# Patient Record
Sex: FEMALE | Race: WHITE | NOT HISPANIC OR LATINO | Employment: UNEMPLOYED | URBAN - METROPOLITAN AREA
[De-identification: names, ages, dates, MRNs, and addresses within clinical notes are randomized per-mention and may not be internally consistent; named-entity substitution may affect disease eponyms.]

---

## 2017-02-19 ENCOUNTER — HOSPITAL ENCOUNTER (EMERGENCY)
Facility: HOSPITAL | Age: 57
Discharge: HOME | End: 2017-02-19
Attending: FAMILY MEDICINE
Payer: MEDICAID

## 2017-02-19 VITALS
HEART RATE: 84 BPM | DIASTOLIC BLOOD PRESSURE: 80 MMHG | OXYGEN SATURATION: 99 % | BODY MASS INDEX: 30.82 KG/M2 | WEIGHT: 185 LBS | HEIGHT: 65 IN | RESPIRATION RATE: 20 BRPM | SYSTOLIC BLOOD PRESSURE: 160 MMHG | TEMPERATURE: 98 F

## 2017-02-19 DIAGNOSIS — K29.00 ACUTE SUPERFICIAL GASTRITIS WITHOUT HEMORRHAGE: Primary | ICD-10-CM

## 2017-02-19 LAB
ALBUMIN SERPL BCP-MCNC: 4 G/DL
ALP SERPL-CCNC: 36 IU/L
ALT SERPL W/O P-5'-P-CCNC: 15 IU/L
AMYLASE SERPL-CCNC: 64 U/L
ANION GAP SERPL CALC-SCNC: 10 MMOL/L
AST SERPL-CCNC: 21 IU/L
BACTERIA #/AREA URNS AUTO: NORMAL /HPF
BASOPHILS # BLD AUTO: 0.03 K/UL
BASOPHILS NFR BLD: 0.5 %
BILIRUB SERPL-MCNC: 0.5 MG/DL
BILIRUB UR QL STRIP: NEGATIVE
BUN SERPL-MCNC: 19 MG/DL
CALCIUM SERPL-MCNC: 9.4 MG/DL
CHLORIDE SERPL-SCNC: 97 MMOL/L
CLARITY UR REFRACT.AUTO: CLEAR
CO2 SERPL-SCNC: 32 MMOL/L
COLOR UR AUTO: YELLOW
CREAT SERPL-MCNC: 0.55 MG/DL
DIFFERENTIAL METHOD: ABNORMAL
EOSINOPHIL # BLD AUTO: 0 K/UL
EOSINOPHIL NFR BLD: 0.6 %
ERYTHROCYTE [DISTWIDTH] IN BLOOD BY AUTOMATED COUNT: 12.9 %
EST. GFR  (AFRICAN AMERICAN): >60 ML/MIN/1.73 M^2
EST. GFR  (NON AFRICAN AMERICAN): >60 ML/MIN/1.73 M^2
GLUCOSE SERPL-MCNC: 116 MG/DL
GLUCOSE UR QL STRIP: NEGATIVE
HCT VFR BLD AUTO: 39.4 %
HGB BLD-MCNC: 13.2 G/DL
HGB UR QL STRIP: NEGATIVE
KETONES UR QL STRIP: NEGATIVE
LEUKOCYTE ESTERASE UR QL STRIP: ABNORMAL
LIPASE SERPL-CCNC: 182 U/L
LYMPHOCYTES # BLD AUTO: 3 K/UL
LYMPHOCYTES NFR BLD: 47.9 %
MCH RBC QN AUTO: 29.9 PG
MCHC RBC AUTO-ENTMCNC: 33.5 %
MCV RBC AUTO: 89 FL
MICROSCOPIC COMMENT: NORMAL
MONOCYTES # BLD AUTO: 0.4 K/UL
MONOCYTES NFR BLD: 6.6 %
NEUTROPHILS # BLD AUTO: 2.7 K/UL
NEUTROPHILS NFR BLD: 44.2 %
NITRITE UR QL STRIP: NEGATIVE
PH UR STRIP: 8 [PH] (ref 5–8)
PLATELET # BLD AUTO: 157 K/UL
PMV BLD AUTO: 13.2 FL
POTASSIUM SERPL-SCNC: 4.4 MMOL/L
PROT SERPL-MCNC: 7.2 G/DL
PROT UR QL STRIP: NEGATIVE
RBC # BLD AUTO: 4.41 M/UL
RBC #/AREA URNS AUTO: 0 /HPF (ref 0–4)
SODIUM SERPL-SCNC: 139 MMOL/L
SP GR UR STRIP: 1.01 (ref 1–1.03)
SQUAMOUS #/AREA URNS AUTO: NORMAL /HPF
TROPONIN I SERPL DL<=0.01 NG/ML-MCNC: <0.012 NG/ML
URN SPEC COLLECT METH UR: ABNORMAL
UROBILINOGEN UR STRIP-ACNC: NEGATIVE EU/DL
WBC # BLD AUTO: 6.18 K/UL
WBC #/AREA URNS AUTO: 2 /HPF (ref 0–5)
WBC CLUMPS UR QL AUTO: NORMAL

## 2017-02-19 PROCEDURE — 96361 HYDRATE IV INFUSION ADD-ON: CPT

## 2017-02-19 PROCEDURE — 83690 ASSAY OF LIPASE: CPT

## 2017-02-19 PROCEDURE — 96375 TX/PRO/DX INJ NEW DRUG ADDON: CPT

## 2017-02-19 PROCEDURE — 96374 THER/PROPH/DIAG INJ IV PUSH: CPT

## 2017-02-19 PROCEDURE — 85025 COMPLETE CBC W/AUTO DIFF WBC: CPT

## 2017-02-19 PROCEDURE — 84484 ASSAY OF TROPONIN QUANT: CPT

## 2017-02-19 PROCEDURE — 63600175 PHARM REV CODE 636 W HCPCS: Performed by: FAMILY MEDICINE

## 2017-02-19 PROCEDURE — 81000 URINALYSIS NONAUTO W/SCOPE: CPT

## 2017-02-19 PROCEDURE — C9113 INJ PANTOPRAZOLE SODIUM, VIA: HCPCS | Performed by: FAMILY MEDICINE

## 2017-02-19 PROCEDURE — 80053 COMPREHEN METABOLIC PANEL: CPT

## 2017-02-19 PROCEDURE — 25000003 PHARM REV CODE 250: Performed by: FAMILY MEDICINE

## 2017-02-19 PROCEDURE — 93005 ELECTROCARDIOGRAM TRACING: CPT

## 2017-02-19 PROCEDURE — 82150 ASSAY OF AMYLASE: CPT

## 2017-02-19 PROCEDURE — 99284 EMERGENCY DEPT VISIT MOD MDM: CPT | Mod: 25

## 2017-02-19 RX ORDER — TRAMADOL HYDROCHLORIDE 50 MG/1
50 TABLET ORAL EVERY 8 HOURS PRN
Qty: 30 TABLET | Refills: 0 | Status: SHIPPED | OUTPATIENT
Start: 2017-02-19 | End: 2017-03-01

## 2017-02-19 RX ORDER — TRAMADOL HYDROCHLORIDE 50 MG/1
50 TABLET ORAL
Status: COMPLETED | OUTPATIENT
Start: 2017-02-19 | End: 2017-02-19

## 2017-02-19 RX ORDER — DICYCLOMINE HYDROCHLORIDE 20 MG/1
20 TABLET ORAL 3 TIMES DAILY PRN
Qty: 30 TABLET | Refills: 0 | Status: SHIPPED | OUTPATIENT
Start: 2017-02-19 | End: 2017-03-21

## 2017-02-19 RX ORDER — PANTOPRAZOLE SODIUM 20 MG/1
40 TABLET, DELAYED RELEASE ORAL DAILY
Qty: 30 TABLET | Refills: 0 | Status: SHIPPED | OUTPATIENT
Start: 2017-02-19 | End: 2017-05-12 | Stop reason: CLARIF

## 2017-02-19 RX ORDER — PANTOPRAZOLE SODIUM 40 MG/10ML
40 INJECTION, POWDER, LYOPHILIZED, FOR SOLUTION INTRAVENOUS
Status: COMPLETED | OUTPATIENT
Start: 2017-02-19 | End: 2017-02-19

## 2017-02-19 RX ORDER — ONDANSETRON 2 MG/ML
4 INJECTION INTRAMUSCULAR; INTRAVENOUS
Status: COMPLETED | OUTPATIENT
Start: 2017-02-19 | End: 2017-02-19

## 2017-02-19 RX ADMIN — PANTOPRAZOLE SODIUM 40 MG: 40 INJECTION, POWDER, FOR SOLUTION INTRAVENOUS at 10:02

## 2017-02-19 RX ADMIN — LIDOCAINE HYDROCHLORIDE: 20 SOLUTION ORAL; TOPICAL at 12:02

## 2017-02-19 RX ADMIN — TRAMADOL HYDROCHLORIDE 50 MG: 50 TABLET, COATED ORAL at 12:02

## 2017-02-19 RX ADMIN — SODIUM CHLORIDE 1000 ML: 0.9 INJECTION, SOLUTION INTRAVENOUS at 10:02

## 2017-02-19 RX ADMIN — ONDANSETRON 4 MG: 2 INJECTION INTRAMUSCULAR; INTRAVENOUS at 10:02

## 2017-02-19 NOTE — ED PROVIDER NOTES
Encounter Date: 2017       History     Chief Complaint   Patient presents with    Nausea     nausea accompinied by sharp pains under right ribs x 4 days      Review of patient's allergies indicates:   Allergen Reactions    Codeine Hives    Sulfa (sulfonamide antibiotics) Hives     HPI Comments: Patient complains of right upper quadrant pain since last 4 days.  Mild nausea vomiting so far.  No blood.  No diarrhea.  No fever.  Patient says she had a kidney tumor removed about 4 years ago.    The history is provided by the patient.     Past Medical History   Diagnosis Date    Anxiety     Depression     High cholesterol     Hypertension      No past medical history pertinent negatives.  Past Surgical History   Procedure Laterality Date    Hysterectomy      S-section       section      Breast surgery       History reviewed. No pertinent family history.  Social History   Substance Use Topics    Smoking status: Never Smoker    Smokeless tobacco: None    Alcohol use No     Review of Systems   Constitutional: Negative for activity change, appetite change, chills, fatigue, fever and unexpected weight change.   HENT: Negative for ear discharge, ear pain and sore throat.    Eyes: Negative for pain, discharge, redness and itching.   Respiratory: Negative for cough, choking, chest tightness, shortness of breath and wheezing.    Gastrointestinal: Positive for abdominal pain, nausea and vomiting. Negative for abdominal distention, anal bleeding, blood in stool, constipation and diarrhea.   Genitourinary: Negative for dysuria.   Musculoskeletal: Negative for back pain, neck pain and neck stiffness.   Skin: Negative for rash.   Neurological: Negative for dizziness, light-headedness and numbness.   Psychiatric/Behavioral: The patient is not nervous/anxious.    All other systems reviewed and are negative.      Physical Exam   Initial Vitals   BP Pulse Resp Temp SpO2   17 0949 17 0949 17 0949  02/19/17 0949 02/19/17 0949   160/66 73 20 98.1 °F (36.7 °C) 96 %     Physical Exam    Nursing note and vitals reviewed.  Constitutional: She appears well-developed and well-nourished.   HENT:   Head: Normocephalic and atraumatic.   Right Ear: External ear normal.   Left Ear: External ear normal.   Nose: Nose normal.   Mouth/Throat: Oropharynx is clear and moist.   Eyes: Conjunctivae and EOM are normal. Pupils are equal, round, and reactive to light.   Neck: Normal range of motion. Neck supple.   Cardiovascular: Normal rate, regular rhythm, normal heart sounds and intact distal pulses.   Pulmonary/Chest: Breath sounds normal. No respiratory distress. She has no wheezes. She has no rhonchi. She has no rales. She exhibits no tenderness.   Abdominal: Soft. Bowel sounds are normal. She exhibits no distension and no mass. There is no hepatosplenomegaly. There is tenderness in the right upper quadrant. There is guarding. There is no rebound, no tenderness at McBurney's point and negative Hickey's sign.       Musculoskeletal: Normal range of motion.   Neurological: She is alert and oriented to person, place, and time. She has normal strength and normal reflexes. She displays normal reflexes. No cranial nerve deficit or sensory deficit.         ED Course   Procedures  Labs Reviewed   CBC W/ AUTO DIFFERENTIAL - Abnormal; Notable for the following:        Result Value    MPV 13.2 (*)     All other components within normal limits   COMPREHENSIVE METABOLIC PANEL - Abnormal; Notable for the following:     CO2 32 (*)     Glucose 116 (*)     BUN, Bld 19 (*)     Alkaline Phosphatase 36 (*)     All other components within normal limits   URINALYSIS - Abnormal; Notable for the following:     Leukocytes, UA 1+ (*)     All other components within normal limits   TROPONIN I   LIPASE   AMYLASE   URINALYSIS MICROSCOPIC     EKG Readings: (Independently Interpreted)   Rhythm: Normal Sinus Rhythm. Ectopy: No Ectopy. Conduction: Normal. ST  Segments: Normal ST Segments. T Waves: Normal. Clinical Impression: Normal Sinus Rhythm                            ED Course   pain better and pt feels safe to go home.  Clinical Impression:   The encounter diagnosis was Acute superficial gastritis without hemorrhage.    Disposition:   Disposition: Discharged  Condition: Stable  Advised to follow up with primary care physician if pain persist after taking medication for further evaluation.       Maximo Jay MD  02/19/17 4113

## 2017-02-19 NOTE — ED AVS SNAPSHOT
[facility]  [address] [place] [address]               Ashli Kumar   2017  9:48 AM   ED    Description:  Female : 1960   Department:  [facility]           Your Care was Coordinated By:     Provider Role From To    Maximo Jay MD Attending Provider 17 0954 --      Reason for Visit     Nausea           Diagnoses this Visit        Comments    Acute superficial gastritis without hemorrhage    -  Primary       ED Disposition     None           To Do List           Follow-up Information     Follow up with Kobi Harrington MD In 1 week.    Specialty:  Family Medicine    Contact information:    [address] [place] [id number] [phone]         These Medications        Disp Refills Start End    pantoprazole (PROTONIX) 20 MG tablet 30 tablet 0 2017    Take 2 tablets (40 mg total) by mouth once daily. - Oral    dicyclomine (BENTYL) 20 mg tablet 30 tablet 0 2017 3/21/2017    Take 1 tablet (20 mg total) by mouth 3 (three) times daily as needed (pain). - Oral    tramadol (ULTRAM) 50 mg tablet 30 tablet 0 2017 3/1/2017    Take 1 tablet (50 mg total) by mouth every 8 (eight) hours as needed for Pain. - Oral      [facility]     [facility] On Call Nurse Care Line -  Assistance  Registered nurses in the [facility] provide clinical advisement, health education, appointment booking, and other advisory services.  Call for this free service at [phone].             Medications           Message regarding Medications     Verify the changes and/or additions to your medication regime listed below are the same as discussed with your clinician today.  If any of these changes or additions are incorrect, please notify your healthcare provider.        START taking these NEW medications        Refills    pantoprazole (PROTONIX) 20 MG tablet 0    Sig: Take 2 tablets (40 mg total) by  mouth once daily.    Class: Print    Route: Oral    dicyclomine (BENTYL) 20 mg tablet 0    Sig: Take 1 tablet (20 mg total) by mouth 3 (three) times daily as needed (pain).    Class: Print    Route: Oral    tramadol (ULTRAM) 50 mg tablet 0    Sig: Take 1 tablet (50 mg total) by mouth every 8 (eight) hours as needed for Pain.    Class: Print    Route: Oral      These medications were administered today        Dose Freq    ondansetron injection 4 mg 4 mg ED 1 Time    Sig: Inject 4 mg into the vein ED 1 Time.    Class: Normal    Route: Intravenous    sodium chloride 0.9% bolus 1,000 mL 1,000 mL ED 1 Time    Sig: Inject 1,000 mLs into the vein ED 1 Time.    Class: Normal    Route: Intravenous    pantoprazole injection 40 mg 40 mg ED 1 Time    Sig: Inject 40 mg into the vein ED 1 Time.    Class: Normal    Route: Intravenous    (pyxis) gi cocktail (mylanta 30 mL, lidocaine 2 % viscous 10 mL, dicyclomine 10 mL) 50 mL  ED 1 Time    Sig: Take by mouth ED 1 Time.    Class: Normal    Route: Oral    tramadol tablet 50 mg 50 mg ED 1 Time    Sig: Take 1 tablet (50 mg total) by mouth ED 1 Time.    Class: Normal    Route: Oral      STOP taking these medications     hydrocodone-acetaminophen 7.5-325mg (NORCO) 7.5-325 mg per tablet Take 1 tablet by mouth every 6 (six) hours as needed for Pain.           Verify that the below list of medications is an accurate representation of the medications you are currently taking.  If none reported, the list may be blank. If incorrect, please contact your healthcare provider. Carry this list with you in case of emergency.           Current Medications     (pyxis) gi cocktail (mylanta 30 mL, lidocaine 2 % viscous 10 mL, dicyclomine 10 mL) 50 mL Take by mouth ED 1 Time.    dicyclomine (BENTYL) 20 mg tablet Take 1 tablet (20 mg total) by mouth 3 (three) times daily as needed (pain).    fluoxetine (PROZAC) 10 MG Tab Take 4 tablets (40 mg total) by mouth every 12 (twelve) hours.    pantoprazole  "(PROTONIX) 20 MG tablet Take 2 tablets (40 mg total) by mouth once daily.    pantoprazole injection 40 mg Inject 40 mg into the vein ED 1 Time.    tramadol (ULTRAM) 50 mg tablet Take 1 tablet (50 mg total) by mouth every 8 (eight) hours as needed for Pain.    tramadol tablet 50 mg Take 1 tablet (50 mg total) by mouth ED 1 Time.           Clinical Reference Information           Your Vitals Were     BP Pulse Temp Resp Height Weight    155/90 75 98 °F (36.7 °C) 20 5' 5" (1.651 m) 83.9 kg (185 lb)    SpO2 BMI             97% 30.79 kg/m2         Allergies as of 2/19/2017        Reactions    Codeine Hives    Sulfa (Sulfonamide Antibiotics) Hives      Immunizations Administered on Date of Encounter - 2/19/2017     None      ED Micro, Lab, POCT     Start Ordered       Status Ordering Provider    02/19/17 1007 02/19/17 1007  Troponin I  STAT      Final result     02/19/17 1007 02/19/17 1007  CBC auto differential  STAT      Final result     02/19/17 1007 02/19/17 1007  Comprehensive metabolic panel  STAT      Final result     02/19/17 1007 02/19/17 1007  Lipase  STAT      Final result     02/19/17 1007 02/19/17 1007  Urinalysis  STAT      Edited Result - FINAL     02/19/17 1007 02/19/17 1007  Amylase  STAT      Final result     02/19/17 1007 02/19/17 1007  Urinalysis Microscopic  Once      Final result       ED Imaging Orders     Start Ordered       Status Ordering Provider    02/19/17 1107 02/19/17 1107  CT Abdomen Pelvis  Without Contrast  1 time imaging      Final result         Discharge Instructions         Treating Gastritis     Take your medicines as directed, even if your stomach pain goes away.    A medical evaluation will be done to find out the cause of your symptoms. The evaluation may include your health history, a physical exam, and some tests. Once your evaluation is done, treatment can begin. It may include taking certain medicines and making some lifestyle changes. Follow your healthcare providers " advice.  Taking medicines  Your healthcare providers may prescribe some medicines to neutralize or reduce excess stomach acids. If tests show that H. pylori are in your stomach lining, antibiotics may be prescribed. H.pylori are a type of bacteria that can cause gastritis.  Avoiding certain things  Be sure to avoid:  · Aspirin. Avoid taking aspirin and other anti-inflammatory medicines, such as ibuprofen. They can irritate your stomach lining. Also, check with your healthcare provider before taking or stopping any medicines.  · Spicy foods and caffeine. Stay away from foods prepared with spices, especially black pepper. Caffeine can also make your symptoms worse. So, avoid coffee, tea, cola drinks, and chocolate. Be sure to tell your healthcare provider about any other foods or liquids that bother your stomach.  · Tobacco and alcohol. Dont use tobacco or drink alcohol. Tobacco and alcohol can increase stomach acids and worsen your gastritis symptoms.  Reducing your stress  Stress may make your gastritis symptoms worse. Whenever you can, reduce the stress in your life. One way to do this is to start an exercise program--talk to your healthcare provider first. Also, try to get enough sleep, at least 8 hours a night.  Date Last Reviewed: 7/1/2016  © 0027-6574 [employer]. [address] [place] [address]. All rights reserved. This information is not intended as a substitute for professional medical care. Always follow your healthcare professional's instructions.          [facility] Sign-Up     Activating your [facility] account is as easy as 1-2-3!     1) Visit [email or web address], select Sign Up Now, enter this activation code and your date of birth, then select Next.  [email or web address]  Expires: 4/5/2017 12:10 PM      2) Create a username and password to use when you visit [facility] in the future and select a security question in case you lose your password and select Next.    3) Enter your e-mail  address and click Sign Up!    Additional Information  If you have questions, please e-mail [email or web address] or call [phone] to talk to our [facility] staff. Remember, [facility] is NOT to be used for urgent needs. For medical emergencies, dial 911.          [facility] complies with applicable Federal civil rights laws and does not discriminate on the basis of race, color, national origin, age, disability, or sex.        Language Assistance Services     ATTENTION: Language assistance services are available, free of charge. Please call [phone].      ATENCIÓN: Si habla español, tiene a zavala disposición servicios gratuitos de asistencia lingüística. Llame al [phone].     CHÚ Ý: N?u b?n nói Ti?ng Vi?t, có các d?ch v? h? tr? ngôn ng? mi?n phí dành cho b?n. G?i s? [phone].

## 2017-02-19 NOTE — DISCHARGE INSTRUCTIONS
Treating Gastritis     Take your medicines as directed, even if your stomach pain goes away.    A medical evaluation will be done to find out the cause of your symptoms. The evaluation may include your health history, a physical exam, and some tests. Once your evaluation is done, treatment can begin. It may include taking certain medicines and making some lifestyle changes. Follow your healthcare providers advice.  Taking medicines  Your healthcare providers may prescribe some medicines to neutralize or reduce excess stomach acids. If tests show that H. pylori are in your stomach lining, antibiotics may be prescribed. H.pylori are a type of bacteria that can cause gastritis.  Avoiding certain things  Be sure to avoid:  · Aspirin. Avoid taking aspirin and other anti-inflammatory medicines, such as ibuprofen. They can irritate your stomach lining. Also, check with your healthcare provider before taking or stopping any medicines.  · Spicy foods and caffeine. Stay away from foods prepared with spices, especially black pepper. Caffeine can also make your symptoms worse. So, avoid coffee, tea, cola drinks, and chocolate. Be sure to tell your healthcare provider about any other foods or liquids that bother your stomach.  · Tobacco and alcohol. Dont use tobacco or drink alcohol. Tobacco and alcohol can increase stomach acids and worsen your gastritis symptoms.  Reducing your stress  Stress may make your gastritis symptoms worse. Whenever you can, reduce the stress in your life. One way to do this is to start an exercise program--talk to your healthcare provider first. Also, try to get enough sleep, at least 8 hours a night.  Date Last Reviewed: 7/1/2016  © 6555-3742 [employer]. [address] [place] [address], [place] [address]. All rights reserved. This information is not intended as a substitute for professional medical care. Always follow your healthcare professional's instructions.

## 2017-05-04 ENCOUNTER — OFFICE VISIT (OUTPATIENT)
Dept: GASTROENTEROLOGY | Facility: CLINIC | Age: 57
End: 2017-05-04
Payer: MEDICAID

## 2017-05-04 VITALS — HEIGHT: 65 IN | WEIGHT: 184.06 LBS | BODY MASS INDEX: 30.67 KG/M2

## 2017-05-04 DIAGNOSIS — Z12.11 COLON CANCER SCREENING: ICD-10-CM

## 2017-05-04 DIAGNOSIS — R10.12 ABDOMINAL PAIN, LUQ (LEFT UPPER QUADRANT): ICD-10-CM

## 2017-05-04 DIAGNOSIS — R10.13 DYSPEPSIA: ICD-10-CM

## 2017-05-04 DIAGNOSIS — R19.5 LOOSE STOOLS: ICD-10-CM

## 2017-05-04 DIAGNOSIS — R10.13 ABDOMINAL PAIN, EPIGASTRIC: Primary | ICD-10-CM

## 2017-05-04 DIAGNOSIS — K62.5 RECTAL BLEEDING: ICD-10-CM

## 2017-05-04 DIAGNOSIS — R15.2 FECAL URGENCY: ICD-10-CM

## 2017-05-04 DIAGNOSIS — R10.11 ABDOMINAL PAIN, RUQ (RIGHT UPPER QUADRANT): ICD-10-CM

## 2017-05-04 PROCEDURE — 99213 OFFICE O/P EST LOW 20 MIN: CPT | Mod: PBBFAC,PN | Performed by: NURSE PRACTITIONER

## 2017-05-04 PROCEDURE — 99204 OFFICE O/P NEW MOD 45 MIN: CPT | Mod: S$PBB,,, | Performed by: NURSE PRACTITIONER

## 2017-05-04 PROCEDURE — 99999 PR PBB SHADOW E&M-EST. PATIENT-LVL III: CPT | Mod: PBBFAC,,, | Performed by: NURSE PRACTITIONER

## 2017-05-04 RX ORDER — CLONAZEPAM 0.5 MG/1
0.5 TABLET ORAL 2 TIMES DAILY PRN
COMMUNITY
End: 2019-02-09

## 2017-05-04 RX ORDER — OXYBUTYNIN CHLORIDE 5 MG/1
5 TABLET ORAL 3 TIMES DAILY
COMMUNITY
End: 2022-05-27

## 2017-05-04 RX ORDER — DICYCLOMINE HYDROCHLORIDE 10 MG/1
10 CAPSULE ORAL 4 TIMES DAILY PRN
Qty: 120 CAPSULE | Refills: 3 | Status: SHIPPED | OUTPATIENT
Start: 2017-05-04 | End: 2017-06-03

## 2017-05-04 RX ORDER — LISINOPRIL AND HYDROCHLOROTHIAZIDE 10; 12.5 MG/1; MG/1
1 TABLET ORAL DAILY
Status: ON HOLD | COMMUNITY
End: 2019-05-13 | Stop reason: HOSPADM

## 2017-05-04 RX ORDER — PRAVASTATIN SODIUM 40 MG/1
40 TABLET ORAL DAILY
COMMUNITY
End: 2022-05-27

## 2017-05-04 RX ORDER — DIVALPROEX SODIUM 500 MG/1
500 TABLET, FILM COATED, EXTENDED RELEASE ORAL 2 TIMES DAILY
COMMUNITY
End: 2022-06-27 | Stop reason: SDUPTHER

## 2017-05-04 RX ORDER — ASPIRIN 81 MG/1
81 TABLET ORAL DAILY
Status: ON HOLD | COMMUNITY
End: 2019-05-13 | Stop reason: HOSPADM

## 2017-05-04 RX ORDER — FLUOXETINE HYDROCHLORIDE 40 MG/1
40 CAPSULE ORAL DAILY
COMMUNITY
End: 2022-06-27 | Stop reason: SDUPTHER

## 2017-05-04 RX ORDER — PANTOPRAZOLE SODIUM 40 MG/1
40 TABLET, DELAYED RELEASE ORAL DAILY
Qty: 30 TABLET | Refills: 3 | Status: SHIPPED | OUTPATIENT
Start: 2017-05-04 | End: 2017-05-12 | Stop reason: CLARIF

## 2017-05-04 RX ORDER — METOPROLOL SUCCINATE 50 MG/1
50 TABLET, EXTENDED RELEASE ORAL DAILY
COMMUNITY
End: 2022-05-27

## 2017-05-04 RX ORDER — LORATADINE 10 MG/1
10 TABLET ORAL DAILY
COMMUNITY
End: 2017-12-22

## 2017-05-04 NOTE — PROGRESS NOTES
Subjective:       Patient ID: Ashli Kumar is a 56 y.o. female.    Chief Complaint: Gas    HPI  Reports presenting to ED in February with upper abdominal pain. (6/10).  She was started on pantoprazole and bentyl which did provide some benefit.  She is not longer taking these and has continued to have upper abdominal pain, increased gas, belching, reflux, indigestion.  She also reports fecal urgency with loose stools after eating.  Certain foods may be worse like greasy foods and cheese.  Has noted blood episodically with bowel movements.  No black stools.  No nausea or vomiting.   No dysphagia.   Labs and CT at ED were unremarkable.  She has never had EGD or colonoscopy.    Review of Systems   Constitutional: Negative.  Negative for activity change, appetite change, fatigue, fever and unexpected weight change.   HENT: Negative.  Negative for sore throat and trouble swallowing.    Respiratory: Negative.  Negative for cough, choking and shortness of breath.    Cardiovascular: Negative.  Negative for chest pain.   Gastrointestinal: Positive for abdominal pain and diarrhea. Negative for abdominal distention, blood in stool, constipation, nausea and vomiting.   Genitourinary: Negative.  Negative for difficulty urinating, dysuria and hematuria.   Musculoskeletal: Negative.  Negative for neck pain and neck stiffness.   Skin: Negative.    Neurological: Negative.  Negative for dizziness, syncope, weakness and light-headedness.   Psychiatric/Behavioral: Negative.        Objective:      Physical Exam   Constitutional: She is oriented to person, place, and time. She appears well-developed and well-nourished. No distress.   HENT:   Head: Normocephalic.   Eyes: No scleral icterus.   Neck: Neck supple.   Cardiovascular: Normal rate.    Pulmonary/Chest: Effort normal. No respiratory distress.   Abdominal: Soft. Normal appearance and bowel sounds are normal. She exhibits no distension and no mass. There is tenderness in  the right upper quadrant, epigastric area and left upper quadrant.   Musculoskeletal: Normal range of motion. She exhibits no edema.   Neurological: She is alert and oriented to person, place, and time.   Skin: Skin is warm and dry. She is not diaphoretic. No pallor.   Psychiatric: She has a normal mood and affect. Her behavior is normal. Judgment and thought content normal.   Vitals reviewed.      Assessment:       1. Abdominal pain, epigastric    2. Abdominal pain, LUQ (left upper quadrant)    3. Abdominal pain, RUQ (right upper quadrant)    4. Dyspepsia    5. Rectal bleeding    6. Fecal urgency    7. Loose stools    8. Colon cancer screening        Plan:         Ashli Antonio was seen today for gas.    Diagnoses and all orders for this visit:    Abdominal pain, epigastric  -     US Abdomen Complete; Future    Abdominal pain, LUQ (left upper quadrant)  -     US Abdomen Complete; Future    Abdominal pain, RUQ (right upper quadrant)  -     US Abdomen Complete; Future    Dyspepsia    Rectal bleeding    Fecal urgency    Loose stools    Colon cancer screening    Other orders  -     pantoprazole (PROTONIX) 40 MG tablet; Take 1 tablet (40 mg total) by mouth once daily.  -     dicyclomine (BENTYL) 10 MG capsule; Take 1 capsule (10 mg total) by mouth 4 (four) times daily as needed.  -     Case request GI: ESOPHAGOGASTRODUODENOSCOPY (EGD), COLONOSCOPY    I have explained the planned procedures to the patient.The risks, benefits and alternatives of the procedure were also explained in detail. Patient verbalized understanding, all questions were answered. The patient agrees to proceed as planned.    Lactose free trial.  Gas-x.  Resume bentyl and pantoprazole.    Follow up after the above.

## 2017-05-11 ENCOUNTER — ANESTHESIA EVENT (OUTPATIENT)
Dept: ENDOSCOPY | Facility: HOSPITAL | Age: 57
End: 2017-05-11
Payer: MEDICAID

## 2017-05-11 ENCOUNTER — TELEPHONE (OUTPATIENT)
Dept: GASTROENTEROLOGY | Facility: CLINIC | Age: 57
End: 2017-05-11

## 2017-05-11 NOTE — TELEPHONE ENCOUNTER
----- Message from Tracy Huang sent at 5/11/2017  7:09 AM CDT -----  No. [phone]   Patient is having a procedure on Friday, 5/12/17.   What type of diet is she on today.   Please call.

## 2017-05-11 NOTE — ANESTHESIA PREPROCEDURE EVALUATION
2017  Ashli Kumar is a 56 y.o., female w abd pain, dyspepsia, rectal bleeding & loose stools for EGD & colonoscopy.    PRIOR ANES (in Epic)    none 2017    ANES-RELATED MED/SURG  HTN  HLD    Past Surgical History:   Procedure Laterality Date    BREAST SURGERY       SECTION      HYSTERECTOMY      s-section       ALLERGIES  Review of patient's allergies indicates:   Allergen Reactions    Codeine Hives    Sulfa (sulfonamide antibiotics) Hives     ANES-RELATED HOME Rx  ?      Anesthesia Evaluation         Review of Systems  Anesthesia Hx:  History of prior surgery of interest to airway management or planning: Denies Personal Hx of Anesthesia complications.   Social:  Non-Smoker, No Alcohol Use    Hematology/Oncology:  Hematology Normal   Oncology Normal     EENT/Dental:  EENT/Dental Normal 2 missing teeth  No nosebleeds   Cardiovascular:   Exercise tolerance: good Hypertension    Pulmonary:   Snores  Hi STOPBANG   Renal/:   Chronic Renal Disease Kidney tumor removed   Neurological:  Neurology Normal    Endocrine:  Endocrine Normal      Wt Readings from Last 1 Encounters:   17 83.5 kg (184 lb 1.4 oz)     Temp Readings from Last 1 Encounters:   17 36.7 °C (98 °F)     BP Readings from Last 1 Encounters:   17 (!) 160/80     Pulse Readings from Last 1 Encounters:   17 84     SpO2 Readings from Last 1 Encounters:   17 99%       Physical Exam   Airway/Jaw/Neck:  Airway Findings: Mouth Opening: < 3 cm     Dental:  DENTAL FINDINGS: Normal            Lab Results   Component Value Date    WBC 6.18 2017    HGB 13.2 2017    HCT 39.4 2017    MCV 89 2017     2017       Chemistry        Component Value Date/Time     2017 1032    K 4.4 2017 1032    CL 97 2017 1032    CO2 32 (H) 2017 1032    BUN  19 (H) 02/19/2017 1032    CREATININE 0.55 02/19/2017 1032     (H) 02/19/2017 1032        Component Value Date/Time    CALCIUM 9.4 02/19/2017 1032    ALKPHOS 36 (L) 02/19/2017 1032    AST 21 02/19/2017 1032    ALT 15 02/19/2017 1032    BILITOT 0.5 02/19/2017 1032          Lab Results   Component Value Date    ALBUMIN 4.0 02/19/2017     No results found for: TSH, A8BYODZ, J3KRFDR, THYROIDAB    CXR 2016-11-04  Heart size is normal. The lung fields are clear. No acute pulmonary infiltrate.  Chronic spurring of the thoracic spine is present.    EKG 2017-02-19  Normal sinus rhythm  Normal ECG  When compared with ECG of 13-OCT-2016 09:49,  No significant change was found  Confirmed by Bhargav    ECHO      Anesthesia Plan  Type of Anesthesia, risks & benefits discussed:  Anesthesia Type:  MAC  Patient's Preference:   Intra-op Monitoring Plan:   Intra-op Monitoring Plan Comments:   Post Op Pain Control Plan:   Post Op Pain Control Plan Comments:   Induction:    Beta Blocker:         Informed Consent: Patient understands risks and agrees with Anesthesia plan.  Questions answered. Anesthesia consent signed with patient.  ASA Score: 2     Day of Surgery Review of History & Physical:            Ready For Surgery From Anesthesia Perspective.

## 2017-05-12 ENCOUNTER — HOSPITAL ENCOUNTER (OUTPATIENT)
Facility: HOSPITAL | Age: 57
Discharge: HOME | End: 2017-05-12
Attending: INTERNAL MEDICINE | Admitting: INTERNAL MEDICINE
Payer: MEDICAID

## 2017-05-12 ENCOUNTER — ANESTHESIA (OUTPATIENT)
Dept: ENDOSCOPY | Facility: HOSPITAL | Age: 57
End: 2017-05-12
Payer: MEDICAID

## 2017-05-12 DIAGNOSIS — R10.11 RIGHT UPPER QUADRANT PAIN: ICD-10-CM

## 2017-05-12 DIAGNOSIS — R19.7 DIARRHEA, UNSPECIFIED TYPE: ICD-10-CM

## 2017-05-12 DIAGNOSIS — R10.12 LEFT UPPER QUADRANT PAIN: ICD-10-CM

## 2017-05-12 DIAGNOSIS — R10.13 EPIGASTRIC PAIN: Primary | ICD-10-CM

## 2017-05-12 DIAGNOSIS — Z12.12 SCREENING FOR COLORECTAL CANCER: ICD-10-CM

## 2017-05-12 DIAGNOSIS — Z12.11 SCREENING FOR COLORECTAL CANCER: ICD-10-CM

## 2017-05-12 DIAGNOSIS — Z12.11 COLON CANCER SCREENING: Primary | ICD-10-CM

## 2017-05-12 PROCEDURE — 25000003 PHARM REV CODE 250: Performed by: NURSE ANESTHETIST, CERTIFIED REGISTERED

## 2017-05-12 PROCEDURE — 37000009 HC ANESTHESIA EA ADD 15 MINS: Performed by: INTERNAL MEDICINE

## 2017-05-12 PROCEDURE — 37000008 HC ANESTHESIA 1ST 15 MINUTES: Performed by: INTERNAL MEDICINE

## 2017-05-12 PROCEDURE — 25000003 PHARM REV CODE 250: Performed by: INTERNAL MEDICINE

## 2017-05-12 PROCEDURE — 63600175 PHARM REV CODE 636 W HCPCS: Performed by: NURSE ANESTHETIST, CERTIFIED REGISTERED

## 2017-05-12 PROCEDURE — 43239 EGD BIOPSY SINGLE/MULTIPLE: CPT | Performed by: INTERNAL MEDICINE

## 2017-05-12 PROCEDURE — 88342 IMHCHEM/IMCYTCHM 1ST ANTB: CPT | Mod: 26,,, | Performed by: PATHOLOGY

## 2017-05-12 PROCEDURE — 45378 DIAGNOSTIC COLONOSCOPY: CPT | Performed by: INTERNAL MEDICINE

## 2017-05-12 PROCEDURE — 27201012 HC FORCEPS, HOT/COLD, DISP: Performed by: INTERNAL MEDICINE

## 2017-05-12 PROCEDURE — 88305 TISSUE EXAM BY PATHOLOGIST: CPT | Mod: 26,,, | Performed by: PATHOLOGY

## 2017-05-12 PROCEDURE — 88305 TISSUE EXAM BY PATHOLOGIST: CPT | Performed by: PATHOLOGY

## 2017-05-12 PROCEDURE — G0121 COLON CA SCRN NOT HI RSK IND: HCPCS | Mod: 53 | Performed by: INTERNAL MEDICINE

## 2017-05-12 PROCEDURE — 45378 DIAGNOSTIC COLONOSCOPY: CPT | Mod: 52,,, | Performed by: INTERNAL MEDICINE

## 2017-05-12 PROCEDURE — 43239 EGD BIOPSY SINGLE/MULTIPLE: CPT | Mod: 51,,, | Performed by: INTERNAL MEDICINE

## 2017-05-12 RX ORDER — SODIUM CHLORIDE 9 MG/ML
INJECTION, SOLUTION INTRAVENOUS CONTINUOUS
Status: DISCONTINUED | OUTPATIENT
Start: 2017-05-12 | End: 2017-05-12 | Stop reason: HOSPADM

## 2017-05-12 RX ORDER — LIDOCAINE HCL/PF 100 MG/5ML
SYRINGE (ML) INTRAVENOUS
Status: DISCONTINUED | OUTPATIENT
Start: 2017-05-12 | End: 2017-05-12

## 2017-05-12 RX ORDER — PROPOFOL 10 MG/ML
VIAL (ML) INTRAVENOUS CONTINUOUS PRN
Status: DISCONTINUED | OUTPATIENT
Start: 2017-05-12 | End: 2017-05-12

## 2017-05-12 RX ORDER — OMEPRAZOLE 40 MG/1
40 CAPSULE, DELAYED RELEASE ORAL DAILY
Qty: 30 CAPSULE | Refills: 2 | Status: SHIPPED | OUTPATIENT
Start: 2017-05-12 | End: 2019-07-27 | Stop reason: SDUPTHER

## 2017-05-12 RX ORDER — PROPOFOL 10 MG/ML
VIAL (ML) INTRAVENOUS
Status: DISCONTINUED | OUTPATIENT
Start: 2017-05-12 | End: 2017-05-12

## 2017-05-12 RX ADMIN — PROPOFOL 50 MG: 10 INJECTION, EMULSION INTRAVENOUS at 08:05

## 2017-05-12 RX ADMIN — SODIUM CHLORIDE: 0.9 INJECTION, SOLUTION INTRAVENOUS at 09:05

## 2017-05-12 RX ADMIN — LIDOCAINE HYDROCHLORIDE 80 MG: 20 INJECTION, SOLUTION INTRAVENOUS at 08:05

## 2017-05-12 RX ADMIN — SODIUM CHLORIDE: 0.9 INJECTION, SOLUTION INTRAVENOUS at 08:05

## 2017-05-12 RX ADMIN — TOPICAL ANESTHETIC 1 EACH: 200 SPRAY DENTAL; PERIODONTAL at 08:05

## 2017-05-12 RX ADMIN — PROPOFOL 125 MCG/KG/MIN: 10 INJECTION, EMULSION INTRAVENOUS at 08:05

## 2017-05-12 NOTE — PLAN OF CARE
Past Medical History:   Diagnosis Date    Anxiety     Depression     High cholesterol     Hypertension      Accompanied by spouse, ok to speak to him re results of exam.

## 2017-05-12 NOTE — INTERVAL H&P NOTE
The patient has been examined and the H&P has been reviewed:    I concur with the findings and no changes have occurred since H&P was written.    Anesthesia/Surgery risks, benefits and alternative options discussed and understood by patient/family.          Active Hospital Problems    Diagnosis  POA    Screening for colorectal cancer [Z12.11, Z12.12]  Not Applicable      Resolved Hospital Problems    Diagnosis Date Resolved POA   No resolved problems to display.

## 2017-05-12 NOTE — ANESTHESIA POSTPROCEDURE EVALUATION
"Anesthesia Post Evaluation    Patient: Ashli Kumar    Procedure(s) Performed: Procedure(s) (LRB):  ESOPHAGOGASTRODUODENOSCOPY (EGD) (N/A)  COLONOSCOPY Miralax (N/A)    Final Anesthesia Type: MAC  Patient location during evaluation: GI PACU  Patient participation: Yes- Able to Participate  Level of consciousness: awake and alert and oriented  Post-procedure vital signs: reviewed and stable  Pain management: adequate  Airway patency: patent  PONV status at discharge: No PONV  Anesthetic complications: no      Cardiovascular status: blood pressure returned to baseline, hemodynamically stable and stable  Respiratory status: room air, unassisted and spontaneous ventilation  Hydration status: euvolemic  Follow-up not needed.        Visit Vitals    /84 (BP Location: Right arm, Patient Position: Lying, BP Method: Automatic)    Pulse (!) 56    Temp 36.4 °C (97.5 °F) (Oral)    Resp 20    Ht 5' 5" (1.651 m)    Wt 83 kg (183 lb)    SpO2 (!) 93%    Breastfeeding No    BMI 30.45 kg/m2       Pain/Geraldo Score: Pain Assessment Performed: Yes (5/12/2017  7:55 AM)  Presence of Pain: denies (5/12/2017  7:55 AM)      "

## 2017-05-12 NOTE — IP AVS SNAPSHOT
[facility]  [address]  [place] [address]  Phone: [phone]           Patient Discharge Instructions   Our goal is to set you up for success. This packet includes information on your condition, medications, and your home care.  It will help you care for yourself to prevent having to return to the hospital.     Please ask your nurse if you have any questions.      There are many details to remember when preparing to leave the hospital. Here is what you will need to do:    1. Take your medicine. If you are prescribed medications, review your Medication List on the following pages. You may have new medications to  at the pharmacy and others that you'll need to stop taking. Review the instructions for how and when to take your medications. Talk with your doctor or nurses if you are unsure of what to do.     2. Go to your follow-up appointments. Specific follow-up information is listed in the following pages. Your may be contacted by a nurse or clinical provider about future appointments. Be sure we have all of the phone numbers to reach you. Please contact your provider's office if you are unable to make an appointment.     3. Watch for warning signs. Your doctor or nurse will give you detailed warning signs to watch for and when to call for assistance. These instructions may also include educational information about your condition. If you experience any of warning signs to your health, call your doctor.               ** Verify the list of medication(s) below is accurate and up to date. Carry this with you in case of emergency. If your medications have changed, please notify your healthcare provider.             Medication List      CONTINUE taking these medications        Additional Info                      aspirin 81 MG EC tablet   Commonly known as:  ECOTRIN   Refills:  0   Dose:  81 mg    Instructions:  Take 81 mg by mouth once daily.     Begin Date    AM    Noon    PM    Bedtime        clonazePAM 0.5 MG tablet   Commonly known as:  KLONOPIN   Refills:  0   Dose:  0.5 mg    Instructions:  Take 0.5 mg by mouth 2 (two) times daily as needed for Anxiety.     Begin Date    AM    Noon    PM    Bedtime       dicyclomine 10 MG capsule   Commonly known as:  BENTYL   Quantity:  120 capsule   Refills:  3   Dose:  10 mg    Instructions:  Take 1 capsule (10 mg total) by mouth 4 (four) times daily as needed.     Begin Date    AM    Noon    PM    Bedtime       divalproex  MG Tb24   Commonly known as:  DEPAKOTE   Refills:  0   Dose:  500 mg    Instructions:  Take 500 mg by mouth once daily.     Begin Date    AM    Noon    PM    Bedtime       fluoxetine 40 MG capsule   Commonly known as:  PROZAC   Refills:  0    Instructions:  Take by mouth once daily.     Begin Date    AM    Noon    PM    Bedtime       lisinopril-hydrochlorothiazide 10-12.5 mg per tablet   Commonly known as:  PRINZIDE,ZESTORETIC   Refills:  0   Dose:  1 tablet    Instructions:  Take 1 tablet by mouth once daily.     Begin Date    AM    Noon    PM    Bedtime       loratadine 10 mg tablet   Commonly known as:  CLARITIN   Refills:  0   Dose:  10 mg    Instructions:  Take 10 mg by mouth once daily.     Begin Date    AM    Noon    PM    Bedtime       metoprolol succinate 50 MG 24 hr tablet   Commonly known as:  TOPROL-XL   Refills:  0   Dose:  50 mg    Instructions:  Take 50 mg by mouth once daily.     Begin Date    AM    Noon    PM    Bedtime       oxybutynin 5 MG Tab   Commonly known as:  DITROPAN   Refills:  0   Dose:  5 mg    Instructions:  Take 5 mg by mouth 3 (three) times daily.     Begin Date    AM    Noon    PM    Bedtime       * pantoprazole 20 MG tablet   Commonly known as:  PROTONIX   Quantity:  30 tablet   Refills:  0   Dose:  40 mg    Instructions:  Take 2 tablets (40 mg total) by mouth once daily.     Begin Date    AM    Noon    PM    Bedtime       * pantoprazole 40 MG tablet   Commonly known as:  PROTONIX   Quantity:  30  tablet   Refills:  3   Dose:  40 mg    Instructions:  Take 1 tablet (40 mg total) by mouth once daily.     Begin Date    AM    Noon    PM    Bedtime       pravastatin 40 MG tablet   Commonly known as:  PRAVACHOL   Refills:  0   Dose:  40 mg    Instructions:  Take 40 mg by mouth once daily.     Begin Date    AM    Noon    PM    Bedtime       * Notice:  This list has 2 medication(s) that are the same as other medications prescribed for you. Read the directions carefully, and ask your doctor or other care provider to review them with you.               Please bring to all follow up appointments:    1. A copy of your discharge instructions.  2. All medicines you are currently taking in their original bottles.  3. Identification and insurance card.    Please arrive 15 minutes ahead of scheduled appointment time.    Please call 24 hours in advance if you must reschedule your appointment and/or time.          Discharge Instructions     Future Orders    Diet general     Questions:    Total calories:      Fat restriction, if any:      Protein restriction, if any:      Na restriction, if any:      Fluid restriction:      Additional restrictions:          Discharge Instructions       Discharge Summary/Instructions for EGD and Colonoscopy  Ashli Kumar  5/12/2017  Buddy Butcher Jr., *    Restrictions on Activity:    - Do not drive car or operate machinery until the day after the procedure.  - The following day: return to full activity including work.  - For 3 days: No heavy lifting, straining or running.  - Diet: Eat and drink normally unless instructed otherwise.    Treatment for Common Side Effects:  - Mild abdominal pain and bloating or excessive gas: rest, eat lightly and use a heating pad.   -Sore Throat - treat with throat lozenges, gargle with warm salt water.    Symptoms to watch for and report to your physician:  1. Severe abdominal pain.  2. Fever within 24 hours after a procedure.  3. A large amount  "of rectal bleeding. (A small amount of blood from the rectum is not serious, especially if hemorrhoids are present.)  4. Because air was put into your colon during the procedure, expelling large amount of air from your rectum is normal.  5. You may not have a bowel movement for 1-3 days because of the colonoscopy prep. This is normal.  6. Go directly to the emergency room if you notice any of the following:     Chills and/orfever over 101   Persistent vomiting   Severe abdominal pain, other than gas cramps   Severe chest pain   Black, tarry stools   Any bleeding - exceeding one tablespoon    If you have any questions or problems, please call your Physician:    Buddy Butcher Jr., *    Lab Results: Contact Physician's Office    If a complication or emergency situation arises and you are unable to reach your Physician - GO TO THE EMERGENCY ROOM.          Admission Information     Date & Time Provider Department CSN    5/12/2017  7:11 AM Buddy Butcher Jr., MD [facility] [id number]      Care Providers     Provider Role Specialty Primary office phone    Buddy Butcher Jr., MD Attending Provider Gastroenterology [phone]    Buddy Butcher Jr., MD Surgeon  Gastroenterology [phone]      Your Vitals Were     BP Pulse Temp Resp Height Weight    123/84 (BP Location: Right arm, Patient Position: Lying, BP Method: Automatic) 56 97.5 °F (36.4 °C) (Oral) 20 5' 5" (1.651 m) 83 kg (183 lb)    SpO2 BMI             93% 30.45 kg/m2         Recent Lab Values     No lab values to display.      Pending Labs     Order Current Status    Specimen to Pathology - Surgery Collected (05/12/17 0912)      Allergies as of 5/12/2017        Reactions    Codeine Hives    Sulfa (Sulfonamide Antibiotics) Hives      [facility]     [facility] Nurse Care Line - 24/7 Assistance  Unless otherwise directed by your provider, please contact [facility], our nurse care line that is available for 24/7 " assistance.     Registered nurses in the [facility] Center provide clinical advisement, health education, appointment booking, and other advisory services.  Call for this free service at [phone].        Advance Directives     An advance directive is a document which, in the event you are no longer able to make decisions for yourself, tells your healthcare team what kind of treatment you do or do not want to receive, or who you would like to make those decisions for you.  If you do not currently have an advance directive, [facility] encourages you to create one.  For more information call:  [phone]), [phone] ([phone]),  or log on to [email or web address].        Language Assistance Services     ATTENTION: Language assistance services are available, free of charge. Please call [phone].      ATENCIÓN: Si habla español, tiene a zavala disposición servicios gratuitos de asistencia lingüística. Llame al [phone].     [facility] Ý: N?u b?n nói Ti?ng Vi?t, có các d?ch v? h? tr? ngôn ng? mi?n phí dành cho b?n. G?i s? [phone].        [facility] Sign-Up     Activating your [facility] account is as easy as 1-2-3!     1) Visit [email or web address], select Sign Up Now, enter this activation code and your date of birth, then select Next.  [email or web address]  Expires: 6/26/2017  9:27 AM      2) Create a username and password to use when you visit [facility] in the future and select a security question in case you lose your password and select Next.    3) Enter your e-mail address and click Sign Up!    Additional Information  If you have questions, please e-mail [email or web address] or call [phone] to talk to our [facility] staff. Remember, [facility] is NOT to be used for urgent needs. For medical emergencies, dial 911.          [facility] complies with applicable Federal civil rights laws and does not discriminate on the basis of race, color, national origin, age,  disability, or sex.

## 2017-05-12 NOTE — TRANSFER OF CARE
"Anesthesia Transfer of Care Note    Patient: Ashli Kumar    Procedure(s) Performed: Procedure(s) (LRB):  ESOPHAGOGASTRODUODENOSCOPY (EGD) (N/A)  COLONOSCOPY Miralax (N/A)    Patient location: GI    Anesthesia Type: MAC    Transport from OR: Transported from OR on room air with adequate spontaneous ventilation    Post pain: adequate analgesia    Post assessment: no apparent anesthetic complications    Post vital signs: stable    Level of consciousness: awake, alert and oriented    Nausea/Vomiting: no nausea/vomiting    Complications: none          Last vitals:   Visit Vitals    /84 (BP Location: Right arm, Patient Position: Lying, BP Method: Automatic)    Pulse (!) 56    Temp 36.4 °C (97.5 °F) (Oral)    Resp 20    Ht 5' 5" (1.651 m)    Wt 83 kg (183 lb)    SpO2 (!) 93%    Breastfeeding No    BMI 30.45 kg/m2     "

## 2017-05-12 NOTE — DISCHARGE INSTRUCTIONS
Discharge Summary/Instructions for EGD and Colonoscopy  Ashli Kumar  5/12/2017  Buddy Butcher Jr., *    Restrictions on Activity:    - Do not drive car or operate machinery until the day after the procedure.  - The following day: return to full activity including work.  - For 3 days: No heavy lifting, straining or running.  - Diet: Eat and drink normally unless instructed otherwise.    Treatment for Common Side Effects:  - Mild abdominal pain and bloating or excessive gas: rest, eat lightly and use a heating pad.   -Sore Throat - treat with throat lozenges, gargle with warm salt water.    Symptoms to watch for and report to your physician:  1. Severe abdominal pain.  2. Fever within 24 hours after a procedure.  3. A large amount of rectal bleeding. (A small amount of blood from the rectum is not serious, especially if hemorrhoids are present.)  4. Because air was put into your colon during the procedure, expelling large amount of air from your rectum is normal.  5. You may not have a bowel movement for 1-3 days because of the colonoscopy prep. This is normal.  6. Go directly to the emergency room if you notice any of the following:     Chills and/orfever over 101   Persistent vomiting   Severe abdominal pain, other than gas cramps   Severe chest pain   Black, tarry stools   Any bleeding - exceeding one tablespoon    If you have any questions or problems, please call your Physician:    Buddy Butcher Jr., *    Lab Results: Contact Physician's Office    If a complication or emergency situation arises and you are unable to reach your Physician - GO TO THE EMERGENCY ROOM.

## 2017-05-15 VITALS
TEMPERATURE: 98 F | HEIGHT: 65 IN | SYSTOLIC BLOOD PRESSURE: 130 MMHG | WEIGHT: 183 LBS | RESPIRATION RATE: 21 BRPM | HEART RATE: 55 BPM | OXYGEN SATURATION: 96 % | BODY MASS INDEX: 30.49 KG/M2 | DIASTOLIC BLOOD PRESSURE: 57 MMHG

## 2017-06-08 ENCOUNTER — OFFICE VISIT (OUTPATIENT)
Dept: GASTROENTEROLOGY | Facility: CLINIC | Age: 57
End: 2017-06-08
Payer: MEDICAID

## 2017-06-08 VITALS
SYSTOLIC BLOOD PRESSURE: 125 MMHG | HEIGHT: 65 IN | WEIGHT: 180.56 LBS | DIASTOLIC BLOOD PRESSURE: 71 MMHG | BODY MASS INDEX: 30.08 KG/M2

## 2017-06-08 DIAGNOSIS — R10.13 ABDOMINAL PAIN, EPIGASTRIC: ICD-10-CM

## 2017-06-08 DIAGNOSIS — Z12.11 COLON CANCER SCREENING: ICD-10-CM

## 2017-06-08 DIAGNOSIS — K25.9 GASTRIC ULCER WITHOUT HEMORRHAGE OR PERFORATION, UNSPECIFIED CHRONICITY: Primary | ICD-10-CM

## 2017-06-08 PROCEDURE — 99213 OFFICE O/P EST LOW 20 MIN: CPT | Mod: S$PBB,,, | Performed by: NURSE PRACTITIONER

## 2017-06-08 PROCEDURE — 99212 OFFICE O/P EST SF 10 MIN: CPT | Mod: PBBFAC,PN | Performed by: NURSE PRACTITIONER

## 2017-06-08 PROCEDURE — 99999 PR PBB SHADOW E&M-EST. PATIENT-LVL II: CPT | Mod: PBBFAC,,, | Performed by: NURSE PRACTITIONER

## 2017-06-08 NOTE — PROGRESS NOTES
Subjective:       Patient ID: Ashli Kumar is a 56 y.o. female.    Chief Complaint: Follow-up    HPI Presents to follow up after EGD and colonoscopy for abdominal pain, dyspepsia, loose stools, fecal urgency and rectal bleeding.    EGD:  - Normal duodenal bulb and second portion of the                         duodenum.                        - Non-bleeding gastric ulcers with pigmented                         material. Biopsied.                        - The examination was otherwise normal.                        - Z-line regular, 40 cm from the incisors.                        - Normal esophagus.    Colonoscopy:    The perianal and digital rectal examinations were normal.       Multiple medium-mouthed diverticula were found in the sigmoid colon.       Due to tortuosity and angulation in an area of numerouds        diverticula, the scope could not be advanced above the 45 cm level       The rectum appeared normal.    Path:  Stomach, antrum ulcer, biopsy:  Chronic gastritis.  No definitive area of ulceration is identified within the entirely submitted biopsy specimen.  Immunohistochemical stain for Helicobacter species will be performed and results will follow in a supplemental  Report.  Negative for h.pylori      Reports that she continues to have episodic upper abdominal discomfort and heartburn which is worsened by certain foods.  She does not think she has been taking omeprazole as prescribed.  Denies nausea or vomiting.  She has had resolution of loose stools, urgency and rectal bleeding.  She reports a regular bowel habit.  Denies blood or black stools.     Review of Systems   Constitutional: Negative.  Negative for activity change, appetite change, fatigue, fever and unexpected weight change.   HENT: Negative.  Negative for sore throat and trouble swallowing.    Respiratory: Negative.  Negative for cough, choking and shortness of breath.    Cardiovascular: Negative.  Negative for chest pain.    Gastrointestinal: Positive for abdominal pain. Negative for blood in stool, constipation, diarrhea, nausea and vomiting.   Genitourinary: Positive for hematuria.   Musculoskeletal: Negative.  Negative for neck pain and neck stiffness.   Skin: Negative.    Neurological: Negative.  Negative for dizziness, syncope, weakness and light-headedness.   Psychiatric/Behavioral: Negative.        Objective:      Physical Exam   Constitutional: She is oriented to person, place, and time. She appears well-developed and well-nourished. No distress.   HENT:   Head: Normocephalic.   Eyes: No scleral icterus.   Cardiovascular: Normal rate.    Pulmonary/Chest: Effort normal.   Neurological: She is alert and oriented to person, place, and time.   Skin: Skin is warm and dry. She is not diaphoretic.   Psychiatric: She has a normal mood and affect. Her behavior is normal. Judgment and thought content normal.   Vitals reviewed.      Assessment:       1. Gastric ulcer without hemorrhage or perforation, unspecified chronicity    2. Abdominal pain, epigastric    3. Colon cancer screening        Plan:     Ashli Antonio was seen today for follow-up.    Diagnoses and all orders for this visit:    Gastric ulcer without hemorrhage or perforation, unspecified chronicity    Abdominal pain, epigastric    Colon cancer screening          Resume omeprazole.   Barium Enema as previously ordered.    Follow up in 6 months or sooner if needed.

## 2017-06-13 ENCOUNTER — HOSPITAL ENCOUNTER (EMERGENCY)
Facility: HOSPITAL | Age: 57
Discharge: HOME | End: 2017-06-13
Attending: FAMILY MEDICINE
Payer: MEDICAID

## 2017-06-13 VITALS
HEART RATE: 67 BPM | WEIGHT: 179 LBS | RESPIRATION RATE: 18 BRPM | BODY MASS INDEX: 29.82 KG/M2 | SYSTOLIC BLOOD PRESSURE: 130 MMHG | OXYGEN SATURATION: 100 % | HEIGHT: 65 IN | DIASTOLIC BLOOD PRESSURE: 68 MMHG | TEMPERATURE: 98 F

## 2017-06-13 DIAGNOSIS — H60.501 ACUTE OTITIS EXTERNA OF RIGHT EAR, UNSPECIFIED TYPE: Primary | ICD-10-CM

## 2017-06-13 PROCEDURE — 99283 EMERGENCY DEPT VISIT LOW MDM: CPT

## 2017-06-13 RX ORDER — NEOMYCIN SULFATE, POLYMYXIN B SULFATE AND HYDROCORTISONE 10; 3.5; 1 MG/ML; MG/ML; [USP'U]/ML
3 SUSPENSION/ DROPS AURICULAR (OTIC) 3 TIMES DAILY
Qty: 10 ML | Refills: 0 | Status: SHIPPED | OUTPATIENT
Start: 2017-06-13 | End: 2018-08-23

## 2017-06-13 NOTE — ED PROVIDER NOTES
"Encounter Date: 2017       History     Chief Complaint   Patient presents with    Otalgia     PT reports right ear pain and "popping noises" x 1 week. Pt reports she noticed brown drainage today     Review of patient's allergies indicates:   Allergen Reactions    Codeine Hives    Sulfa (sulfonamide antibiotics) Hives     Patient complains of mild fullness and drainage in her right ear.  Popping sensation.  No fever.  Mild congestion.  No sore throat.  No chest pain no shortness of breath no cough .      The history is provided by the patient.     Past Medical History:   Diagnosis Date    Anxiety     Depression     High cholesterol     Hypertension      Past Surgical History:   Procedure Laterality Date    BREAST SURGERY       SECTION      COLONOSCOPY N/A 2017    Procedure: COLONOSCOPY Miralax;  Surgeon: Buddy Butcher Jr., MD;  Location: [facility];  Service: Endoscopy;  Laterality: N/A;    HYSTERECTOMY      KIDNEY SURGERY Right     done at [facility], reported as mass by patient    s-section       History reviewed. No pertinent family history.  Social History   Substance Use Topics    Smoking status: Never Smoker    Smokeless tobacco: Not on file    Alcohol use No     Review of Systems   Constitutional: Negative for activity change, appetite change, chills and fatigue.   HENT: Positive for ear discharge and ear pain. Negative for sore throat.    Eyes: Negative for pain, discharge and itching.   Respiratory: Negative for cough, chest tightness, shortness of breath and wheezing.    Gastrointestinal: Negative for abdominal distention, abdominal pain, nausea and vomiting.   Genitourinary: Negative for dysuria.   Musculoskeletal: Negative for back pain and neck pain.   Skin: Negative for rash.   Neurological: Negative for dizziness, tremors, seizures, speech difficulty, weakness, light-headedness, numbness and headaches.   Psychiatric/Behavioral: Negative for behavioral problems and " hallucinations. The patient is not nervous/anxious.    All other systems reviewed and are negative.      Physical Exam     Initial Vitals [06/13/17 1005]   BP Pulse Resp Temp SpO2   (!) 156/93 66 18 98.9 °F (37.2 °C) 99 %     Physical Exam    Nursing note and vitals reviewed.  Constitutional: She appears well-developed and well-nourished.   HENT:   Head: Normocephalic and atraumatic.   Right Ear: External ear normal. There is drainage and tenderness.   Left Ear: External ear normal. No drainage or tenderness.   Nose: Nose normal.   Mouth/Throat: Oropharynx is clear and moist.   Eyes: Pupils are equal, round, and reactive to light.   Cardiovascular: Normal rate, regular rhythm, normal heart sounds and intact distal pulses. Exam reveals no gallop and no friction rub.    No murmur heard.  Pulmonary/Chest: Breath sounds normal. No respiratory distress. She has no wheezes. She has no rhonchi. She has no rales.   Neurological: She is alert and oriented to person, place, and time. She has normal strength.         ED Course   Procedures  Labs Reviewed - No data to display                            ED Course     Clinical Impression:   The encounter diagnosis was Acute otitis externa of right ear, unspecified type.    Disposition:   Disposition: Discharged  Condition: Edil Jay MD  06/13/17 1030

## 2017-06-13 NOTE — ED TRIAGE NOTES
"PT reports right ear pain and "popping noises" x 1 week. Pt reports she noticed brown drainage today  "

## 2017-08-15 ENCOUNTER — TELEPHONE (OUTPATIENT)
Dept: GASTROENTEROLOGY | Facility: CLINIC | Age: 57
End: 2017-08-15

## 2017-08-15 NOTE — TELEPHONE ENCOUNTER
MAILBOX NOT SET UP      ----- Message from Sammie Jeffopshire sent at 8/15/2017  1:30 PM CDT -----  Contact: [phone]/self   Patient would like to reschedule a colonoscopy. She would also like to discuss pain relief with you.  Please advise.

## 2017-09-18 ENCOUNTER — TELEPHONE (OUTPATIENT)
Dept: GASTROENTEROLOGY | Facility: CLINIC | Age: 57
End: 2017-09-18

## 2017-09-18 NOTE — TELEPHONE ENCOUNTER
----- Message from Tonya Mascorro sent at 9/18/2017 11:32 AM CDT -----  Contact: [phone]/self  Patient requesting to speak with you regarding scheduling Colonoscopy. Please advise.

## 2017-09-25 ENCOUNTER — HOSPITAL ENCOUNTER (OUTPATIENT)
Dept: RADIOLOGY | Facility: HOSPITAL | Age: 57
Discharge: HOME | End: 2017-09-25
Attending: INTERNAL MEDICINE
Payer: MEDICAID

## 2017-09-25 DIAGNOSIS — Z12.11 COLON CANCER SCREENING: ICD-10-CM

## 2017-09-25 PROCEDURE — 74270 X-RAY XM COLON 1CNTRST STD: CPT | Mod: 26,,, | Performed by: INTERNAL MEDICINE

## 2017-09-25 PROCEDURE — 74270 X-RAY XM COLON 1CNTRST STD: CPT | Mod: TC

## 2017-10-04 ENCOUNTER — TELEPHONE (OUTPATIENT)
Dept: GASTROENTEROLOGY | Facility: CLINIC | Age: 57
End: 2017-10-04

## 2017-10-04 ENCOUNTER — HOSPITAL ENCOUNTER (EMERGENCY)
Facility: HOSPITAL | Age: 57
Discharge: HOME | End: 2017-10-04
Attending: FAMILY MEDICINE
Payer: MEDICAID

## 2017-10-04 VITALS
DIASTOLIC BLOOD PRESSURE: 67 MMHG | BODY MASS INDEX: 28.32 KG/M2 | RESPIRATION RATE: 16 BRPM | SYSTOLIC BLOOD PRESSURE: 147 MMHG | OXYGEN SATURATION: 100 % | HEIGHT: 65 IN | WEIGHT: 170 LBS | HEART RATE: 61 BPM | TEMPERATURE: 98 F

## 2017-10-04 DIAGNOSIS — N39.0 URINARY TRACT INFECTION WITH HEMATURIA, SITE UNSPECIFIED: Primary | ICD-10-CM

## 2017-10-04 DIAGNOSIS — R31.9 URINARY TRACT INFECTION WITH HEMATURIA, SITE UNSPECIFIED: Primary | ICD-10-CM

## 2017-10-04 LAB
BACTERIA #/AREA URNS AUTO: ABNORMAL /HPF
BILIRUB UR QL STRIP: NEGATIVE
CLARITY UR REFRACT.AUTO: ABNORMAL
COLOR UR AUTO: ABNORMAL
GLUCOSE UR QL STRIP: NEGATIVE
HGB UR QL STRIP: ABNORMAL
HYALINE CASTS UR QL AUTO: 0 /LPF
KETONES UR QL STRIP: ABNORMAL
LEUKOCYTE ESTERASE UR QL STRIP: ABNORMAL
MICROSCOPIC COMMENT: ABNORMAL
NITRITE UR QL STRIP: NEGATIVE
PH UR STRIP: 7 [PH] (ref 5–8)
PROT UR QL STRIP: ABNORMAL
RBC #/AREA URNS AUTO: >100 /HPF (ref 0–4)
SP GR UR STRIP: 1.01 (ref 1–1.03)
URN SPEC COLLECT METH UR: ABNORMAL
UROBILINOGEN UR STRIP-ACNC: ABNORMAL EU/DL
WBC #/AREA URNS AUTO: 8 /HPF (ref 0–5)

## 2017-10-04 PROCEDURE — 81000 URINALYSIS NONAUTO W/SCOPE: CPT

## 2017-10-04 PROCEDURE — 99283 EMERGENCY DEPT VISIT LOW MDM: CPT

## 2017-10-04 RX ORDER — NITROFURANTOIN 25; 75 MG/1; MG/1
100 CAPSULE ORAL 2 TIMES DAILY
Qty: 10 CAPSULE | Refills: 0 | Status: SHIPPED | OUTPATIENT
Start: 2017-10-04 | End: 2017-10-09

## 2017-10-04 RX ORDER — PHENAZOPYRIDINE HYDROCHLORIDE 200 MG/1
200 TABLET, FILM COATED ORAL 3 TIMES DAILY
Qty: 6 TABLET | Refills: 0 | Status: SHIPPED | OUTPATIENT
Start: 2017-10-04 | End: 2017-10-14

## 2017-10-04 NOTE — TELEPHONE ENCOUNTER
----- Message from Starla Trujillo sent at 10/4/2017 10:05 AM CDT -----  Contact: pt  Pt called in said she need to talk tot the dr said she has pressure when she urinates and a lil burning

## 2017-10-04 NOTE — TELEPHONE ENCOUNTER
Informed pt of her barium enema results. Pt stated that she has some pinkish blood when she urinates. I informed pt that she should go the the ED. Pt stated that she will call back to schedule a f/u appt.

## 2017-10-04 NOTE — TELEPHONE ENCOUNTER
Spoke with pt and she is having c/o pressure/burning after Urinating. I informed pt that she should speak with her Gynecologist about this issue. Pt has been offered an appt to see the Gynecologist here, and declines (she has her own that she would like to see).  Pt stated that she would like to know the results from her barium enema.

## 2017-10-04 NOTE — TELEPHONE ENCOUNTER
Let her know that the barium enema did not show any polyps but the results are limited as she had difficulty tolerating the barium for as long as needed.  She should FU in the clinic to discuss anything further that we may need to do.  Please schedule FU appt.

## 2017-10-04 NOTE — ED PROVIDER NOTES
Encounter Date: 10/4/2017       History     Chief Complaint   Patient presents with    Urinary Tract Infection     pressure with urination, small amount of blood in urine and frequency x1 day        Dysuria    This is a new problem. The current episode started two days ago. The problem occurs every urination. The problem has been gradually worsening. The quality of the pain is described as burning. The pain is at a severity of 3/10. Associated symptoms include hematuria. Pertinent negatives include no chills, no sweats and no nausea. She has tried nothing for the symptoms.     Review of patient's allergies indicates:   Allergen Reactions    Codeine Hives    Sulfa (sulfonamide antibiotics) Hives     Past Medical History:   Diagnosis Date    Anxiety     Depression     High cholesterol     Hypertension      Past Surgical History:   Procedure Laterality Date    BREAST SURGERY       SECTION      COLONOSCOPY N/A 2017    Procedure: COLONOSCOPY Miralax;  Surgeon: Buddy Butcher Jr., MD;  Location: [facility];  Service: Endoscopy;  Laterality: N/A;    HYSTERECTOMY      KIDNEY SURGERY Right     done at [facility], reported as mass by patient    s-section       History reviewed. No pertinent family history.  Social History   Substance Use Topics    Smoking status: Never Smoker    Smokeless tobacco: Never Used    Alcohol use No     Review of Systems   Constitutional: Negative for chills and fever.   HENT: Negative for sore throat.    Respiratory: Negative for shortness of breath.    Cardiovascular: Negative for chest pain.   Gastrointestinal: Negative for nausea.   Genitourinary: Positive for dysuria and hematuria.   Musculoskeletal: Negative for back pain.   Skin: Negative for rash.   Neurological: Negative for weakness.   Hematological: Does not bruise/bleed easily.   All other systems reviewed and are negative.      Physical Exam     Initial Vitals   BP Pulse Resp Temp SpO2   10/04/17 1316  10/04/17 1316 10/04/17 1316 10/04/17 1316 10/04/17 1317   (!) 147/66 61 16 98.3 °F (36.8 °C) 100 %      MAP       10/04/17 1316       93         Physical Exam    Nursing note and vitals reviewed.  Constitutional: She appears well-developed.   HENT:   Head: Normocephalic and atraumatic.   Right Ear: External ear normal.   Left Ear: External ear normal.   Nose: Nose normal.   Mouth/Throat: Oropharynx is clear and moist.   Eyes: Conjunctivae and EOM are normal. Pupils are equal, round, and reactive to light. Right eye exhibits no discharge. Left eye exhibits no discharge.   Neck: Normal range of motion. Neck supple. No tracheal deviation present.   Cardiovascular: Normal rate, regular rhythm and normal heart sounds.   No murmur heard.  Pulmonary/Chest: Breath sounds normal. No respiratory distress. She has no wheezes.   Abdominal: Soft. Bowel sounds are normal.   Neurological: She is alert and oriented to person, place, and time.   Skin: Skin is warm and dry.         ED Course   Procedures  Labs Reviewed   URINALYSIS - Abnormal; Notable for the following:        Result Value    Color, UA Orange (*)     Appearance, UA Cloudy (*)     Protein, UA 2+ (*)     Ketones, UA 1+ (*)     Occult Blood UA 3+ (*)     Urobilinogen, UA 2.0-3.0 (*)     Leukocytes, UA 3+ (*)     All other components within normal limits   URINALYSIS MICROSCOPIC - Abnormal; Notable for the following:     RBC, UA >100 (*)     WBC, UA 8 (*)     Bacteria, UA Few (*)     All other components within normal limits             Medical Decision Making:   Initial Assessment:   Patient sitting in no distress and pleasant. Patient has no other complaints other than documented.   Differential Diagnosis:   UTI  URINARY CALCULI  Abdominal pain                   ED Course      Clinical Impression:   The encounter diagnosis was Urinary tract infection with hematuria, site unspecified.                           Yong Van MD  10/04/17 9960

## 2017-12-22 ENCOUNTER — HOSPITAL ENCOUNTER (EMERGENCY)
Facility: HOSPITAL | Age: 57
Discharge: HOME | End: 2017-12-22
Payer: MEDICAID

## 2017-12-22 VITALS
HEIGHT: 65 IN | OXYGEN SATURATION: 100 % | HEART RATE: 57 BPM | RESPIRATION RATE: 18 BRPM | SYSTOLIC BLOOD PRESSURE: 182 MMHG | BODY MASS INDEX: 28.32 KG/M2 | DIASTOLIC BLOOD PRESSURE: 72 MMHG | TEMPERATURE: 98 F | WEIGHT: 170 LBS

## 2017-12-22 DIAGNOSIS — N30.00 ACUTE CYSTITIS WITHOUT HEMATURIA: Primary | ICD-10-CM

## 2017-12-22 LAB
BACTERIA #/AREA URNS AUTO: ABNORMAL /HPF
BILIRUB UR QL STRIP: NEGATIVE
CLARITY UR REFRACT.AUTO: CLEAR
COLOR UR AUTO: YELLOW
GLUCOSE UR QL STRIP: NEGATIVE
HGB UR QL STRIP: NEGATIVE
KETONES UR QL STRIP: NEGATIVE
LEUKOCYTE ESTERASE UR QL STRIP: ABNORMAL
MICROSCOPIC COMMENT: ABNORMAL
NITRITE UR QL STRIP: NEGATIVE
PH UR STRIP: 7 [PH] (ref 5–8)
PROT UR QL STRIP: NEGATIVE
SP GR UR STRIP: 1.01 (ref 1–1.03)
SQUAMOUS #/AREA URNS AUTO: 2 /HPF
URN SPEC COLLECT METH UR: ABNORMAL
UROBILINOGEN UR STRIP-ACNC: NEGATIVE EU/DL
WBC #/AREA URNS AUTO: 5 /HPF (ref 0–5)

## 2017-12-22 PROCEDURE — 81000 URINALYSIS NONAUTO W/SCOPE: CPT

## 2017-12-22 PROCEDURE — 99283 EMERGENCY DEPT VISIT LOW MDM: CPT

## 2017-12-22 RX ORDER — NITROFURANTOIN 25; 75 MG/1; MG/1
100 CAPSULE ORAL 2 TIMES DAILY
Qty: 10 CAPSULE | Refills: 0 | Status: SHIPPED | OUTPATIENT
Start: 2017-12-22 | End: 2017-12-27

## 2017-12-22 NOTE — ED PROVIDER NOTES
Encounter Date: 2017       History     Chief Complaint   Patient presents with    Abdominal Pain     RLQ abdominal pain with urinary frequency since yesterday and sharp pains in right flank; states RLQ is constant 7/10.     57-year-old female presents to clinic with complaints of 2 day history of lower abdominal cramping worse on the right side, urinary frequency, urinary urgency and right flank pain.  Symptoms are constant and unchanged.  Patient denies dysuria, hematuria, nausea, vomiting, fever or chills.  Patient denies past medical history of recurrent UTIs, pyelonephritis or nephrolithiasis.  Patient did have a mass removed from right kidney without any incident post surgery.  Patient has tried no treatment.          Review of patient's allergies indicates:   Allergen Reactions    Codeine Hives    Sulfa (sulfonamide antibiotics) Hives     Past Medical History:   Diagnosis Date    Anxiety     Depression     GERD (gastroesophageal reflux disease)     High cholesterol     Hypertension      Past Surgical History:   Procedure Laterality Date    BREAST SURGERY       SECTION      COLONOSCOPY N/A 2017    Procedure: COLONOSCOPY Miralax;  Surgeon: Buddy Butcher Jr., MD;  Location: [facility];  Service: Endoscopy;  Laterality: N/A;    HYSTERECTOMY      KIDNEY SURGERY Right     done at [facility], reported as mass by patient    s-section       History reviewed. No pertinent family history.  Social History   Substance Use Topics    Smoking status: Never Smoker    Smokeless tobacco: Never Used    Alcohol use No     Review of Systems   Constitutional: Negative for chills, diaphoresis, fatigue and fever.   Respiratory: Negative for shortness of breath.    Cardiovascular: Negative for chest pain.   Gastrointestinal: Positive for abdominal pain. Negative for constipation, nausea and vomiting.   Genitourinary: Positive for flank pain, frequency and urgency. Negative for decreased urine volume,  difficulty urinating, dysuria, hematuria and vaginal discharge.   Musculoskeletal: Negative for back pain.   Skin: Negative for rash.   Neurological: Negative for dizziness, weakness and headaches.   All other systems reviewed and are negative.      Physical Exam     Initial Vitals [12/22/17 1228]   BP Pulse Resp Temp SpO2   (!) 182/72 (!) 57 18 97.5 °F (36.4 °C) 100 %      MAP       108.67         Physical Exam    Nursing note and vitals reviewed.  Constitutional: Vital signs are normal. She appears well-developed and well-nourished. She is active. No distress.   HENT:   Head: Normocephalic and atraumatic.   Eyes: Conjunctivae, EOM and lids are normal.   Neck: Normal range of motion. Neck supple. No thyroid mass and no thyromegaly present.   Cardiovascular: Regular rhythm, normal heart sounds and normal pulses. Bradycardia present.    No murmur heard.  Hr: 58 on exam   Pulmonary/Chest: Effort normal and breath sounds normal. No respiratory distress. She has no decreased breath sounds. She has no wheezes. She has no rales.   Abdominal: Soft. Normal appearance and bowel sounds are normal. She exhibits no mass. There is no hepatosplenomegaly. There is tenderness in the right lower quadrant, suprapubic area and left lower quadrant. There is no rebound, no guarding, no CVA tenderness, no tenderness at McBurney's point and negative Hickey's sign. No hernia.   Musculoskeletal: Normal range of motion.   Neurological: She is alert.   Skin: Skin is warm, dry and intact. Capillary refill takes less than 2 seconds.         ED Course   Procedures  Labs Reviewed   URINALYSIS - Abnormal; Notable for the following:        Result Value    Leukocytes, UA 3+ (*)     All other components within normal limits   URINALYSIS MICROSCOPIC - Abnormal; Notable for the following:     Bacteria, UA Few (*)     All other components within normal limits             Medical Decision Making:   Initial Assessment:   57-year-old female presents to  clinic with complaints of 2 day history of lower abdominal cramping worse on the right side, urinary frequency, urinary urgency and right flank pain.  Symptoms are constant and unchanged.  Patient denies dysuria, hematuria, nausea, vomiting, fever or chills.  Patient denies past medical history of recurrent UTIs, pyelonephritis or nephrolithiasis.  Patient did have a mass removed from right kidney without any incident post surgery.  Patient has tried no treatment.  Physical exam reveal slight tenderness to lower abdominal regionno CVA tenderness.    Differential Diagnosis:   Urinary tract infection, pyelonephritis, nephrolithiasis.  Clinical Tests:   Lab Tests: Ordered and Reviewed       <> Summary of Lab: Urinalysis revealed large leukocyte.  ED Management:  Patient diagnosed with urinary tract infection and treated with antibiotics and to follow up with PCP 2 days if symptoms worsen.                   ED Course      Clinical Impression:   The encounter diagnosis was Acute cystitis without hematuria.                           WENDY Bartlett  12/22/17 1532       Matthew Shi MD  12/31/17 4909

## 2018-07-17 ENCOUNTER — TELEPHONE (OUTPATIENT)
Dept: GASTROENTEROLOGY | Facility: CLINIC | Age: 58
End: 2018-07-17

## 2018-07-17 NOTE — TELEPHONE ENCOUNTER
Spoke with pt and was able to schedule her for a f/u with Micaela on 8/23/18 at 11:00am in [place]. Verbal Understanding.

## 2018-07-17 NOTE — TELEPHONE ENCOUNTER
----- Message from Terrie Cartagena sent at 7/17/2018  1:32 PM CDT -----  Contact: self, [phone]  Patient states she is going through a lot and just realized she will be out of dicyclomine and omeprazole medications prescribed. Patient would like to know if she needs to come back in first. Please advise.

## 2018-08-11 ENCOUNTER — HOSPITAL ENCOUNTER (EMERGENCY)
Facility: HOSPITAL | Age: 58
Discharge: HOME | End: 2018-08-11
Attending: SURGERY
Payer: MEDICAID

## 2018-08-11 VITALS
RESPIRATION RATE: 18 BRPM | HEIGHT: 65 IN | TEMPERATURE: 98 F | BODY MASS INDEX: 31.49 KG/M2 | OXYGEN SATURATION: 100 % | DIASTOLIC BLOOD PRESSURE: 73 MMHG | HEART RATE: 57 BPM | WEIGHT: 189 LBS | SYSTOLIC BLOOD PRESSURE: 169 MMHG

## 2018-08-11 DIAGNOSIS — N30.01 ACUTE CYSTITIS WITH HEMATURIA: Primary | ICD-10-CM

## 2018-08-11 LAB
BILIRUB UR QL STRIP: NEGATIVE
CLARITY UR REFRACT.AUTO: ABNORMAL
COLOR UR AUTO: ABNORMAL
GLUCOSE UR QL STRIP: NEGATIVE
HGB UR QL STRIP: ABNORMAL
KETONES UR QL STRIP: NEGATIVE
LEUKOCYTE ESTERASE UR QL STRIP: ABNORMAL
MICROSCOPIC COMMENT: NORMAL
NITRITE UR QL STRIP: NEGATIVE
PH UR STRIP: 7 [PH] (ref 5–8)
PROT UR QL STRIP: ABNORMAL
RBC #/AREA URNS AUTO: 1 /HPF (ref 0–4)
SP GR UR STRIP: 1.01 (ref 1–1.03)
URN SPEC COLLECT METH UR: ABNORMAL
UROBILINOGEN UR STRIP-ACNC: 1 EU/DL
WBC #/AREA URNS AUTO: 3 /HPF (ref 0–5)

## 2018-08-11 PROCEDURE — 25000003 PHARM REV CODE 250: Performed by: SURGERY

## 2018-08-11 PROCEDURE — 87086 URINE CULTURE/COLONY COUNT: CPT

## 2018-08-11 PROCEDURE — 99283 EMERGENCY DEPT VISIT LOW MDM: CPT

## 2018-08-11 PROCEDURE — 81000 URINALYSIS NONAUTO W/SCOPE: CPT

## 2018-08-11 RX ORDER — PHENAZOPYRIDINE HYDROCHLORIDE 200 MG/1
200 TABLET, FILM COATED ORAL
Qty: 9 TABLET | Refills: 0 | Status: SHIPPED | OUTPATIENT
Start: 2018-08-11 | End: 2018-08-14

## 2018-08-11 RX ORDER — NITROFURANTOIN 25; 75 MG/1; MG/1
100 CAPSULE ORAL 2 TIMES DAILY
Qty: 14 CAPSULE | Refills: 0 | Status: SHIPPED | OUTPATIENT
Start: 2018-08-11 | End: 2018-08-18

## 2018-08-11 RX ORDER — NITROFURANTOIN 25; 75 MG/1; MG/1
100 CAPSULE ORAL
Status: COMPLETED | OUTPATIENT
Start: 2018-08-11 | End: 2018-08-11

## 2018-08-11 RX ORDER — TRAMADOL HYDROCHLORIDE 50 MG/1
100 TABLET ORAL
Status: COMPLETED | OUTPATIENT
Start: 2018-08-11 | End: 2018-08-11

## 2018-08-11 RX ADMIN — TRAMADOL HYDROCHLORIDE 100 MG: 50 TABLET, COATED ORAL at 09:08

## 2018-08-11 RX ADMIN — NITROFURANTOIN (MONOHYDRATE/MACROCRYSTALS) 100 MG: 75; 25 CAPSULE ORAL at 09:08

## 2018-08-11 NOTE — ED PROVIDER NOTES
Encounter Date: 2018       History     Chief Complaint   Patient presents with    Flank Pain     Pt reports left flank pain that started this morning. Pt reports frequent urination.     Patient has chronic UTI secondary to bladder dysfunction and she has recurrence of urinary frequency and left flank and CVA pain since yesterday.  No abdominal pain or GI symptoms      The history is provided by the patient.   Flank Pain   This is a recurrent problem. The current episode started yesterday. The problem occurs daily. The problem has been gradually worsening. Associated symptoms include headaches. Pertinent negatives include no chest pain and no abdominal pain. Nothing aggravates the symptoms. Nothing relieves the symptoms. She has tried nothing for the symptoms.     Review of patient's allergies indicates:   Allergen Reactions    Codeine Hives    Sulfa (sulfonamide antibiotics) Hives     Past Medical History:   Diagnosis Date    Anxiety     Depression     GERD (gastroesophageal reflux disease)     High cholesterol     Hypertension      Past Surgical History:   Procedure Laterality Date    BREAST SURGERY       SECTION      COLONOSCOPY N/A 2017    Procedure: COLONOSCOPY Miralax;  Surgeon: Buddy Butcher Jr., MD;  Location: [facility];  Service: Endoscopy;  Laterality: N/A;    HYSTERECTOMY      KIDNEY SURGERY Right     done at [facility], reported as mass by patient    s-section       History reviewed. No pertinent family history.  Social History   Substance Use Topics    Smoking status: Never Smoker    Smokeless tobacco: Never Used    Alcohol use No     Review of Systems   Constitutional: Negative.    HENT: Negative.    Eyes: Negative.    Respiratory: Negative.    Cardiovascular: Negative for chest pain.   Gastrointestinal: Negative for abdominal pain.   Endocrine: Negative.    Genitourinary: Positive for flank pain and urgency.   Skin: Negative.    Allergic/Immunologic: Negative.     Neurological: Positive for headaches.   Hematological: Negative.    Psychiatric/Behavioral: Negative.        Physical Exam     Initial Vitals [08/11/18 0833]   BP Pulse Resp Temp SpO2   (!) 199/91 75 20 97.6 °F (36.4 °C) 100 %      MAP       --         Physical Exam    Nursing note and vitals reviewed.  Constitutional: She appears well-developed and well-nourished.   HENT:   Head: Normocephalic and atraumatic.   Eyes: Conjunctivae are normal.   Neck: Normal range of motion.   Cardiovascular: Normal rate, normal heart sounds and intact distal pulses.   Pulmonary/Chest: Breath sounds normal.   Abdominal: Soft.   Musculoskeletal: Normal range of motion.   Neurological: She is alert and oriented to person, place, and time. She has normal strength.   Skin: Skin is warm and dry. Capillary refill takes less than 2 seconds.   Psychiatric: She has a normal mood and affect.         ED Course   Procedures  Labs Reviewed   URINALYSIS - Abnormal; Notable for the following:        Result Value    Appearance, UA Hazy (*)     Protein, UA Trace (*)     Occult Blood UA Trace (*)     Leukocytes, UA 3+ (*)     All other components within normal limits   CULTURE, URINE   URINALYSIS MICROSCOPIC          Imaging Results    None          Medical Decision Making:   Initial Assessment:   Dysuria with frequent UTIs  ED Management:  Patient has a UTI and will treat with antibiotics.  She needs follow-up with her urologist to discuss prevention of future UTIs                      Clinical Impression:   The encounter diagnosis was Acute cystitis with hematuria.      Disposition:   Disposition: Discharged  Condition: Stable                        TULIO Thomas III, MD  08/11/18 0931

## 2018-08-12 LAB
BACTERIA UR CULT: NORMAL
BACTERIA UR CULT: NORMAL

## 2018-08-17 ENCOUNTER — HOSPITAL ENCOUNTER (OUTPATIENT)
Dept: RADIOLOGY | Facility: HOSPITAL | Age: 58
Discharge: HOME | End: 2018-08-17
Attending: UROLOGY
Payer: MEDICAID

## 2018-08-17 DIAGNOSIS — R31.21 ASYMPTOMATIC MICROSCOPIC HEMATURIA: ICD-10-CM

## 2018-08-17 DIAGNOSIS — R39.14 FEELING OF INCOMPLETE BLADDER EMPTYING: ICD-10-CM

## 2018-08-17 DIAGNOSIS — N30.20 BLADDER INFECTION, CHRONIC: ICD-10-CM

## 2018-08-17 DIAGNOSIS — N30.20 BLADDER INFECTION, CHRONIC: Primary | ICD-10-CM

## 2018-08-20 ENCOUNTER — HOSPITAL ENCOUNTER (OUTPATIENT)
Dept: RADIOLOGY | Facility: HOSPITAL | Age: 58
Discharge: HOME | End: 2018-08-20
Attending: UROLOGY
Payer: MEDICAID

## 2018-08-20 DIAGNOSIS — N30.20 BLADDER INFECTION, CHRONIC: ICD-10-CM

## 2018-08-20 DIAGNOSIS — R31.21 ASYMPTOMATIC MICROSCOPIC HEMATURIA: ICD-10-CM

## 2018-08-20 DIAGNOSIS — R39.14 FEELING OF INCOMPLETE BLADDER EMPTYING: ICD-10-CM

## 2018-08-20 PROCEDURE — 76770 US EXAM ABDO BACK WALL COMP: CPT | Mod: TC,PO

## 2018-08-23 ENCOUNTER — OFFICE VISIT (OUTPATIENT)
Dept: GASTROENTEROLOGY | Facility: CLINIC | Age: 58
End: 2018-08-23
Payer: MEDICAID

## 2018-08-23 VITALS
DIASTOLIC BLOOD PRESSURE: 55 MMHG | BODY MASS INDEX: 28.1 KG/M2 | HEIGHT: 65 IN | SYSTOLIC BLOOD PRESSURE: 84 MMHG | WEIGHT: 168.63 LBS

## 2018-08-23 DIAGNOSIS — R12 HEARTBURN: Primary | ICD-10-CM

## 2018-08-23 DIAGNOSIS — K22.4 ESOPHAGEAL SPASM: ICD-10-CM

## 2018-08-23 PROCEDURE — 99213 OFFICE O/P EST LOW 20 MIN: CPT | Mod: PBBFAC,PN | Performed by: NURSE PRACTITIONER

## 2018-08-23 PROCEDURE — 99213 OFFICE O/P EST LOW 20 MIN: CPT | Mod: S$PBB,,, | Performed by: NURSE PRACTITIONER

## 2018-08-23 PROCEDURE — 99999 PR PBB SHADOW E&M-EST. PATIENT-LVL III: CPT | Mod: PBBFAC,,, | Performed by: NURSE PRACTITIONER

## 2018-10-05 ENCOUNTER — TELEPHONE (OUTPATIENT)
Dept: GASTROENTEROLOGY | Facility: CLINIC | Age: 58
End: 2018-10-05

## 2018-10-05 NOTE — TELEPHONE ENCOUNTER
Attempted to contact patient to reschedule her appointment with Micaela Galicia. Unable to leave patient a message and patient does not have a patient portal.

## 2018-10-08 ENCOUNTER — TELEPHONE (OUTPATIENT)
Dept: GASTROENTEROLOGY | Facility: CLINIC | Age: 58
End: 2018-10-08

## 2018-10-08 NOTE — TELEPHONE ENCOUNTER
Informed pt that Micaela will not be in clinic on  10-11 with she is scheduled for a 1 month f/u. [facility]. I informed pt that I will contact her when the schedule opens. Verbal Understanding.

## 2019-02-09 ENCOUNTER — HOSPITAL ENCOUNTER (EMERGENCY)
Facility: HOSPITAL | Age: 59
Discharge: HOME | End: 2019-02-09
Attending: EMERGENCY MEDICINE
Payer: MEDICAID

## 2019-02-09 VITALS
DIASTOLIC BLOOD PRESSURE: 70 MMHG | OXYGEN SATURATION: 99 % | SYSTOLIC BLOOD PRESSURE: 113 MMHG | RESPIRATION RATE: 15 BRPM | TEMPERATURE: 98 F | HEART RATE: 78 BPM

## 2019-02-09 DIAGNOSIS — R05.9 COUGH: ICD-10-CM

## 2019-02-09 DIAGNOSIS — J20.9 ACUTE BRONCHITIS, UNSPECIFIED ORGANISM: Primary | ICD-10-CM

## 2019-02-09 LAB
FLUAV AG SPEC QL IA: NEGATIVE
FLUBV AG SPEC QL IA: NEGATIVE
SPECIMEN SOURCE: NORMAL

## 2019-02-09 PROCEDURE — 99284 EMERGENCY DEPT VISIT MOD MDM: CPT | Mod: 25,ER

## 2019-02-09 PROCEDURE — 87400 INFLUENZA A/B EACH AG IA: CPT | Mod: ER

## 2019-02-09 RX ORDER — PREDNISONE 20 MG/1
20 TABLET ORAL 2 TIMES DAILY
Qty: 8 TABLET | Refills: 0 | Status: SHIPPED | OUTPATIENT
Start: 2019-02-09 | End: 2019-02-13

## 2019-02-09 RX ORDER — BENZONATATE 200 MG/1
200 CAPSULE ORAL 3 TIMES DAILY PRN
Qty: 20 CAPSULE | Refills: 0 | Status: SHIPPED | OUTPATIENT
Start: 2019-02-09 | End: 2019-02-19

## 2019-02-09 NOTE — ED PROVIDER NOTES
Encounter Date: 2019       History     Chief Complaint   Patient presents with    Fatigue     I have been sick since Tuesday. Coughing and short of breath. I am nauseated. I am also very weak,      59 yo female here with complaints of nonproductive cough x4 days.  Patient reports that she was seen by her primary care provider at the onset of symptoms and prescribed cough medicine-which she reports works somewhat.  She denies any fever or chills.  She denies any nasal congestion or sore throat. She denies being a daily cigarette smoker or having any lung issues.          Review of patient's allergies indicates:   Allergen Reactions    Codeine Hives    Sulfa (sulfonamide antibiotics) Hives     Past Medical History:   Diagnosis Date    Anxiety     Depression     GERD (gastroesophageal reflux disease)     High cholesterol     Hypertension      Past Surgical History:   Procedure Laterality Date    BREAST SURGERY       SECTION      COLONOSCOPY Miralax N/A 2017    Performed by [facility]dy [facility]cher Jr[facility] at [facility]    ESOPHAGOGASTRODUODENOSCOPY (EGD) N/A 2017    Performed by Bud[facility]cher Jr., MD at [facility]    HYSTERECTOMY      KIDNEY SURGERY Right     done at [facility], reported as mass by patient    s-section       History reviewed. No pertinent family history.  Social History     Tobacco Use    Smoking status: Never Smoker    Smokeless tobacco: Never Used   Substance Use Topics    Alcohol use: No    Drug use: No     Review of Systems   Constitutional: Positive for activity change, appetite change and fatigue.   HENT: Positive for congestion. Negative for sinus pressure, sinus pain and sore throat.    Respiratory: Positive for cough. Negative for shortness of breath and wheezing.    Cardiovascular: Negative for chest pain and leg swelling.   Gastrointestinal: Positive for nausea ( not currently having ). Negative for abdominal distention, diarrhea and vomiting.    Musculoskeletal: Negative for myalgias.   Neurological: Negative for dizziness, syncope and headaches.   All other systems reviewed and are negative.      Physical Exam     Initial Vitals [02/09/19 1030]   BP Pulse Resp Temp SpO2   117/67 77 15 97.8 °F (36.6 °C) 98 %      MAP       --         Physical Exam    Nursing note and vitals reviewed.  Constitutional: She appears well-developed and well-nourished. She is not diaphoretic. No distress.   HENT:   Head: Normocephalic and atraumatic.   Right Ear: External ear normal.   Left Ear: External ear normal.   Nose: Nose normal.   Mouth/Throat: Oropharynx is clear and moist.   Bilateral TMs gray and pearly.  Bilateral canals clear.  No pharyngeal erythema or tonsillar swelling.   Eyes: Conjunctivae and EOM are normal.   Neck: Normal range of motion. Neck supple.   Cardiovascular: Normal rate, regular rhythm and normal heart sounds. Exam reveals no gallop and no friction rub.    No murmur heard.  Pulmonary/Chest: Breath sounds normal. No respiratory distress. She has no wheezes. She has no rhonchi. She has no rales.   Abdominal: Soft. She exhibits no distension. There is no tenderness.   Musculoskeletal:   No gross abnormality, normal gait.   Neurological: She is alert and oriented to person, place, and time.   Psychiatric: She has a normal mood and affect. Her behavior is normal. Thought content normal.         ED Course   Procedures  Labs Reviewed   INFLUENZA A AND B ANTIGEN          Imaging Results          X-Ray Chest PA And Lateral (Final result)  Result time 02/09/19 10:49:02    Final result by NICOLE Paulino Sr., MD (02/09/19 10:49:02)                 Impression:      1. The lungs are clear.  2. There are mild degenerative changes in the thoracic spine.  .      Electronically signed by: Kobi Paulino MD  Date:    02/09/2019  Time:    10:49             Narrative:    EXAMINATION:  XR CHEST PA AND LATERAL    CLINICAL  HISTORY:  Cough    COMPARISON:  11/04/2016    FINDINGS:  The size and contour of the heart are normal. The lungs are clear. There is no pneumothorax or pleural effusion.  There are mild degenerative changes in the thoracic spine.                                 Medical Decision Making:   Differential Diagnosis:   Influenza, bronchitis, URI, pneumonia  Clinical Tests:   Lab Tests: Reviewed and Ordered  Radiological Study: Ordered and Reviewed  ED Management:  Influenza swab negative and chest x-ray without acute abnormalities.  Will treat for acute bronchitis with course of steroids.  Findings, treatment, need for follow-up, and return precautions to ER reviewed with patient prior to discharge. Patient verbalized agreement and understanding of treatment plan.              Attending Attestation:     Physician Attestation Statement for NP/PA:   I reviewed the chart but I did not personally examine the patient. The face to face encounter was performed by the NP/PA.                     Clinical Impression:   1. Acute bronchitis.     Disposition:   Disposition: Discharged                        Bev Her NP  02/09/19 6975       Kobi Whitman MD  02/09/19 6965

## 2019-02-09 NOTE — ED NOTES
Did not obtain vitals in triage. Pt stated she was to weak to sit up and she needed to lay down. Pt then stated she needed a bed pain. Pt informed I can use the wheel chair to bring her bathroom. Pt ambulated into ED.Pt's grand daughter is with her and is also sick with a persistent cough and she reports her little sister is sick also.

## 2019-03-25 ENCOUNTER — HOSPITAL ENCOUNTER (EMERGENCY)
Facility: HOSPITAL | Age: 59
Discharge: HOME | End: 2019-03-25
Attending: FAMILY MEDICINE
Payer: MEDICAID

## 2019-03-25 VITALS
OXYGEN SATURATION: 99 % | HEART RATE: 54 BPM | BODY MASS INDEX: 29.16 KG/M2 | RESPIRATION RATE: 16 BRPM | DIASTOLIC BLOOD PRESSURE: 63 MMHG | WEIGHT: 175 LBS | TEMPERATURE: 98 F | HEIGHT: 65 IN | SYSTOLIC BLOOD PRESSURE: 132 MMHG

## 2019-03-25 DIAGNOSIS — R09.1 PLEURISY: Primary | ICD-10-CM

## 2019-03-25 DIAGNOSIS — R07.9 CHEST PAIN: ICD-10-CM

## 2019-03-25 LAB
ALBUMIN SERPL BCP-MCNC: 4.2 G/DL (ref 3.5–5.2)
ALP SERPL-CCNC: 44 U/L (ref 38–126)
ALT SERPL W/O P-5'-P-CCNC: 12 U/L (ref 10–44)
ANION GAP SERPL CALC-SCNC: 7 MMOL/L (ref 8–16)
APTT BLDCRRT: 24.2 SEC (ref 21–32)
AST SERPL-CCNC: 17 U/L (ref 15–46)
BASOPHILS # BLD AUTO: 0.02 K/UL (ref 0–0.2)
BASOPHILS NFR BLD: 0.3 % (ref 0–1.9)
BILIRUB SERPL-MCNC: 0.3 MG/DL (ref 0.1–1)
BUN SERPL-MCNC: 25 MG/DL (ref 7–17)
CALCIUM SERPL-MCNC: 9 MG/DL (ref 8.7–10.5)
CHLORIDE SERPL-SCNC: 98 MMOL/L (ref 95–110)
CO2 SERPL-SCNC: 30 MMOL/L (ref 23–29)
CREAT SERPL-MCNC: 0.89 MG/DL (ref 0.5–1.4)
D DIMER PPP FEU-MCNC: 238 NG/ML
DIFFERENTIAL METHOD: ABNORMAL
EOSINOPHIL # BLD AUTO: 0.1 K/UL (ref 0–0.5)
EOSINOPHIL NFR BLD: 0.7 % (ref 0–8)
ERYTHROCYTE [DISTWIDTH] IN BLOOD BY AUTOMATED COUNT: 13 % (ref 11.5–14.5)
EST. GFR  (AFRICAN AMERICAN): >60 ML/MIN/1.73 M^2
EST. GFR  (NON AFRICAN AMERICAN): >60 ML/MIN/1.73 M^2
GLUCOSE SERPL-MCNC: 100 MG/DL (ref 70–110)
HCT VFR BLD AUTO: 36.8 % (ref 37–48.5)
HGB BLD-MCNC: 11.9 G/DL (ref 12–16)
INR PPP: 1 (ref 0.8–1.2)
LYMPHOCYTES # BLD AUTO: 3 K/UL (ref 1–4.8)
LYMPHOCYTES NFR BLD: 44 % (ref 18–48)
MCH RBC QN AUTO: 28.7 PG (ref 27–31)
MCHC RBC AUTO-ENTMCNC: 32.3 G/DL (ref 32–36)
MCV RBC AUTO: 89 FL (ref 82–98)
MONOCYTES # BLD AUTO: 0.6 K/UL (ref 0.3–1)
MONOCYTES NFR BLD: 8.9 % (ref 4–15)
NEUTROPHILS # BLD AUTO: 3.1 K/UL (ref 1.8–7.7)
NEUTROPHILS NFR BLD: 45.8 % (ref 38–73)
PLATELET # BLD AUTO: 129 K/UL (ref 150–350)
PMV BLD AUTO: 13.2 FL (ref 9.2–12.9)
POTASSIUM SERPL-SCNC: 4.2 MMOL/L (ref 3.5–5.1)
PROT SERPL-MCNC: 7.2 G/DL (ref 6–8.4)
PROTHROMBIN TIME: 10.8 SEC (ref 9–12.5)
RBC # BLD AUTO: 4.15 M/UL (ref 4–5.4)
SODIUM SERPL-SCNC: 135 MMOL/L (ref 136–145)
TROPONIN I SERPL DL<=0.01 NG/ML-MCNC: <0.012 NG/ML (ref 0.01–0.03)
WBC # BLD AUTO: 6.82 K/UL (ref 3.9–12.7)

## 2019-03-25 PROCEDURE — 93010 ELECTROCARDIOGRAM REPORT: CPT | Mod: ,,, | Performed by: INTERNAL MEDICINE

## 2019-03-25 PROCEDURE — 63600175 PHARM REV CODE 636 W HCPCS: Mod: ER | Performed by: FAMILY MEDICINE

## 2019-03-25 PROCEDURE — 85730 THROMBOPLASTIN TIME PARTIAL: CPT | Mod: ER

## 2019-03-25 PROCEDURE — 96374 THER/PROPH/DIAG INJ IV PUSH: CPT | Mod: ER

## 2019-03-25 PROCEDURE — 25000003 PHARM REV CODE 250: Mod: ER | Performed by: FAMILY MEDICINE

## 2019-03-25 PROCEDURE — 85025 COMPLETE CBC W/AUTO DIFF WBC: CPT | Mod: ER

## 2019-03-25 PROCEDURE — 84484 ASSAY OF TROPONIN QUANT: CPT | Mod: ER

## 2019-03-25 PROCEDURE — 85379 FIBRIN DEGRADATION QUANT: CPT | Mod: ER

## 2019-03-25 PROCEDURE — C9113 INJ PANTOPRAZOLE SODIUM, VIA: HCPCS | Mod: ER | Performed by: FAMILY MEDICINE

## 2019-03-25 PROCEDURE — 99285 EMERGENCY DEPT VISIT HI MDM: CPT | Mod: 25,ER

## 2019-03-25 PROCEDURE — 80053 COMPREHEN METABOLIC PANEL: CPT | Mod: ER

## 2019-03-25 PROCEDURE — 96375 TX/PRO/DX INJ NEW DRUG ADDON: CPT | Mod: ER

## 2019-03-25 PROCEDURE — 93010 EKG 12-LEAD: ICD-10-PCS | Mod: ,,, | Performed by: INTERNAL MEDICINE

## 2019-03-25 PROCEDURE — 85610 PROTHROMBIN TIME: CPT | Mod: ER

## 2019-03-25 PROCEDURE — 93005 ELECTROCARDIOGRAM TRACING: CPT | Mod: ER

## 2019-03-25 RX ORDER — KETOROLAC TROMETHAMINE 10 MG/1
10 TABLET, FILM COATED ORAL EVERY 8 HOURS
Qty: 15 TABLET | Refills: 0 | Status: ON HOLD | OUTPATIENT
Start: 2019-03-25 | End: 2019-05-13 | Stop reason: ALTCHOICE

## 2019-03-25 RX ORDER — ONDANSETRON 2 MG/ML
4 INJECTION INTRAMUSCULAR; INTRAVENOUS
Status: COMPLETED | OUTPATIENT
Start: 2019-03-25 | End: 2019-03-25

## 2019-03-25 RX ORDER — PANTOPRAZOLE SODIUM 40 MG/10ML
40 INJECTION, POWDER, LYOPHILIZED, FOR SOLUTION INTRAVENOUS
Status: COMPLETED | OUTPATIENT
Start: 2019-03-25 | End: 2019-03-25

## 2019-03-25 RX ORDER — MORPHINE SULFATE 4 MG/ML
2 INJECTION, SOLUTION INTRAMUSCULAR; INTRAVENOUS
Status: COMPLETED | OUTPATIENT
Start: 2019-03-25 | End: 2019-03-25

## 2019-03-25 RX ADMIN — LIDOCAINE HYDROCHLORIDE: 20 SOLUTION ORAL; TOPICAL at 05:03

## 2019-03-25 RX ADMIN — MORPHINE SULFATE 2 MG: 4 INJECTION INTRAVENOUS at 06:03

## 2019-03-25 RX ADMIN — PANTOPRAZOLE SODIUM 40 MG: 40 INJECTION, POWDER, FOR SOLUTION INTRAVENOUS at 05:03

## 2019-03-25 RX ADMIN — ONDANSETRON 4 MG: 2 INJECTION INTRAMUSCULAR; INTRAVENOUS at 06:03

## 2019-03-25 NOTE — ED PROVIDER NOTES
"Encounter Date: 3/25/2019       History     Chief Complaint   Patient presents with    Pleurisy     pt. stated "it started with the pain this morning right in the middle of my chestthe pain has not gotten any better, Lianne been drinking plenty of fluids thinking It might of been of from being outside form [facility] yesterday"      58-year-old female complains of short retrosternal chest pain since this morning.  Tried to drink some water but did not get better.  Has not taken any medications.  No radiation of pain. Pain is noticed with inspiration.  Denies cough or fever.    The history is provided by the patient.     Review of patient's allergies indicates:   Allergen Reactions    Codeine Hives    Sulfa (sulfonamide antibiotics) Hives     Past Medical History:   Diagnosis Date    Anxiety     Depression     GERD (gastroesophageal reflux disease)     High cholesterol     Hypertension      Past Surgical History:   Procedure Laterality Date    BREAST SURGERY       SECTION      COLONOSCOPY Miralax N/A 2017    Performed [facility] Buddy [facility]cher [facility] at [facility] ENDO    ESOPHAGOGASTRODUODENOSCOPY (EGD) N/A 2017    Performed by [facility]dy [facility]cher Jr[facility] at [facility] ENDO    HYSTERECTOMY      KIDNEY SURGERY Right     done at [facility], reported as mass by patient    s-section       No family history on file.  Social History     Tobacco Use    Smoking status: Never Smoker    Smokeless tobacco: Never Used   Substance Use Topics    Alcohol use: No    Drug use: No     Review of Systems   Constitutional: Negative for activity change, appetite change, chills and fever.   HENT: Negative for congestion, ear discharge, rhinorrhea, sinus pressure, sinus pain, sore throat and trouble swallowing.    Eyes: Negative for photophobia, pain, discharge, redness, itching and visual disturbance.   Respiratory: Negative for cough, chest tightness, shortness of breath and wheezing.    Cardiovascular: Positive for chest " pain. Negative for palpitations and leg swelling.   Gastrointestinal: Negative for abdominal distention, abdominal pain, constipation, diarrhea, nausea and vomiting.   Genitourinary: Negative for dysuria, flank pain, frequency and hematuria.   Musculoskeletal: Negative for back pain, gait problem, neck pain and neck stiffness.   Skin: Negative for rash and wound.   Neurological: Negative for dizziness, tremors, seizures, syncope, speech difficulty, weakness, light-headedness, numbness and headaches.   Psychiatric/Behavioral: Negative for behavioral problems, confusion, hallucinations and sleep disturbance. The patient is not nervous/anxious.    All other systems reviewed and are negative.      Physical Exam     Initial Vitals   BP Pulse Resp Temp SpO2   -- -- -- -- --      MAP       --         Physical Exam    Nursing note and vitals reviewed.  Constitutional: Vital signs are normal. She appears well-developed and well-nourished. She is active.   HENT:   Head: Normocephalic and atraumatic.   Nose: Nose normal.   Mouth/Throat: Oropharynx is clear and moist.   Eyes: Conjunctivae, EOM and lids are normal. Pupils are equal, round, and reactive to light.   Neck: Trachea normal, normal range of motion and full passive range of motion without pain. Neck supple. Normal range of motion present. No neck rigidity.   Cardiovascular: Normal rate, regular rhythm, S1 normal, S2 normal, normal heart sounds, intact distal pulses and normal pulses.   Pulmonary/Chest: Breath sounds normal. No respiratory distress. She has no wheezes. She has no rhonchi. She has no rales.   Abdominal: Soft. Normal appearance and bowel sounds are normal. She exhibits no distension. There is no tenderness.   Musculoskeletal: Normal range of motion.   Lymphadenopathy:     She has no cervical adenopathy.   Neurological: She is alert and oriented to person, place, and time. She has normal reflexes. No cranial nerve deficit or sensory deficit. GCS score is  15. GCS eye subscore is 4. GCS verbal subscore is 5. GCS motor subscore is 6.   Skin: Skin is warm and intact. Capillary refill takes less than 2 seconds. No abrasion, no bruising and no rash noted.   Psychiatric: She has a normal mood and affect. Her speech is normal and behavior is normal. Judgment and thought content normal. She is not actively hallucinating. Cognition and memory are normal. She is attentive.         ED Course   Procedures  Labs Reviewed   CBC W/ AUTO DIFFERENTIAL   COMPREHENSIVE METABOLIC PANEL   APTT   PROTIME-INR   TROPONIN I     EKG Readings: (Independently Interpreted)   Rhythm: Normal Sinus Rhythm. Heart Rate: 72. Ectopy: No Ectopy. Conduction: Normal. ST Segments: Normal ST Segments. T Waves: Normal. Clinical Impression: Normal Sinus Rhythm       Imaging Results    None       X-Rays:   Independently Interpreted Readings:   Chest X-Ray: Normal heart size.  No infiltrates.  No acute abnormalities.     Medical Decision Making:   Clinical Tests:   Lab Tests: Ordered and Reviewed  Radiological Study: Ordered and Reviewed  Medical Tests: Ordered and Reviewed                      Clinical Impression:       ICD-10-CM ICD-9-CM   1. Pleurisy R09.1 511.0   2. Chest pain R07.9 786.50         Disposition:   Disposition: Discharged  Condition: Stable                        Reyna Moya MD  03/25/19 3331

## 2019-03-25 NOTE — ED NOTES
"Patient stated "the chest pain comes and goes, when I take a deep breathe in", MD is aware and orders placed  "

## 2019-04-25 ENCOUNTER — HOSPITAL ENCOUNTER (EMERGENCY)
Facility: HOSPITAL | Age: 59
Discharge: HOME | End: 2019-04-25
Attending: EMERGENCY MEDICINE
Payer: MEDICAID

## 2019-04-25 VITALS
DIASTOLIC BLOOD PRESSURE: 69 MMHG | RESPIRATION RATE: 17 BRPM | SYSTOLIC BLOOD PRESSURE: 155 MMHG | HEART RATE: 56 BPM | TEMPERATURE: 98 F | WEIGHT: 181.5 LBS | BODY MASS INDEX: 30.24 KG/M2 | HEIGHT: 65 IN | OXYGEN SATURATION: 100 %

## 2019-04-25 DIAGNOSIS — R07.9 CHEST PAIN: ICD-10-CM

## 2019-04-25 DIAGNOSIS — R07.89 CHEST TIGHTNESS: Primary | ICD-10-CM

## 2019-04-25 LAB
ALBUMIN SERPL BCP-MCNC: 4.4 G/DL (ref 3.5–5.2)
ALP SERPL-CCNC: 39 U/L (ref 38–126)
ALT SERPL W/O P-5'-P-CCNC: 13 U/L (ref 10–44)
ANION GAP SERPL CALC-SCNC: 7 MMOL/L (ref 8–16)
AST SERPL-CCNC: 16 U/L (ref 15–46)
BASOPHILS # BLD AUTO: 0.02 K/UL (ref 0–0.2)
BASOPHILS NFR BLD: 0.3 % (ref 0–1.9)
BILIRUB SERPL-MCNC: 0.4 MG/DL (ref 0.1–1)
BUN SERPL-MCNC: 22 MG/DL (ref 7–17)
CALCIUM SERPL-MCNC: 9.5 MG/DL (ref 8.7–10.5)
CHLORIDE SERPL-SCNC: 99 MMOL/L (ref 95–110)
CO2 SERPL-SCNC: 31 MMOL/L (ref 23–29)
CREAT SERPL-MCNC: 0.81 MG/DL (ref 0.5–1.4)
DIFFERENTIAL METHOD: ABNORMAL
EOSINOPHIL # BLD AUTO: 0.1 K/UL (ref 0–0.5)
EOSINOPHIL NFR BLD: 0.8 % (ref 0–8)
ERYTHROCYTE [DISTWIDTH] IN BLOOD BY AUTOMATED COUNT: 13.2 % (ref 11.5–14.5)
EST. GFR  (AFRICAN AMERICAN): >60 ML/MIN/1.73 M^2
EST. GFR  (NON AFRICAN AMERICAN): >60 ML/MIN/1.73 M^2
GLUCOSE SERPL-MCNC: 89 MG/DL (ref 70–110)
HCT VFR BLD AUTO: 36.9 % (ref 37–48.5)
HGB BLD-MCNC: 12.2 G/DL (ref 12–16)
LYMPHOCYTES # BLD AUTO: 2.6 K/UL (ref 1–4.8)
LYMPHOCYTES NFR BLD: 44.1 % (ref 18–48)
MCH RBC QN AUTO: 29.1 PG (ref 27–31)
MCHC RBC AUTO-ENTMCNC: 33.1 G/DL (ref 32–36)
MCV RBC AUTO: 88 FL (ref 82–98)
MONOCYTES # BLD AUTO: 0.7 K/UL (ref 0.3–1)
MONOCYTES NFR BLD: 11.9 % (ref 4–15)
NEUTROPHILS # BLD AUTO: 2.5 K/UL (ref 1.8–7.7)
NEUTROPHILS NFR BLD: 42.6 % (ref 38–73)
NT-PROBNP: 203 PG/ML (ref 5–900)
PLATELET # BLD AUTO: 146 K/UL (ref 150–350)
PMV BLD AUTO: 13.1 FL (ref 9.2–12.9)
POTASSIUM SERPL-SCNC: 4 MMOL/L (ref 3.5–5.1)
PROT SERPL-MCNC: 7.5 G/DL (ref 6–8.4)
RBC # BLD AUTO: 4.19 M/UL (ref 4–5.4)
SODIUM SERPL-SCNC: 137 MMOL/L (ref 136–145)
TROPONIN I SERPL DL<=0.01 NG/ML-MCNC: <0.012 NG/ML (ref 0.01–0.03)
WBC # BLD AUTO: 5.89 K/UL (ref 3.9–12.7)

## 2019-04-25 PROCEDURE — 93005 ELECTROCARDIOGRAM TRACING: CPT | Mod: ER

## 2019-04-25 PROCEDURE — 93010 ELECTROCARDIOGRAM REPORT: CPT | Mod: ,,, | Performed by: INTERNAL MEDICINE

## 2019-04-25 PROCEDURE — 80053 COMPREHEN METABOLIC PANEL: CPT | Mod: ER

## 2019-04-25 PROCEDURE — 84484 ASSAY OF TROPONIN QUANT: CPT | Mod: ER

## 2019-04-25 PROCEDURE — 99284 EMERGENCY DEPT VISIT MOD MDM: CPT | Mod: 25,ER

## 2019-04-25 PROCEDURE — 25000003 PHARM REV CODE 250: Mod: ER | Performed by: EMERGENCY MEDICINE

## 2019-04-25 PROCEDURE — 85025 COMPLETE CBC W/AUTO DIFF WBC: CPT | Mod: ER

## 2019-04-25 PROCEDURE — 94760 N-INVAS EAR/PLS OXIMETRY 1: CPT | Mod: ER

## 2019-04-25 PROCEDURE — 83880 ASSAY OF NATRIURETIC PEPTIDE: CPT | Mod: ER

## 2019-04-25 PROCEDURE — 93010 EKG 12-LEAD: ICD-10-PCS | Mod: ,,, | Performed by: INTERNAL MEDICINE

## 2019-04-25 RX ORDER — ASPIRIN 325 MG
325 TABLET ORAL
Status: COMPLETED | OUTPATIENT
Start: 2019-04-25 | End: 2019-04-25

## 2019-04-25 RX ADMIN — ASPIRIN 325 MG ORAL TABLET 325 MG: 325 PILL ORAL at 09:04

## 2019-04-25 NOTE — DISCHARGE INSTRUCTIONS
Return emergency room with any worsening chest pain, shortness of breath, nausea, vomiting, or any other concerning symptoms.  Follow with the cardiologist at the scheduled appointment this coming month.

## 2019-04-25 NOTE — ED PROVIDER NOTES
Encounter Date: 2019       History     Chief Complaint   Patient presents with    Chest Pain     reports chest tightness constant since this am. she is hypertensive, states she just took her meds prior to arrival. pt anxious     58-year-old female comes emergency with complaints of having a tightness in the left side of her chest that she woke up with this morning.  Patient had a stress test done yesterday although she could on do the leg kicking to produce the rapid heart rate.  Patient has not received the results of the stress test.  Patient concerned that there may be something wrong.  Denies any shortness of breath. No fever the chills nausea vomiting or diarrhea.  No diaphoresis.  Has had similar episodes in the past mostly over the last week.  Patient's cardiologist is well aware of this.  Patient several hypertension high cholesterol.  Appears to be in no distress at this time        Review of patient's allergies indicates:   Allergen Reactions    Codeine Hives    Sulfa (sulfonamide antibiotics) Hives     Past Medical History:   Diagnosis Date    Anxiety     Depression     GERD (gastroesophageal reflux disease)     High cholesterol     Hypertension      Past Surgical History:   Procedure Laterality Date    BREAST SURGERY       SECTION      COLONOSCOPY Miralax N/A 2017    Performed by Buddy [facility]cher Jr., MD at [facility] ENDO    ESOPHAGOGASTRODUODENOSCOPY (EGD) N/A 2017    Performed by Buddy Butcher Jr., MD at [facility] ENDO    HYSTERECTOMY      KIDNEY SURGERY Right     done at [facility], reported as mass by patient    s-section       No family history on file.  Social History     Tobacco Use    Smoking status: Never Smoker    Smokeless tobacco: Never Used   Substance Use Topics    Alcohol use: No    Drug use: No     Review of Systems   Respiratory: Positive for chest tightness (Chest tightness left chest anterior inferior not reproduced with palpation.).     Cardiovascular: Negative for chest pain, palpitations and leg swelling.   Genitourinary: Negative.    All other systems reviewed and are negative.      Physical Exam     Initial Vitals [04/25/19 0930]   BP Pulse Resp Temp SpO2   (!) 216/95 74 18 98.2 °F (36.8 °C) 99 %      MAP       --         Physical Exam    Nursing note and vitals reviewed.  Constitutional: She appears well-developed and well-nourished.   HENT:   Head: Normocephalic and atraumatic.   Eyes: Pupils are equal, round, and reactive to light.   Cardiovascular: Normal rate, regular rhythm and normal heart sounds.   No anterior chest wall tenderness to palpation.   Abdominal: Soft. Bowel sounds are normal.   Musculoskeletal: Normal range of motion.   Neurological: She is alert and oriented to person, place, and time. She has normal strength.   Skin: Skin is warm.   Psychiatric: She has a normal mood and affect. Thought content normal.         ED Course   Procedures  Labs Reviewed   CBC W/ AUTO DIFFERENTIAL - Abnormal; Notable for the following components:       Result Value    Hematocrit 36.9 (*)     Platelets 146 (*)     MPV 13.1 (*)     All other components within normal limits   COMPREHENSIVE METABOLIC PANEL - Abnormal; Notable for the following components:    CO2 31 (*)     BUN, Bld 22 (*)     Anion Gap 7 (*)     All other components within normal limits   TROPONIN I   NT-PRO NATRIURETIC PEPTIDE   TROPONIN I          Imaging Results    None          Medical Decision Making:   Differential Diagnosis:   MI, STEMI, anxiety reaction, costochondritis, PE, pneumothorax, angina, cancer, fracture    ED Management:  The patient is stable at this time.  All laboratory values are within normal limits. Considering that the patient had a stress test yesterday was not told of any abnormal findings I will allow the patient to follow with the primary care physician.  I will call her cardiologist prior to discharge just to keep him informed of what is going on.   There was some concern that she may need a stent but she is uncertain of this.  Patient's cardiologist is Dr. Mark Umanzor.  He is in the heart catheterization lab today.  I have spoken with his nurse practitioner, Elizabet Reynoso.  She reports to me that her stress test done yesterday was negative.  She has a follow-up appointment set for later this month.  I will discharge patient home at this time.  Follow up with her cardiologist as necessary.  The patient does however clearly understand to return to the emergency room with any shortness of breath, chest pain, or any other concerns.                      Clinical Impression:       ICD-10-CM ICD-9-CM   1. Chest tightness R07.89 786.59   2. Chest pain R07.9 786.50         Disposition:   Disposition: Discharged  Condition: Stable                        Kenneth Layton MD  04/25/19 2064

## 2019-05-01 PROBLEM — I20.9 ANGINA, CLASS III: Status: ACTIVE | Noted: 2019-05-01

## 2019-05-13 PROBLEM — I10 ESSENTIAL HYPERTENSION: Status: ACTIVE | Noted: 2019-05-13

## 2019-05-13 PROBLEM — I50.31 ACUTE DIASTOLIC HEART FAILURE: Status: ACTIVE | Noted: 2019-05-13

## 2019-05-13 PROBLEM — R06.02 SOB (SHORTNESS OF BREATH): Status: ACTIVE | Noted: 2019-05-13

## 2019-05-13 PROBLEM — E66.09 OBESITY DUE TO EXCESS CALORIES: Status: ACTIVE | Noted: 2019-05-13

## 2019-07-27 ENCOUNTER — HOSPITAL ENCOUNTER (EMERGENCY)
Facility: HOSPITAL | Age: 59
Discharge: HOME | End: 2019-07-27
Attending: EMERGENCY MEDICINE
Payer: MEDICAID

## 2019-07-27 VITALS
BODY MASS INDEX: 28.66 KG/M2 | HEIGHT: 65 IN | HEART RATE: 80 BPM | TEMPERATURE: 98 F | SYSTOLIC BLOOD PRESSURE: 107 MMHG | OXYGEN SATURATION: 97 % | WEIGHT: 172 LBS | RESPIRATION RATE: 18 BRPM | DIASTOLIC BLOOD PRESSURE: 77 MMHG

## 2019-07-27 DIAGNOSIS — M76.61 TENDONITIS, ACHILLES, RIGHT: Primary | ICD-10-CM

## 2019-07-27 PROCEDURE — 99283 EMERGENCY DEPT VISIT LOW MDM: CPT | Mod: ER

## 2019-07-27 PROCEDURE — 25000003 PHARM REV CODE 250: Mod: ER | Performed by: PHYSICIAN ASSISTANT

## 2019-07-27 RX ORDER — OMEPRAZOLE 40 MG/1
40 CAPSULE, DELAYED RELEASE ORAL DAILY
Qty: 30 CAPSULE | Refills: 2 | Status: SHIPPED | OUTPATIENT
Start: 2019-07-27 | End: 2022-05-27

## 2019-07-27 RX ORDER — FAMOTIDINE 20 MG/1
20 TABLET, FILM COATED ORAL
Status: COMPLETED | OUTPATIENT
Start: 2019-07-27 | End: 2019-07-27

## 2019-07-27 RX ORDER — KETOROLAC TROMETHAMINE 10 MG/1
10 TABLET, FILM COATED ORAL
Status: COMPLETED | OUTPATIENT
Start: 2019-07-27 | End: 2019-07-27

## 2019-07-27 RX ORDER — IBUPROFEN 600 MG/1
600 TABLET ORAL EVERY 8 HOURS PRN
Qty: 15 TABLET | Refills: 0 | Status: SHIPPED | OUTPATIENT
Start: 2019-07-27 | End: 2019-08-01

## 2019-07-27 RX ADMIN — FAMOTIDINE 20 MG: 20 TABLET, FILM COATED ORAL at 06:07

## 2019-07-27 RX ADMIN — KETOROLAC TROMETHAMINE 10 MG: 10 TABLET, FILM COATED ORAL at 06:07

## 2019-07-27 NOTE — ED NOTES
"Pt stated her right wrist feels like pain striking and it hard to do anything thing with it like that. Also verbalized pain to the right ankle " I dont know why it hurt and I dont remember hurting it."  "

## 2019-07-28 NOTE — ED PROVIDER NOTES
Encounter Date: 2019       History     Chief Complaint   Patient presents with    Joint Pain     Pt c/o right  wrist and right ankle pain x 3-4 days.  Pt denies any known injury.      Patient is 59-year-old female presenting with complaint of 1 week history intermittent moderate aching pain in the right posterior ankle.  No known injury.  The pain is worse with walking.  It improved some with plantar flexion of the foot.  No numbness or focal weakness.  No treatment prior to arrival.        Review of patient's allergies indicates:   Allergen Reactions    Codeine Hives    Sulfa (sulfonamide antibiotics) Hives     Past Medical History:   Diagnosis Date    Anxiety     Depression     GERD (gastroesophageal reflux disease)     High cholesterol     Hypertension      Past Surgical History:   Procedure Laterality Date    BREAST SURGERY       SECTION      COLONOSCOPY Miralax N/A 2017    Performed [facility] at [facility] ENDO    ESOPHAGOGASTRODUODENOSCOPY (EGD) N/A 2017    Performed by Buddy [facility]cher Jr., MD at [facility] ENDO    HYSTERECTOMY      KIDNEY SURGERY Right     done at [facility], reported as mass by patient    Left heart cath Left 2019    Performed by [facility]hard [facility]nan MD at [facility] CATH LAB    s-section       History reviewed. No pertinent family history.  Social History     Tobacco Use    Smoking status: Never Smoker    Smokeless tobacco: Never Used   Substance Use Topics    Alcohol use: No    Drug use: No     Review of Systems   Constitutional: Negative for activity change, appetite change, chills and fever.   Musculoskeletal: Negative for back pain, neck pain and neck stiffness.        +right ankle pain. No swelling.   Skin: Negative for color change, rash and wound.   Neurological: Negative for weakness and numbness.   All other systems reviewed and are negative.      Physical Exam     Initial Vitals [19 1745]   BP Pulse Resp Temp SpO2   107/77 80  18 97.8 °F (36.6 °C) 97 %      MAP       --         Physical Exam    Nursing note and vitals reviewed.  Constitutional: She appears well-developed and well-nourished. She appears distressed (Mild.  Smiling, resting comfortably).   HENT:   Head: Normocephalic and atraumatic.   Cardiovascular: Normal rate, regular rhythm, normal heart sounds and intact distal pulses.   Pulmonary/Chest: Breath sounds normal. No respiratory distress.   Musculoskeletal: She exhibits no edema.   No swelling or deformity to the right ankle.  No bony tenderness.  Tenderness to palpation along the Achilles tendon.  Achilles tendon and feels intact.  Pain with dorsiflexion.  No tenderness along the plantar fascia.  Normal range of motion of all toes without pain.  No posterior calf tenderness or swelling.  Normal range of motion of right knee without pain.  Good distal pulses.   Neurological: She is alert and oriented to person, place, and time. She has normal strength. No sensory deficit.   Skin: Skin is warm and dry. No rash noted. No erythema.   Psychiatric: She has a normal mood and affect. Her behavior is normal. Judgment and thought content normal.         ED Course   Procedures  Labs Reviewed - No data to display       Imaging Results    None          Medical Decision Making:   Exam is consistent with Achilles tendinitis.  No bony tenderness or blunt trauma so no emergent imaging indicated at this time.  Patient was advised on stretches and supportive care and the need for follow-up. Rx for NSAID. Return to the ED if worse in any way.                       Clinical Impression:       ICD-10-CM ICD-9-CM   1. Tendonitis, Achilles, right M76.61 726.71         Disposition:   Disposition: Discharged                        WENDY Ansari  07/28/19 6520

## 2020-01-29 ENCOUNTER — HOSPITAL ENCOUNTER (OUTPATIENT)
Dept: RADIOLOGY | Facility: HOSPITAL | Age: 60
Discharge: HOME | End: 2020-01-29
Attending: OBSTETRICS & GYNECOLOGY
Payer: MEDICAID

## 2020-01-29 DIAGNOSIS — Z12.31 SCREENING MAMMOGRAM, ENCOUNTER FOR: ICD-10-CM

## 2020-01-29 PROCEDURE — 77067 SCR MAMMO BI INCL CAD: CPT | Mod: TC,PO

## 2020-02-03 ENCOUNTER — HOSPITAL ENCOUNTER (EMERGENCY)
Facility: HOSPITAL | Age: 60
Discharge: HOME | End: 2020-02-03
Attending: EMERGENCY MEDICINE
Payer: MEDICAID

## 2020-02-03 VITALS
SYSTOLIC BLOOD PRESSURE: 121 MMHG | WEIGHT: 175 LBS | TEMPERATURE: 98 F | DIASTOLIC BLOOD PRESSURE: 60 MMHG | BODY MASS INDEX: 29.16 KG/M2 | OXYGEN SATURATION: 97 % | HEART RATE: 66 BPM | RESPIRATION RATE: 18 BRPM | HEIGHT: 65 IN

## 2020-02-03 DIAGNOSIS — M79.601 RIGHT ARM PAIN: Primary | ICD-10-CM

## 2020-02-03 DIAGNOSIS — S46.911A MUSCLE STRAIN OF RIGHT UPPER ARM, INITIAL ENCOUNTER: ICD-10-CM

## 2020-02-03 PROCEDURE — 93005 ELECTROCARDIOGRAM TRACING: CPT | Mod: ER

## 2020-02-03 PROCEDURE — 93010 EKG 12-LEAD: ICD-10-PCS | Mod: ,,, | Performed by: INTERNAL MEDICINE

## 2020-02-03 PROCEDURE — 99284 EMERGENCY DEPT VISIT MOD MDM: CPT | Mod: 25,ER

## 2020-02-03 PROCEDURE — 63600175 PHARM REV CODE 636 W HCPCS: Mod: ER | Performed by: EMERGENCY MEDICINE

## 2020-02-03 PROCEDURE — 93010 ELECTROCARDIOGRAM REPORT: CPT | Mod: ,,, | Performed by: INTERNAL MEDICINE

## 2020-02-03 PROCEDURE — 96372 THER/PROPH/DIAG INJ SC/IM: CPT | Mod: ER

## 2020-02-03 RX ORDER — TRAMADOL HYDROCHLORIDE AND ACETAMINOPHEN 37.5; 325 MG/1; MG/1
1 TABLET, FILM COATED ORAL EVERY 6 HOURS PRN
Qty: 9 TABLET | Refills: 0 | Status: SHIPPED | OUTPATIENT
Start: 2020-02-03 | End: 2022-05-27

## 2020-02-03 RX ORDER — KETOROLAC TROMETHAMINE 30 MG/ML
60 INJECTION, SOLUTION INTRAMUSCULAR; INTRAVENOUS
Status: COMPLETED | OUTPATIENT
Start: 2020-02-03 | End: 2020-02-03

## 2020-02-03 RX ADMIN — KETOROLAC TROMETHAMINE 60 MG: 30 INJECTION, SOLUTION INTRAMUSCULAR at 09:02

## 2020-02-04 NOTE — ED PROVIDER NOTES
Chief Complaint  Chief Complaint   Patient presents with    Arm Pain     pain under the right arm started tonight. Pain is 8/10       HPI  Ashli Kumar is a 59 y.o. female who presents with right arm pain. Patient reports right arm pain that started tonight.  Pain feels crampy and is exacerbated by lifting her arm up only.  She denies any chest pain or shortness of breath or palpitations.  She denies any fever vomiting or diarrhea or diaphoresis.  She denies any syncope or near syncope or abdominal pain.  She denies any trauma. She reports that she has had pains to this arm previously and this is similar but she still wanted to get checked out.  The pain is moderate in intensity    Past medical history  Past Medical History:   Diagnosis Date    Anxiety     Depression     GERD (gastroesophageal reflux disease)     High cholesterol     Hypertension        Current Medications  No current facility-administered medications for this encounter.     Current Outpatient Medications:     divalproex ER (DEPAKOTE) 500 MG Tb24, Take 500 mg by mouth once daily., Disp: , Rfl:     fluoxetine (PROZAC) 40 MG capsule, Take by mouth once daily., Disp: , Rfl:     lisinopril 10 MG tablet, Take 1 tablet (10 mg total) by mouth once daily., Disp: 90 tablet, Rfl: 3    metoprolol succinate (TOPROL-XL) 50 MG 24 hr tablet, Take 50 mg by mouth once daily., Disp: , Rfl:     omeprazole (PRILOSEC) 40 MG capsule, Take 1 capsule (40 mg total) by mouth once daily., Disp: 30 capsule, Rfl: 2    oxybutynin (DITROPAN) 5 MG Tab, Take 5 mg by mouth 3 (three) times daily., Disp: , Rfl:     pravastatin (PRAVACHOL) 40 MG tablet, Take 40 mg by mouth once daily., Disp: , Rfl:     tramadol-acetaminophen 37.5-325 mg (ULTRACET) 37.5-325 mg Tab, Take 1 tablet by mouth every 6 (six) hours as needed for Pain., Disp: 9 tablet, Rfl: 0    Allergies  Review of patient's allergies indicates:   Allergen Reactions    Codeine Hives    Sulfa  (sulfonamide antibiotics) Hives       Surgical history  Past Surgical History:   Procedure Laterality Date    BRE[facility]OPSY [facility]     [facility]    BREAST SURGERY       SECTION      COLONOSCOPY N/A 2017    Procedure: COLONOSCOPY Miralax;  Surgeon: Buddy Butcher Jr., MD;  Location[facility] ENDO;  Service: Endoscopy;  Laterality: N/A;    HYSTERECTOMY      [place]GERY Right     [place], reported as mass by patient    LEFT HEART CATHETERIZATION Left 2019    Procedure: Left heart cath;  Surgeon: Gerhard Krishnan MD;  Location[facility] CATH LAB;  Service: Cardiology;  Laterality: Left;    OOPHORECTOMY      s-section         Social history  Social History     Socioeconomic History    Marital status:      Spouse name: Not on file    Number of children: Not on file    Years of education: Not on file    Highest education level: Not on file   Occupational History    Not on file   Social Needs    Financial resource strain: Not on file    Food insecurity:     Worry: Not on file     Inability: Not on file    Transportation needs:     Medical: Not on file     Non-medical: Not on file   Tobacco Use    Smoking status: Never Smoker    Smokeless tobacco: Never Used   Substance and Sexual Activity    Alcohol use: No    Drug use: No    Sexual activity: Not on file   Lifestyle    Physical activity:     Days per week: Not on file     Minutes per session: Not on file    Stress: Not on file   Relationships    Social connections:     Talks on phone: Not on file     Gets together: Not on file     Attends Gnosticism service: Not on file     Active member of club or organization: Not on file     Attends meetings of clubs or organizations: Not on file     Relationship status: Not on file   Other Topics Concern    Not on file   Social History Narrative    Not on file       Family History  History reviewed. No pertinent family history.    Review of systems  Constitutional: No fever or  "weakness.  Eyes: No redness, pain, or discharge.  HENT: No ear pain, no sudden onset headache, no throat pain.  Respiratory: No wheezing, cough, or shortness of breath.  Cardiovascular: No chest pain or palpitations.  Skin: No rash, abscess, or laceration.  Neurologic: No new focal weakness or sensory changes.  All systems otherwise negative except as noted in ROS and HPI    Physical Exam  Vital signs: BP (!) 186/84 (BP Location: Right arm, Patient Position: Sitting)   Pulse 79   Temp 97.8 °F (36.6 °C) (Oral)   Resp 18   Ht 5' 5" (1.651 m)   Wt 79.4 kg (175 lb)   SpO2 98%   Breastfeeding? No   BMI 29.12 kg/m²   Constitutional: No acute distress.  Well developed, alert, oriented and appropriate.  HENT: Normocephalic, atraumatic. Normal ear, nose, and throat.  Eyes: PERRL, EOMI, normal conjunctiva.  Neck: Normal range of motion, no tenderness; supple.  Respiratory: Nonlabored breathing with normal breath sounds.  Cardiovascular: RRR with no pulse deficit.  GI: Soft, nontender, no rebound or guarding.  Musculoskeletal:  Patient has pain with active abduction of right arm.  Local point tender to muscles of left arm.  No bony tenderness.  Skin: Warm, dry skin without infection or injury.  Neurologic: Normal motor, sensation with no new focal deficit.  Psychiatric: Affect normal, judgement normal, mood normal.  No SI, HI, and not gravely disabled.    Labs  Pertinent labs reviewed (see chart for details)  Labs Reviewed - No data to display    ECG  Results for orders placed or performed during the hospital encounter of 04/25/19   EKG 12-lead    Collection Time: 04/25/19  9:30 AM    Narrative    Test Reason : R07.9,    Vent. Rate : 072 BPM     Atrial Rate : 072 BPM     P-R Int : 182 ms          QRS Dur : 094 ms      QT Int : 402 ms       P-R-T Axes : 072 036 067 degrees     QTc Int : 440 ms    Normal sinus rhythm  Normal ECG  When compared with ECG of 25-MAR-2019 16:10,  No significant change was found  Confirmed by " BARRON ENCISO, JOE ([id number]) on 4/25/2019 2:16:39 PM    Referred By: [id number]   SELF           Confirmed By:JOE MART MD     ECG interpreted by ED MD    Radiology  X-Ray Chest AP Portable   Final Result      No acute findings.         Electronically signed by: Silvestre Rodriguez MD   Date:    02/03/2020   Time:    22:09          Procedures  Procedures    Medications   ketorolac injection 60 mg (60 mg Intramuscular Given 2/3/20 2150)       ED course and medical decision making    ED Course as of Feb 03 2230 Mon Feb 03, 2020 2145 EKG shows normal sinus rhythm rate of 67 beats per minute with no significant ST changes.  Normal QTC    [MB]      ED Course User Index  [MB] Dominick Sadler MD       Out of an abundance of caution, we will perform chest x-ray and EKG.  Symptoms though are very consistent with local right arm strain.  Patient feels improved after medication.  She feels comfortable plan to go home and understands reasons to return emergency department    Disposition    Patient discharged in stable condition      Final impression  1. Right arm pain    2. Muscle strain of right upper arm, initial encounter        Critical care time spent with this patient was 0 minutes excluding the procedure time.          Dominick Sadler MD  02/03/20 1054

## 2020-04-16 ENCOUNTER — HOSPITAL ENCOUNTER (EMERGENCY)
Facility: HOSPITAL | Age: 60
Discharge: HOME | End: 2020-04-16
Attending: EMERGENCY MEDICINE
Payer: MEDICAID

## 2020-04-16 VITALS
BODY MASS INDEX: 28.99 KG/M2 | TEMPERATURE: 98 F | SYSTOLIC BLOOD PRESSURE: 128 MMHG | WEIGHT: 174 LBS | OXYGEN SATURATION: 99 % | HEIGHT: 65 IN | RESPIRATION RATE: 20 BRPM | HEART RATE: 59 BPM | DIASTOLIC BLOOD PRESSURE: 76 MMHG

## 2020-04-16 DIAGNOSIS — R05.9 COUGH: Primary | ICD-10-CM

## 2020-04-16 LAB — SARS-COV-2 RDRP RESP QL NAA+PROBE: NEGATIVE

## 2020-04-16 PROCEDURE — U0002 COVID-19 LAB TEST NON-CDC: HCPCS | Mod: ER

## 2020-04-16 PROCEDURE — 99283 EMERGENCY DEPT VISIT LOW MDM: CPT | Mod: 25,ER

## 2020-04-16 RX ORDER — BENZONATATE 100 MG/1
100 CAPSULE ORAL 3 TIMES DAILY PRN
Qty: 20 CAPSULE | Refills: 0 | Status: SHIPPED | OUTPATIENT
Start: 2020-04-16 | End: 2020-04-26

## 2020-04-16 NOTE — DISCHARGE INSTRUCTIONS
Please follow-up with your primary care as needed return to the emergency room as needed for any shortness of breath.   You have been prescribed cough medicine may take it as directed.

## 2020-04-30 ENCOUNTER — HOSPITAL ENCOUNTER (EMERGENCY)
Facility: HOSPITAL | Age: 60
Discharge: HOME | End: 2020-04-30
Attending: FAMILY MEDICINE
Payer: MEDICAID

## 2020-04-30 VITALS
BODY MASS INDEX: 29.32 KG/M2 | HEART RATE: 67 BPM | WEIGHT: 176 LBS | SYSTOLIC BLOOD PRESSURE: 150 MMHG | RESPIRATION RATE: 18 BRPM | OXYGEN SATURATION: 99 % | HEIGHT: 65 IN | TEMPERATURE: 99 F | DIASTOLIC BLOOD PRESSURE: 80 MMHG

## 2020-04-30 DIAGNOSIS — G89.29 CHRONIC NECK PAIN: ICD-10-CM

## 2020-04-30 DIAGNOSIS — M54.2 CHRONIC NECK PAIN: ICD-10-CM

## 2020-04-30 DIAGNOSIS — J30.2 SEASONAL ALLERGIES: Primary | ICD-10-CM

## 2020-04-30 DIAGNOSIS — R52 PAIN: ICD-10-CM

## 2020-04-30 LAB — SARS-COV-2 RDRP RESP QL NAA+PROBE: NEGATIVE

## 2020-04-30 PROCEDURE — U0002 COVID-19 LAB TEST NON-CDC: HCPCS | Mod: ER

## 2020-04-30 PROCEDURE — 99283 EMERGENCY DEPT VISIT LOW MDM: CPT | Mod: 25,ER

## 2020-04-30 RX ORDER — METHOCARBAMOL 500 MG/1
500 TABLET, FILM COATED ORAL 2 TIMES DAILY PRN
Qty: 12 TABLET | Refills: 0 | Status: SHIPPED | OUTPATIENT
Start: 2020-04-30 | End: 2020-05-10

## 2020-04-30 NOTE — DISCHARGE INSTRUCTIONS
Please follow-up with your primary care doctor for further evaluation.  Please return to the emergency room as needed.
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
## 2020-09-01 ENCOUNTER — HOSPITAL ENCOUNTER (EMERGENCY)
Facility: HOSPITAL | Age: 60
Discharge: HOME | End: 2020-09-01
Attending: EMERGENCY MEDICINE
Payer: MEDICAID

## 2020-09-01 VITALS
RESPIRATION RATE: 25 BRPM | OXYGEN SATURATION: 96 % | DIASTOLIC BLOOD PRESSURE: 58 MMHG | SYSTOLIC BLOOD PRESSURE: 119 MMHG | WEIGHT: 165 LBS | HEART RATE: 59 BPM | BODY MASS INDEX: 27.49 KG/M2 | HEIGHT: 65 IN | TEMPERATURE: 98 F

## 2020-09-01 DIAGNOSIS — R07.9 CHEST PAIN: ICD-10-CM

## 2020-09-01 DIAGNOSIS — B34.9 ACUTE VIRAL SYNDROME: Primary | ICD-10-CM

## 2020-09-01 LAB
ALBUMIN SERPL BCP-MCNC: 4.2 G/DL (ref 3.5–5.2)
ALP SERPL-CCNC: 36 U/L (ref 38–126)
ALT SERPL W/O P-5'-P-CCNC: 22 U/L (ref 10–44)
ANION GAP SERPL CALC-SCNC: 11 MMOL/L (ref 8–16)
AST SERPL-CCNC: 37 U/L (ref 15–46)
BASOPHILS # BLD AUTO: 0.02 K/UL (ref 0–0.2)
BASOPHILS NFR BLD: 0.3 % (ref 0–1.9)
BILIRUB SERPL-MCNC: 0.6 MG/DL (ref 0.1–1)
BUN SERPL-MCNC: 15 MG/DL (ref 7–17)
CALCIUM SERPL-MCNC: 9.3 MG/DL (ref 8.7–10.5)
CHLORIDE SERPL-SCNC: 96 MMOL/L (ref 95–110)
CO2 SERPL-SCNC: 30 MMOL/L (ref 23–29)
CREAT SERPL-MCNC: 0.78 MG/DL (ref 0.5–1.4)
DIFFERENTIAL METHOD: ABNORMAL
EOSINOPHIL # BLD AUTO: 0 K/UL (ref 0–0.5)
EOSINOPHIL NFR BLD: 0.3 % (ref 0–8)
ERYTHROCYTE [DISTWIDTH] IN BLOOD BY AUTOMATED COUNT: 13.4 % (ref 11.5–14.5)
EST. GFR  (AFRICAN AMERICAN): >60 ML/MIN/1.73 M^2
EST. GFR  (NON AFRICAN AMERICAN): >60 ML/MIN/1.73 M^2
GLUCOSE SERPL-MCNC: 94 MG/DL (ref 70–110)
HCT VFR BLD AUTO: 38.6 % (ref 37–48.5)
HGB BLD-MCNC: 12.7 G/DL (ref 12–16)
IMM GRANULOCYTES # BLD AUTO: 0.04 K/UL (ref 0–0.04)
IMM GRANULOCYTES NFR BLD AUTO: 0.5 % (ref 0–0.5)
LYMPHOCYTES # BLD AUTO: 2.3 K/UL (ref 1–4.8)
LYMPHOCYTES NFR BLD: 28.2 % (ref 18–48)
MCH RBC QN AUTO: 30.1 PG (ref 27–31)
MCHC RBC AUTO-ENTMCNC: 32.9 G/DL (ref 32–36)
MCV RBC AUTO: 92 FL (ref 82–98)
MONOCYTES # BLD AUTO: 0.7 K/UL (ref 0.3–1)
MONOCYTES NFR BLD: 8.3 % (ref 4–15)
NEUTROPHILS # BLD AUTO: 5 K/UL (ref 1.8–7.7)
NEUTROPHILS NFR BLD: 62.4 % (ref 38–73)
NRBC BLD-RTO: 0 /100 WBC
PLATELET # BLD AUTO: 142 K/UL (ref 150–350)
PMV BLD AUTO: 13.2 FL (ref 9.2–12.9)
POTASSIUM SERPL-SCNC: 4.1 MMOL/L (ref 3.5–5.1)
PROT SERPL-MCNC: 7 G/DL (ref 6–8.4)
RBC # BLD AUTO: 4.22 M/UL (ref 4–5.4)
SODIUM SERPL-SCNC: 137 MMOL/L (ref 136–145)
TROPONIN I SERPL DL<=0.01 NG/ML-MCNC: <0.012 NG/ML (ref 0.01–0.03)
WBC # BLD AUTO: 7.97 K/UL (ref 3.9–12.7)

## 2020-09-01 PROCEDURE — 96361 HYDRATE IV INFUSION ADD-ON: CPT | Mod: ER

## 2020-09-01 PROCEDURE — 85025 COMPLETE CBC W/AUTO DIFF WBC: CPT | Mod: ER

## 2020-09-01 PROCEDURE — 99284 EMERGENCY DEPT VISIT MOD MDM: CPT | Mod: 25,ER

## 2020-09-01 PROCEDURE — 84484 ASSAY OF TROPONIN QUANT: CPT | Mod: ER

## 2020-09-01 PROCEDURE — 96374 THER/PROPH/DIAG INJ IV PUSH: CPT | Mod: ER

## 2020-09-01 PROCEDURE — 80053 COMPREHEN METABOLIC PANEL: CPT | Mod: ER

## 2020-09-01 PROCEDURE — 25000003 PHARM REV CODE 250: Mod: ER | Performed by: PHYSICIAN ASSISTANT

## 2020-09-01 PROCEDURE — 93010 ELECTROCARDIOGRAM REPORT: CPT | Mod: ,,, | Performed by: INTERNAL MEDICINE

## 2020-09-01 PROCEDURE — 93010 EKG 12-LEAD: ICD-10-PCS | Mod: ,,, | Performed by: INTERNAL MEDICINE

## 2020-09-01 PROCEDURE — 93005 ELECTROCARDIOGRAM TRACING: CPT | Mod: ER

## 2020-09-01 PROCEDURE — 63600175 PHARM REV CODE 636 W HCPCS: Mod: ER | Performed by: PHYSICIAN ASSISTANT

## 2020-09-01 RX ORDER — LEVOCETIRIZINE DIHYDROCHLORIDE 5 MG/1
5 TABLET, FILM COATED ORAL NIGHTLY
COMMUNITY
End: 2022-05-27

## 2020-09-01 RX ORDER — SPIRONOLACTONE 25 MG/1
25 TABLET ORAL DAILY
COMMUNITY
End: 2022-05-27

## 2020-09-01 RX ORDER — ATORVASTATIN CALCIUM 10 MG/1
10 TABLET, FILM COATED ORAL DAILY
COMMUNITY
End: 2022-05-27

## 2020-09-01 RX ORDER — ONDANSETRON 2 MG/ML
4 INJECTION INTRAMUSCULAR; INTRAVENOUS
Status: COMPLETED | OUTPATIENT
Start: 2020-09-01 | End: 2020-09-01

## 2020-09-01 RX ORDER — ONDANSETRON 4 MG/1
8 TABLET, ORALLY DISINTEGRATING ORAL
COMMUNITY
End: 2022-05-27

## 2020-09-01 RX ORDER — DIPHENOXYLATE HYDROCHLORIDE AND ATROPINE SULFATE 2.5; .025 MG/1; MG/1
1 TABLET ORAL 4 TIMES DAILY PRN
COMMUNITY
End: 2022-05-27

## 2020-09-01 RX ORDER — ONDANSETRON 4 MG/1
4 TABLET, FILM COATED ORAL EVERY 8 HOURS PRN
Qty: 10 TABLET | Refills: 0 | Status: SHIPPED | OUTPATIENT
Start: 2020-09-01 | End: 2022-05-27

## 2020-09-01 RX ADMIN — ONDANSETRON 4 MG: 2 INJECTION INTRAMUSCULAR; INTRAVENOUS at 03:09

## 2020-09-01 RX ADMIN — SODIUM CHLORIDE 1000 ML: 0.9 INJECTION, SOLUTION INTRAVENOUS at 03:09

## 2020-09-01 NOTE — DISCHARGE INSTRUCTIONS
Home quarantine into the received the results from your COVID test from urgent care.  Return to the emergency department for new or worsening chest pain, shortness of breath or if worse in any way.

## 2020-09-01 NOTE — ED PROVIDER NOTES
Encounter Date: 2020       History     Chief Complaint   Patient presents with    multiple medical complaints.     c/o fatigue, nausea, light headed, lack of appetite. denies any fever. frequent cold sweats.      Patient is a 60-year-old female with history of anxiety/depression, GERD, hypertension and hyperlipidemia.  She is presenting with complaint of 3-4 month history of fatigue and just not feeling well.  She has had some intermittent nasal congestion and postnasal drip.  Occasional dry cough.  When she coughs it causes nausea and causes her to feel lightheaded.  She has had some intermittent chest pain that she feels is related to GERD.  She is not having chest pain now.  No shortness of breath.  No known exposure to illness.  She was seen at urgent care today and tested for COVID but they told her her blood pressure was low and she needed to come get IV fluids. She has not been vomiting and had one episode of diarrhea yesterday.         Review of patient's allergies indicates:   Allergen Reactions    Codeine Hives    Sulfa (sulfonamide antibiotics) Hives    Benzoate analogues Itching     Past Medical History:   Diagnosis Date    Anxiety     Depression     GERD (gastroesophageal reflux disease)     High cholesterol     Hypertension      Past Surgical History:   Procedure Laterality Date    [facility]SY [facility]     [facility]    BREAST SURGERY       SECTION      COLONOSCOPY N/A 2017    Procedure: COLONOSCOPY Miralax;  Surgeon: Buddy Butcher Jr., MD;  Location: [facility];  Service: Endoscopy;  Laterality: N/A;    HYSTERECTOMY      [facility]ID[facility]GERY Right     [facility], reported as mass by patient    LEFT HEART CATHETERIZATION Left 2019    Procedure: Left heart cath;  Surgeon: Gerhard Krishnan MD;  Location: [facility] CATH LAB;  Service: Cardiology;  Laterality: Left;    OOPHORECTOMY      s-section       History reviewed. No pertinent family history.  Social History      Tobacco Use    Smoking status: Never Smoker    Smokeless tobacco: Never Used   Substance Use Topics    Alcohol use: No    Drug use: No     Review of Systems   Constitutional: Positive for appetite change and fatigue. Negative for activity change, chills and fever.   HENT: Positive for congestion. Negative for ear pain, rhinorrhea, sore throat and voice change.    Respiratory: Positive for cough. Negative for shortness of breath and wheezing.    Cardiovascular: Positive for chest pain. Negative for palpitations and leg swelling.   Gastrointestinal: Positive for diarrhea (resolved) and nausea. Negative for abdominal pain and vomiting.   Genitourinary: Negative for dysuria, flank pain, frequency and hematuria.   Musculoskeletal: Negative for back pain, myalgias, neck pain and neck stiffness.   Skin: Negative for rash.   Neurological: Negative for dizziness, weakness, numbness and headaches.   All other systems reviewed and are negative.      Physical Exam     Initial Vitals [09/01/20 1504]   BP Pulse Resp Temp SpO2   (!) 181/81 80 19 98 °F (36.7 °C) 99 %      MAP       --         Physical Exam    Nursing note and vitals reviewed.  Constitutional: She appears well-developed and well-nourished. She appears distressed (malaise. Non-toxic appearing).   HENT:   Head: Normocephalic.   Nose: Nose normal.   Mouth/Throat: Oropharynx is clear and moist.   Eyes: Conjunctivae and EOM are normal. Pupils are equal, round, and reactive to light.   Neck: Normal range of motion. Neck supple.   Cardiovascular: Normal rate, regular rhythm, normal heart sounds and intact distal pulses.   Pulmonary/Chest: Breath sounds normal. No respiratory distress.   Abdominal: Soft. Bowel sounds are normal. There is no abdominal tenderness.   Musculoskeletal: No edema.   Lymphadenopathy:     She has no cervical adenopathy.   Neurological: She is alert and oriented to person, place, and time.   Skin: Skin is warm and dry. No rash noted.    Psychiatric: She has a normal mood and affect. Her behavior is normal. Judgment and thought content normal.         ED Course   Procedures  Labs Reviewed   CBC W/ AUTO DIFFERENTIAL - Abnormal; Notable for the following components:       Result Value    Platelets 142 (*)     MPV 13.2 (*)     All other components within normal limits   COMPREHENSIVE METABOLIC PANEL - Abnormal; Notable for the following components:    CO2 30 (*)     Alkaline Phosphatase 36 (*)     All other components within normal limits   TROPONIN I        ECG Results          EKG 12-lead (Final result)  Result time 09/01/20 15:35:27    Final result by Interface, Lab In [facility] (09/01/20 15:35:27)                 Narrative:    Test Reason : R07.9,    Vent. Rate : 059 BPM     Atrial Rate : 059 BPM     P-R Int : 164 ms          QRS Dur : 092 ms      QT Int : 444 ms       P-R-T Axes : 072 049 062 degrees     QTc Int : 439 ms    Sinus bradycardia  Otherwise normal ECG  When compared with ECG of 03-FEB-2020 21:41,  No significant change was found  Confirmed by Aamir Umanzor MD ([id number]) on 9/1/2020 3:35:22 PM    Referred By: System System           Confirmed By:Aamir Umanzor MD                            Imaging Results    None          Medical Decision Making:   Clinical Tests:   Lab Tests: Ordered and Reviewed  The following lab test(s) were unremarkable: CBC, CMP and Troponin  Patient was feeling better prior to discharge.  Advised her to follow-up with her PCP for recheck without fail.  Return to the emergency department if worse in any way.                   ED Course as of Sep 01 1647   Tue Sep 01, 2020   1528 EKG shows sinus bradycardia rate of 59 beats per minute with no ST elevation MI.  Normal QTC    [MB]      ED Course User Index  [MB] Dominick Sadler MD                Clinical Impression:       ICD-10-CM ICD-9-CM   1. Acute viral syndrome  B34.9 079.99   2. Chest pain  R07.9 786.50         Disposition:   Disposition: Discharged     ED  Disposition Condition    Discharge Stable        ED Prescriptions     Medication Sig Dispense Start Date End Date Auth. Provider    ondansetron (ZOFRAN) 4 MG tablet Take 1 tablet (4 mg total) by mouth every 8 (eight) hours as needed. 10 tablet 9/1/2020  WENDY Ansari        Follow-up Information     Follow up With Specialties Details Why Contact Info    Kobi Harrington MD Family Medicine In 2 days  [address] [facility] [id number] [phone]                                       WENDY Ansari  09/01/20 9048

## 2020-09-01 NOTE — ED NOTES
Gave patient warm blanket for comfort. Patient denies pain/discomfort. Side rails up x2. Bed is in low locked position. Call bell is in reach. tv on. No distress noted.

## 2020-09-15 ENCOUNTER — HOSPITAL ENCOUNTER (EMERGENCY)
Facility: HOSPITAL | Age: 60
Discharge: HOME | End: 2020-09-15
Attending: EMERGENCY MEDICINE
Payer: MEDICAID

## 2020-09-15 VITALS
HEIGHT: 65 IN | HEART RATE: 84 BPM | OXYGEN SATURATION: 98 % | WEIGHT: 165 LBS | RESPIRATION RATE: 18 BRPM | BODY MASS INDEX: 27.49 KG/M2 | TEMPERATURE: 98 F | DIASTOLIC BLOOD PRESSURE: 86 MMHG | SYSTOLIC BLOOD PRESSURE: 151 MMHG

## 2020-09-15 DIAGNOSIS — S63.502A LEFT WRIST SPRAIN, INITIAL ENCOUNTER: Primary | ICD-10-CM

## 2020-09-15 DIAGNOSIS — S09.90XA ACUTE HEAD INJURY WITHOUT LOSS OF CONSCIOUSNESS, INITIAL ENCOUNTER: ICD-10-CM
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
## 2020-09-15 PROCEDURE — 99283 EMERGENCY DEPT VISIT LOW MDM: CPT | Mod: 25,ER

## 2020-09-15 PROCEDURE — 25000003 PHARM REV CODE 250: Mod: ER | Performed by: PHYSICIAN ASSISTANT

## 2020-09-15 RX ORDER — KETOROLAC TROMETHAMINE 10 MG/1
10 TABLET, FILM COATED ORAL
Status: COMPLETED | OUTPATIENT
Start: 2020-09-15 | End: 2020-09-15

## 2020-09-15 RX ORDER — MELOXICAM 15 MG/1
15 TABLET ORAL DAILY
Qty: 7 TABLET | Refills: 0 | Status: SHIPPED | OUTPATIENT
Start: 2020-09-15 | End: 2020-09-22

## 2020-09-15 RX ADMIN — KETOROLAC TROMETHAMINE 10 MG: 10 TABLET, FILM COATED ORAL at 11:09

## 2020-09-15 NOTE — ED PROVIDER NOTES
"Encounter Date: 9/15/2020       History     Chief Complaint   Patient presents with    Fall     "I tripped and fell last night about 7pm and today my forehead hurts and my laft hand by my thumb" denies LOC. No obv deformity     Patient is a 60-year-old female presenting with constant moderate aching pain to the left wrist secondary to a trip and fall at home last night.  She also struck the front of her head.  No LOC.  No diffuse headache only pain on the forehead with palpation.  No changes in vision speech or hearing.  No numbness or focal weakness.  She is not on anticoagulants.  No treatment prior to arrival.        Review of patient's allergies indicates:   Allergen Reactions    Codeine Hives    Sulfa (sulfonamide antibiotics) Hives    Benzoate analogues Itching     Past Medical History:   Diagnosis Date    Anxiety     Depression     GERD (gastroesophageal reflux disease)     High cholesterol     Hypertension      Past Surgical History:   Procedure Laterality Date    [facility]     [facility]    BREAST SURGERY       SECTION      COLONOSCOPY N/A 2017    Procedure: COLONOSCOPY Miralax;  Surgeon: Buddy Butcher Jr., MD;  Location: [facility];  Service: Endoscopy;  Laterality: N/A;    HYSTERECTOMY      KIDNEY SURGERY Right     done at [facility], reported as mass by patient    LEFT HEART CATHETERIZATION Left 2019    Procedure: Left heart cath;  Surgeon: Gerhard Krishnan MD;  Location: [facility] CATH LAB;  Service: Cardiology;  Laterality: Left;    OOPHORECTOMY      s-section       No family history on file.  Social History     Tobacco Use    Smoking status: Never Smoker    Smokeless tobacco: Never Used   Substance Use Topics    Alcohol use: No    Drug use: No     Review of Systems   Constitutional: Negative for activity change, appetite change, chills, fatigue and fever.   HENT: Negative for congestion, ear pain, rhinorrhea, sore throat and voice change.    Respiratory: " Negative for cough, shortness of breath and wheezing.    Cardiovascular: Negative for chest pain.   Gastrointestinal: Negative for abdominal pain, diarrhea, nausea and vomiting.   Musculoskeletal: Negative for back pain, neck pain and neck stiffness.        + left wrist pain   Skin: Negative for rash.   Neurological: Negative for dizziness, weakness and numbness.        + head injury  No LOC.    All other systems reviewed and are negative.      Physical Exam     Initial Vitals [09/15/20 1050]   BP Pulse Resp Temp SpO2   (!) 158/84 72 19 98.6 °F (37 °C) 98 %      MAP       --         Physical Exam    Nursing note and vitals reviewed.  Constitutional: She appears well-developed and well-nourished. She appears distressed.   HENT:   Head: Normocephalic.   Nose: Nose normal.   Mouth/Throat: Oropharynx is clear and moist.   No palpable skull deformity.  No hemotympanum.   Eyes: Conjunctivae and EOM are normal. Pupils are equal, round, and reactive to light.   Neck: Normal range of motion. Neck supple.   No midline or spinous tenderness.  Normal range of motion   Cardiovascular: Normal rate, regular rhythm, normal heart sounds and intact distal pulses.   Pulmonary/Chest: Breath sounds normal. No respiratory distress.   Abdominal: Soft. Bowel sounds are normal. There is no abdominal tenderness.   Musculoskeletal: No edema.      Comments: No swelling or deformity to the left wrist.  Tenderness palpation of the distal radius.  Pain with flexion and extension.  No pain with pronation and supination.  No tenderness over the anatomical snuffbox or bones in the hand.  Normal range of motion of all fingers.  Normal range of motion of elbow without pain.  Good distal pulses.   Lymphadenopathy:     She has no cervical adenopathy.   Neurological: She is alert and oriented to person, place, and time. She has normal strength. No cranial nerve deficit or sensory deficit.   Skin: Skin is warm and dry. No rash noted.   Psychiatric: She  has a normal mood and affect. Her behavior is normal. Judgment and thought content normal.         ED Course   Procedures  Labs Reviewed - No data to display       Imaging Results          X-Ray Wrist Complete Left (Final result)  Result time 09/15/20 11:21:35    Final result by Silvestre Rodriguez MD (09/15/20 11:21:35)                 Impression:      No acute findings.      Electronically signed by: Silvestre Rodriguez MD  Date:    09/15/2020  Time:    11:21             Narrative:    EXAMINATION:  XR WRIST COMPLETE 3 VIEWS LEFT    CLINICAL HISTORY:  Wrist injury with left wrist pain.  Unspecified fall, initial encounter,    TECHNIQUE:  Standard radiography performed.    COMPARISON:  None    FINDINGS:  No definite fracture seen.  There are mild degenerative changes of the radiocarpal, intercarpal and 1st carpometacarpal joints.                                 Medical Decision Making:   Clinical Tests:   Radiological Study: Ordered and Reviewed  No acute findings on x-ray of the left wrist per my independent interpretation.  Ace bandage applied by the nurse.  Advised the patient on supportive care and follow-up.  Return to the emergency department if worse in any way.                             Clinical Impression:       ICD-10-CM ICD-9-CM   1. Left wrist sprain, initial encounter  S63.502A 842.00   2. Fall at home, initial encounter  W19.XXXA E888.9    Y92.009 E849.0   3. Acute head injury without loss of consciousness, initial encounter  S09.90XA 959.01         Disposition:   Disposition: Discharged     ED Disposition Condition    Discharge Stable        ED Prescriptions     Medication Sig Dispense Start Date End Date Auth. Provider    meloxicam (MOBIC) 15 MG tablet Take 1 tablet (15 mg total) by mouth once daily. for 7 days 7 tablet 9/15/2020 9/22/2020 WENDY Ansari        Follow-up Information     Follow up With Specialties Details Why Contact Info    Kobi Harrington MD Family Medicine In 1 week   [address] [facility] [id number]  [phone]                                         WENDY Ansari  09/15/20 6297

## 2020-10-31 ENCOUNTER — HOSPITAL ENCOUNTER (EMERGENCY)
Facility: HOSPITAL | Age: 60
Discharge: HOME | End: 2020-10-31
Attending: FAMILY MEDICINE
Payer: MEDICAID

## 2020-10-31 VITALS
TEMPERATURE: 99 F | SYSTOLIC BLOOD PRESSURE: 154 MMHG | WEIGHT: 153.5 LBS | OXYGEN SATURATION: 96 % | HEART RATE: 77 BPM | BODY MASS INDEX: 25.58 KG/M2 | RESPIRATION RATE: 25 BRPM | HEIGHT: 65 IN | DIASTOLIC BLOOD PRESSURE: 71 MMHG

## 2020-10-31 DIAGNOSIS — K21.9 GASTROESOPHAGEAL REFLUX DISEASE WITHOUT ESOPHAGITIS: Primary | ICD-10-CM

## 2020-10-31 DIAGNOSIS — R07.9 CHEST PAIN: ICD-10-CM

## 2020-10-31 LAB
ALBUMIN SERPL BCP-MCNC: 3.9 G/DL (ref 3.5–5.2)
ALP SERPL-CCNC: 40 U/L (ref 38–126)
ALT SERPL W/O P-5'-P-CCNC: 12 U/L (ref 10–44)
AMYLASE SERPL-CCNC: 51 U/L (ref 30–110)
ANION GAP SERPL CALC-SCNC: 11 MMOL/L (ref 8–16)
AST SERPL-CCNC: 20 U/L (ref 15–46)
BASOPHILS # BLD AUTO: 0.02 K/UL (ref 0–0.2)
BASOPHILS NFR BLD: 0.3 % (ref 0–1.9)
BILIRUB SERPL-MCNC: 0.8 MG/DL (ref 0.1–1)
BUN SERPL-MCNC: 13 MG/DL (ref 7–17)
CALCIUM SERPL-MCNC: 9.3 MG/DL (ref 8.7–10.5)
CHLORIDE SERPL-SCNC: 99 MMOL/L (ref 95–110)
CO2 SERPL-SCNC: 27 MMOL/L (ref 23–29)
CREAT SERPL-MCNC: 0.64 MG/DL (ref 0.5–1.4)
DIFFERENTIAL METHOD: ABNORMAL
EOSINOPHIL # BLD AUTO: 0 K/UL (ref 0–0.5)
EOSINOPHIL NFR BLD: 0.5 % (ref 0–8)
ERYTHROCYTE [DISTWIDTH] IN BLOOD BY AUTOMATED COUNT: 12.1 % (ref 11.5–14.5)
EST. GFR  (AFRICAN AMERICAN): >60 ML/MIN/1.73 M^2
EST. GFR  (NON AFRICAN AMERICAN): >60 ML/MIN/1.73 M^2
GLUCOSE SERPL-MCNC: 104 MG/DL (ref 70–110)
HCT VFR BLD AUTO: 37.7 % (ref 37–48.5)
HGB BLD-MCNC: 12.7 G/DL (ref 12–16)
IMM GRANULOCYTES # BLD AUTO: 0.03 K/UL (ref 0–0.04)
IMM GRANULOCYTES NFR BLD AUTO: 0.5 % (ref 0–0.5)
LIPASE SERPL-CCNC: 125 U/L (ref 23–300)
LYMPHOCYTES # BLD AUTO: 3.2 K/UL (ref 1–4.8)
LYMPHOCYTES NFR BLD: 48.7 % (ref 18–48)
MCH RBC QN AUTO: 31.3 PG (ref 27–31)
MCHC RBC AUTO-ENTMCNC: 33.7 G/DL (ref 32–36)
MCV RBC AUTO: 93 FL (ref 82–98)
MONOCYTES # BLD AUTO: 0.5 K/UL (ref 0.3–1)
MONOCYTES NFR BLD: 6.9 % (ref 4–15)
NEUTROPHILS # BLD AUTO: 2.8 K/UL (ref 1.8–7.7)
NEUTROPHILS NFR BLD: 43.1 % (ref 38–73)
NRBC BLD-RTO: 0 /100 WBC
PLATELET # BLD AUTO: 171 K/UL (ref 150–350)
PMV BLD AUTO: 12.5 FL (ref 9.2–12.9)
POTASSIUM SERPL-SCNC: 3.5 MMOL/L (ref 3.5–5.1)
PROT SERPL-MCNC: 6.8 G/DL (ref 6–8.4)
RBC # BLD AUTO: 4.06 M/UL (ref 4–5.4)
SODIUM SERPL-SCNC: 137 MMOL/L (ref 136–145)
TROPONIN I SERPL DL<=0.01 NG/ML-MCNC: <0.012 NG/ML (ref 0.01–0.03)
TROPONIN I SERPL DL<=0.01 NG/ML-MCNC: <0.012 NG/ML (ref 0.01–0.03)
WBC # BLD AUTO: 6.51 K/UL (ref 3.9–12.7)

## 2020-10-31 PROCEDURE — 93005 ELECTROCARDIOGRAM TRACING: CPT | Mod: ER

## 2020-10-31 PROCEDURE — 96374 THER/PROPH/DIAG INJ IV PUSH: CPT | Mod: ER

## 2020-10-31 PROCEDURE — 82150 ASSAY OF AMYLASE: CPT | Mod: ER

## 2020-10-31 PROCEDURE — 83690 ASSAY OF LIPASE: CPT | Mod: ER

## 2020-10-31 PROCEDURE — 80053 COMPREHEN METABOLIC PANEL: CPT | Mod: ER

## 2020-10-31 PROCEDURE — 84484 ASSAY OF TROPONIN QUANT: CPT | Mod: 91,ER

## 2020-10-31 PROCEDURE — 25000003 PHARM REV CODE 250: Mod: ER | Performed by: FAMILY MEDICINE

## 2020-10-31 PROCEDURE — 63600175 PHARM REV CODE 636 W HCPCS: Mod: ER | Performed by: FAMILY MEDICINE

## 2020-10-31 PROCEDURE — 94760 N-INVAS EAR/PLS OXIMETRY 1: CPT | Mod: ER

## 2020-10-31 PROCEDURE — 99284 EMERGENCY DEPT VISIT MOD MDM: CPT | Mod: 25,ER

## 2020-10-31 PROCEDURE — C9113 INJ PANTOPRAZOLE SODIUM, VIA: HCPCS | Mod: ER | Performed by: FAMILY MEDICINE

## 2020-10-31 PROCEDURE — 96375 TX/PRO/DX INJ NEW DRUG ADDON: CPT | Mod: ER

## 2020-10-31 PROCEDURE — 93010 EKG 12-LEAD: ICD-10-PCS | Mod: ,,, | Performed by: INTERNAL MEDICINE

## 2020-10-31 PROCEDURE — 93010 ELECTROCARDIOGRAM REPORT: CPT | Mod: ,,, | Performed by: INTERNAL MEDICINE

## 2020-10-31 PROCEDURE — 85025 COMPLETE CBC W/AUTO DIFF WBC: CPT | Mod: ER

## 2020-10-31 RX ORDER — ONDANSETRON 2 MG/ML
4 INJECTION INTRAMUSCULAR; INTRAVENOUS
Status: COMPLETED | OUTPATIENT
Start: 2020-10-31 | End: 2020-10-31

## 2020-10-31 RX ORDER — PANTOPRAZOLE SODIUM 20 MG/1
20 TABLET, DELAYED RELEASE ORAL DAILY
Qty: 30 TABLET | Refills: 0 | Status: SHIPPED | OUTPATIENT
Start: 2020-10-31 | End: 2022-05-27

## 2020-10-31 RX ORDER — PANTOPRAZOLE SODIUM 40 MG/10ML
40 INJECTION, POWDER, LYOPHILIZED, FOR SOLUTION INTRAVENOUS
Status: COMPLETED | OUTPATIENT
Start: 2020-10-31 | End: 2020-10-31

## 2020-10-31 RX ORDER — ONDANSETRON 4 MG/1
4 TABLET, ORALLY DISINTEGRATING ORAL EVERY 8 HOURS PRN
Qty: 30 TABLET | Refills: 0 | Status: SHIPPED | OUTPATIENT
Start: 2020-10-31 | End: 2022-05-27

## 2020-10-31 RX ADMIN — PANTOPRAZOLE SODIUM 40 MG: 40 INJECTION, POWDER, FOR SOLUTION INTRAVENOUS at 10:10

## 2020-10-31 RX ADMIN — LIDOCAINE HYDROCHLORIDE: 20 SOLUTION ORAL; TOPICAL at 11:10

## 2020-10-31 RX ADMIN — ONDANSETRON 4 MG: 2 INJECTION INTRAMUSCULAR; INTRAVENOUS at 10:10

## 2020-10-31 NOTE — ED PROVIDER NOTES
"Encounter Date: 10/31/2020       History     Chief Complaint   Patient presents with    Vomiting     Pt states "I threw up twice this week. I can't eat anything. I feel tightness in my chest and feel light headed." Pt states she threw up last night. Pt c/o cough. Denies diarrhea, abd pain, SOB, diaphoresis. No meds.      60-year-old female presents to ER with vomiting since last 2 days.  She is unable to tolerate solids.  She is able to keep fluids down.  Mild epigastric and anterior chest wall pain.  Patient denies fever, cough.  Generalized weakness and tiredness.  No focal deficits.  No shortness of breath.    The history is provided by the patient.     Review of patient's allergies indicates:   Allergen Reactions    Codeine Hives    Sulfa (sulfonamide antibiotics) Hives    Benzoate analogues Itching     Past Medical History:   Diagnosis Date    Anxiety     Depression     GERD (gastroesophageal reflux disease)     High cholesterol     Hypertension      Past Surgical History:   Procedure Laterality Date    BRE[facility]SY [facility]     [facility]    BREAST SURGERY       SECTION      COLONOSCOPY N/A 2017    Procedure: COLONOSCOPY Miralax;  Surgeon: Buddy Butcher Jr., MD;  Location: [facility] ENDO;  Service: Endoscopy;  Laterality: N/A;    HYSTERECTOMY      KIDNEY SURGERY Right     done at [facility], reported as mass by patient    LEFT HEART CATHETERIZATION Left 2019    Procedure: Left heart cath;  Surgeon: Gerhard Krishnan MD;  Location: [facility] CATH LAB;  Service: Cardiology;  Laterality: Left;    OOPHORECTOMY      s-section       History reviewed. No pertinent family history.  Social History     Tobacco Use    Smoking status: Never Smoker    Smokeless tobacco: Never Used   Substance Use Topics    Alcohol use: No    Drug use: No     Review of Systems   Constitutional: Positive for appetite change. Negative for activity change, chills and fever.   HENT: Negative for congestion, ear " discharge, rhinorrhea, sinus pressure, sinus pain, sore throat and trouble swallowing.    Eyes: Negative for photophobia, pain, discharge, redness, itching and visual disturbance.   Respiratory: Negative for cough, chest tightness, shortness of breath and wheezing.    Cardiovascular: Positive for chest pain. Negative for palpitations and leg swelling.   Gastrointestinal: Positive for abdominal pain, nausea and vomiting. Negative for abdominal distention, constipation and diarrhea.   Genitourinary: Negative for dysuria, flank pain, frequency and hematuria.   Musculoskeletal: Negative for back pain, gait problem, neck pain and neck stiffness.   Skin: Negative for rash and wound.   Neurological: Negative for dizziness, tremors, seizures, syncope, speech difficulty, weakness, light-headedness, numbness and headaches.   Psychiatric/Behavioral: Negative for behavioral problems, confusion, hallucinations and sleep disturbance. The patient is not nervous/anxious.    All other systems reviewed and are negative.      Physical Exam     Initial Vitals [10/31/20 0950]   BP Pulse Resp Temp SpO2   (!) 164/81 (!) 125 20 98.6 °F (37 °C) 97 %      MAP       --         Physical Exam    Nursing note and vitals reviewed.  Constitutional: Vital signs are normal. She appears well-developed and well-nourished. She is not diaphoretic. She is active. No distress.   HENT:   Head: Normocephalic and atraumatic.   Right Ear: Tympanic membrane normal.   Left Ear: Tympanic membrane normal.   Nose: Nose normal.   Mouth/Throat: Oropharynx is clear and moist.   Eyes: Conjunctivae, EOM and lids are normal. Pupils are equal, round, and reactive to light.   Neck: Trachea normal, normal range of motion and full passive range of motion without pain. Neck supple. Normal range of motion present. No neck rigidity.   Cardiovascular: Normal rate, regular rhythm, S1 normal, S2 normal, normal heart sounds, intact distal pulses and normal pulses.    Pulmonary/Chest: Breath sounds normal. No respiratory distress. She has no wheezes. She has no rhonchi. She has no rales. She exhibits tenderness.     Tender to touch and palpation over Xphisternum and epigastric area.   Abdominal: Soft. Normal appearance and bowel sounds are normal. She exhibits no distension. There is abdominal tenderness in the epigastric area.   Musculoskeletal: Normal range of motion. No tenderness.   Lymphadenopathy:     She has no cervical adenopathy.   Neurological: She is alert and oriented to person, place, and time. She has normal strength and normal reflexes. No cranial nerve deficit or sensory deficit. GCS score is 15. GCS eye subscore is 4. GCS verbal subscore is 5. GCS motor subscore is 6.   Skin: Skin is warm and intact. Capillary refill takes less than 2 seconds. No abrasion, no bruising and no rash noted.   Psychiatric: She has a normal mood and affect. Her speech is normal and behavior is normal. Judgment and thought content normal. She is not actively hallucinating. Cognition and memory are normal. She is attentive.         ED Course   Procedures  Labs Reviewed   CBC W/ AUTO DIFFERENTIAL - Abnormal; Notable for the following components:       Result Value    MCH 31.3 (*)     Lymph % 48.7 (*)     All other components within normal limits   COMPREHENSIVE METABOLIC PANEL   TROPONIN I   TROPONIN I   AMYLASE   LIPASE   LIPASE   AMYLASE        ECG Results          EKG 12-lead (In process)  Result time 10/31/20 10:16:33    In process by Interface, Lab In [facility] (10/31/20 10:16:33)                 Narrative:    Test Reason : R07.9,    Vent. Rate : 103 BPM     Atrial Rate : 103 BPM     P-R Int : 148 ms          QRS Dur : 088 ms      QT Int : 358 ms       P-R-T Axes : 066 034 034 degrees     QTc Int : 468 ms    Sinus tachycardia  Nonspecific ST and T wave abnormality  Abnormal ECG  When compared with ECG of 01-SEP-2020 15:25,  Vent. rate has increased BY  44 BPM  Nonspecific T wave  abnormality now evident in Inferior leads  Nonspecific T wave abnormality now evident in Lateral leads    Referred By: AAAREFERR   SELF           Confirmed By:                             Imaging Results    None          Medical Decision Making:   Initial Assessment:   Epigastric pain/sternal tenderness.  Vomiting for last 2 days.  No shortness of breath.  Differential Diagnosis:   Gastritis, pancreatitis, costochondritis, angina, mi,  Clinical Tests:   Lab Tests: Ordered and Reviewed  Radiological Study: Ordered and Reviewed  ED Management:  Normal EKG without ST elevation or depressions.  Normal cardiac enzymes.  Amylase and lipase are normal.  Patient has been treated with Protonix and Zofran which improved her symptoms.  Repeat cardiac enzyme is also negative.  Patient is pain free.  She has been prescribed with Protonix and Zofran.  Advised to follow-up ED with recurrence of similar symptoms.  Follow up PCP for further evaluation and management.                             Clinical Impression:       ICD-10-CM ICD-9-CM   1. Gastroesophageal reflux disease without esophagitis  K21.9 530.81   2. Chest pain  R07.9 786.50                      Disposition:   Disposition: Discharged  Condition: Stable     ED Disposition Condition    Discharge Stable        ED Prescriptions     Medication Sig Dispense Start Date End Date Auth. Provider    pantoprazole (PROTONIX) 20 MG tablet Take 1 tablet (20 mg total) by mouth once daily. 30 tablet 10/31/2020  Maximo Jay MD    ondansetron (ZOFRAN-ODT) 4 MG TbDL Take 1 tablet (4 mg total) by mouth every 8 (eight) hours as needed. 30 tablet 10/31/2020  Maximo Jay MD        Follow-up Information     Follow up With Specialties Details Why Contact Info    Kobi Harrington MD Family Medicine   [address] [place] [id number] [phone]                                         Maximo Jay MD  10/31/20 1145

## 2020-11-01 ENCOUNTER — HOSPITAL ENCOUNTER (EMERGENCY)
Facility: HOSPITAL | Age: 60
Discharge: HOME | End: 2020-11-01
Attending: FAMILY MEDICINE
Payer: MEDICAID

## 2020-11-01 VITALS
HEIGHT: 65 IN | BODY MASS INDEX: 25.33 KG/M2 | HEART RATE: 69 BPM | RESPIRATION RATE: 17 BRPM | WEIGHT: 152 LBS | TEMPERATURE: 98 F | SYSTOLIC BLOOD PRESSURE: 119 MMHG | DIASTOLIC BLOOD PRESSURE: 68 MMHG | OXYGEN SATURATION: 100 %

## 2020-11-01 DIAGNOSIS — R55 NEAR SYNCOPE: Primary | ICD-10-CM

## 2020-11-01 DIAGNOSIS — R07.9 CHEST PAIN: ICD-10-CM

## 2020-11-01 DIAGNOSIS — R53.1 GENERALIZED WEAKNESS: ICD-10-CM

## 2020-11-01 DIAGNOSIS — E86.0 DEHYDRATION: ICD-10-CM

## 2020-11-01 LAB
ALBUMIN SERPL BCP-MCNC: 3.9 G/DL (ref 3.5–5.2)
ALP SERPL-CCNC: 28 U/L (ref 38–126)
ALT SERPL W/O P-5'-P-CCNC: 14 U/L (ref 10–44)
ANION GAP SERPL CALC-SCNC: 9 MMOL/L (ref 8–16)
APTT BLDCRRT: 23.7 SEC (ref 21–32)
AST SERPL-CCNC: 25 U/L (ref 15–46)
BASOPHILS # BLD AUTO: 0.01 K/UL (ref 0–0.2)
BASOPHILS NFR BLD: 0.2 % (ref 0–1.9)
BILIRUB SERPL-MCNC: 0.6 MG/DL (ref 0.1–1)
BUN SERPL-MCNC: 12 MG/DL (ref 7–17)
CALCIUM SERPL-MCNC: 9.1 MG/DL (ref 8.7–10.5)
CHLORIDE SERPL-SCNC: 98 MMOL/L (ref 95–110)
CO2 SERPL-SCNC: 27 MMOL/L (ref 23–29)
CREAT SERPL-MCNC: 0.71 MG/DL (ref 0.5–1.4)
D DIMER PPP IA.FEU-MCNC: 0.73 MG/L FEU
DIFFERENTIAL METHOD: ABNORMAL
EOSINOPHIL # BLD AUTO: 0 K/UL (ref 0–0.5)
EOSINOPHIL NFR BLD: 0.3 % (ref 0–8)
ERYTHROCYTE [DISTWIDTH] IN BLOOD BY AUTOMATED COUNT: 12.2 % (ref 11.5–14.5)
EST. GFR  (AFRICAN AMERICAN): >60 ML/MIN/1.73 M^2
EST. GFR  (NON AFRICAN AMERICAN): >60 ML/MIN/1.73 M^2
GLUCOSE SERPL-MCNC: 220 MG/DL (ref 70–110)
HCT VFR BLD AUTO: 36.8 % (ref 37–48.5)
HGB BLD-MCNC: 12 G/DL (ref 12–16)
IMM GRANULOCYTES # BLD AUTO: 0.06 K/UL (ref 0–0.04)
IMM GRANULOCYTES NFR BLD AUTO: 0.9 % (ref 0–0.5)
INR PPP: 1 (ref 0.8–1.2)
LYMPHOCYTES # BLD AUTO: 2.5 K/UL (ref 1–4.8)
LYMPHOCYTES NFR BLD: 38.4 % (ref 18–48)
MCH RBC QN AUTO: 31.2 PG (ref 27–31)
MCHC RBC AUTO-ENTMCNC: 32.6 G/DL (ref 32–36)
MCV RBC AUTO: 96 FL (ref 82–98)
MONOCYTES # BLD AUTO: 0.5 K/UL (ref 0.3–1)
MONOCYTES NFR BLD: 7.8 % (ref 4–15)
NEUTROPHILS # BLD AUTO: 3.4 K/UL (ref 1.8–7.7)
NEUTROPHILS NFR BLD: 52.4 % (ref 38–73)
NRBC BLD-RTO: 0 /100 WBC
NT-PROBNP: 160 PG/ML (ref 5–900)
PLATELET # BLD AUTO: 162 K/UL (ref 150–350)
PMV BLD AUTO: 13 FL (ref 9.2–12.9)
POTASSIUM SERPL-SCNC: 4 MMOL/L (ref 3.5–5.1)
PROT SERPL-MCNC: 6.8 G/DL (ref 6–8.4)
PROTHROMBIN TIME: 10.8 SEC (ref 9–12.5)
RBC # BLD AUTO: 3.85 M/UL (ref 4–5.4)
SODIUM SERPL-SCNC: 134 MMOL/L (ref 136–145)
TROPONIN I SERPL DL<=0.01 NG/ML-MCNC: <0.012 NG/ML (ref 0.01–0.03)
WBC # BLD AUTO: 6.54 K/UL (ref 3.9–12.7)

## 2020-11-01 PROCEDURE — 25000003 PHARM REV CODE 250: Mod: ER | Performed by: FAMILY MEDICINE

## 2020-11-01 PROCEDURE — 84484 ASSAY OF TROPONIN QUANT: CPT | Mod: ER

## 2020-11-01 PROCEDURE — 85610 PROTHROMBIN TIME: CPT | Mod: ER

## 2020-11-01 PROCEDURE — 80053 COMPREHEN METABOLIC PANEL: CPT | Mod: ER

## 2020-11-01 PROCEDURE — 93010 ELECTROCARDIOGRAM REPORT: CPT | Mod: ,,, | Performed by: INTERNAL MEDICINE

## 2020-11-01 PROCEDURE — 96361 HYDRATE IV INFUSION ADD-ON: CPT | Mod: ER

## 2020-11-01 PROCEDURE — 94760 N-INVAS EAR/PLS OXIMETRY 1: CPT | Mod: ER

## 2020-11-01 PROCEDURE — 96360 HYDRATION IV INFUSION INIT: CPT | Mod: ER,59

## 2020-11-01 PROCEDURE — 85025 COMPLETE CBC W/AUTO DIFF WBC: CPT | Mod: ER

## 2020-11-01 PROCEDURE — 25500020 PHARM REV CODE 255: Mod: ER | Performed by: FAMILY MEDICINE

## 2020-11-01 PROCEDURE — 93005 ELECTROCARDIOGRAM TRACING: CPT | Mod: ER

## 2020-11-01 PROCEDURE — 99285 EMERGENCY DEPT VISIT HI MDM: CPT | Mod: 25,ER

## 2020-11-01 PROCEDURE — 83880 ASSAY OF NATRIURETIC PEPTIDE: CPT | Mod: ER

## 2020-11-01 PROCEDURE — 85730 THROMBOPLASTIN TIME PARTIAL: CPT | Mod: ER

## 2020-11-01 PROCEDURE — 93010 EKG 12-LEAD: ICD-10-PCS | Mod: ,,, | Performed by: INTERNAL MEDICINE

## 2020-11-01 PROCEDURE — 85379 FIBRIN DEGRADATION QUANT: CPT | Mod: ER

## 2020-11-01 RX ADMIN — SODIUM CHLORIDE 1000 ML: 0.9 INJECTION, SOLUTION INTRAVENOUS at 05:11

## 2020-11-01 RX ADMIN — IOHEXOL 100 ML: 350 INJECTION, SOLUTION INTRAVENOUS at 06:11

## 2020-11-01 NOTE — ED NOTES
Pt began c/o chest pain when placed in room. Pt placed on cardiac monitor, BP cuff and pulse ox. MD notified. IV started and blood sent to lab. EKG completed. Pt in room on cell phone. Will continue to monitor.

## 2020-11-01 NOTE — ED PROVIDER NOTES
Encounter Date: 2020       History     Chief Complaint   Patient presents with    Weakness     Pt's neighbor states she took pt to run errands and pt became weak and needed assistance sitting down in the store. Pt seen in ER yesterday. Pt c/o abd cramping and runny nose. Denies hitting head or LOC.      60-year-old female went for shopping today and suddenly felt weak and was helped by another person.  She had no focal weakness but generalized.  She did not have chest pain or shortness of breath.  No cough or respiratory distress.  No fever.  No abdominal pain.  Just before coming to ER she had mild tenderness in her right chest wall which disappeared.  Patient was seen in the ER for epigastric, lower chest wall pain yesterday and had complete cardiac workup which was negative with 2nd set of troponin.  She was treated as GERD and she did not have any more pain since then.  Today she does not have pain which was there yesterday.  No slurring of speech.  No focal facial weakness.  No blurring of vision.    The history is provided by the patient.     Review of patient's allergies indicates:   Allergen Reactions    Codeine Hives    Sulfa (sulfonamide antibiotics) Hives    Benzoate analogues Itching     Past Medical History:   Diagnosis Date    Anxiety     Depression     GERD (gastroesophageal reflux disease)     High cholesterol     Hypertension      Past Surgical History:   Procedure Laterality Date    [facility]    BREAST SURGERY       SECTION      COLONOSCOPY N/A 2017    Procedure: COLONOSCOPY Miralax;  Surgeon: Buddy Butcher Jr., MD;  Location: [facility] ENDO;  Service: Endoscopy;  Laterality: N/A;    HYSTERECTOMY      KIDNEY SURGERY Right     done at [facility], reported as mass by patient    LEFT HEART CATHETERIZATION Left 2019    Procedure: Left heart cath;  Surgeon: Gerhard Krishnan MD;  [facility];  Service: Cardiology;  Laterality: Left;     OOPHORECTOMY      s-section       History reviewed. No pertinent family history.  Social History     Tobacco Use    Smoking status: Never Smoker    Smokeless tobacco: Never Used   Substance Use Topics    Alcohol use: No    Drug use: No       Nurse's notes reviewed  Vital signs reviewed        Review of Systems  Constitutional-positive for appetite change  Cardiovascular-positive for chest pain  Gastrointestinal-positive for abdominal pain nausea and vomiting  Skin negative for rash in wound  All other systems reviewed and are negative      Physical Exam     Initial Vitals [11/01/20 1540]   BP Pulse Resp Temp SpO2   (!) 142/60 (!) 112 20 98.2 °F (36.8 °C) 99 %      MAP       --         Physical Exam    Constitutional-well developed well nourished.  No acute distress.  H ENT-normal cephalic and atraumatic  Eyes-no acute changes, extraocular muscles intact  Neck is supple  Cardiovascular shows no pulse deficit  Abdomen-mild midepigastric tenderness  Musculoskeletal-no acute changes, normal range of motion  Neuro-moving all extremities normally with no lateralizing deficits          ED Course   Procedures  Labs Reviewed   CBC W/ AUTO DIFFERENTIAL - Abnormal; Notable for the following components:       Result Value    RBC 3.85 (*)     Hematocrit 36.8 (*)     MCH 31.2 (*)     MPV 13.0 (*)     Immature Granulocytes 0.9 (*)     Immature Grans (Abs) 0.06 (*)     All other components within normal limits   COMPREHENSIVE METABOLIC PANEL - Abnormal; Notable for the following components:    Sodium 134 (*)     Glucose 220 (*)     Alkaline Phosphatase 28 (*)     All other components within normal limits   D DIMER, QUANTITATIVE - Abnormal; Notable for the following components:    D-Dimer 0.73 (*)     All other components within normal limits   TROPONIN I   APTT   PROTIME-INR   NT-PRO NATRIURETIC PEPTIDE   PROTIME-INR   NT-PRO NATRIURETIC PEPTIDE   APTT        ECG Results          EKG 12-lead (Final result)  Result time  11/03/20 12:25:37    Final result by Interface, Lab In [facility] (11/03/20 12:25:37)                 Narrative:    Test Reason : R07.9,    Vent. Rate : 099 BPM     Atrial Rate : 099 BPM     P-R Int : 148 ms          QRS Dur : 088 ms      QT Int : 360 ms       P-R-T Axes : 072 060 076 degrees     QTc Int : 462 ms    Normal sinus rhythm  Normal ECG  When compared with ECG of 31-OCT-2020 09:56,  Nonspecific T wave abnormality, improved in Lateral leads  Confirmed by Kiko ZUNIGA MD, Jeremy BARTON ([id number]) on 11/2/2020 8:28:58 AM    Referred By: AAAREFERR   SELF           Confirmed By:Jeremy Hoover III, MD                            Imaging Results          CTA Chest Non-Coronary - PE Study (Final result)  Result time 11/01/20 18:49:48    Final result by Praful Dick MD (11/01/20 18:49:48)                 Impression:      No CT evidence of pulmonary thromboembolus.    No pulmonary consolidation or infiltrate.    Significant scarring involving the upper pole the right kidney.    All CT scans at this facility use dose modulation, iterative reconstruction, and/or weight based dosing when appropriate to reduce radiation dose to as low as reasonable achievable.      Electronically signed by: Praful Dick  Date:    11/01/2020  Time:    18:49             Narrative:    EXAMINATION:  CTA CHEST NON CORONARY    CLINICAL HISTORY:  PE suspected, intermediate prob, neg D-dimer;    TECHNIQUE:  Low dose axial images, sagittal and coronal reformations were obtained from the thoracic inlet to the lung bases.  One hundred cc Omnipaque 350 intravenous contrast administered according to PE protocol..    COMPARISON:  Chest radiograph 04/16/2020    FINDINGS:  Base of Neck: No significant abnormality.    Aorta: Left-sided aortic arch.  Three branch vessels.  No aneurysm.  Minor atherosclerotic disease.  No evidence of dissection.    Heart: Normal size. No pericardial effusion.    Pulmonary vasculature: The pulmonary arteries distribute  normally.  There is adequate opacification of the pulmonary arteries.  No filling defects identified.    Shonda/Mediastinum: No pathologic jesu enlargement.    Trachea and Proximal airways: Patent.    Lungs/Pleura: Lungs appear symmetrically expanded.  No focal consolidation.  There are faint bilateral dependent ground-glass opacities, most compatible with atelectasis.  No pleural effusion.  No pneumothorax.  No pulmonary mass.    Esophagus: Normal course and caliber.    Upper Abdomen: Significant scarring involving the upper pole of the right kidney.    Bones: Mild degenerative changes.  No acute or concerning osseous abnormalities.    Thoracic soft tissues: Within normal limits.                                 Medical Decision Making:   Initial Assessment:   Near syncopal episode.  Cardiac evaluation negative yesterday.  Differential Diagnosis:   Near syncopal episode.  Generalized weakness,  Anxiety,  PE,  Orthostatic hypertension.  Dehydration.  Clinical Tests:   Lab Tests: Ordered and Reviewed  Radiological Study: Ordered and Reviewed  Medical Tests: Ordered and Reviewed  ED Management:  Normal EKG.  Normal cardiac enzymes.  Slightly elevated glucose today.  Slightly elevated D-dimer.  CT of chest has been ordered.  Patient has 1 kidney with normal preserved kidney functions.  Will hydrate before CT.  Pending CT patient turned over to Dr. Sadler                      Patient feels improved and feels comfortable plan to go home at this time.  She understands reasons to return emergency department and will follow up with primary doctor immediately.  She will concentrate on hydration.   Dominick Sadler MD       Clinical Impression:       ICD-10-CM ICD-9-CM   1. Near syncope  R55 780.2   2. Chest pain  R07.9 786.50   3. Dehydration  E86.0 276.51   4. Generalized weakness  R53.1 780.79                          ED Disposition Condition    Discharge Stable        ED Prescriptions     None        Follow-up  Information     Follow up With Specialties Details Why Contact Info    Kobi Harrington MD Family Medicine Call today  [address] [facility] [id number] [phone]                                         Dominick Sadler MD  11/01/20 2321       Dominick Sadler MD  11/28/20 0706       Dominick Sadler MD  12/07/20 0120

## 2020-11-02 NOTE — DISCHARGE INSTRUCTIONS
Please eat no more than 1800 calories per day if wanting to lose weight.  Please avoid white foods like white bread, white pasta, and white rice.  Brown versions of those foods are better.  Fresh vegetables and lean proteins should be included with most meals. Try to eat fresh natural foods with different colors as they contain different vitamins.  Please avoid sodas and other sugary foods and drinks.  Please contact your primary doctor for continued evaluation.  Please return to the ED immediately if symptoms return, change or worsen in any way.  Please call your primary doctor today for reevaluation appointment.

## 2021-03-30 ENCOUNTER — HOSPITAL ENCOUNTER (EMERGENCY)
Facility: HOSPITAL | Age: 61
Discharge: HOME | End: 2021-03-30
Attending: EMERGENCY MEDICINE
Payer: MEDICAID

## 2021-03-30 VITALS
HEART RATE: 64 BPM | TEMPERATURE: 98 F | RESPIRATION RATE: 20 BRPM | OXYGEN SATURATION: 97 % | BODY MASS INDEX: 24.32 KG/M2 | HEIGHT: 65 IN | DIASTOLIC BLOOD PRESSURE: 63 MMHG | SYSTOLIC BLOOD PRESSURE: 133 MMHG | WEIGHT: 146 LBS

## 2021-03-30 DIAGNOSIS — R07.9 CHEST PAIN: Primary | ICD-10-CM

## 2021-03-30 DIAGNOSIS — R55 NEAR SYNCOPE: ICD-10-CM

## 2021-03-30 LAB
ALBUMIN SERPL BCP-MCNC: 3.6 G/DL (ref 3.5–5.2)
ALP SERPL-CCNC: 42 U/L (ref 38–126)
ALT SERPL W/O P-5'-P-CCNC: 17 U/L (ref 10–44)
ANION GAP SERPL CALC-SCNC: 3 MMOL/L (ref 8–16)
AST SERPL-CCNC: 27 U/L (ref 15–46)
BASOPHILS # BLD AUTO: 0.03 K/UL (ref 0–0.2)
BASOPHILS NFR BLD: 0.5 % (ref 0–1.9)
BILIRUB SERPL-MCNC: 0.6 MG/DL (ref 0.1–1)
CALCIUM SERPL-MCNC: 8.9 MG/DL (ref 8.7–10.5)
CHLORIDE SERPL-SCNC: 101 MMOL/L (ref 95–110)
CO2 SERPL-SCNC: 35 MMOL/L (ref 23–29)
CREAT SERPL-MCNC: 0.58 MG/DL (ref 0.5–1.4)
DIFFERENTIAL METHOD: ABNORMAL
EOSINOPHIL # BLD AUTO: 0 K/UL (ref 0–0.5)
EOSINOPHIL NFR BLD: 0.5 % (ref 0–8)
ERYTHROCYTE [DISTWIDTH] IN BLOOD BY AUTOMATED COUNT: 13 % (ref 11.5–14.5)
EST. GFR  (AFRICAN AMERICAN): >60 ML/MIN/1.73 M^2
EST. GFR  (NON AFRICAN AMERICAN): >60 ML/MIN/1.73 M^2
GLUCOSE SERPL-MCNC: 101 MG/DL (ref 70–110)
HCT VFR BLD AUTO: 32.1 % (ref 37–48.5)
HGB BLD-MCNC: 10.6 G/DL (ref 12–16)
IMM GRANULOCYTES # BLD AUTO: 0.02 K/UL (ref 0–0.04)
IMM GRANULOCYTES NFR BLD AUTO: 0.3 % (ref 0–0.5)
LYMPHOCYTES # BLD AUTO: 2.5 K/UL (ref 1–4.8)
LYMPHOCYTES NFR BLD: 38.8 % (ref 18–48)
MCH RBC QN AUTO: 30.6 PG (ref 27–31)
MCHC RBC AUTO-ENTMCNC: 33 G/DL (ref 32–36)
MCV RBC AUTO: 93 FL (ref 82–98)
MONOCYTES # BLD AUTO: 0.6 K/UL (ref 0.3–1)
MONOCYTES NFR BLD: 9.1 % (ref 4–15)
NEUTROPHILS # BLD AUTO: 3.2 K/UL (ref 1.8–7.7)
NEUTROPHILS NFR BLD: 50.8 % (ref 38–73)
NRBC BLD-RTO: 0 /100 WBC
PLATELET # BLD AUTO: 202 K/UL (ref 150–450)
PMV BLD AUTO: 12.7 FL (ref 9.2–12.9)
POTASSIUM SERPL-SCNC: 3.5 MMOL/L (ref 3.5–5.1)
PROT SERPL-MCNC: 6.2 G/DL (ref 6–8.4)
RBC # BLD AUTO: 3.46 M/UL (ref 4–5.4)
SODIUM SERPL-SCNC: 139 MMOL/L (ref 136–145)
TROPONIN I SERPL-MCNC: <0.012 NG/ML (ref 0.01–0.03)
UUN UR-MCNC: 13 MG/DL (ref 7–17)
WBC # BLD AUTO: 6.36 K/UL (ref 3.9–12.7)

## 2021-03-30 PROCEDURE — 85025 COMPLETE CBC W/AUTO DIFF WBC: CPT | Mod: ER | Performed by: EMERGENCY MEDICINE

## 2021-03-30 PROCEDURE — 93010 EKG 12-LEAD: ICD-10-PCS | Mod: ,,, | Performed by: INTERNAL MEDICINE

## 2021-03-30 PROCEDURE — 96360 HYDRATION IV INFUSION INIT: CPT | Mod: ER

## 2021-03-30 PROCEDURE — 93005 ELECTROCARDIOGRAM TRACING: CPT | Mod: ER

## 2021-03-30 PROCEDURE — 84484 ASSAY OF TROPONIN QUANT: CPT | Mod: ER | Performed by: EMERGENCY MEDICINE

## 2021-03-30 PROCEDURE — 99285 EMERGENCY DEPT VISIT HI MDM: CPT | Mod: 25,ER

## 2021-03-30 PROCEDURE — 80053 COMPREHEN METABOLIC PANEL: CPT | Mod: ER | Performed by: EMERGENCY MEDICINE

## 2021-03-30 PROCEDURE — 25000003 PHARM REV CODE 250: Mod: ER | Performed by: EMERGENCY MEDICINE

## 2021-03-30 PROCEDURE — 93010 ELECTROCARDIOGRAM REPORT: CPT | Mod: ,,, | Performed by: INTERNAL MEDICINE

## 2021-03-30 RX ORDER — SODIUM CHLORIDE 9 MG/ML
INJECTION, SOLUTION INTRAVENOUS
Status: COMPLETED | OUTPATIENT
Start: 2021-03-30 | End: 2021-03-30

## 2021-03-30 RX ADMIN — SODIUM CHLORIDE: 0.9 INJECTION, SOLUTION INTRAVENOUS at 01:03

## 2021-05-15 ENCOUNTER — HOSPITAL ENCOUNTER (EMERGENCY)
Facility: HOSPITAL | Age: 61
Discharge: HOME | End: 2021-05-15
Attending: FAMILY MEDICINE
Payer: MEDICAID

## 2021-05-15 VITALS
TEMPERATURE: 99 F | OXYGEN SATURATION: 100 % | BODY MASS INDEX: 24.32 KG/M2 | RESPIRATION RATE: 19 BRPM | HEIGHT: 65 IN | SYSTOLIC BLOOD PRESSURE: 128 MMHG | WEIGHT: 146 LBS | HEART RATE: 54 BPM | DIASTOLIC BLOOD PRESSURE: 74 MMHG

## 2021-05-15 DIAGNOSIS — R42 DIZZINESS: Primary | ICD-10-CM

## 2021-05-15 DIAGNOSIS — I95.1 ORTHOSTATIC HYPOTENSION: ICD-10-CM

## 2021-05-15 DIAGNOSIS — R07.9 CHEST PAIN: ICD-10-CM

## 2021-05-15 LAB
ALBUMIN SERPL BCP-MCNC: 4.5 G/DL (ref 3.5–5.2)
ALP SERPL-CCNC: 47 U/L (ref 38–126)
ALT SERPL W/O P-5'-P-CCNC: 29 U/L (ref 10–44)
ANION GAP SERPL CALC-SCNC: 7 MMOL/L (ref 8–16)
AST SERPL-CCNC: 35 U/L (ref 15–46)
BASOPHILS # BLD AUTO: 0.03 K/UL (ref 0–0.2)
BASOPHILS NFR BLD: 0.4 % (ref 0–1.9)
BILIRUB SERPL-MCNC: 0.6 MG/DL (ref 0.1–1)
CALCIUM SERPL-MCNC: 9.4 MG/DL (ref 8.7–10.5)
CHLORIDE SERPL-SCNC: 96 MMOL/L (ref 95–110)
CO2 SERPL-SCNC: 30 MMOL/L (ref 23–29)
CREAT SERPL-MCNC: 0.59 MG/DL (ref 0.5–1.4)
DIFFERENTIAL METHOD: ABNORMAL
EOSINOPHIL # BLD AUTO: 0 K/UL (ref 0–0.5)
EOSINOPHIL NFR BLD: 0.4 % (ref 0–8)
ERYTHROCYTE [DISTWIDTH] IN BLOOD BY AUTOMATED COUNT: 12.3 % (ref 11.5–14.5)
EST. GFR  (AFRICAN AMERICAN): >60 ML/MIN/1.73 M^2
EST. GFR  (NON AFRICAN AMERICAN): >60 ML/MIN/1.73 M^2
GLUCOSE SERPL-MCNC: 93 MG/DL (ref 70–110)
HCT VFR BLD AUTO: 36.3 % (ref 37–48.5)
HGB BLD-MCNC: 12 G/DL (ref 12–16)
IMM GRANULOCYTES # BLD AUTO: 0.04 K/UL (ref 0–0.04)
IMM GRANULOCYTES NFR BLD AUTO: 0.5 % (ref 0–0.5)
LYMPHOCYTES # BLD AUTO: 2.6 K/UL (ref 1–4.8)
LYMPHOCYTES NFR BLD: 33.7 % (ref 18–48)
MCH RBC QN AUTO: 30.9 PG (ref 27–31)
MCHC RBC AUTO-ENTMCNC: 33.1 G/DL (ref 32–36)
MCV RBC AUTO: 94 FL (ref 82–98)
MONOCYTES # BLD AUTO: 0.5 K/UL (ref 0.3–1)
MONOCYTES NFR BLD: 6.8 % (ref 4–15)
NEUTROPHILS # BLD AUTO: 4.5 K/UL (ref 1.8–7.7)
NEUTROPHILS NFR BLD: 58.2 % (ref 38–73)
NRBC BLD-RTO: 0 /100 WBC
NT-PROBNP SERPL-MCNC: 326 PG/ML (ref 5–900)
PLATELET # BLD AUTO: 187 K/UL (ref 150–450)
PMV BLD AUTO: 12.7 FL (ref 9.2–12.9)
POTASSIUM SERPL-SCNC: 4.8 MMOL/L (ref 3.5–5.1)
PROT SERPL-MCNC: 7.8 G/DL (ref 6–8.4)
RBC # BLD AUTO: 3.88 M/UL (ref 4–5.4)
SODIUM SERPL-SCNC: 133 MMOL/L (ref 136–145)
TROPONIN I SERPL-MCNC: <0.012 NG/ML (ref 0.01–0.03)
UUN UR-MCNC: 24 MG/DL (ref 7–17)
WBC # BLD AUTO: 7.74 K/UL (ref 3.9–12.7)

## 2021-05-15 PROCEDURE — 83880 ASSAY OF NATRIURETIC PEPTIDE: CPT | Mod: ER | Performed by: FAMILY MEDICINE

## 2021-05-15 PROCEDURE — 25000003 PHARM REV CODE 250: Mod: ER | Performed by: FAMILY MEDICINE

## 2021-05-15 PROCEDURE — 96361 HYDRATE IV INFUSION ADD-ON: CPT | Mod: ER

## 2021-05-15 PROCEDURE — 85025 COMPLETE CBC W/AUTO DIFF WBC: CPT | Mod: ER | Performed by: FAMILY MEDICINE

## 2021-05-15 PROCEDURE — 93005 ELECTROCARDIOGRAM TRACING: CPT | Mod: ER

## 2021-05-15 PROCEDURE — 96374 THER/PROPH/DIAG INJ IV PUSH: CPT | Mod: ER

## 2021-05-15 PROCEDURE — 84484 ASSAY OF TROPONIN QUANT: CPT | Mod: ER | Performed by: FAMILY MEDICINE

## 2021-05-15 PROCEDURE — 25000003 PHARM REV CODE 250: Mod: ER | Performed by: EMERGENCY MEDICINE

## 2021-05-15 PROCEDURE — 93010 ELECTROCARDIOGRAM REPORT: CPT | Mod: ,,, | Performed by: INTERNAL MEDICINE

## 2021-05-15 PROCEDURE — 94760 N-INVAS EAR/PLS OXIMETRY 1: CPT | Mod: ER

## 2021-05-15 PROCEDURE — 93010 EKG 12-LEAD: ICD-10-PCS | Mod: ,,, | Performed by: INTERNAL MEDICINE

## 2021-05-15 PROCEDURE — 80053 COMPREHEN METABOLIC PANEL: CPT | Mod: ER | Performed by: FAMILY MEDICINE

## 2021-05-15 PROCEDURE — 99284 EMERGENCY DEPT VISIT MOD MDM: CPT | Mod: 25,ER

## 2021-05-15 RX ORDER — FAMOTIDINE 10 MG/ML
20 INJECTION INTRAVENOUS
Status: COMPLETED | OUTPATIENT
Start: 2021-05-15 | End: 2021-05-15

## 2021-05-15 RX ADMIN — FAMOTIDINE 20 MG: 10 INJECTION, SOLUTION INTRAVENOUS at 06:05

## 2021-05-15 RX ADMIN — LIDOCAINE HYDROCHLORIDE: 20 SOLUTION ORAL; TOPICAL at 06:05

## 2021-05-15 RX ADMIN — SODIUM CHLORIDE 1000 ML: 0.9 INJECTION, SOLUTION INTRAVENOUS at 08:05

## 2022-03-21 ENCOUNTER — HOSPITAL ENCOUNTER (EMERGENCY)
Facility: HOSPITAL | Age: 62
Discharge: HOME | End: 2022-03-21
Attending: EMERGENCY MEDICINE
Payer: MEDICAID

## 2022-03-21 VITALS
TEMPERATURE: 99 F | DIASTOLIC BLOOD PRESSURE: 84 MMHG | BODY MASS INDEX: 30.6 KG/M2 | WEIGHT: 183.88 LBS | HEART RATE: 70 BPM | RESPIRATION RATE: 17 BRPM | OXYGEN SATURATION: 99 % | SYSTOLIC BLOOD PRESSURE: 126 MMHG

## 2022-03-21 DIAGNOSIS — N30.90 CYSTITIS: Primary | ICD-10-CM

## 2022-03-21 LAB
ALBUMIN SERPL BCP-MCNC: 4.1 G/DL (ref 3.5–5.2)
ALP SERPL-CCNC: 45 U/L (ref 38–126)
ALT SERPL W/O P-5'-P-CCNC: 12 U/L (ref 10–44)
ANION GAP SERPL CALC-SCNC: 9 MMOL/L (ref 8–16)
AST SERPL-CCNC: 18 U/L (ref 15–46)
BASOPHILS # BLD AUTO: 0.01 K/UL (ref 0–0.2)
BASOPHILS NFR BLD: 0.2 % (ref 0–1.9)
BILIRUB SERPL-MCNC: 0.5 MG/DL (ref 0.1–1)
BILIRUB UR QL STRIP: NEGATIVE
CALCIUM SERPL-MCNC: 9.1 MG/DL (ref 8.7–10.5)
CHLORIDE SERPL-SCNC: 99 MMOL/L (ref 95–110)
CLARITY UR REFRACT.AUTO: CLEAR
CO2 SERPL-SCNC: 30 MMOL/L (ref 23–29)
COLOR UR AUTO: YELLOW
CREAT SERPL-MCNC: 0.7 MG/DL (ref 0.5–1.4)
DIFFERENTIAL METHOD: ABNORMAL
EOSINOPHIL # BLD AUTO: 0.1 K/UL (ref 0–0.5)
EOSINOPHIL NFR BLD: 1 % (ref 0–8)
ERYTHROCYTE [DISTWIDTH] IN BLOOD BY AUTOMATED COUNT: 12.6 % (ref 11.5–14.5)
EST. GFR  (AFRICAN AMERICAN): >60 ML/MIN/1.73 M^2
EST. GFR  (NON AFRICAN AMERICAN): >60 ML/MIN/1.73 M^2
GLUCOSE SERPL-MCNC: 97 MG/DL (ref 70–110)
GLUCOSE UR QL STRIP: NEGATIVE
HCT VFR BLD AUTO: 35.1 % (ref 37–48.5)
HGB BLD-MCNC: 11.5 G/DL (ref 12–16)
HGB UR QL STRIP: ABNORMAL
IMM GRANULOCYTES # BLD AUTO: 0.01 K/UL (ref 0–0.04)
IMM GRANULOCYTES NFR BLD AUTO: 0.2 % (ref 0–0.5)
KETONES UR QL STRIP: NEGATIVE
LEUKOCYTE ESTERASE UR QL STRIP: ABNORMAL
LYMPHOCYTES # BLD AUTO: 2.3 K/UL (ref 1–4.8)
LYMPHOCYTES NFR BLD: 44.4 % (ref 18–48)
MCH RBC QN AUTO: 29.7 PG (ref 27–31)
MCHC RBC AUTO-ENTMCNC: 32.8 G/DL (ref 32–36)
MCV RBC AUTO: 91 FL (ref 82–98)
MICROSCOPIC COMMENT: ABNORMAL
MONOCYTES # BLD AUTO: 0.4 K/UL (ref 0.3–1)
MONOCYTES NFR BLD: 7.3 % (ref 4–15)
NEUTROPHILS # BLD AUTO: 2.5 K/UL (ref 1.8–7.7)
NEUTROPHILS NFR BLD: 46.9 % (ref 38–73)
NITRITE UR QL STRIP: NEGATIVE
NRBC BLD-RTO: 0 /100 WBC
PH UR STRIP: 5 [PH] (ref 5–8)
PLATELET # BLD AUTO: 160 K/UL (ref 150–450)
PMV BLD AUTO: 12.5 FL (ref 9.2–12.9)
POTASSIUM SERPL-SCNC: 4.3 MMOL/L (ref 3.5–5.1)
PROT SERPL-MCNC: 7.1 G/DL (ref 6–8.4)
PROT UR QL STRIP: NEGATIVE
RBC # BLD AUTO: 3.87 M/UL (ref 4–5.4)
RBC #/AREA URNS AUTO: 6 /HPF (ref 0–4)
SODIUM SERPL-SCNC: 138 MMOL/L (ref 136–145)
SP GR UR STRIP: 1.01 (ref 1–1.03)
URN SPEC COLLECT METH UR: ABNORMAL
UROBILINOGEN UR STRIP-ACNC: NEGATIVE EU/DL
UUN UR-MCNC: 14 MG/DL (ref 7–17)
WBC # BLD AUTO: 5.22 K/UL (ref 3.9–12.7)
WBC #/AREA URNS AUTO: 10 /HPF (ref 0–5)

## 2022-03-21 PROCEDURE — 96374 THER/PROPH/DIAG INJ IV PUSH: CPT | Mod: ER

## 2022-03-21 PROCEDURE — 80053 COMPREHEN METABOLIC PANEL: CPT | Mod: ER | Performed by: PHYSICIAN ASSISTANT

## 2022-03-21 PROCEDURE — 25000003 PHARM REV CODE 250: Mod: ER | Performed by: PHYSICIAN ASSISTANT

## 2022-03-21 PROCEDURE — 85025 COMPLETE CBC W/AUTO DIFF WBC: CPT | Mod: ER | Performed by: PHYSICIAN ASSISTANT

## 2022-03-21 PROCEDURE — 81000 URINALYSIS NONAUTO W/SCOPE: CPT | Mod: ER | Performed by: PHYSICIAN ASSISTANT

## 2022-03-21 PROCEDURE — 99284 EMERGENCY DEPT VISIT MOD MDM: CPT | Mod: 25,ER

## 2022-03-21 PROCEDURE — 63600175 PHARM REV CODE 636 W HCPCS: Mod: ER | Performed by: PHYSICIAN ASSISTANT

## 2022-03-21 PROCEDURE — 96361 HYDRATE IV INFUSION ADD-ON: CPT | Mod: ER

## 2022-03-21 RX ORDER — CEPHALEXIN 500 MG/1
500 CAPSULE ORAL EVERY 12 HOURS
Qty: 14 CAPSULE | Refills: 0 | Status: SHIPPED | OUTPATIENT
Start: 2022-03-21 | End: 2022-03-28

## 2022-03-21 RX ORDER — KETOROLAC TROMETHAMINE 30 MG/ML
30 INJECTION, SOLUTION INTRAMUSCULAR; INTRAVENOUS
Status: COMPLETED | OUTPATIENT
Start: 2022-03-21 | End: 2022-03-21

## 2022-03-21 RX ORDER — NAPROXEN 500 MG/1
500 TABLET ORAL 2 TIMES DAILY WITH MEALS
Qty: 14 TABLET | Refills: 0 | Status: SHIPPED | OUTPATIENT
Start: 2022-03-21 | End: 2022-03-21 | Stop reason: SDUPTHER

## 2022-03-21 RX ORDER — NAPROXEN 500 MG/1
500 TABLET ORAL 2 TIMES DAILY WITH MEALS
Qty: 14 TABLET | Refills: 0 | Status: SHIPPED | OUTPATIENT
Start: 2022-03-21 | End: 2022-05-27

## 2022-03-21 RX ADMIN — KETOROLAC TROMETHAMINE 30 MG: 30 INJECTION, SOLUTION INTRAMUSCULAR; INTRAVENOUS at 10:03

## 2022-03-21 RX ADMIN — SODIUM CHLORIDE 1000 ML: 0.9 INJECTION, SOLUTION INTRAVENOUS at 10:03

## 2022-03-21 NOTE — ED PROVIDER NOTES
Encounter Date: 3/21/2022       History     Chief Complaint   Patient presents with    Back Pain    Urinary Frequency     I am having lower back pain and going to the bathroom more often like every 2 hours for almost a week now      HPI: Ashli Kumar, a 61 y.o. female  has a past medical history of Anxiety, Depression, GERD (gastroesophageal reflux disease), High cholesterol, and Hypertension.     She presents to the ED for evaluation of lower back pain with increased frequency of urination x 2 days.  Denies fevers.  Has concern for UTI.  States that over 10 years ago, had a mass removed from one of her kidneys.  Had no residual effects from this surgery.        The history is provided by the patient.     Review of patient's allergies indicates:   Allergen Reactions    Codeine Hives    Sulfa (sulfonamide antibiotics) Hives    Benzoate analogues Itching     Past Medical History:   Diagnosis Date    Anxiety     Depression     GERD (gastroesophageal reflux disease)     High cholesterol     Hypertension      Past Surgical History:   Procedure Laterality Date    [facility]SY [facility]     [facility]    BREAST SURGERY       SECTION      COLONOSCOPY N/A 2017    Procedure: COLONOSCOPY Miralax;  Surgeon: Buddy Butcher Jr., MD;  Location: [facility] ENDO;  Service: Endoscopy;  Laterality: N/A;    HYSTERECTOMY      KIDNEY SURGERY Right     done at [facility], reported as mass by patient    LEFT HEART CATHETERIZATION Left 2019    Procedure: Left heart cath;  Surgeon: Gerhard Krishnan MD;  Location: [facility] CATH LAB;  Service: Cardiology;  Laterality: Left;    OOPHORECTOMY      s-section       History reviewed. No pertinent family history.  Social History     Tobacco Use    Smoking status: Never Smoker    Smokeless tobacco: Never Used   Substance Use Topics    Alcohol use: No    Drug use: No     Review of Systems   Constitutional: Negative for fever.   Genitourinary: Positive for frequency.  Negative for dysuria.   Musculoskeletal: Positive for back pain.   Skin: Negative for color change and rash.   Neurological: Negative for weakness and numbness.   Hematological: Negative for adenopathy.   Psychiatric/Behavioral: Negative for agitation.   All other systems reviewed and are negative.      Physical Exam     Initial Vitals [03/21/22 1005]   BP Pulse Resp Temp SpO2   126/84 76 15 98.8 °F (37.1 °C) 98 %      MAP       --         Physical Exam    Nursing note and vitals reviewed.  Constitutional: She appears well-developed and well-nourished. She is not diaphoretic. No distress.   HENT:   Head: Normocephalic and atraumatic.   Right Ear: External ear normal.   Left Ear: External ear normal.   Nose: Nose normal.   Eyes: Conjunctivae and EOM are normal.   Neck:   Normal range of motion.  Cardiovascular: Normal rate and regular rhythm.   Pulmonary/Chest: No respiratory distress.   Abdominal: Abdomen is soft. Bowel sounds are normal. She exhibits no distension. There is abdominal tenderness in the suprapubic area. There is no rebound and no guarding.   Musculoskeletal:         General: Normal range of motion.      Cervical back: Normal and normal range of motion.      Thoracic back: Normal.      Lumbar back: Normal.     Neurological: She is alert and oriented to person, place, and time.   Skin: Capillary refill takes less than 2 seconds. No rash noted.   Psychiatric: She has a normal mood and affect. Thought content normal.         ED Course   Procedures  Labs Reviewed   URINALYSIS, REFLEX TO URINE CULTURE - Abnormal; Notable for the following components:       Result Value    Occult Blood UA 1+ (*)     Leukocytes, UA 2+ (*)     All other components within normal limits    Narrative:     Preferred Collection Type->Urine, Clean Catch  Specimen Source->Urine  Collection Type->Urine, Clean Catch   CBC W/ AUTO DIFFERENTIAL - Abnormal; Notable for the following components:    RBC 3.87 (*)     Hemoglobin 11.5 (*)      Hematocrit 35.1 (*)     All other components within normal limits   COMPREHENSIVE METABOLIC PANEL - Abnormal; Notable for the following components:    CO2 30 (*)     All other components within normal limits   URINALYSIS MICROSCOPIC - Abnormal; Notable for the following components:    RBC, UA 6 (*)     WBC, UA 10 (*)     All other components within normal limits    Narrative:     Preferred Collection Type->Urine, Clean Catch  Specimen Source->Urine  Collection Type->Urine, Clean Catch          Imaging Results    None          Medications   ketorolac injection 30 mg (30 mg Intravenous Given 3/21/22 1029)   sodium chloride 0.9% bolus 1,000 mL (0 mLs Intravenous Stopped 3/21/22 1128)     Medical Decision Making:   Initial Assessment:   Increased frequency of urination   Differential Diagnosis:   UTI, muscular strain, pyleo   ED Management:  Pt presents to ED for evaluation of lower back pain with increased frequency of urination.  No concerning abnormalities on blood work.  UA concerning for UTI.  Pt will be prescribed a course of ABX and instructed to f/u with PCP for further evaluation.  Pt verbalized understanding and agreement with plan.                        Clinical Impression:   Final diagnoses:  [N30.90] Cystitis (Primary)          ED Disposition Condition    Discharge Stable        ED Prescriptions     Medication Sig Dispense Start Date End Date Auth. Provider    cephALEXin (KEFLEX) 500 MG capsule Take 1 capsule (500 mg total) by mouth every 12 (twelve) hours. for 7 days 14 capsule 3/21/2022 3/28/2022 Dahlia Schmitz PA-C    naproxen (NAPROSYN) 500 MG tablet  (Status: Discontinued) Take 1 tablet (500 mg total) by mouth 2 (two) times daily with meals. 14 tablet 3/21/2022 3/21/2022 Dahlia Schmitz PA-C    naproxen (NAPROSYN) 500 MG tablet Take 1 tablet (500 mg total) by mouth 2 (two) times daily with meals. 14 tablet 3/21/2022  Dahlia Schmitz PA-C        Follow-up Information    None          Dahlia LAINEZ  KALEB Schmitz  03/21/22 1522     Composite Graft Text: The defect edges were debeveled with a #15 scalpel blade.  Given the location of the defect, shape of the defect, the proximity to free margins and the fact the defect was full thickness a composite graft was deemed most appropriate.  The defect was outline and then transferred to the donor site.  A full thickness graft was then excised from the donor site. The graft was then placed in the primary defect, oriented appropriately and then sutured into place.  The secondary defect was then repaired using a primary closure.

## 2022-03-23 ENCOUNTER — TELEPHONE (OUTPATIENT)
Dept: ADMINISTRATIVE | Facility: OTHER | Age: 62
End: 2022-03-23
Payer: MEDICAID

## 2022-04-25 ENCOUNTER — HOSPITAL ENCOUNTER (EMERGENCY)
Facility: HOSPITAL | Age: 62
Discharge: HOME | End: 2022-04-25
Attending: EMERGENCY MEDICINE
Payer: MEDICAID

## 2022-04-25 VITALS
RESPIRATION RATE: 18 BRPM | TEMPERATURE: 98 F | HEART RATE: 74 BPM | WEIGHT: 179.69 LBS | OXYGEN SATURATION: 98 % | SYSTOLIC BLOOD PRESSURE: 130 MMHG | DIASTOLIC BLOOD PRESSURE: 80 MMHG | BODY MASS INDEX: 29.9 KG/M2

## 2022-04-25 DIAGNOSIS — A59.9 TRICHOMONAS INFECTION: Primary | ICD-10-CM

## 2022-04-25 DIAGNOSIS — J06.9 UPPER RESPIRATORY TRACT INFECTION, UNSPECIFIED TYPE: ICD-10-CM

## 2022-04-25 DIAGNOSIS — N30.90 CYSTITIS: ICD-10-CM

## 2022-04-25 LAB
BACTERIA #/AREA URNS AUTO: ABNORMAL /HPF
BILIRUB UR QL STRIP: NEGATIVE
CLARITY UR REFRACT.AUTO: ABNORMAL
COLOR UR AUTO: ABNORMAL
GLUCOSE UR QL STRIP: NEGATIVE
GROUP A STREP, MOLECULAR: NEGATIVE
HGB UR QL STRIP: ABNORMAL
HYALINE CASTS UR QL AUTO: 0 /LPF
INFLUENZA A, MOLECULAR: NEGATIVE
INFLUENZA B, MOLECULAR: NEGATIVE
KETONES UR QL STRIP: ABNORMAL
LEUKOCYTE ESTERASE UR QL STRIP: ABNORMAL
MICROSCOPIC COMMENT: ABNORMAL
NITRITE UR QL STRIP: NEGATIVE
PH UR STRIP: 6 [PH] (ref 5–8)
PROT UR QL STRIP: ABNORMAL
RBC #/AREA URNS AUTO: 15 /HPF (ref 0–4)
SARS-COV-2 RDRP RESP QL NAA+PROBE: NEGATIVE
SP GR UR STRIP: 1.02 (ref 1–1.03)
SPECIMEN SOURCE: NORMAL
TRICHOMONAS UR QL COMP ASSIST: ABNORMAL
URN SPEC COLLECT METH UR: ABNORMAL
UROBILINOGEN UR STRIP-ACNC: >=8 EU/DL
WBC #/AREA URNS AUTO: >100 /HPF (ref 0–5)

## 2022-04-25 PROCEDURE — 87591 N.GONORRHOEAE DNA AMP PROB: CPT | Mod: ER | Performed by: EMERGENCY MEDICINE

## 2022-04-25 PROCEDURE — 87651 STREP A DNA AMP PROBE: CPT | Mod: ER | Performed by: NURSE PRACTITIONER

## 2022-04-25 PROCEDURE — 87086 URINE CULTURE/COLONY COUNT: CPT | Mod: ER | Performed by: EMERGENCY MEDICINE

## 2022-04-25 PROCEDURE — 87502 INFLUENZA DNA AMP PROBE: CPT | Mod: ER | Performed by: NURSE PRACTITIONER

## 2022-04-25 PROCEDURE — 99284 EMERGENCY DEPT VISIT MOD MDM: CPT | Mod: 25,ER

## 2022-04-25 PROCEDURE — U0002 COVID-19 LAB TEST NON-CDC: HCPCS | Mod: ER | Performed by: EMERGENCY MEDICINE

## 2022-04-25 PROCEDURE — 25000003 PHARM REV CODE 250: Mod: ER | Performed by: NURSE PRACTITIONER

## 2022-04-25 PROCEDURE — 87491 CHLMYD TRACH DNA AMP PROBE: CPT | Mod: ER | Performed by: EMERGENCY MEDICINE

## 2022-04-25 PROCEDURE — 81000 URINALYSIS NONAUTO W/SCOPE: CPT | Mod: ER | Performed by: EMERGENCY MEDICINE

## 2022-04-25 RX ORDER — IBUPROFEN 600 MG/1
600 TABLET ORAL
Status: COMPLETED | OUTPATIENT
Start: 2022-04-25 | End: 2022-04-25

## 2022-04-25 RX ORDER — AMOXICILLIN AND CLAVULANATE POTASSIUM 875; 125 MG/1; MG/1
1 TABLET, FILM COATED ORAL 2 TIMES DAILY
Qty: 14 TABLET | Refills: 0 | Status: SHIPPED | OUTPATIENT
Start: 2022-04-25 | End: 2022-05-27

## 2022-04-25 RX ORDER — METRONIDAZOLE 500 MG/1
500 TABLET ORAL EVERY 12 HOURS
Qty: 14 TABLET | Refills: 0 | Status: SHIPPED | OUTPATIENT
Start: 2022-04-25 | End: 2022-05-02

## 2022-04-25 RX ADMIN — IBUPROFEN 600 MG: 600 TABLET ORAL at 01:04

## 2022-04-25 NOTE — ED NOTES
Discharge and follow up instructions given. Pt verbalized understanding.     Respirations even and non-labored. AAO x 3. NADN.

## 2022-04-25 NOTE — DISCHARGE INSTRUCTIONS
Please follow-up with your primary care physician to have further STD testing performed and return with any new or worsening symptoms.

## 2022-04-25 NOTE — ED PROVIDER NOTES
Encounter Date: 2022       History     Chief Complaint   Patient presents with    Sore Throat     Sore throat congestion and tightness since last night      HPI: Ashli Kumar, a 61 y.o. female  has a past medical history of Anxiety, Depression, GERD (gastroesophageal reflux disease), High cholesterol, and Hypertension.     She presents to the ED for evaluation of URI symptoms including sore throat with intermittent headache wound to throat started last night.  Attest to nasal congestion and hoarseness.  No fever.  No known sick contacts.  Secondary complaint of increased frequency of urination times 2-3 days..  Has tried over-the-counter medication with little improvement of her symptoms.  Attest to vaginal discharge.  Denies concern for STD.  No abdominal/pelvic pain.        The history is provided by the patient.     Review of patient's allergies indicates:   Allergen Reactions    Codeine Hives    Sulfa (sulfonamide antibiotics) Hives    Benzoate analogues Itching     Past Medical History:   Diagnosis Date    Anxiety     Depression     GERD (gastroesophageal reflux disease)     High cholesterol     Hypertension      Past Surgical History:   Procedure Laterality Date    [facility]    BREAST SURGERY       SECTION      COLONOSCOPY N/A 2017    Procedure: COLONOSCOPY Miralax;  Surgeon: Buddy Butcher Jr., MD;  Location: [facility] ENDO;  Service: Endoscopy;  Laterality: N/A;    HYSTERECTOMY      KIDNEY SURGERY Right     [facility] at [facility], reported as mass by patient    LEFT HEART CATHETERIZATION Left 2019    Procedure: Left heart cath;  Surgeon: Gerhard Krishnan MD;  [facility];  Service: Cardiology;  Laterality: Left;    OOPHORECTOMY      s-section       History reviewed. No pertinent family history.  Social History     Tobacco Use    Smoking status: Never Smoker    Smokeless tobacco: Never Used   Substance Use Topics    Alcohol use:  No    Drug use: No     Review of Systems   Constitutional: Negative for fever.   HENT: Positive for congestion and voice change.    Gastrointestinal: Negative for nausea and vomiting.   Genitourinary: Positive for frequency and vaginal discharge. Negative for vaginal bleeding and vaginal pain.   Allergic/Immunologic: Negative for immunocompromised state.   All other systems reviewed and are negative.      Physical Exam     Initial Vitals [04/25/22 1305]   BP Pulse Resp Temp SpO2   132/81 77 15 98.1 °F (36.7 °C) 98 %      MAP       --         Physical Exam    Nursing note and vitals reviewed.  Constitutional: She appears well-developed and well-nourished. She is not diaphoretic. No distress.   HENT:   Head: Normocephalic and atraumatic.   Right Ear: External ear normal.   Left Ear: External ear normal.   Nose: Nose normal.   Mouth/Throat: Uvula is midline, oropharynx is clear and moist and mucous membranes are normal.   Eyes: Conjunctivae and EOM are normal.   Neck:   Normal range of motion.  Cardiovascular: Normal rate and regular rhythm.   Pulmonary/Chest: Breath sounds normal. No respiratory distress. She has no wheezes. She has no rhonchi. She has no rales.   Abdominal: Abdomen is soft. Bowel sounds are normal. She exhibits no distension. There is no abdominal tenderness. There is no rebound and no guarding.   Musculoskeletal:         General: Normal range of motion.      Cervical back: Normal range of motion.     Neurological: She is alert and oriented to person, place, and time.   Skin: Skin is warm and dry. Capillary refill takes less than 2 seconds. No rash noted. No erythema.   Psychiatric: She has a normal mood and affect. Thought content normal.         ED Course   Procedures  Labs Reviewed   URINALYSIS, REFLEX TO URINE CULTURE - Abnormal; Notable for the following components:       Result Value    Appearance, UA Cloudy (*)     Protein, UA 1+ (*)     Ketones, UA 2+ (*)     Occult Blood UA 1+ (*)      Urobilinogen, UA >=8.0 (*)     Leukocytes, UA 3+ (*)     All other components within normal limits    Narrative:     Preferred Collection Type->Urine, Clean Catch  Specimen Source->Urine  Collection Type->Urine, Clean Catch   URINALYSIS MICROSCOPIC - Abnormal; Notable for the following components:    RBC, UA 15 (*)     WBC, UA >100 (*)     Bacteria Moderate (*)     Trichomonas, UA Occasional (*)     All other components within normal limits    Narrative:     Preferred Collection Type->Urine, Clean Catch  Specimen Source->Urine  Collection Type->Urine, Clean Catch   INFLUENZA A & B BY MOLECULAR   GROUP A STREP, MOLECULAR   C. TRACHOMATIS/N. GONORRHOEAE BY AMP DNA   C. TRACHOMATIS/N. GONORRHOEAE BY AMP DNA    Narrative:     Preferred Collection Type->Urine, Clean Catch  Specimen Source->Urine  Collection Type->Urine, Clean Catch   CULTURE, URINE   SARS-COV-2 RNA AMPLIFICATION, QUAL    Narrative:     Is the patient symptomatic?->Yes          Imaging Results    None          Medications   ibuprofen tablet 600 mg (600 mg Oral Given 4/25/22 1345)     Medical Decision Making:   Initial Assessment:   Increased frequency of urination, URI symptoms   Differential Diagnosis:   Covid, strep, influenza, viral URI  UTI, STI, BV  ED Management:  Pt presents to ED for evaluation of urinary and URI symptoms.  Strep, covid, negative; likely viral.  UA concerning for UTI and trichomonas.  Pt denies concern for STD, given finding on urinalysis, will send off for gonorrhea and chlamydia.  Patient declining treatment that time.  She was given information on symptomatic control of her URI symptoms and reasons for return.  Patient verbalized understanding and agreement plan.                      Clinical Impression:   Final diagnoses:  [A59.9] Trichomonas infection (Primary)  [J06.9] Upper respiratory tract infection, unspecified type  [N30.90] Cystitis          ED Disposition Condition    Discharge Stable        ED Prescriptions      Medication Sig Dispense Start Date End Date Auth. Provider    amoxicillin-clavulanate 875-125mg (AUGMENTIN) 875-125 mg per tablet Take 1 tablet by mouth 2 (two) times daily. 14 tablet 4/25/2022  Dahlia Schmitz PA-C    metroNIDAZOLE (FLAGYL) 500 MG tablet Take 1 tablet (500 mg total) by mouth every 12 (twelve) hours. for 7 days 14 tablet 4/25/2022 5/2/2022 Dahlia Schmitz PA-C        Follow-up Information    None          Dahlia Schmitz PA-C  04/26/22 1492

## 2022-04-25 NOTE — FIRST PROVIDER EVALUATION
Emergency Department TeleTriage Encounter Note      CHIEF COMPLAINT    Chief Complaint   Patient presents with    Sore Throat     Sore throat congestion and tightness since last night        VITAL SIGNS   Initial Vitals [04/25/22 1305]   BP Pulse Resp Temp SpO2   132/81 77 15 98.1 °F (36.7 °C) 98 %      MAP       --            ALLERGIES    Review of patient's allergies indicates:   Allergen Reactions    Codeine Hives    Sulfa (sulfonamide antibiotics) Hives    Benzoate analogues Itching       PROVIDER TRIAGE NOTE  This is a teletriage evaluation of a 61 y.o. female presenting to the ED complaining of sore throat, nasal congestion, and hoarse voice since last night.     Initial orders will be placed and care will be transferred to an alternate provider when patient is roomed for a full evaluation. Any additional orders and the final disposition will be determined by that provider.           ORDERS  Labs Reviewed   INFLUENZA A & B BY MOLECULAR   GROUP A STREP, MOLECULAR   SARS-COV-2 RDRP GENE       ED Orders (720h ago, onward)    Start Ordered     Status Ordering Provider    04/25/22 1330 04/25/22 1319  ibuprofen tablet 600 mg  ED 1 Time         Ordered ESTHELA VEGA.    04/25/22 1320 04/25/22 1319  POCT COVID-19 Rapid Screening  Once         Ordered ESTHELA VEGA    04/25/22 1320 04/25/22 1319  Influenza A & B by Molecular  STAT         Ordered ESTHELA VEGA.    04/25/22 1320 04/25/22 1319  Group A Strep, Molecular  STAT         Ordered ESTHELA VEGA            Virtual Visit Note: The provider triage portion of this emergency department evaluation and documentation was performed via [employer], a HIPAA-compliant telemedicine application, in concert with a tele-presenter in the room. A face to face patient evaluation with one of my colleagues will occur once the patient is placed in an emergency department room.      DISCLAIMER: This note was prepared with [employer]  voice recognition transcription software. Garbled syntax, mangled pronouns, and other bizarre constructions may be attributed to that software system.

## 2022-04-26 LAB
BACTERIA UR CULT: NO GROWTH
C TRACH DNA SPEC QL NAA+PROBE: NOT DETECTED
N GONORRHOEA DNA SPEC QL NAA+PROBE: NOT DETECTED

## 2022-05-27 ENCOUNTER — HOSPITAL ENCOUNTER (OUTPATIENT)
Facility: HOSPITAL | Age: 62
Discharge: HOME | DRG: 391 | End: 2022-05-30
Attending: EMERGENCY MEDICINE | Admitting: FAMILY MEDICINE
Payer: MEDICAID

## 2022-05-27 DIAGNOSIS — K92.2 GI BLEED: ICD-10-CM

## 2022-05-27 DIAGNOSIS — R55 SYNCOPE: Primary | ICD-10-CM

## 2022-05-27 DIAGNOSIS — R07.9 CHEST PAIN: ICD-10-CM

## 2022-05-27 DIAGNOSIS — K92.2 LOWER GI BLEED: ICD-10-CM

## 2022-05-27 PROBLEM — F31.32 BIPOLAR AFFECTIVE DISORDER, CURRENTLY DEPRESSED, MODERATE: Status: ACTIVE | Noted: 2022-05-27

## 2022-05-27 PROBLEM — I50.32 CHRONIC HEART FAILURE WITH PRESERVED EJECTION FRACTION: Status: ACTIVE | Noted: 2019-05-13

## 2022-05-27 LAB
ABO + RH BLD: NORMAL
ALBUMIN SERPL BCP-MCNC: 3.8 G/DL (ref 3.5–5.2)
ALP SERPL-CCNC: 45 U/L (ref 55–135)
ALT SERPL W/O P-5'-P-CCNC: 18 U/L (ref 10–44)
ANION GAP SERPL CALC-SCNC: 15 MMOL/L (ref 8–16)
AST SERPL-CCNC: 19 U/L (ref 10–40)
BASOPHILS # BLD AUTO: 0.02 K/UL (ref 0–0.2)
BASOPHILS NFR BLD: 0.2 % (ref 0–1.9)
BILIRUB SERPL-MCNC: 0.3 MG/DL (ref 0.1–1)
BILIRUB UR QL STRIP: NEGATIVE
BLD GP AB SCN CELLS X3 SERPL QL: NORMAL
BUN SERPL-MCNC: 29 MG/DL (ref 8–23)
CALCIUM SERPL-MCNC: 9.6 MG/DL (ref 8.7–10.5)
CHLORIDE SERPL-SCNC: 100 MMOL/L (ref 95–110)
CLARITY UR: CLEAR
CO2 SERPL-SCNC: 25 MMOL/L (ref 23–29)
COLOR UR: YELLOW
CREAT SERPL-MCNC: 1 MG/DL (ref 0.5–1.4)
CTP QC/QA: YES
DIFFERENTIAL METHOD: ABNORMAL
EOSINOPHIL # BLD AUTO: 0 K/UL (ref 0–0.5)
EOSINOPHIL NFR BLD: 0.3 % (ref 0–8)
ERYTHROCYTE [DISTWIDTH] IN BLOOD BY AUTOMATED COUNT: 12.5 % (ref 11.5–14.5)
EST. GFR  (AFRICAN AMERICAN): >60 ML/MIN/1.73 M^2
EST. GFR  (NON AFRICAN AMERICAN): >60 ML/MIN/1.73 M^2
GLUCOSE SERPL-MCNC: 139 MG/DL (ref 70–110)
GLUCOSE UR QL STRIP: NEGATIVE
HCT VFR BLD AUTO: 38.5 % (ref 37–48.5)
HGB BLD-MCNC: 12.5 G/DL (ref 12–16)
HGB UR QL STRIP: ABNORMAL
IMM GRANULOCYTES # BLD AUTO: 0.04 K/UL (ref 0–0.04)
IMM GRANULOCYTES NFR BLD AUTO: 0.4 % (ref 0–0.5)
INR PPP: 1.1 (ref 0.8–1.2)
KETONES UR QL STRIP: NEGATIVE
LEUKOCYTE ESTERASE UR QL STRIP: NEGATIVE
LIPASE SERPL-CCNC: 59 U/L (ref 4–60)
LYMPHOCYTES # BLD AUTO: 2 K/UL (ref 1–4.8)
LYMPHOCYTES NFR BLD: 17.9 % (ref 18–48)
MAGNESIUM SERPL-MCNC: 1.9 MG/DL (ref 1.6–2.6)
MCH RBC QN AUTO: 29.8 PG (ref 27–31)
MCHC RBC AUTO-ENTMCNC: 32.5 G/DL (ref 32–36)
MCV RBC AUTO: 92 FL (ref 82–98)
MONOCYTES # BLD AUTO: 0.7 K/UL (ref 0.3–1)
MONOCYTES NFR BLD: 6.4 % (ref 4–15)
NEUTROPHILS # BLD AUTO: 8.2 K/UL (ref 1.8–7.7)
NEUTROPHILS NFR BLD: 74.8 % (ref 38–73)
NITRITE UR QL STRIP: NEGATIVE
NRBC BLD-RTO: 0 /100 WBC
OB PNL STL: POSITIVE
PH UR STRIP: 6 [PH] (ref 5–8)
PLATELET # BLD AUTO: 162 K/UL (ref 150–450)
PMV BLD AUTO: 12.5 FL (ref 9.2–12.9)
POCT GLUCOSE: 132 MG/DL (ref 70–110)
POTASSIUM SERPL-SCNC: 4.3 MMOL/L (ref 3.5–5.1)
PROT SERPL-MCNC: 7.4 G/DL (ref 6–8.4)
PROT UR QL STRIP: ABNORMAL
PROTHROMBIN TIME: 10.9 SEC (ref 9–12.5)
RBC # BLD AUTO: 4.19 M/UL (ref 4–5.4)
SARS-COV-2 RDRP RESP QL NAA+PROBE: NEGATIVE
SODIUM SERPL-SCNC: 140 MMOL/L (ref 136–145)
SP GR UR STRIP: 1.02 (ref 1–1.03)
TROPONIN I SERPL DL<=0.01 NG/ML-MCNC: <0.006 NG/ML (ref 0–0.03)
URN SPEC COLLECT METH UR: ABNORMAL
UROBILINOGEN UR STRIP-ACNC: NEGATIVE EU/DL
WBC # BLD AUTO: 10.94 K/UL (ref 3.9–12.7)

## 2022-05-27 PROCEDURE — 86850 RBC ANTIBODY SCREEN: CPT | Performed by: EMERGENCY MEDICINE

## 2022-05-27 PROCEDURE — G0378 HOSPITAL OBSERVATION PER HR: HCPCS

## 2022-05-27 PROCEDURE — 85610 PROTHROMBIN TIME: CPT | Performed by: EMERGENCY MEDICINE

## 2022-05-27 PROCEDURE — 93010 EKG 12-LEAD: ICD-10-PCS | Mod: ,,, | Performed by: INTERNAL MEDICINE

## 2022-05-27 PROCEDURE — 25000003 PHARM REV CODE 250: Performed by: STUDENT IN AN ORGANIZED HEALTH CARE EDUCATION/TRAINING PROGRAM

## 2022-05-27 PROCEDURE — 80053 COMPREHEN METABOLIC PANEL: CPT | Performed by: EMERGENCY MEDICINE

## 2022-05-27 PROCEDURE — C9113 INJ PANTOPRAZOLE SODIUM, VIA: HCPCS | Performed by: STUDENT IN AN ORGANIZED HEALTH CARE EDUCATION/TRAINING PROGRAM

## 2022-05-27 PROCEDURE — 81003 URINALYSIS AUTO W/O SCOPE: CPT | Performed by: EMERGENCY MEDICINE

## 2022-05-27 PROCEDURE — 63600175 PHARM REV CODE 636 W HCPCS: Performed by: STUDENT IN AN ORGANIZED HEALTH CARE EDUCATION/TRAINING PROGRAM

## 2022-05-27 PROCEDURE — U0002 COVID-19 LAB TEST NON-CDC: HCPCS | Performed by: STUDENT IN AN ORGANIZED HEALTH CARE EDUCATION/TRAINING PROGRAM

## 2022-05-27 PROCEDURE — 82962 GLUCOSE BLOOD TEST: CPT

## 2022-05-27 PROCEDURE — 93010 ELECTROCARDIOGRAM REPORT: CPT | Mod: ,,, | Performed by: INTERNAL MEDICINE

## 2022-05-27 PROCEDURE — 82272 OCCULT BLD FECES 1-3 TESTS: CPT | Performed by: EMERGENCY MEDICINE

## 2022-05-27 PROCEDURE — 11000001 HC ACUTE MED/SURG PRIVATE ROOM

## 2022-05-27 PROCEDURE — 93005 ELECTROCARDIOGRAM TRACING: CPT

## 2022-05-27 PROCEDURE — 85025 COMPLETE CBC W/AUTO DIFF WBC: CPT | Performed by: EMERGENCY MEDICINE

## 2022-05-27 PROCEDURE — 83735 ASSAY OF MAGNESIUM: CPT | Performed by: EMERGENCY MEDICINE

## 2022-05-27 PROCEDURE — 99285 EMERGENCY DEPT VISIT HI MDM: CPT | Mod: 25

## 2022-05-27 PROCEDURE — 96375 TX/PRO/DX INJ NEW DRUG ADDON: CPT

## 2022-05-27 PROCEDURE — 84484 ASSAY OF TROPONIN QUANT: CPT | Performed by: EMERGENCY MEDICINE

## 2022-05-27 PROCEDURE — 83690 ASSAY OF LIPASE: CPT | Performed by: EMERGENCY MEDICINE

## 2022-05-27 RX ORDER — IBUPROFEN 200 MG
24 TABLET ORAL
Status: DISCONTINUED | OUTPATIENT
Start: 2022-05-27 | End: 2022-05-30 | Stop reason: HOSPADM

## 2022-05-27 RX ORDER — TALC
6 POWDER (GRAM) TOPICAL NIGHTLY PRN
Status: DISCONTINUED | OUTPATIENT
Start: 2022-05-27 | End: 2022-05-30 | Stop reason: HOSPADM

## 2022-05-27 RX ORDER — PANTOPRAZOLE SODIUM 40 MG/10ML
40 INJECTION, POWDER, LYOPHILIZED, FOR SOLUTION INTRAVENOUS 2 TIMES DAILY
Status: DISCONTINUED | OUTPATIENT
Start: 2022-05-28 | End: 2022-05-30 | Stop reason: HOSPADM

## 2022-05-27 RX ORDER — DIVALPROEX SODIUM 250 MG/1
500 TABLET, FILM COATED, EXTENDED RELEASE ORAL 2 TIMES DAILY
Status: DISCONTINUED | OUTPATIENT
Start: 2022-05-27 | End: 2022-05-30 | Stop reason: HOSPADM

## 2022-05-27 RX ORDER — ONDANSETRON 2 MG/ML
8 INJECTION INTRAMUSCULAR; INTRAVENOUS EVERY 6 HOURS PRN
Status: DISCONTINUED | OUTPATIENT
Start: 2022-05-27 | End: 2022-05-30 | Stop reason: HOSPADM

## 2022-05-27 RX ORDER — ACETAMINOPHEN 325 MG/1
650 TABLET ORAL EVERY 6 HOURS PRN
Status: DISCONTINUED | OUTPATIENT
Start: 2022-05-27 | End: 2022-05-30 | Stop reason: HOSPADM

## 2022-05-27 RX ORDER — INSULIN ASPART 100 [IU]/ML
0-5 INJECTION, SOLUTION INTRAVENOUS; SUBCUTANEOUS
Status: DISCONTINUED | OUTPATIENT
Start: 2022-05-27 | End: 2022-05-30 | Stop reason: HOSPADM

## 2022-05-27 RX ORDER — PANTOPRAZOLE SODIUM 40 MG/10ML
80 INJECTION, POWDER, LYOPHILIZED, FOR SOLUTION INTRAVENOUS ONCE
Status: COMPLETED | OUTPATIENT
Start: 2022-05-27 | End: 2022-05-27

## 2022-05-27 RX ORDER — SODIUM CHLORIDE 0.9 % (FLUSH) 0.9 %
5 SYRINGE (ML) INJECTION
Status: DISCONTINUED | OUTPATIENT
Start: 2022-05-27 | End: 2022-05-30 | Stop reason: HOSPADM

## 2022-05-27 RX ORDER — DIVALPROEX SODIUM 250 MG/1
500 TABLET, FILM COATED, EXTENDED RELEASE ORAL DAILY
Status: DISCONTINUED | OUTPATIENT
Start: 2022-05-28 | End: 2022-05-27

## 2022-05-27 RX ORDER — FLUOXETINE HYDROCHLORIDE 20 MG/1
20 CAPSULE ORAL DAILY
Status: ON HOLD | COMMUNITY
Start: 2022-05-27 | End: 2023-01-06 | Stop reason: HOSPADM

## 2022-05-27 RX ORDER — CONJUGATED ESTROGENS 0.62 MG/G
0.5 CREAM VAGINAL
COMMUNITY
Start: 2021-12-15 | End: 2024-02-05

## 2022-05-27 RX ORDER — GLUCAGON 1 MG
1 KIT INJECTION
Status: DISCONTINUED | OUTPATIENT
Start: 2022-05-27 | End: 2022-05-30 | Stop reason: HOSPADM

## 2022-05-27 RX ORDER — METOPROLOL TARTRATE 25 MG/1
25 TABLET, FILM COATED ORAL 2 TIMES DAILY
COMMUNITY
Start: 2022-05-13

## 2022-05-27 RX ORDER — IBUPROFEN 200 MG
16 TABLET ORAL
Status: DISCONTINUED | OUTPATIENT
Start: 2022-05-27 | End: 2022-05-30 | Stop reason: HOSPADM

## 2022-05-27 RX ORDER — FLUOXETINE HYDROCHLORIDE 20 MG/1
60 CAPSULE ORAL DAILY
Status: DISCONTINUED | OUTPATIENT
Start: 2022-05-28 | End: 2022-05-30 | Stop reason: HOSPADM

## 2022-05-27 RX ADMIN — PANTOPRAZOLE SODIUM 80 MG: 40 INJECTION, POWDER, LYOPHILIZED, FOR SOLUTION INTRAVENOUS at 09:05

## 2022-05-27 RX ADMIN — DIVALPROEX SODIUM 500 MG: 250 TABLET, EXTENDED RELEASE ORAL at 09:05

## 2022-05-27 NOTE — ED PROVIDER NOTES
Encounter Date: 2022       History     Chief Complaint   Patient presents with    Loss of Consciousness     Patient states that felt dizzy/weak with abdominal cramping followed by syncope after having diarrhea just PTA. EMS reports blood in stool. Patient states that she has been feeling well, appetite WNL. Denies fever. No pain on arrival. Awake, alert.      61-year-old female history of reflux hypertension tension and hypercholesterolemia presents with a syncopal episode and bright red blood per rectum.  Patient was in usual state health until today when she started having some abdominal discomfort she became diaphoretic she went to the bathroom to have a bowel movement and when she got up from the toilet she felt very dizzy had a syncopal episode EMS upon arrival found bright red blood in the toilet.        Review of patient's allergies indicates:   Allergen Reactions    Codeine Hives    Sulfa (sulfonamide antibiotics) Hives    Benzoate analogues Itching     Past Medical History:   Diagnosis Date    Anxiety     Depression     GERD (gastroesophageal reflux disease)     High cholesterol     Hypertension      Past Surgical History:   Procedure Laterality Date    [facility]SY [facility]    BREAST SURGERY       SECTION      COLONOSCOPY N/A 2017    Procedure: COLONOSCOPY Miralax;  Surgeon: Buddy Butcher Jr., MD;  Location: [facility] ENDO;  Service: Endoscopy;  Laterality: N/A;    HYSTERECTOMY      KIDNEY SURGERY Right     done at [facility], reported as mass by patient    LEFT HEART CATHETERIZATION Left 2019    Procedure: Left heart cath;  Surgeon: Gerhard Krishnan MD;  Location: [facility] CATH LAB;  Service: Cardiology;  Laterality: Left;    OOPHORECTOMY      s-section       No family history on file.  Social History     Tobacco Use    Smoking status: Never Smoker    Smokeless tobacco: Never Used   Substance Use Topics    Alcohol use: No    Drug use: No     Review of Systems    Gastrointestinal: Positive for abdominal pain and blood in stool.   Neurological: Positive for syncope.   All other systems reviewed and are negative.      Physical Exam     Initial Vitals [05/27/22 1633]   BP Pulse Resp Temp SpO2   (!) 113/42 67 15 98.3 °F (36.8 °C) 97 %      MAP       --         Physical Exam    Nursing note and vitals reviewed.  Constitutional: She appears well-developed and well-nourished.   HENT:   Head: Normocephalic and atraumatic.   Eyes: Conjunctivae are normal. Pupils are equal, round, and reactive to light.   Neck: Neck supple.   Normal range of motion.  Cardiovascular: Normal rate, regular rhythm and normal heart sounds.   Pulmonary/Chest: Breath sounds normal.   Abdominal: Abdomen is soft.   Musculoskeletal:         General: Normal range of motion.      Cervical back: Normal range of motion and neck supple.     Neurological: She is alert.   Skin: Skin is warm and dry.         ED Course   Procedures  Labs Reviewed   CBC W/ AUTO DIFFERENTIAL - Abnormal; Notable for the following components:       Result Value    Gran # (ANC) 8.2 (*)     Gran % 74.8 (*)     Lymph % 17.9 (*)     All other components within normal limits   COMPREHENSIVE METABOLIC PANEL - Abnormal; Notable for the following components:    Glucose 139 (*)     BUN 29 (*)     Alkaline Phosphatase 45 (*)     All other components within normal limits   OCCULT BLOOD X 1, STOOL - Abnormal; Notable for the following components:    Occult Blood Positive (*)     All other components within normal limits   TROPONIN I   PROTIME-INR   MAGNESIUM   LIPASE   URINALYSIS, REFLEX TO URINE CULTURE   TYPE & SCREEN          Imaging Results          CT Abdomen Pelvis  Without Contrast (Final result)  Result time 05/27/22 18:23:18    Final result by Arnoldo Barron MD (05/27/22 18:23:18)                 Impression:      Sigmoid diverticulosis without diverticulitis.    Small rectal fecal impaction.    Postsurgical changes right  kidney.      Electronically signed by: Arnoldo Barron  Date:    05/27/2022  Time:    18:23             Narrative:    EXAMINATION:  CT ABDOMEN PELVIS WITHOUT CONTRAST    CLINICAL HISTORY:  GI bleed;    TECHNIQUE:  Low dose axial images, sagittal and coronal reformations were obtained from the lung bases to the pubic symphysis, Oral contrast was not administered.    COMPARISON:  On 02/19/2017 CT of the abdomen and pelvis    FINDINGS:  Heart: Normal in size. No pericardial effusion.    Lung Bases: Well aerated, without consolidation or pleural fluid.    Liver: Normal in size and attenuation, with no focal hepatic lesions.    Gallbladder: No calcified gallstones.    Bile Ducts: No evidence of dilated ducts.    Pancreas: No mass or peripancreatic fat stranding.    Spleen: Unremarkable.    Adrenals: Unremarkable.    Kidneys/ Ureters: Defect in the cortex of the mid right kidney suggests partial nephrectomy..    Bladder: No evidence of wall thickening.    Reproductive organs: Unremarkable.    GI Tract/Mesentery: No evidence of bowel obstruction or inflammation.  Diverticulosis within the sigmoid colon without features of diverticulitis.  A small rectal fecal impaction is present.    Peritoneal Space: No ascites. No free air.    Retroperitoneum: No significant adenopathy.    Abdominal wall: Unremarkable.    Vasculature: No significant atherosclerosis or aneurysm.    Bones: No acute fracture.                                 Medications - No data to display              ED Course as of 05/27/22 1916   Fri May 27, 2022   1744 Normal sinus rhythm rate of 73 QT is 460 normal NC interval normal axis no ST segment elevation or depression [CG]   1914 Spoke with GI, observation no PPI, no serial H/H unless decompensates, cbc in the am,  [CG]      ED Course User Index  [CG] Mario Farris MD             Clinical Impression:   Final diagnoses:  [R55] Syncope (Primary)  [K92.2] Lower GI bleed          ED Disposition Condition     Observation               Mario Farris MD  05/27/22 1916

## 2022-05-27 NOTE — PHARMACY MED REC
"Admission Medication History     The home medication history was taken by Chica Angulo CPhT.    Medication history obtained from, Patient Verified    You may go to "Admission" then "Reconcile Home Medications" tabs to review and/or act upon these items.      The home medication list has been updated by the Pharmacy department.    Please read ALL comments highlighted in yellow.    Please address this information as you see fit.     Feel free to contact us if you have any questions or require assistance.      The medications listed below were removed from the home medication list.  Please reorder if appropriate:  Patient reports no longer taking the following medication(s):   Augmentin 875-125 mg   Atorvastatin 10 mg   Lomotil 2.5-0.025 mg   XYZAL 5 mg   Naprosyn 500 mg   Omeprazole 40 mg   Zofran 4 mg and ODT 4 mg   Oxybutynin 5 mg   Protonix 20 mg   Pravastatin 40 mg   Spironolactone 25 mg   Ultracet 37.5-325 mg      Chica Angulo CPhT.  Ext [phone]                .          "

## 2022-05-27 NOTE — ED NOTES
Received patient from ems.  Patient states she was in [employer] using the bathroom and got weak.  States noticed blood in her stool and felt as though she was having a hot flash.  Sat on the floor and then she remembers waking up on the floor in the bathroom.  Denies hitting her head.  Denies any recent illness. No distress. Vitals stable.  No other complaints.  Iv established. Labs drawn and sent.  Placed on monitor will continue to monitor.

## 2022-05-27 NOTE — ED NOTES
Patient is requesting cough syrup to be called into the pharmacy but does not want to wait for it before discharge.  .

## 2022-05-28 PROBLEM — K62.5 RECTAL BLEEDING: Status: ACTIVE | Noted: 2022-05-27

## 2022-05-28 LAB
ALBUMIN SERPL BCP-MCNC: 3.6 G/DL (ref 3.5–5.2)
ALP SERPL-CCNC: 37 U/L (ref 55–135)
ALT SERPL W/O P-5'-P-CCNC: 17 U/L (ref 10–44)
ANION GAP SERPL CALC-SCNC: 13 MMOL/L (ref 8–16)
AST SERPL-CCNC: 16 U/L (ref 10–40)
BASOPHILS # BLD AUTO: 0.01 K/UL (ref 0–0.2)
BASOPHILS NFR BLD: 0.1 % (ref 0–1.9)
BILIRUB SERPL-MCNC: 0.5 MG/DL (ref 0.1–1)
BUN SERPL-MCNC: 23 MG/DL (ref 8–23)
CALCIUM SERPL-MCNC: 9.3 MG/DL (ref 8.7–10.5)
CHLORIDE SERPL-SCNC: 102 MMOL/L (ref 95–110)
CO2 SERPL-SCNC: 25 MMOL/L (ref 23–29)
CREAT SERPL-MCNC: 0.8 MG/DL (ref 0.5–1.4)
DIFFERENTIAL METHOD: ABNORMAL
EOSINOPHIL # BLD AUTO: 0 K/UL (ref 0–0.5)
EOSINOPHIL NFR BLD: 0.1 % (ref 0–8)
ERYTHROCYTE [DISTWIDTH] IN BLOOD BY AUTOMATED COUNT: 12.5 % (ref 11.5–14.5)
EST. GFR  (AFRICAN AMERICAN): >60 ML/MIN/1.73 M^2
EST. GFR  (NON AFRICAN AMERICAN): >60 ML/MIN/1.73 M^2
ESTIMATED AVG GLUCOSE: 134 MG/DL (ref 68–131)
GLUCOSE SERPL-MCNC: 106 MG/DL (ref 70–110)
HBA1C MFR BLD: 6.3 % (ref 4–5.6)
HCT VFR BLD AUTO: 37.3 % (ref 37–48.5)
HGB BLD-MCNC: 12.1 G/DL (ref 12–16)
IMM GRANULOCYTES # BLD AUTO: 0.04 K/UL (ref 0–0.04)
IMM GRANULOCYTES NFR BLD AUTO: 0.3 % (ref 0–0.5)
LYMPHOCYTES # BLD AUTO: 1.6 K/UL (ref 1–4.8)
LYMPHOCYTES NFR BLD: 13.6 % (ref 18–48)
MAGNESIUM SERPL-MCNC: 1.6 MG/DL (ref 1.6–2.6)
MCH RBC QN AUTO: 29.4 PG (ref 27–31)
MCHC RBC AUTO-ENTMCNC: 32.4 G/DL (ref 32–36)
MCV RBC AUTO: 91 FL (ref 82–98)
MONOCYTES # BLD AUTO: 0.7 K/UL (ref 0.3–1)
MONOCYTES NFR BLD: 6.4 % (ref 4–15)
NEUTROPHILS # BLD AUTO: 9.1 K/UL (ref 1.8–7.7)
NEUTROPHILS NFR BLD: 79.5 % (ref 38–73)
NRBC BLD-RTO: 0 /100 WBC
PHOSPHATE SERPL-MCNC: 4 MG/DL (ref 2.7–4.5)
PLATELET # BLD AUTO: 145 K/UL (ref 150–450)
PMV BLD AUTO: 13.2 FL (ref 9.2–12.9)
POCT GLUCOSE: 112 MG/DL (ref 70–110)
POCT GLUCOSE: 115 MG/DL (ref 70–110)
POCT GLUCOSE: 115 MG/DL (ref 70–110)
POCT GLUCOSE: 118 MG/DL (ref 70–110)
POTASSIUM SERPL-SCNC: 5.2 MMOL/L (ref 3.5–5.1)
PROT SERPL-MCNC: 6.8 G/DL (ref 6–8.4)
RBC # BLD AUTO: 4.11 M/UL (ref 4–5.4)
SODIUM SERPL-SCNC: 140 MMOL/L (ref 136–145)
TSH SERPL DL<=0.005 MIU/L-ACNC: 2.84 UIU/ML (ref 0.4–4)
WBC # BLD AUTO: 11.48 K/UL (ref 3.9–12.7)

## 2022-05-28 PROCEDURE — 80053 COMPREHEN METABOLIC PANEL: CPT | Performed by: STUDENT IN AN ORGANIZED HEALTH CARE EDUCATION/TRAINING PROGRAM

## 2022-05-28 PROCEDURE — G0378 HOSPITAL OBSERVATION PER HR: HCPCS

## 2022-05-28 PROCEDURE — 85025 COMPLETE CBC W/AUTO DIFF WBC: CPT | Performed by: STUDENT IN AN ORGANIZED HEALTH CARE EDUCATION/TRAINING PROGRAM

## 2022-05-28 PROCEDURE — 11000001 HC ACUTE MED/SURG PRIVATE ROOM

## 2022-05-28 PROCEDURE — 63600175 PHARM REV CODE 636 W HCPCS: Performed by: STUDENT IN AN ORGANIZED HEALTH CARE EDUCATION/TRAINING PROGRAM

## 2022-05-28 PROCEDURE — 96375 TX/PRO/DX INJ NEW DRUG ADDON: CPT

## 2022-05-28 PROCEDURE — 84443 ASSAY THYROID STIM HORMONE: CPT | Performed by: STUDENT IN AN ORGANIZED HEALTH CARE EDUCATION/TRAINING PROGRAM

## 2022-05-28 PROCEDURE — 96366 THER/PROPH/DIAG IV INF ADDON: CPT

## 2022-05-28 PROCEDURE — 99223 PR INITIAL HOSPITAL CARE,LEVL III: ICD-10-PCS | Mod: ,,, | Performed by: INTERNAL MEDICINE

## 2022-05-28 PROCEDURE — 96365 THER/PROPH/DIAG IV INF INIT: CPT

## 2022-05-28 PROCEDURE — C9113 INJ PANTOPRAZOLE SODIUM, VIA: HCPCS | Performed by: STUDENT IN AN ORGANIZED HEALTH CARE EDUCATION/TRAINING PROGRAM

## 2022-05-28 PROCEDURE — 84100 ASSAY OF PHOSPHORUS: CPT | Performed by: STUDENT IN AN ORGANIZED HEALTH CARE EDUCATION/TRAINING PROGRAM

## 2022-05-28 PROCEDURE — 36415 COLL VENOUS BLD VENIPUNCTURE: CPT | Performed by: STUDENT IN AN ORGANIZED HEALTH CARE EDUCATION/TRAINING PROGRAM

## 2022-05-28 PROCEDURE — 25000003 PHARM REV CODE 250: Performed by: STUDENT IN AN ORGANIZED HEALTH CARE EDUCATION/TRAINING PROGRAM

## 2022-05-28 PROCEDURE — 99223 1ST HOSP IP/OBS HIGH 75: CPT | Mod: ,,, | Performed by: INTERNAL MEDICINE

## 2022-05-28 PROCEDURE — 83036 HEMOGLOBIN GLYCOSYLATED A1C: CPT | Performed by: STUDENT IN AN ORGANIZED HEALTH CARE EDUCATION/TRAINING PROGRAM

## 2022-05-28 PROCEDURE — 96376 TX/PRO/DX INJ SAME DRUG ADON: CPT

## 2022-05-28 PROCEDURE — 83735 ASSAY OF MAGNESIUM: CPT | Performed by: STUDENT IN AN ORGANIZED HEALTH CARE EDUCATION/TRAINING PROGRAM

## 2022-05-28 RX ORDER — MAGNESIUM SULFATE HEPTAHYDRATE 40 MG/ML
2 INJECTION, SOLUTION INTRAVENOUS ONCE
Status: COMPLETED | OUTPATIENT
Start: 2022-05-28 | End: 2022-05-28

## 2022-05-28 RX ADMIN — FLUOXETINE HYDROCHLORIDE 60 MG: 20 CAPSULE ORAL at 08:05

## 2022-05-28 RX ADMIN — PANTOPRAZOLE SODIUM 40 MG: 40 INJECTION, POWDER, FOR SOLUTION INTRAVENOUS at 09:05

## 2022-05-28 RX ADMIN — DIVALPROEX SODIUM 500 MG: 250 TABLET, EXTENDED RELEASE ORAL at 08:05

## 2022-05-28 RX ADMIN — PANTOPRAZOLE SODIUM 40 MG: 40 INJECTION, POWDER, FOR SOLUTION INTRAVENOUS at 08:05

## 2022-05-28 RX ADMIN — DIVALPROEX SODIUM 500 MG: 250 TABLET, EXTENDED RELEASE ORAL at 09:05

## 2022-05-28 RX ADMIN — ONDANSETRON 8 MG: 2 INJECTION INTRAMUSCULAR; INTRAVENOUS at 02:05

## 2022-05-28 RX ADMIN — MAGNESIUM SULFATE 2 G: 2 INJECTION INTRAVENOUS at 08:05

## 2022-05-28 NOTE — ASSESSMENT & PLAN NOTE
- Home regimen: Lisinopril 10mg and metoprolol 25mg daily  - Hold home medications in setting of acute GI bleed, if BP remains stable will re-start as tolerated

## 2022-05-28 NOTE — ASSESSMENT & PLAN NOTE
- S/p syncopal event with bloody BM, unclear if BM more consistent with upper or lower GI bleed based on history provided  - FOBT positive  - Diverticulosis on CT scan  - Colonoscopy with diverticulosis in 2017  - EGD with non-bleeding gastric ulcers in 2017    PLAN:  - GI consulted, appreciate recs  - NPO pending GI evaluation and resolution of GI bleed  - Daily CBC for now, if pt becomes hemodynamically unstable will check H/H more frequently  - Protonix 80mg IV loading dose then 40mg IV BID per GI recs  - Hold DVT ppx and anticoagulation in setting of acute bleed  - Cardiac telemetry

## 2022-05-28 NOTE — PROGRESS NOTES
"[facility] Medicine  Progress Note    Patient Name: Ashli Kumar  MRN: [id number]  Patient Class: OP- Observation   Admission Date: 5/27/2022  Length of Stay: 0 days  Attending Physician: Hira Piña MD  Primary Care Provider: Kobi Harrington MD        Subjective:     Principal Problem:Acute GI bleeding        HPI:  Ashli Kumar is a 61 y.o. female with PMH HTN, HLD, bipolar disorder, prior GI bleed with diverticulosis and non-bleeding gastric ulcers in 2017, HFpEF who presents with GI bleed. Pt was at the store when she started to feel weak. She went to the restroom and had a BM, after her BM she continued to feel week and ended up sitting down on the ground. Pt reports blacking out but being able to hear everything that was going on and that she never lost consciousness. Pt denies history of GI bleed. Since this pre-syncopal episode the pt has progressively improved and was able to ambulate to the restroom in the ED without lightheadedness or dizziness. Pt had an additional bloody BM in the ED. Pt with difficulty describing the color and appearance of the BM. States it was "sticky" and "looked like blood" and was "darker" but could not say if it as bright red or black in appearance.     In the ED VSS. H/H 10.94/162. PT/INR wnl. CMP and CBC overall wnl. GI was consulted and recommended observation overnight. [facility] was consulted for admission for GI bleed.       Overview/Hospital Course:  No notes on file    Interval History: Pt with 2-3 more bloody Bms overnight. This AM noted that when she stood up from the commode bright red blood dripped onto the floor from her rectum. Had some nausea overnight. Otherwise feels well. No light headedness or dizziness. Was able to ambulate to the commode without difficulty.     Review of Systems   Constitutional:  Negative for appetite change and fever.   HENT:  Negative for congestion and sore throat.    Respiratory:  " Negative for cough, shortness of breath and wheezing.    Cardiovascular:  Negative for chest pain and leg swelling.   Gastrointestinal:  Positive for abdominal pain, blood in stool and nausea. Negative for diarrhea, rectal pain and vomiting.   Genitourinary:  Negative for decreased urine volume and difficulty urinating.   Musculoskeletal:  Negative for back pain and joint swelling.   Neurological:  Negative for dizziness, speech difficulty, light-headedness, numbness and headaches.   Hematological:  Negative for adenopathy. Does not bruise/bleed easily.   Psychiatric/Behavioral:  Negative for confusion and sleep disturbance.    Objective:     Vital Signs (Most Recent):  Temp: 98.6 °F (37 °C) (05/28/22 0351)  Pulse: 83 (05/28/22 0400)  Resp: 18 (05/28/22 0351)  BP: (!) 153/69 (05/28/22 0351)  SpO2: 95 % (05/28/22 0351)   Vital Signs (24h Range):  Temp:  [98.1 °F (36.7 °C)-98.6 °F (37 °C)] 98.6 °F (37 °C)  Pulse:  [62-83] 83  Resp:  [15-21] 18  SpO2:  [95 %-99 %] 95 %  BP: (113-157)/(42-93) 153/69     Weight: 80.8 kg (178 lb 2.1 oz)  Body mass index is 29.64 kg/m².  No intake or output data in the 24 hours ending 05/28/22 0753   Physical Exam  Vitals and nursing note reviewed.   Constitutional:       General: She is not in acute distress.     Appearance: Normal appearance.   HENT:      Head: Normocephalic and atraumatic.      Right Ear: External ear normal.      Left Ear: External ear normal.      Nose: Nose normal. No congestion.      Mouth/Throat:      Mouth: Mucous membranes are moist.      Pharynx: Oropharynx is clear.   Eyes:      Extraocular Movements: Extraocular movements intact.      Conjunctiva/sclera: Conjunctivae normal.   Cardiovascular:      Rate and Rhythm: Normal rate and regular rhythm.      Pulses: Normal pulses.      Heart sounds: No murmur heard.  Pulmonary:      Effort: Pulmonary effort is normal. No respiratory distress.      Breath sounds: No wheezing.   Abdominal:      General: Abdomen is flat.  Bowel sounds are normal.      Palpations: Abdomen is soft.      Tenderness: There is abdominal tenderness (mild diffuse). There is no guarding or rebound.   Musculoskeletal:         General: Normal range of motion.      Cervical back: Normal range of motion. No muscular tenderness.      Right lower leg: No edema.      Left lower leg: No edema.   Lymphadenopathy:      Cervical: No cervical adenopathy.   Skin:     General: Skin is warm and dry.      Capillary Refill: Capillary refill takes less than 2 seconds.      Findings: No erythema or rash.   Neurological:      General: No focal deficit present.      Mental Status: She is alert and oriented to person, place, and time.      Sensory: No sensory deficit.      Coordination: Coordination normal.   Psychiatric:         Attention and Perception: Attention normal.         Mood and Affect: Mood normal.         Speech: Speech normal.         Behavior: Behavior is cooperative.       Significant Labs: All pertinent labs within the past 24 hours have been reviewed.  CBC:   Recent Labs   Lab 05/27/22  1733 05/28/22  0511   WBC 10.94 11.48   HGB 12.5 12.1   HCT 38.5 37.3    145*     CMP:   Recent Labs   Lab 05/27/22  1733 05/28/22  0511    140   K 4.3 5.2*    102   CO2 25 25   * 106   BUN 29* 23   CREATININE 1.0 0.8   CALCIUM 9.6 9.3   PROT 7.4 6.8   ALBUMIN 3.8 3.6   BILITOT 0.3 0.5   ALKPHOS 45* 37*   AST 19 16   ALT 18 17   ANIONGAP 15 13   EGFRNONAA >60 >60       Significant Imaging: I have reviewed all pertinent imaging results/findings within the past 24 hours.      Assessment/Plan:      * Acute GI bleeding  - S/p syncopal event with bloody BM, unclear if BM more consistent with upper or lower GI bleed based on history provided  - FOBT positive  - Diverticulosis on CT scan  - Colonoscopy with diverticulosis in 2017  - EGD with non-bleeding gastric ulcers in 2017    PLAN:  - GI consulted, appreciate recs  - NPO pending GI evaluation and resolution  of GI bleed  - Daily CBC for now, if pt becomes hemodynamically unstable will check H/H more frequently  - Protonix 80mg IV loading dose then 40mg IV BID per GI recs  - Hold DVT ppx and anticoagulation in setting of acute bleed  - Cardiac telemetry      Bipolar affective disorder, currently depressed, moderate  Continue home depakote 500mg BID and fluoxetine 60mg daily      Essential hypertension  - Home regimen: Lisinopril 10mg and metoprolol 25mg daily  - Hold home medications in setting of acute GI bleed, if BP remains stable will re-start as tolerated      Chronic heart failure with preserved ejection fraction  - Left heart cath in 2019 with normal coronary arteries and diastolic dysfunction  - Hold home metoprolol pending BI bleed resolution/further monitoring of BP  - May need echocardiogram if bleeding persists to assess risk of fluid/blood administration        VTE Risk Mitigation (From admission, onward)         Ordered     IP VTE HIGH RISK PATIENT  Once         05/27/22 1925                Discharge Planning   TAYLOR:      Code Status: Full Code   Is the patient medically ready for discharge?:     Reason for patient still in hospital (select all that apply): Patient trending condition and Consult recommendations             Scott Olivier DO  [facility], PGY-2

## 2022-05-28 NOTE — HPI
62 yo F admitted with presyncope and LGIB.  She is a poor historian.  She was at a store and began to feel weak, went to the bathroom, had a blood BM, and then got off the toilet b/c she felt weak and sat on the floor, at which point she states she just fell over.  She has had a few bloody BM since then, but states that the BM she had this am only had dark blood noted when wiping.  She has not had a real abdominal pain until this morning she had some suprapubic pain that resolved after she urinated.  She has h/o severe diverticulosis but denies knowing this before now.  She is not on blood thinners.  She states that her appetite has returned and she is willing to try to eat.

## 2022-05-28 NOTE — ASSESSMENT & PLAN NOTE
"- This could be a diverticular bleed vs hemorrhoidal bleeding.  The "small fecal impaction" on CT would make hemorrhoidal bleeding more likely, but the syncopal episode makes diverticular bleeding a concern.  The safest choice is to watch her again overnight.  I will feed her and if she is stable in the morning, she can be discharged to home first thing in am with (1) addition of a fiber supplement, and (2) daily Miralax.  - Consider CTA if severe bleeding occurs  - I will give her a f/u with me at discharge, message already sent to my staff  "

## 2022-05-28 NOTE — PLAN OF CARE
Problem: Adult Inpatient Plan of Care  Goal: Plan of Care Review  Outcome: Ongoing, Progressing     VIRTUAL NURSE:  Unable to cue into patient's room d/t vidyoconnect malfuction.  Permission received per patient to enter room.  Introduced as VN for night shift that will be working with floor nurse and nursing assistant.  Educated patient on VN's role in patient care and VIP model.  Plan of care reviewed with patient.  Education per flowsheet.   Informed patient that staff will round on them every 2 hours but to use call light for any other needs they may have; informed of fall risk and fall precautions.  Patient verbalized understanding.  Call light within reach; bed siderails up x3 and bed alarm on.  Opportunity given for questions and questions answered.  Admission assessment questions answered.  Patient denies complaints or any needs at this time. Instructed to call for assistance.  Will cont to monitor and intervene as needed.    Labs, notes, orders, and careplan reviewed.       05/27/22 8865   Patient Request   Patient Requested vidyoconnect malfunction; assessed patient in room   Nurse Notification   Charge Nurse Notified? Yes   Name of Charge Nurse RAMÍREZ Brunner   Bedside Nurse Notified? Yes   Name of Bedside Nurse RAMÍREZ Wynne   Nurse Notfication Method Secure Chat   Nurse Notified Of Other   Admission   Initial VN Admission Questions Complete   Communication Issues? Technical Issue   Shift   Virtual Nurse - Rounding Complete   Pain Management Interventions pain management plan reviewed with patient/caregiver   Virtual Nurse - Patient Verbalized Approval Of Camera Use;VN Rounding   Type of Frequent Check   Type Patient Rounds   Safety/Activity   Patient Rounds bed in low position;placement of personal items at bedside;bed wheels locked;call light in patient/parent reach;visualized patient;clutter free environment maintained;ID band on;toileting offered   Safety Promotion/Fall Prevention assistive  device/personal item within reach;bed alarm set;diversional activities provided;Fall Risk reviewed with patient/family;high risk medications identified;medications reviewed;lighting adjusted;nonskid shoes/socks when out of bed;room near unit station;side rails raised x 3;supervised activity;instructed to call staff for mobility   Safety Precautions emergency equipment at bedside   Positioning   Body Position neutral body alignment;neutral head position   Head of Bed (HOB) Positioning HOB at 60-90 degrees   Pain/Comfort/Sleep   Preferred Pain Scale number (Numeric Rating Pain Scale)   Comfort/Acceptable Pain Level 2   Pain Rating (0-10): Rest 0   Sleep/Rest/Relaxation no problem identified;awake

## 2022-05-28 NOTE — ED NOTES
Pt is resting comfortably in bed at this time in no distress and has no complaints or issues. Pt is alert and oriented x4. Normal respiratory drive noted. Continuous cardaic monitor applied to pt and bed in lowest, locked position with side rails up and call light within reach. Will continue to monitor.

## 2022-05-28 NOTE — HPI
"Ashli Kumar is a 61 y.o. female with PMH HTN, HLD, bipolar disorder, prior GI bleed with diverticulosis and non-bleeding gastric ulcers in 2017, HFpEF who presents with GI bleed. Pt was at the store when she started to feel weak. She went to the restroom and had a BM, after her BM she continued to feel week and ended up sitting down on the ground. Pt reports blacking out but being able to hear everything that was going on and that she never lost consciousness. Pt denies history of GI bleed. Since this pre-syncopal episode the pt has progressively improved and was able to ambulate to the restroom in the ED without lightheadedness or dizziness. Pt had an additional bloody BM in the ED. Pt with difficulty describing the color and appearance of the BM. States it was "sticky" and "looked like blood" and was "darker" but could not say if it as bright red or black in appearance.     In the ED VSS. H/H 10.94/162. PT/INR wnl. CMP and CBC overall wnl. GI was consulted and recommended observation overnight. [facility] was consulted for admission for GI bleed.   "

## 2022-05-28 NOTE — PLAN OF CARE
Pt. AAOx4. Diet advanced, Pt. Tolerating diet well. BS monitored. Pt. Continuing to have frequent bloody Bms. Pt. States it has become less frequent.VSS. Bed alarm on and call light in reach. Pt. Instructed to call for assistance.   Problem: Adult Inpatient Plan of Care  Goal: Plan of Care Review  Outcome: Ongoing, Progressing     Problem: Adult Inpatient Plan of Care  Goal: Patient-Specific Goal (Individualized)  Outcome: Ongoing, Progressing     Problem: Adult Inpatient Plan of Care  Goal: Absence of Hospital-Acquired Illness or Injury  Outcome: Ongoing, Progressing     Problem: Adult Inpatient Plan of Care  Goal: Optimal Comfort and Wellbeing  Outcome: Ongoing, Progressing     Problem: Adult Inpatient Plan of Care  Goal: Readiness for Transition of Care  Outcome: Ongoing, Progressing     Problem: Hypertension Comorbidity  Goal: Blood Pressure in Desired Range  Outcome: Ongoing, Progressing     Problem: Adjustment to Illness (Gastrointestinal Bleeding)  Goal: Optimal Coping with Acute Illness  Outcome: Ongoing, Progressing

## 2022-05-28 NOTE — H&P
"[facility] Medicine  History & Physical    Patient Name: Ashli Kumar  MRN: [id number]  Patient Class: OP- Observation  Admission Date: 5/27/2022  Attending Physician: Hira Piña MD   Primary Care Provider: Kobi Harrington MD         Patient information was obtained from patient, past medical records and ER records.     Subjective:     Principal Problem:Acute GI bleeding    Chief Complaint:   Chief Complaint   Patient presents with    Loss of Consciousness     Patient states that felt dizzy/weak with abdominal cramping followed by syncope after having diarrhea just PTA. EMS reports blood in stool. Patient states that she has been feeling well, appetite WNL. Denies fever. No pain on arrival. Awake, alert.         HPI: Ashli Kumar is a 61 y.o. female with PMH HTN, HLD, bipolar disorder, prior GI bleed with diverticulosis and non-bleeding gastric ulcers in 2017, HFpEF who presents with GI bleed. Pt was at the store when she started to feel weak. She went to the restroom and had a BM, after her BM she continued to feel week and ended up sitting down on the ground. Pt reports blacking out but being able to hear everything that was going on and that she never lost consciousness. Pt denies history of GI bleed. Since this pre-syncopal episode the pt has progressively improved and was able to ambulate to the restroom in the ED without lightheadedness or dizziness. Pt had an additional bloody BM in the ED. Pt with difficulty describing the color and appearance of the BM. States it was "sticky" and "looked like blood" and was "darker" but could not say if it as bright red or black in appearance.     In the ED VSS. H/H 10.94/162. PT/INR wnl. CMP and CBC overall wnl. GI was consulted and recommended observation overnight. [facility] was consulted for admission for GI bleed.       Past Medical History:   Diagnosis Date    Anxiety     Depression     GERD " (gastroesophageal reflux disease)     High cholesterol     Hypertension        Past Surgical History:   Procedure Laterality Date    BREAST BIOPSY Left     [facility]    BREAST SURGERY       SECTION      COLONOSCOPY N/A 2017    Procedure: COLONOSCOPY Miralax;  Surgeon: Buddy Butcher Jr., MD;  Location: [facility];  Service: Endoscopy;  Laterality: N/A;    HYSTERECTOMY      KIDNEY SURGERY Right     done at [facility], reported as mass by patient    LEFT HEART CATHETERIZATION Left 2019    Procedure: Left heart cath;  Surgeon: Gerhard Krishnan MD;  Location: [facility] CATH LAB;  Service: Cardiology;  Laterality: Left;    OOPHORECTOMY      s-section         Review of patient's allergies indicates:   Allergen Reactions    Codeine Hives    Sulfa (sulfonamide antibiotics) Hives    Benzoate analogues Itching       No current facility-administered medications on file prior to encounter.     Current Outpatient Medications on File Prior to Encounter   Medication Sig    divalproex ER (DEPAKOTE) 500 MG Tb24 Take 500 mg by mouth 2 (two) times a day.    fluoxetine (PROZAC) 40 MG capsule Take 40 mg by mouth once daily. Take 1 capsule by mouth daily with 20 mg capsule    FLUoxetine 20 MG capsule Take 20 mg by mouth once daily. Take 1 capsule by mouth daily with 40 mg capsule    lisinopril 10 MG tablet Take 1 tablet (10 mg total) by mouth once daily.    metoprolol tartrate (LOPRESSOR) 25 MG tablet Take 25 mg by mouth 2 (two) times daily.    PREMARIN vaginal cream Place 0.5 g vaginally twice a week.    [DISCONTINUED] amoxicillin-clavulanate 875-125mg (AUGMENTIN) 875-125 mg per tablet Take 1 tablet by mouth 2 (two) times daily.    [DISCONTINUED] atorvastatin (LIPITOR) 10 MG tablet Take 10 mg by mouth once daily.    [DISCONTINUED] diphenoxylate-atropine 2.5-0.025 mg (LOMOTIL) 2.5-0.025 mg per tablet Take 1 tablet by mouth 4 (four) times daily as needed for Diarrhea.    [DISCONTINUED] levocetirizine  (XYZAL) 5 MG tablet Take 5 mg by mouth every evening.    [DISCONTINUED] metoprolol succinate (TOPROL-XL) 50 MG 24 hr tablet Take 50 mg by mouth once daily.    [DISCONTINUED] naproxen (NAPROSYN) 500 MG tablet Take 1 tablet (500 mg total) by mouth 2 (two) times daily with meals.    [DISCONTINUED] omeprazole (PRILOSEC) 40 MG capsule Take 1 capsule (40 mg total) by mouth once daily.    [DISCONTINUED] ondansetron (ZOFRAN) 4 MG tablet Take 1 tablet (4 mg total) by mouth every 8 (eight) hours as needed.    [DISCONTINUED] ondansetron (ZOFRAN-ODT) 4 MG TbDL Take 8 mg by mouth every 12 (twelve) hours.    [DISCONTINUED] ondansetron (ZOFRAN-ODT) 4 MG TbDL Take 1 tablet (4 mg total) by mouth every 8 (eight) hours as needed.    [DISCONTINUED] oxybutynin (DITROPAN) 5 MG Tab Take 5 mg by mouth 3 (three) times daily.    [DISCONTINUED] pantoprazole (PROTONIX) 20 MG tablet Take 1 tablet (20 mg total) by mouth once daily.    [DISCONTINUED] pravastatin (PRAVACHOL) 40 MG tablet Take 40 mg by mouth once daily.    [DISCONTINUED] spironolactone (ALDACTONE) 25 MG tablet Take 25 mg by mouth once daily.    [DISCONTINUED] tramadol-acetaminophen 37.5-325 mg (ULTRACET) 37.5-325 mg Tab Take 1 tablet by mouth every 6 (six) hours as needed for Pain.     Family History    None       Tobacco Use    Smoking status: Never Smoker    Smokeless tobacco: Never Used   Substance and Sexual Activity    Alcohol use: No    Drug use: No    Sexual activity: Not on file     Review of Systems   Constitutional:  Positive for diaphoresis (resolved after BM). Negative for appetite change and fever.   HENT:  Negative for congestion and sore throat.    Respiratory:  Negative for cough, shortness of breath and wheezing.    Cardiovascular:  Negative for chest pain and leg swelling.   Gastrointestinal:  Positive for abdominal pain and blood in stool. Negative for diarrhea, nausea, rectal pain and vomiting.   Genitourinary:  Negative for decreased urine  volume and difficulty urinating.   Musculoskeletal:  Negative for back pain and joint swelling.   Neurological:  Negative for dizziness, speech difficulty, light-headedness, numbness and headaches.   Hematological:  Negative for adenopathy. Does not bruise/bleed easily.   Psychiatric/Behavioral:  Negative for confusion and sleep disturbance.    Objective:     Vital Signs (Most Recent):  Temp: 98.3 °F (36.8 °C) (05/27/22 1633)  Pulse: 74 (05/27/22 2023)  Resp: 18 (05/27/22 2023)  BP: (!) 157/72 (05/27/22 2023)  SpO2: 99 % (05/27/22 2023)   Vital Signs (24h Range):  Temp:  [98.3 °F (36.8 °C)] 98.3 °F (36.8 °C)  Pulse:  [67-74] 74  Resp:  [15-21] 18  SpO2:  [97 %-99 %] 99 %  BP: (113-157)/(42-93) 157/72     Weight: 81.2 kg (179 lb)  Body mass index is 29.79 kg/m².    Physical Exam  Vitals and nursing note reviewed.   Constitutional:       General: She is not in acute distress.     Appearance: Normal appearance.   HENT:      Head: Normocephalic and atraumatic.      Right Ear: External ear normal.      Left Ear: External ear normal.      Nose: Nose normal. No congestion.      Mouth/Throat:      Mouth: Mucous membranes are moist.      Pharynx: Oropharynx is clear.   Eyes:      Extraocular Movements: Extraocular movements intact.      Conjunctiva/sclera: Conjunctivae normal.   Cardiovascular:      Rate and Rhythm: Normal rate and regular rhythm.      Pulses: Normal pulses.      Heart sounds: No murmur heard.  Pulmonary:      Effort: Pulmonary effort is normal. No respiratory distress.      Breath sounds: No wheezing.   Abdominal:      General: Abdomen is flat. Bowel sounds are normal.      Palpations: Abdomen is soft.      Tenderness: There is abdominal tenderness (mild diffuse). There is no guarding or rebound.   Musculoskeletal:         General: Normal range of motion.      Cervical back: Normal range of motion. No muscular tenderness.      Right lower leg: No edema.      Left lower leg: No edema.   Lymphadenopathy:       Cervical: No cervical adenopathy.   Skin:     General: Skin is warm and dry.      Capillary Refill: Capillary refill takes less than 2 seconds.      Findings: No erythema or rash.   Neurological:      General: No focal deficit present.      Mental Status: She is alert and oriented to person, place, and time.      Sensory: No sensory deficit.      Coordination: Coordination normal.   Psychiatric:         Attention and Perception: Attention normal.         Mood and Affect: Mood normal.         Speech: Speech normal.         Behavior: Behavior is cooperative.           Significant Labs: All pertinent labs within the past 24 hours have been reviewed.  CBC:   Recent Labs   Lab 05/27/22  1733   WBC 10.94   HGB 12.5   HCT 38.5        CMP:   Recent Labs   Lab 05/27/22  1733      K 4.3      CO2 25   *   BUN 29*   CREATININE 1.0   CALCIUM 9.6   PROT 7.4   ALBUMIN 3.8   BILITOT 0.3   ALKPHOS 45*   AST 19   ALT 18   ANIONGAP 15   EGFRNONAA >60       Significant Imaging: I have reviewed all pertinent imaging results/findings within the past 24 hours.    Imaging Results              CT Abdomen Pelvis  Without Contrast (Final result)  Result time 05/27/22 18:23:18      Final result by Arnoldo Barron MD (05/27/22 18:23:18)                   Impression:      Sigmoid diverticulosis without diverticulitis.    Small rectal fecal impaction.    Postsurgical changes right kidney.      Electronically signed by: Arnoldo Barron  Date:    05/27/2022  Time:    18:23               Narrative:    EXAMINATION:  CT ABDOMEN PELVIS WITHOUT CONTRAST    CLINICAL HISTORY:  GI bleed;    TECHNIQUE:  Low dose axial images, sagittal and coronal reformations were obtained from the lung bases to the pubic symphysis, Oral contrast was not administered.    COMPARISON:  On 02/19/2017 CT of the abdomen and pelvis    FINDINGS:  Heart: Normal in size. No pericardial effusion.    Lung Bases: Well aerated, without consolidation or  pleural fluid.    Liver: Normal in size and attenuation, with no focal hepatic lesions.    Gallbladder: No calcified gallstones.    Bile Ducts: No evidence of dilated ducts.    Pancreas: No mass or peripancreatic fat stranding.    Spleen: Unremarkable.    Adrenals: Unremarkable.    Kidneys/ Ureters: Defect in the cortex of the mid right kidney suggests partial nephrectomy..    Bladder: No evidence of wall thickening.    Reproductive organs: Unremarkable.    GI Tract/Mesentery: No evidence of bowel obstruction or inflammation.  Diverticulosis within the sigmoid colon without features of diverticulitis.  A small rectal fecal impaction is present.    Peritoneal Space: No ascites. No free air.    Retroperitoneum: No significant adenopathy.    Abdominal wall: Unremarkable.    Vasculature: No significant atherosclerosis or aneurysm.    Bones: No acute fracture.                                        Assessment/Plan:     * Acute GI bleeding  - S/p syncopal event with bloody BM, unclear if BM more consistent with upper or lower GI bleed based on history provided  - FOBT positive  - Diverticulosis on CT scan  - Colonoscopy with diverticulosis in 2017  - EGD with non-bleeding gastric ulcers in 2017    PLAN:  - GI consulted, appreciate recs  - NPO pending GI evaluation and resolution of GI bleed  - Daily CBC for now, if pt has multiple bloody BMs overnight or becomes hemodynamically unstable will check H/H more frequently  - Protonix 80mg IV loading dose then 40mg IV BID per GI recs  - Hold DVT ppx and anticoagulation in setting of acute bleed  - Cardiac telemetry      Bipolar affective disorder, currently depressed, moderate  Continue home depakote 500mg BID and fluoxetine 60mg daily      Essential hypertension  - Home regimen: Lisinopril 10mg and metoprolol 25mg daily  - Hold home medications in setting of acute GI bleed, if BP remains stable will re-start as tolerated      Chronic heart failure with preserved ejection  fraction  - Left heart cath in 2019 with normal coronary arteries and diastolic dysfunction  - Hold home metoprolol pending BI bleed resolution/further monitoring of BP  - May need echocardiogram if bleeding persists to assess risk of fluid/blood administration        VTE Risk Mitigation (From admission, onward)         Ordered     IP VTE HIGH RISK PATIENT  Once         05/27/22 1925                 Scott Olivier DO  [facility], PGY-2

## 2022-05-28 NOTE — CONSULTS
[facility]  Gastroenterology  Consult Note    Patient Name: Ashli Kumar  MRN: [id number]  Admission Date: 2022  Hospital Length of Stay: 0 days  Code Status: Full Code   Attending Provider: Hira Piña MD   Consulting Provider: Mary Cotton MD  Primary Care Physician: Kobi Harrington MD  Principal Problem:Rectal bleeding    [facility]  Consult performed by: Mary Cotton MD  Consult ordered by: Scott Olivier DO  Reason for consult: Rectal bleeding  Assessment/Recommendations: Please see A/P below        Subjective:     HPI:  62 yo F admitted with presyncope and LGIB.  She is a poor historian.  She was at a store and began to feel weak, went to the bathroom, had a blood BM, and then got off the toilet b/c she felt weak and sat on the floor, at which point she states she just fell over.  She has had a few bloody BM since then, but states that the BM she had this am only had dark blood noted when wiping.  She has not had a real abdominal pain until this morning she had some suprapubic pain that resolved after she urinated.  She has h/o severe diverticulosis but denies knowing this before now.  She is not on blood thinners.  She states that her appetite has returned and she is willing to try to eat.      Past Medical History:   Diagnosis Date    Anxiety     Arthritis     Depression     GERD (gastroesophageal reflux disease)     High cholesterol     Hypertension        Past Surgical History:   Procedure Laterality Date    [facility]    BREAST SURGERY       SECTION      COLONOSCOPY N/A 2017    Procedure: COLONOSCOPY Miralax;  Surgeon: Buddy Butcher Jr., MD;  Location: [facility];  Service: Endoscopy;  Laterality: N/A;    HYSTERECTOMY      KIDNEY SURGERY Right     done [facility], reported as mass by patient    LEFT HEART CATHETERIZATION Left 2019    Procedure: Left heart cath;  Surgeon:  Gerhard Krishnan MD;  Location: [facility] CATH LAB;  Service: Cardiology;  Laterality: Left;    OOPHORECTOMY      s-section         Review of patient's allergies indicates:   Allergen Reactions    Codeine Hives    Sulfa (sulfonamide antibiotics) Hives    Benzoate analogues Itching     Family History    None       Tobacco Use    Smoking status: Never Smoker    Smokeless tobacco: Never Used   Substance and Sexual Activity    Alcohol use: No    Drug use: No    Sexual activity: Not Currently     Review of Systems   Constitutional:  Negative for chills and unexpected weight change.   Eyes:  Negative for photophobia and visual disturbance.   Respiratory:  Negative for chest tightness, shortness of breath and wheezing.    Cardiovascular:  Negative for chest pain, palpitations and leg swelling.   Gastrointestinal:  Positive for abdominal pain and blood in stool.   Genitourinary:  Negative for dysuria, flank pain and hematuria.   Musculoskeletal:  Negative for joint swelling and myalgias.   Skin:  Negative for color change and rash.   Neurological:  Negative for dizziness and speech difficulty.   Psychiatric/Behavioral:  Negative for confusion and hallucinations.    Objective:     Vital Signs (Most Recent):  Temp: 98.2 °F (36.8 °C) (05/28/22 1126)  Pulse: 75 (05/28/22 1200)  Resp: 18 (05/28/22 1126)  BP: 138/76 (05/28/22 1126)  SpO2: 95 % (05/28/22 0351) Vital Signs (24h Range):  Temp:  [97.6 °F (36.4 °C)-98.6 °F (37 °C)] 98.2 °F (36.8 °C)  Pulse:  [62-94] 75  Resp:  [15-21] 18  SpO2:  [95 %-99 %] 95 %  BP: (113-157)/(42-93) 138/76     Weight: 80.8 kg (178 lb 2.1 oz) (05/27/22 2230)  Body mass index is 29.64 kg/m².    No intake or output data in the 24 hours ending 05/28/22 1331    Lines/Drains/Airways       Peripheral Intravenous Line  Duration                  Peripheral IV - Single Lumen 05/27/22 1719 20 G Left Forearm <1 day                    Physical Exam  Constitutional:       Appearance: Normal appearance.  She is well-developed. She is not ill-appearing.   HENT:      Head: Normocephalic and atraumatic.   Eyes:      Extraocular Movements: Extraocular movements intact.      Pupils: Pupils are equal, round, and reactive to light.   Pulmonary:      Effort: Pulmonary effort is normal. No respiratory distress.   Abdominal:      General: There is no distension.      Palpations: Abdomen is soft.      Tenderness: There is no abdominal tenderness.   Musculoskeletal:         General: No signs of injury. Normal range of motion.      Cervical back: Normal range of motion and neck supple.   Neurological:      General: No focal deficit present.      Mental Status: She is alert and oriented to person, place, and time.   Psychiatric:         Mood and Affect: Mood normal.         Behavior: Behavior normal.         Thought Content: Thought content normal.         Judgment: Judgment normal.       Significant Labs:  CBC:   Recent Labs   Lab 05/27/22  1733 05/28/22  0511   WBC 10.94 11.48   HGB 12.5 12.1   HCT 38.5 37.3    145*     CMP:   Recent Labs   Lab 05/28/22  0511      CALCIUM 9.3   ALBUMIN 3.6   PROT 6.8      K 5.2*   CO2 25      BUN 23   CREATININE 0.8   ALKPHOS 37*   ALT 17   AST 16   BILITOT 0.5     EGD and colonoscopy 5/2017:  EGD:  - Normal duodenal bulb and second portion of the                         duodenum.                        - Non-bleeding gastric ulcers with pigmented                         material. Biopsied.                        - The examination was otherwise normal.                        - Z-line regular, 40 cm from the incisors.                        - Normal esophagus.     Colonoscopy:    The perianal and digital rectal examinations were normal.       Multiple medium-mouthed diverticula were found in the sigmoid colon.       Due to tortuosity and angulation in an area of numerouds        diverticula, the scope could not be advanced above the 45 cm level       The rectum appeared  "normal.     Limited BE was normal     Significant Imaging:  Imaging results within the past 24 hours have been reviewed.    Assessment/Plan:     * Rectal bleeding  - This could be a diverticular bleed vs hemorrhoidal bleeding.  The "small fecal impaction" on CT would make hemorrhoidal bleeding more likely, but the syncopal episode makes diverticular bleeding a concern.  The safest choice is to watch her again overnight.  I will feed her and if she is stable in the morning, she can be discharged to home first thing in am with (1) addition of a fiber supplement, and (2) daily Miralax.  - Consider CTA if severe bleeding occurs  - I will give her a f/u with me at discharge, message already sent to my staff        Thank you for your consult. I will follow-up with patient in the morning if bleeding recurs, otherwise it is ok to discharge. Please contact us if you have any additional questions.    Mary Cotton MD  Gastroenterology  [facility]  "

## 2022-05-28 NOTE — ASSESSMENT & PLAN NOTE
- S/p syncopal event with bloody BM, unclear if BM more consistent with upper or lower GI bleed based on history provided  - FOBT positive  - Diverticulosis on CT scan  - Colonoscopy with diverticulosis in 2017  - EGD with non-bleeding gastric ulcers in 2017    PLAN:  - GI consulted, appreciate recs  - NPO pending GI evaluation and resolution of GI bleed  - Daily CBC for now, if pt has multiple bloody BMs overnight or becomes hemodynamically unstable will check H/H more frequently  - Protonix 80mg IV loading dose then 40mg IV BID per GI recs  - Hold DVT ppx and anticoagulation in setting of acute bleed  - Cardiac telemetry

## 2022-05-28 NOTE — SUBJECTIVE & OBJECTIVE
Past Medical History:   Diagnosis Date    Anxiety     Arthritis     Depression     GERD (gastroesophageal reflux disease)     High cholesterol     Hypertension        Past Surgical History:   Procedure Laterality Date    BREAST BIOPSY Left     h    BREAST SURGERY       SECTION      COLONOSCOPY N/A 2017    Procedure: COLONOSCOPY Miralax;  Surgeon: Buddy Butcher Jr., MD;  Location: [facility] ENDO;  Service: Endoscopy;  Laterality: N/A;    HYSTERECTOMY      KIDNEY SURGERY Right     done at [facility], reported as mass by patient    LEFT HEART CATHETERIZATION Left 2019    Procedure: Left heart cath;  Surgeon: Gerhard Krishnan MD;  [facility];  Service: Cardiology;  Laterality: Left;    OOPHORECTOMY      s-section         Review of patient's allergies indicates:   Allergen Reactions    Codeine Hives    Sulfa (sulfonamide antibiotics) Hives    Benzoate analogues Itching     Family History    None       Tobacco Use    Smoking status: Never Smoker    Smokeless tobacco: Never Used   Substance and Sexual Activity    Alcohol use: No    Drug use: No    Sexual activity: Not Currently     Review of Systems   Constitutional:  Negative for chills and unexpected weight change.   Eyes:  Negative for photophobia and visual disturbance.   Respiratory:  Negative for chest tightness, shortness of breath and wheezing.    Cardiovascular:  Negative for chest pain, palpitations and leg swelling.   Gastrointestinal:  Positive for abdominal pain and blood in stool.   Genitourinary:  Negative for dysuria, flank pain and hematuria.   Musculoskeletal:  Negative for joint swelling and myalgias.   Skin:  Negative for color change and rash.   Neurological:  Negative for dizziness and speech difficulty.   Psychiatric/Behavioral:  Negative for confusion and hallucinations.    Objective:     Vital Signs (Most Recent):  Temp: 98.2 °F (36.8 °C) (22 1126)  Pulse: 75 (22 1200)  Resp: 18 (22 1126)  BP:  138/76 (05/28/22 1126)  SpO2: 95 % (05/28/22 0351) Vital Signs (24h Range):  Temp:  [97.6 °F (36.4 °C)-98.6 °F (37 °C)] 98.2 °F (36.8 °C)  Pulse:  [62-94] 75  Resp:  [15-21] 18  SpO2:  [95 %-99 %] 95 %  BP: (113-157)/(42-93) 138/76     Weight: 80.8 kg (178 lb 2.1 oz) (05/27/22 2230)  Body mass index is 29.64 kg/m².    No intake or output data in the 24 hours ending 05/28/22 1331    Lines/Drains/Airways       Peripheral Intravenous Line  Duration                  Peripheral IV - Single Lumen 05/27/22 1719 20 G Left Forearm <1 day                    Physical Exam  Constitutional:       Appearance: Normal appearance. She is well-developed. She is not ill-appearing.   HENT:      Head: Normocephalic and atraumatic.   Eyes:      Extraocular Movements: Extraocular movements intact.      Pupils: Pupils are equal, round, and reactive to light.   Pulmonary:      Effort: Pulmonary effort is normal. No respiratory distress.   Abdominal:      General: There is no distension.      Palpations: Abdomen is soft.      Tenderness: There is no abdominal tenderness.   Musculoskeletal:         General: No signs of injury. Normal range of motion.      Cervical back: Normal range of motion and neck supple.   Neurological:      General: No focal deficit present.      Mental Status: She is alert and oriented to person, place, and time.   Psychiatric:         Mood and Affect: Mood normal.         Behavior: Behavior normal.         Thought Content: Thought content normal.         Judgment: Judgment normal.       Significant Labs:  CBC:   Recent Labs   Lab 05/27/22  1733 05/28/22  0511   WBC 10.94 11.48   HGB 12.5 12.1   HCT 38.5 37.3    145*     CMP:   Recent Labs   Lab 05/28/22  0511      CALCIUM 9.3   ALBUMIN 3.6   PROT 6.8      K 5.2*   CO2 25      BUN 23   CREATININE 0.8   ALKPHOS 37*   ALT 17   AST 16   BILITOT 0.5     EGD and colonoscopy 5/2017:  EGD:  - Normal duodenal bulb and second portion of the                          duodenum.                        - Non-bleeding gastric ulcers with pigmented                         material. Biopsied.                        - The examination was otherwise normal.                        - Z-line regular, 40 cm from the incisors.                        - Normal esophagus.     Colonoscopy:    The perianal and digital rectal examinations were normal.       Multiple medium-mouthed diverticula were found in the sigmoid colon.       Due to tortuosity and angulation in an area of numerouds        diverticula, the scope could not be advanced above the 45 cm level       The rectum appeared normal.     Limited BE was normal     Significant Imaging:  Imaging results within the past 24 hours have been reviewed.

## 2022-05-28 NOTE — ASSESSMENT & PLAN NOTE
- Left heart cath in 2019 with normal coronary arteries and diastolic dysfunction  - Hold home metoprolol pending BI bleed resolution/further monitoring of BP  - May need echocardiogram if bleeding persists to assess risk of fluid/blood administration

## 2022-05-28 NOTE — SUBJECTIVE & OBJECTIVE
Interval History: Pt with 2-3 more bloody Bms overnight. This AM noted that when she stood up from the commode bright red blood dripped onto the floor from her rectum. Had some nausea overnight. Otherwise feels well. No light headedness or dizziness. Was able to ambulate to the commode without difficulty.     Review of Systems   Constitutional:  Negative for appetite change and fever.   HENT:  Negative for congestion and sore throat.    Respiratory:  Negative for cough, shortness of breath and wheezing.    Cardiovascular:  Negative for chest pain and leg swelling.   Gastrointestinal:  Positive for abdominal pain, blood in stool and nausea. Negative for diarrhea, rectal pain and vomiting.   Genitourinary:  Negative for decreased urine volume and difficulty urinating.   Musculoskeletal:  Negative for back pain and joint swelling.   Neurological:  Negative for dizziness, speech difficulty, light-headedness, numbness and headaches.   Hematological:  Negative for adenopathy. Does not bruise/bleed easily.   Psychiatric/Behavioral:  Negative for confusion and sleep disturbance.    Objective:     Vital Signs (Most Recent):  Temp: 98.6 °F (37 °C) (05/28/22 0351)  Pulse: 83 (05/28/22 0400)  Resp: 18 (05/28/22 0351)  BP: (!) 153/69 (05/28/22 0351)  SpO2: 95 % (05/28/22 0351)   Vital Signs (24h Range):  Temp:  [98.1 °F (36.7 °C)-98.6 °F (37 °C)] 98.6 °F (37 °C)  Pulse:  [62-83] 83  Resp:  [15-21] 18  SpO2:  [95 %-99 %] 95 %  BP: (113-157)/(42-93) 153/69     Weight: 80.8 kg (178 lb 2.1 oz)  Body mass index is 29.64 kg/m².  No intake or output data in the 24 hours ending 05/28/22 0753   Physical Exam  Vitals and nursing note reviewed.   Constitutional:       General: She is not in acute distress.     Appearance: Normal appearance.   HENT:      Head: Normocephalic and atraumatic.      Right Ear: External ear normal.      Left Ear: External ear normal.      Nose: Nose normal. No congestion.      Mouth/Throat:      Mouth: Mucous  membranes are moist.      Pharynx: Oropharynx is clear.   Eyes:      Extraocular Movements: Extraocular movements intact.      Conjunctiva/sclera: Conjunctivae normal.   Cardiovascular:      Rate and Rhythm: Normal rate and regular rhythm.      Pulses: Normal pulses.      Heart sounds: No murmur heard.  Pulmonary:      Effort: Pulmonary effort is normal. No respiratory distress.      Breath sounds: No wheezing.   Abdominal:      General: Abdomen is flat. Bowel sounds are normal.      Palpations: Abdomen is soft.      Tenderness: There is abdominal tenderness (mild diffuse). There is no guarding or rebound.   Musculoskeletal:         General: Normal range of motion.      Cervical back: Normal range of motion. No muscular tenderness.      Right lower leg: No edema.      Left lower leg: No edema.   Lymphadenopathy:      Cervical: No cervical adenopathy.   Skin:     General: Skin is warm and dry.      Capillary Refill: Capillary refill takes less than 2 seconds.      Findings: No erythema or rash.   Neurological:      General: No focal deficit present.      Mental Status: She is alert and oriented to person, place, and time.      Sensory: No sensory deficit.      Coordination: Coordination normal.   Psychiatric:         Attention and Perception: Attention normal.         Mood and Affect: Mood normal.         Speech: Speech normal.         Behavior: Behavior is cooperative.       Significant Labs: All pertinent labs within the past 24 hours have been reviewed.  CBC:   Recent Labs   Lab 05/27/22  1733 05/28/22  0511   WBC 10.94 11.48   HGB 12.5 12.1   HCT 38.5 37.3    145*     CMP:   Recent Labs   Lab 05/27/22  1733 05/28/22  0511    140   K 4.3 5.2*    102   CO2 25 25   * 106   BUN 29* 23   CREATININE 1.0 0.8   CALCIUM 9.6 9.3   PROT 7.4 6.8   ALBUMIN 3.8 3.6   BILITOT 0.3 0.5   ALKPHOS 45* 37*   AST 19 16   ALT 18 17   ANIONGAP 15 13   EGFRNONAA >60 >60       Significant Imaging: I have  reviewed all pertinent imaging results/findings within the past 24 hours.

## 2022-05-28 NOTE — SUBJECTIVE & OBJECTIVE
Past Medical History:   Diagnosis Date    Anxiety     Depression     GERD (gastroesophageal reflux disease)     High cholesterol     Hypertension        Past Surgical History:   Procedure Laterality Date    BREAST BIOPSY Left     [facility]    BREAST SURGERY       SECTION      COLONOSCOPY N/A 2017    Procedure: COLONOSCOPY Miralax;  Surgeon: Buddy Butcher Jr., MD;  Location: [facility] ENDO;  Service: Endoscopy;  Laterality: N/A;    HYSTERECTOMY      KIDNEY SURGERY Right     done at [facility], reported as mass by patient    LEFT HEART CATHETERIZATION Left 2019    Procedure: Left heart cath;  Surgeon: Gerhard Krishnan MD;  Location: [facility] CATH LAB;  Service: Cardiology;  Laterality: Left;    OOPHORECTOMY      s-section         Review of patient's allergies indicates:   Allergen Reactions    Codeine Hives    Sulfa (sulfonamide antibiotics) Hives    Benzoate analogues Itching       No current facility-administered medications on file prior to encounter.     Current Outpatient Medications on File Prior to Encounter   Medication Sig    divalproex ER (DEPAKOTE) 500 MG Tb24 Take 500 mg by mouth 2 (two) times a day.    fluoxetine (PROZAC) 40 MG capsule Take 40 mg by mouth once daily. Take 1 capsule by mouth daily with 20 mg capsule    FLUoxetine 20 MG capsule Take 20 mg by mouth once daily. Take 1 capsule by mouth daily with 40 mg capsule    lisinopril 10 MG tablet Take 1 tablet (10 mg total) by mouth once daily.    metoprolol tartrate (LOPRESSOR) 25 MG tablet Take 25 mg by mouth 2 (two) times daily.    PREMARIN vaginal cream Place 0.5 g vaginally twice a week.    [DISCONTINUED] amoxicillin-clavulanate 875-125mg (AUGMENTIN) 875-125 mg per tablet Take 1 tablet by mouth 2 (two) times daily.    [DISCONTINUED] atorvastatin (LIPITOR) 10 MG tablet Take 10 mg by mouth once daily.    [DISCONTINUED] diphenoxylate-atropine 2.5-0.025 mg (LOMOTIL) 2.5-0.025 mg per tablet Take 1 tablet by mouth 4 (four) times daily as needed  for Diarrhea.    [DISCONTINUED] levocetirizine (XYZAL) 5 MG tablet Take 5 mg by mouth every evening.    [DISCONTINUED] metoprolol succinate (TOPROL-XL) 50 MG 24 hr tablet Take 50 mg by mouth once daily.    [DISCONTINUED] naproxen (NAPROSYN) 500 MG tablet Take 1 tablet (500 mg total) by mouth 2 (two) times daily with meals.    [DISCONTINUED] omeprazole (PRILOSEC) 40 MG capsule Take 1 capsule (40 mg total) by mouth once daily.    [DISCONTINUED] ondansetron (ZOFRAN) 4 MG tablet Take 1 tablet (4 mg total) by mouth every 8 (eight) hours as needed.    [DISCONTINUED] ondansetron (ZOFRAN-ODT) 4 MG TbDL Take 8 mg by mouth every 12 (twelve) hours.    [DISCONTINUED] ondansetron (ZOFRAN-ODT) 4 MG TbDL Take 1 tablet (4 mg total) by mouth every 8 (eight) hours as needed.    [DISCONTINUED] oxybutynin (DITROPAN) 5 MG Tab Take 5 mg by mouth 3 (three) times daily.    [DISCONTINUED] pantoprazole (PROTONIX) 20 MG tablet Take 1 tablet (20 mg total) by mouth once daily.    [DISCONTINUED] pravastatin (PRAVACHOL) 40 MG tablet Take 40 mg by mouth once daily.    [DISCONTINUED] spironolactone (ALDACTONE) 25 MG tablet Take 25 mg by mouth once daily.    [DISCONTINUED] tramadol-acetaminophen 37.5-325 mg (ULTRACET) 37.5-325 mg Tab Take 1 tablet by mouth every 6 (six) hours as needed for Pain.     Family History    None       Tobacco Use    Smoking status: Never Smoker    Smokeless tobacco: Never Used   Substance and Sexual Activity    Alcohol use: No    Drug use: No    Sexual activity: Not on file     Review of Systems   Constitutional:  Positive for diaphoresis (resolved after BM). Negative for appetite change and fever.   HENT:  Negative for congestion and sore throat.    Respiratory:  Negative for cough, shortness of breath and wheezing.    Cardiovascular:  Negative for chest pain and leg swelling.   Gastrointestinal:  Positive for abdominal pain and blood in stool. Negative for diarrhea, nausea, rectal pain and vomiting.   Genitourinary:   Negative for decreased urine volume and difficulty urinating.   Musculoskeletal:  Negative for back pain and joint swelling.   Neurological:  Negative for dizziness, speech difficulty, light-headedness, numbness and headaches.   Hematological:  Negative for adenopathy. Does not bruise/bleed easily.   Psychiatric/Behavioral:  Negative for confusion and sleep disturbance.    Objective:     Vital Signs (Most Recent):  Temp: 98.3 °F (36.8 °C) (05/27/22 1633)  Pulse: 74 (05/27/22 2023)  Resp: 18 (05/27/22 2023)  BP: (!) 157/72 (05/27/22 2023)  SpO2: 99 % (05/27/22 2023)   Vital Signs (24h Range):  Temp:  [98.3 °F (36.8 °C)] 98.3 °F (36.8 °C)  Pulse:  [67-74] 74  Resp:  [15-21] 18  SpO2:  [97 %-99 %] 99 %  BP: (113-157)/(42-93) 157/72     Weight: 81.2 kg (179 lb)  Body mass index is 29.79 kg/m².    Physical Exam  Vitals and nursing note reviewed.   Constitutional:       General: She is not in acute distress.     Appearance: Normal appearance.   HENT:      Head: Normocephalic and atraumatic.      Right Ear: External ear normal.      Left Ear: External ear normal.      Nose: Nose normal. No congestion.      Mouth/Throat:      Mouth: Mucous membranes are moist.      Pharynx: Oropharynx is clear.   Eyes:      Extraocular Movements: Extraocular movements intact.      Conjunctiva/sclera: Conjunctivae normal.   Cardiovascular:      Rate and Rhythm: Normal rate and regular rhythm.      Pulses: Normal pulses.      Heart sounds: No murmur heard.  Pulmonary:      Effort: Pulmonary effort is normal. No respiratory distress.      Breath sounds: No wheezing.   Abdominal:      General: Abdomen is flat. Bowel sounds are normal.      Palpations: Abdomen is soft.      Tenderness: There is abdominal tenderness (mild diffuse). There is no guarding or rebound.   Musculoskeletal:         General: Normal range of motion.      Cervical back: Normal range of motion. No muscular tenderness.      Right lower leg: No edema.      Left lower leg: No  edema.   Lymphadenopathy:      Cervical: No cervical adenopathy.   Skin:     General: Skin is warm and dry.      Capillary Refill: Capillary refill takes less than 2 seconds.      Findings: No erythema or rash.   Neurological:      General: No focal deficit present.      Mental Status: She is alert and oriented to person, place, and time.      Sensory: No sensory deficit.      Coordination: Coordination normal.   Psychiatric:         Attention and Perception: Attention normal.         Mood and Affect: Mood normal.         Speech: Speech normal.         Behavior: Behavior is cooperative.           Significant Labs: All pertinent labs within the past 24 hours have been reviewed.  CBC:   Recent Labs   Lab 05/27/22  1733   WBC 10.94   HGB 12.5   HCT 38.5        CMP:   Recent Labs   Lab 05/27/22  1733      K 4.3      CO2 25   *   BUN 29*   CREATININE 1.0   CALCIUM 9.6   PROT 7.4   ALBUMIN 3.8   BILITOT 0.3   ALKPHOS 45*   AST 19   ALT 18   ANIONGAP 15   EGFRNONAA >60       Significant Imaging: I have reviewed all pertinent imaging results/findings within the past 24 hours.    Imaging Results              CT Abdomen Pelvis  Without Contrast (Final result)  Result time 05/27/22 18:23:18      Final result by Arnoldo Barron MD (05/27/22 18:23:18)                   Impression:      Sigmoid diverticulosis without diverticulitis.    Small rectal fecal impaction.    Postsurgical changes right kidney.      Electronically signed by: Arnoldo Barron  Date:    05/27/2022  Time:    18:23               Narrative:    EXAMINATION:  CT ABDOMEN PELVIS WITHOUT CONTRAST    CLINICAL HISTORY:  GI bleed;    TECHNIQUE:  Low dose axial images, sagittal and coronal reformations were obtained from the lung bases to the pubic symphysis, Oral contrast was not administered.    COMPARISON:  On 02/19/2017 CT of the abdomen and pelvis    FINDINGS:  Heart: Normal in size. No pericardial effusion.    Lung Bases: Well  aerated, without consolidation or pleural fluid.    Liver: Normal in size and attenuation, with no focal hepatic lesions.    Gallbladder: No calcified gallstones.    Bile Ducts: No evidence of dilated ducts.    Pancreas: No mass or peripancreatic fat stranding.    Spleen: Unremarkable.    Adrenals: Unremarkable.    Kidneys/ Ureters: Defect in the cortex of the mid right kidney suggests partial nephrectomy..    Bladder: No evidence of wall thickening.    Reproductive organs: Unremarkable.    GI Tract/Mesentery: No evidence of bowel obstruction or inflammation.  Diverticulosis within the sigmoid colon without features of diverticulitis.  A small rectal fecal impaction is present.    Peritoneal Space: No ascites. No free air.    Retroperitoneum: No significant adenopathy.    Abdominal wall: Unremarkable.    Vasculature: No significant atherosclerosis or aneurysm.    Bones: No acute fracture.

## 2022-05-29 PROBLEM — R10.32 LLQ PAIN: Status: ACTIVE | Noted: 2022-05-29

## 2022-05-29 LAB
HCT VFR BLD AUTO: 33.8 % (ref 37–48.5)
HGB BLD-MCNC: 11.1 G/DL (ref 12–16)
POCT GLUCOSE: 104 MG/DL (ref 70–110)
POCT GLUCOSE: 109 MG/DL (ref 70–110)
POCT GLUCOSE: 90 MG/DL (ref 70–110)

## 2022-05-29 PROCEDURE — 85014 HEMATOCRIT: CPT | Performed by: STUDENT IN AN ORGANIZED HEALTH CARE EDUCATION/TRAINING PROGRAM

## 2022-05-29 PROCEDURE — G0378 HOSPITAL OBSERVATION PER HR: HCPCS

## 2022-05-29 PROCEDURE — 25000003 PHARM REV CODE 250: Performed by: STUDENT IN AN ORGANIZED HEALTH CARE EDUCATION/TRAINING PROGRAM

## 2022-05-29 PROCEDURE — 96376 TX/PRO/DX INJ SAME DRUG ADON: CPT

## 2022-05-29 PROCEDURE — 63600175 PHARM REV CODE 636 W HCPCS: Performed by: STUDENT IN AN ORGANIZED HEALTH CARE EDUCATION/TRAINING PROGRAM

## 2022-05-29 PROCEDURE — 85018 HEMOGLOBIN: CPT | Performed by: STUDENT IN AN ORGANIZED HEALTH CARE EDUCATION/TRAINING PROGRAM

## 2022-05-29 PROCEDURE — 36415 COLL VENOUS BLD VENIPUNCTURE: CPT | Performed by: STUDENT IN AN ORGANIZED HEALTH CARE EDUCATION/TRAINING PROGRAM

## 2022-05-29 PROCEDURE — 99233 PR SUBSEQUENT HOSPITAL CARE,LEVL III: ICD-10-PCS | Mod: ,,, | Performed by: INTERNAL MEDICINE

## 2022-05-29 PROCEDURE — C9113 INJ PANTOPRAZOLE SODIUM, VIA: HCPCS | Performed by: STUDENT IN AN ORGANIZED HEALTH CARE EDUCATION/TRAINING PROGRAM

## 2022-05-29 PROCEDURE — 11000001 HC ACUTE MED/SURG PRIVATE ROOM

## 2022-05-29 PROCEDURE — 99233 SBSQ HOSP IP/OBS HIGH 50: CPT | Mod: ,,, | Performed by: INTERNAL MEDICINE

## 2022-05-29 RX ORDER — POLYETHYLENE GLYCOL 3350 17 G/17G
17 POWDER, FOR SOLUTION ORAL DAILY
Status: DISCONTINUED | OUTPATIENT
Start: 2022-05-29 | End: 2022-05-30 | Stop reason: HOSPADM

## 2022-05-29 RX ORDER — DICYCLOMINE HYDROCHLORIDE 10 MG/1
10 CAPSULE ORAL 3 TIMES DAILY
Status: DISCONTINUED | OUTPATIENT
Start: 2022-05-29 | End: 2022-05-29

## 2022-05-29 RX ORDER — DICYCLOMINE HYDROCHLORIDE 20 MG/1
20 TABLET ORAL 3 TIMES DAILY
Status: DISCONTINUED | OUTPATIENT
Start: 2022-05-29 | End: 2022-05-30 | Stop reason: HOSPADM

## 2022-05-29 RX ADMIN — MESALAMINE 1000 MG: 250 CAPSULE ORAL at 04:05

## 2022-05-29 RX ADMIN — DICYCLOMINE HYDROCHLORIDE 20 MG: 20 TABLET ORAL at 08:05

## 2022-05-29 RX ADMIN — MESALAMINE 1000 MG: 250 CAPSULE ORAL at 08:05

## 2022-05-29 RX ADMIN — PANTOPRAZOLE SODIUM 40 MG: 40 INJECTION, POWDER, FOR SOLUTION INTRAVENOUS at 08:05

## 2022-05-29 RX ADMIN — DICYCLOMINE HYDROCHLORIDE 10 MG: 10 CAPSULE ORAL at 11:05

## 2022-05-29 RX ADMIN — FLUOXETINE HYDROCHLORIDE 60 MG: 20 CAPSULE ORAL at 08:05

## 2022-05-29 RX ADMIN — POLYETHYLENE GLYCOL 3350 17 G: 17 POWDER, FOR SOLUTION ORAL at 11:05

## 2022-05-29 RX ADMIN — ACETAMINOPHEN 650 MG: 325 TABLET ORAL at 01:05

## 2022-05-29 RX ADMIN — DIVALPROEX SODIUM 500 MG: 250 TABLET, EXTENDED RELEASE ORAL at 08:05

## 2022-05-29 RX ADMIN — Medication 6 MG: at 08:05

## 2022-05-29 NOTE — SUBJECTIVE & OBJECTIVE
Interval History: NAEON. No blood per rectum overnight, and denies any bloody BM. No new symptomatic complaints this morning. Dispo pending GI eval this morning.    Review of Systems   Constitutional:  Negative for appetite change and fever.   HENT:  Negative for congestion and sore throat.    Respiratory:  Negative for cough, shortness of breath and wheezing.    Cardiovascular:  Negative for chest pain and leg swelling.   Gastrointestinal:  Negative for abdominal pain, blood in stool (resovled), diarrhea, nausea, rectal pain and vomiting.   Genitourinary:  Negative for decreased urine volume and difficulty urinating.   Musculoskeletal:  Negative for back pain and joint swelling.   Neurological:  Negative for dizziness, speech difficulty, light-headedness, numbness and headaches.   Hematological:  Negative for adenopathy. Does not bruise/bleed easily.   Psychiatric/Behavioral:  Negative for confusion and sleep disturbance.    Objective:     Vital Signs (Most Recent):  Temp: 97.6 °F (36.4 °C) (05/29/22 0802)  Pulse: 90 (05/29/22 0802)  Resp: 17 (05/29/22 0802)  BP: (!) 126/58 (05/29/22 0802)  SpO2: 98 % (05/29/22 0802)   Vital Signs (24h Range):  Temp:  [97.1 °F (36.2 °C)-99.2 °F (37.3 °C)] 97.6 °F (36.4 °C)  Pulse:  [] 90  Resp:  [17-18] 17  SpO2:  [95 %-99 %] 98 %  BP: (117-150)/(58-81) 126/58     Weight: 80.8 kg (178 lb 2.1 oz)  Body mass index is 29.64 kg/m².    Intake/Output Summary (Last 24 hours) at 5/29/2022 0828  Last data filed at 5/28/2022 1658  Gross per 24 hour   Intake 43.91 ml   Output --   Net 43.91 ml      Physical Exam  Vitals and nursing note reviewed.   Constitutional:       General: She is not in acute distress.     Appearance: Normal appearance. She is not ill-appearing.   Eyes:      General:         Right eye: No discharge.         Left eye: No discharge.      Conjunctiva/sclera: Conjunctivae normal.   Cardiovascular:      Rate and Rhythm: Normal rate and regular rhythm.      Heart  sounds: Normal heart sounds. No murmur heard.  Pulmonary:      Effort: Pulmonary effort is normal. No respiratory distress.      Breath sounds: Normal breath sounds. No wheezing.   Abdominal:      General: Bowel sounds are normal.      Palpations: Abdomen is soft.      Tenderness: There is no abdominal tenderness.   Neurological:      Mental Status: She is alert and oriented to person, place, and time.   Psychiatric:         Behavior: Behavior normal.       Significant Labs:   Recent Results (from the past 24 hour(s))   POCT glucose    Collection Time: 05/28/22 11:26 AM   Result Value Ref Range    POCT Glucose 112 (H) 70 - 110 mg/dL   POCT glucose    Collection Time: 05/28/22  3:48 PM   Result Value Ref Range    POCT Glucose 115 (H) 70 - 110 mg/dL   POCT glucose    Collection Time: 05/28/22  9:05 PM   Result Value Ref Range    POCT Glucose 115 (H) 70 - 110 mg/dL       Significant Imaging:   Imaging Results              CT Abdomen Pelvis  Without Contrast (Final result)  Result time 05/27/22 18:23:18      Final result by Arnoldo Barron MD (05/27/22 18:23:18)                   Impression:      Sigmoid diverticulosis without diverticulitis.    Small rectal fecal impaction.    Postsurgical changes right kidney.      Electronically signed by: Arnoldo Barron  Date:    05/27/2022  Time:    18:23               Narrative:    EXAMINATION:  CT ABDOMEN PELVIS WITHOUT CONTRAST    CLINICAL HISTORY:  GI bleed;    TECHNIQUE:  Low dose axial images, sagittal and coronal reformations were obtained from the lung bases to the pubic symphysis, Oral contrast was not administered.    COMPARISON:  On 02/19/2017 CT of the abdomen and pelvis    FINDINGS:  Heart: Normal in size. No pericardial effusion.    Lung Bases: Well aerated, without consolidation or pleural fluid.    Liver: Normal in size and attenuation, with no focal hepatic lesions.    Gallbladder: No calcified gallstones.    Bile Ducts: No evidence of dilated  ducts.    Pancreas: No mass or peripancreatic fat stranding.    Spleen: Unremarkable.    Adrenals: Unremarkable.    Kidneys/ Ureters: Defect in the cortex of the mid right kidney suggests partial nephrectomy..    Bladder: No evidence of wall thickening.    Reproductive organs: Unremarkable.    GI Tract/Mesentery: No evidence of bowel obstruction or inflammation.  Diverticulosis within the sigmoid colon without features of diverticulitis.  A small rectal fecal impaction is present.    Peritoneal Space: No ascites. No free air.    Retroperitoneum: No significant adenopathy.    Abdominal wall: Unremarkable.    Vasculature: No significant atherosclerosis or aneurysm.    Bones: No acute fracture.

## 2022-05-29 NOTE — ASSESSMENT & PLAN NOTE
- S/p syncopal event with bloody BM, unclear if BM more consistent with upper or lower GI bleed based on history provided  - FOBT positive  - Diverticulosis on CT scan  - Colonoscopy with diverticulosis in 2017  - EGD with non-bleeding gastric ulcers in 2017    PLAN:  - GI consulted, appreciate recs  - Advanced diet and tolerating PO intake  - Monitoring CBC, H/H stable during admission  - continue 40mg IV BID  - Hold DVT ppx and anticoagulation in setting of acute bleed  - Cardiac telemetry

## 2022-05-29 NOTE — PROGRESS NOTES
CT shows narrow segment from distal descending through sigmoid with inflammatory changes. While ischemia is a possibility, this would be an unusual location. Infection is also possible but her presentation was not consistent with infection. 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 We will add a mesalamine product, and continue Bentyl for comfort. She should see colorectal surgery as an outpatient to discuss resection.

## 2022-05-29 NOTE — ASSESSMENT & PLAN NOTE
- Now with LLQ pain and tenderness on exam.  - Check repeat CT scan  - Miralax  - Ok to give Bentyl if no obstruction on CT scan  - She has h/o severe diverticulosis with incomplete colonoscopy and thus may need surgical evaluation for segmental sigmoid resection.

## 2022-05-29 NOTE — PROGRESS NOTES
"[facility] Medicine  Progress Note    Patient Name: Ashli Kumar  MRN: [id number]  Patient Class: IP- Inpatient   Admission Date: 5/27/2022  Length of Stay: 1 days  Attending Physician: Hira Piña MD  Primary Care Provider: Kobi Harrington MD        Subjective:     Principal Problem:Rectal bleeding        HPI:  Ashli Kumar is a 61 y.o. female with PMH HTN, HLD, bipolar disorder, prior GI bleed with diverticulosis and non-bleeding gastric ulcers in 2017, HFpEF who presents with GI bleed. Pt was at the store when she started to feel weak. She went to the restroom and had a BM, after her BM she continued to feel week and ended up sitting down on the ground. Pt reports blacking out but being able to hear everything that was going on and that she never lost consciousness. Pt denies history of GI bleed. Since this pre-syncopal episode the pt has progressively improved and was able to ambulate to the restroom in the ED without lightheadedness or dizziness. Pt had an additional bloody BM in the ED. Pt with difficulty describing the color and appearance of the BM. States it was "sticky" and "looked like blood" and was "darker" but could not say if it as bright red or black in appearance.     In the ED VSS. H/H 10.94/162. PT/INR wnl. CMP and CBC overall wnl. GI was consulted and recommended observation overnight. [facility] was consulted for admission for GI bleed.       Overview/Hospital Course:  No notes on file    Interval History: NAEON. No blood per rectum overnight, and denies any bloody BM. No new symptomatic complaints this morning. Dispo pending GI eval this morning.    Review of Systems   Constitutional:  Negative for appetite change and fever.   HENT:  Negative for congestion and sore throat.    Respiratory:  Negative for cough, shortness of breath and wheezing.    Cardiovascular:  Negative for chest pain and leg swelling.   Gastrointestinal:  Negative for " abdominal pain, blood in stool (resovled), diarrhea, nausea, rectal pain and vomiting.   Genitourinary:  Negative for decreased urine volume and difficulty urinating.   Musculoskeletal:  Negative for back pain and joint swelling.   Neurological:  Negative for dizziness, speech difficulty, light-headedness, numbness and headaches.   Hematological:  Negative for adenopathy. Does not bruise/bleed easily.   Psychiatric/Behavioral:  Negative for confusion and sleep disturbance.    Objective:     Vital Signs (Most Recent):  Temp: 97.6 °F (36.4 °C) (05/29/22 0802)  Pulse: 90 (05/29/22 0802)  Resp: 17 (05/29/22 0802)  BP: (!) 126/58 (05/29/22 0802)  SpO2: 98 % (05/29/22 0802)   Vital Signs (24h Range):  Temp:  [97.1 °F (36.2 °C)-99.2 °F (37.3 °C)] 97.6 °F (36.4 °C)  Pulse:  [] 90  Resp:  [17-18] 17  SpO2:  [95 %-99 %] 98 %  BP: (117-150)/(58-81) 126/58     Weight: 80.8 kg (178 lb 2.1 oz)  Body mass index is 29.64 kg/m².    Intake/Output Summary (Last 24 hours) at 5/29/2022 0828  Last data filed at 5/28/2022 1658  Gross per 24 hour   Intake 43.91 ml   Output --   Net 43.91 ml      Physical Exam  Vitals and nursing note reviewed.   Constitutional:       General: She is not in acute distress.     Appearance: Normal appearance. She is not ill-appearing.   Eyes:      General:         Right eye: No discharge.         Left eye: No discharge.      Conjunctiva/sclera: Conjunctivae normal.   Cardiovascular:      Rate and Rhythm: Normal rate and regular rhythm.      Heart sounds: Normal heart sounds. No murmur heard.  Pulmonary:      Effort: Pulmonary effort is normal. No respiratory distress.      Breath sounds: Normal breath sounds. No wheezing.   Abdominal:      General: Bowel sounds are normal.      Palpations: Abdomen is soft.      Tenderness: There is no abdominal tenderness.   Neurological:      Mental Status: She is alert and oriented to person, place, and time.   Psychiatric:         Behavior: Behavior normal.        Significant Labs:   Recent Results (from the past 24 hour(s))   POCT glucose    Collection Time: 05/28/22 11:26 AM   Result Value Ref Range    POCT Glucose 112 (H) 70 - 110 mg/dL   POCT glucose    Collection Time: 05/28/22  3:48 PM   Result Value Ref Range    POCT Glucose 115 (H) 70 - 110 mg/dL   POCT glucose    Collection Time: 05/28/22  9:05 PM   Result Value Ref Range    POCT Glucose 115 (H) 70 - 110 mg/dL       Significant Imaging:   Imaging Results              CT Abdomen Pelvis  Without Contrast (Final result)  Result time 05/27/22 18:23:18      Final result by Arnoldo Barron MD (05/27/22 18:23:18)                   Impression:      Sigmoid diverticulosis without diverticulitis.    Small rectal fecal impaction.    Postsurgical changes right kidney.      Electronically signed by: Arnoldo Barron  Date:    05/27/2022  Time:    18:23               Narrative:    EXAMINATION:  CT ABDOMEN PELVIS WITHOUT CONTRAST    CLINICAL HISTORY:  GI bleed;    TECHNIQUE:  Low dose axial images, sagittal and coronal reformations were obtained from the lung bases to the pubic symphysis, Oral contrast was not administered.    COMPARISON:  On 02/19/2017 CT of the abdomen and pelvis    FINDINGS:  Heart: Normal in size. No pericardial effusion.    Lung Bases: Well aerated, without consolidation or pleural fluid.    Liver: Normal in size and attenuation, with no focal hepatic lesions.    Gallbladder: No calcified gallstones.    Bile Ducts: No evidence of dilated ducts.    Pancreas: No mass or peripancreatic fat stranding.    Spleen: Unremarkable.    Adrenals: Unremarkable.    Kidneys/ Ureters: Defect in the cortex of the mid right kidney suggests partial nephrectomy..    Bladder: No evidence of wall thickening.    Reproductive organs: Unremarkable.    GI Tract/Mesentery: No evidence of bowel obstruction or inflammation.  Diverticulosis within the sigmoid colon without features of diverticulitis.  A small rectal fecal  impaction is present.    Peritoneal Space: No ascites. No free air.    Retroperitoneum: No significant adenopathy.    Abdominal wall: Unremarkable.    Vasculature: No significant atherosclerosis or aneurysm.    Bones: No acute fracture.                                          Assessment/Plan:      * Rectal bleeding  - S/p syncopal event with bloody BM, unclear if BM more consistent with upper or lower GI bleed based on history provided  - FOBT positive  - Diverticulosis on CT scan  - Colonoscopy with diverticulosis in 2017  - EGD with non-bleeding gastric ulcers in 2017    PLAN:  - GI consulted, appreciate recs  - Advanced diet and tolerating PO intake  - Monitoring CBC, H/H stable during admission  - continue 40mg IV BID  - Hold DVT ppx and anticoagulation in setting of acute bleed  - Cardiac telemetry      Bipolar affective disorder, currently depressed, moderate  Continue home depakote 500mg BID and fluoxetine 60mg daily      Essential hypertension  - Home regimen: Lisinopril 10mg and metoprolol 25mg daily  - Hold home medications in setting of acute GI bleed, if BP remains stable will re-start as tolerated        Chronic heart failure with preserved ejection fraction  - Left heart cath in 2019 with normal coronary arteries and diastolic dysfunction  - Hold home metoprolol pending BI bleed resolution/further monitoring of BP  - May need echocardiogram if bleeding persists to assess risk of fluid/blood administration        VTE Risk Mitigation (From admission, onward)         Ordered     IP VTE HIGH RISK PATIENT  Once         05/27/22 1925                Discharge Planning   TAYLOR:      Code Status: Full Code   Is the patient medically ready for discharge?:     Reason for patient still in hospital (select all that apply): Consult recommendations and Pending disposition             Bairon Hook MD, PGY-2  Department of [facility] Medicine   [facility] Surg

## 2022-05-29 NOTE — SUBJECTIVE & OBJECTIVE
Subjective:     Interval History: Pt now complaining of LLQ pain.  She is a poor historian and states she has not had a BM for 2 days, but nursing reports that she has.  The pt states that only small light blood has come out.  She tolerated diet without issues.    Review of Systems   Constitutional:  Negative for appetite change.   Respiratory:  Negative for chest tightness, shortness of breath and wheezing.    Cardiovascular:  Negative for chest pain, palpitations and leg swelling.   Gastrointestinal:  Positive for abdominal pain and blood in stool.   Objective:     Vital Signs (Most Recent):  Temp: 97.6 °F (36.4 °C) (05/29/22 0802)  Pulse: 90 (05/29/22 0802)  Resp: 17 (05/29/22 0802)  BP: (!) 126/58 (05/29/22 0802)  SpO2: 98 % (05/29/22 0802)   Vital Signs (24h Range):  Temp:  [97.1 °F (36.2 °C)-99.2 °F (37.3 °C)] 97.6 °F (36.4 °C)  Pulse:  [] 90  Resp:  [17-18] 17  SpO2:  [95 %-99 %] 98 %  BP: (125-150)/(58-76) 126/58     Weight: 80.8 kg (178 lb 2.1 oz) (05/27/22 2230)  Body mass index is 29.64 kg/m².      Intake/Output Summary (Last 24 hours) at 5/29/2022 1159  Last data filed at 5/28/2022 1658  Gross per 24 hour   Intake 43.91 ml   Output --   Net 43.91 ml       Lines/Drains/Airways       Peripheral Intravenous Line  Duration                  Peripheral IV - Single Lumen 05/27/22 1719 20 G Left Forearm 1 day                    Physical Exam  Constitutional:       Appearance: Normal appearance. She is well-developed. She is not ill-appearing.   HENT:      Head: Normocephalic and atraumatic.   Eyes:      Extraocular Movements: Extraocular movements intact.      Pupils: Pupils are equal, round, and reactive to light.   Pulmonary:      Effort: Pulmonary effort is normal. No respiratory distress.   Abdominal:      General: There is no distension.      Palpations: Abdomen is soft.      Comments: (+) TTP LLQ with voluntary guarding, no rebound   Musculoskeletal:         General: No signs of injury. Normal  range of motion.      Cervical back: Normal range of motion and neck supple.   Neurological:      General: No focal deficit present.      Mental Status: She is alert and oriented to person, place, and time.   Psychiatric:         Mood and Affect: Mood normal.         Behavior: Behavior normal.         Thought Content: Thought content normal.         Judgment: Judgment normal.       Significant Labs:  CBC:   Recent Labs   Lab 05/27/22  1733 05/28/22  0511   WBC 10.94 11.48   HGB 12.5 12.1   HCT 38.5 37.3    145*     CMP:   Recent Labs   Lab 05/28/22  0511      CALCIUM 9.3   ALBUMIN 3.6   PROT 6.8      K 5.2*   CO2 25      BUN 23   CREATININE 0.8   ALKPHOS 37*   ALT 17   AST 16   BILITOT 0.5         Significant Imaging:  Imaging results within the past 24 hours have been reviewed.

## 2022-05-29 NOTE — PLAN OF CARE
Pt. AAOx4. Diet placed back to NPO. BS monitored. VSS. Orthostatics completed. Bed alarm on and call light in reach. Tele monitored. Pt. Instructed to call for assistance.     Problem: Adult Inpatient Plan of Care  Goal: Plan of Care Review  Outcome: Ongoing, Progressing     Problem: Adult Inpatient Plan of Care  Goal: Patient-Specific Goal (Individualized)  Outcome: Ongoing, Progressing     Problem: Adult Inpatient Plan of Care  Goal: Absence of Hospital-Acquired Illness or Injury  Outcome: Ongoing, Progressing     Problem: Adult Inpatient Plan of Care  Goal: Optimal Comfort and Wellbeing  Outcome: Ongoing, Progressing     Problem: Adult Inpatient Plan of Care  Goal: Readiness for Transition of Care  Outcome: Ongoing, Progressing     Problem: Hypertension Comorbidity  Goal: Blood Pressure in Desired Range  Outcome: Ongoing, Progressing     Problem: Adjustment to Illness (Gastrointestinal Bleeding)  Goal: Optimal Coping with Acute Illness  Outcome: Ongoing, Progressing

## 2022-05-29 NOTE — PROGRESS NOTES
[facility]  Gastroenterology  Progress Note    Patient Name: Ashli Kumar  MRN: [id number]  Admission Date: 5/27/2022  Hospital Length of Stay: 1 days  Code Status: Full Code   Attending Provider: Hira Piña MD  Consulting Provider: Mary Cotton MD  Primary Care Physician: Kobi Harrington MD  Principal Problem: Rectal bleeding      Subjective:     Interval History: Pt now complaining of LLQ pain.  She is a poor historian and states she has not had a BM for 2 days, but nursing reports that she has.  The pt states that only small light blood has come out.  She tolerated diet without issues.    Review of Systems   Constitutional:  Negative for appetite change.   Respiratory:  Negative for chest tightness, shortness of breath and wheezing.    Cardiovascular:  Negative for chest pain, palpitations and leg swelling.   Gastrointestinal:  Positive for abdominal pain and blood in stool.   Objective:     Vital Signs (Most Recent):  Temp: 97.6 °F (36.4 °C) (05/29/22 0802)  Pulse: 90 (05/29/22 0802)  Resp: 17 (05/29/22 0802)  BP: (!) 126/58 (05/29/22 0802)  SpO2: 98 % (05/29/22 0802)   Vital Signs (24h Range):  Temp:  [97.1 °F (36.2 °C)-99.2 °F (37.3 °C)] 97.6 °F (36.4 °C)  Pulse:  [] 90  Resp:  [17-18] 17  SpO2:  [95 %-99 %] 98 %  BP: (125-150)/(58-76) 126/58     Weight: 80.8 kg (178 lb 2.1 oz) (05/27/22 2230)  Body mass index is 29.64 kg/m².      Intake/Output Summary (Last 24 hours) at 5/29/2022 1159  Last data filed at 5/28/2022 1658  Gross per 24 hour   Intake 43.91 ml   Output --   Net 43.91 ml       Lines/Drains/Airways       Peripheral Intravenous Line  Duration                  Peripheral IV - Single Lumen 05/27/22 1719 20 G Left Forearm 1 day                    Physical Exam  Constitutional:       Appearance: Normal appearance. She is well-developed. She is not ill-appearing.   HENT:      Head: Normocephalic and atraumatic.   Eyes:      Extraocular Movements: Extraocular movements  intact.      Pupils: Pupils are equal, round, and reactive to light.   Pulmonary:      Effort: Pulmonary effort is normal. No respiratory distress.   Abdominal:      General: There is no distension.      Palpations: Abdomen is soft.      Comments: (+) TTP LLQ with voluntary guarding, no rebound   Musculoskeletal:         General: No signs of injury. Normal range of motion.      Cervical back: Normal range of motion and neck supple.   Neurological:      General: No focal deficit present.      Mental Status: She is alert and oriented to person, place, and time.   Psychiatric:         Mood and Affect: Mood normal.         Behavior: Behavior normal.         Thought Content: Thought content normal.         Judgment: Judgment normal.       Significant Labs:  CBC:   Recent Labs   Lab 05/27/22  1733 05/28/22  0511   WBC 10.94 11.48   HGB 12.5 12.1   HCT 38.5 37.3    145*     CMP:   Recent Labs   Lab 05/28/22  0511      CALCIUM 9.3   ALBUMIN 3.6   PROT 6.8      K 5.2*   CO2 25      BUN 23   CREATININE 0.8   ALKPHOS 37*   ALT 17   AST 16   BILITOT 0.5         Significant Imaging:  Imaging results within the past 24 hours have been reviewed.    Assessment/Plan:     * Rectal bleeding  - this has resolved, but now she has pain.    LLQ pain  - Now with LLQ pain and tenderness on exam.  - Check repeat CT scan  - Miralax  - Ok to give Bentyl if no obstruction on CT scan  - She has h/o severe diverticulosis with incomplete colonoscopy and thus may need surgical evaluation for segmental sigmoid resection.        Thank you for your consult. Our team will follow-up with patient. Please contact us if you have any additional questions.    Mary Cotton MD  Gastroenterology  [facility]

## 2022-05-29 NOTE — PLAN OF CARE
Discussed repeat CT with Dr. Cotton GI, recommendations greatly appreciated. Will initiate mesalamine, 1g 4x daily, in addition to bentyl.     Vijaya Corley MD  [facility] PGY3    CT Abdomen Pelvis  Without Contrast   Final Result      Increased left colonic inflammation in a pattern that is atypical for acute diverticulitis.  Inflammatory bowel disease, ischemia, or infectious enteritis should be considered.      Colonic diverticulosis      Status post partial right nephrectomy         Electronically signed by: Arnoldo Cruz Jr   Date:    05/29/2022   Time:    13:28      CT Abdomen Pelvis  Without Contrast   Final Result      Sigmoid diverticulosis without diverticulitis.      Small rectal fecal impaction.      Postsurgical changes right kidney.         Electronically signed by: Arnoldo Barron   Date:    05/27/2022   Time:    18:23

## 2022-05-30 ENCOUNTER — TELEPHONE (OUTPATIENT)
Dept: PHARMACY | Facility: CLINIC | Age: 62
End: 2022-05-30
Payer: MEDICAID

## 2022-05-30 VITALS
DIASTOLIC BLOOD PRESSURE: 70 MMHG | OXYGEN SATURATION: 100 % | HEIGHT: 65 IN | HEART RATE: 78 BPM | SYSTOLIC BLOOD PRESSURE: 151 MMHG | TEMPERATURE: 98 F | BODY MASS INDEX: 30.12 KG/M2 | WEIGHT: 180.75 LBS | RESPIRATION RATE: 20 BRPM

## 2022-05-30 LAB
ALBUMIN SERPL BCP-MCNC: 3.2 G/DL (ref 3.5–5.2)
ALP SERPL-CCNC: 47 U/L (ref 55–135)
ALT SERPL W/O P-5'-P-CCNC: 9 U/L (ref 10–44)
ANION GAP SERPL CALC-SCNC: 11 MMOL/L (ref 8–16)
AST SERPL-CCNC: 15 U/L (ref 10–40)
BASOPHILS # BLD AUTO: 0.04 K/UL (ref 0–0.2)
BASOPHILS NFR BLD: 0.3 % (ref 0–1.9)
BILIRUB SERPL-MCNC: 0.5 MG/DL (ref 0.1–1)
BUN SERPL-MCNC: 8 MG/DL (ref 8–23)
CALCIUM SERPL-MCNC: 8.8 MG/DL (ref 8.7–10.5)
CHLORIDE SERPL-SCNC: 99 MMOL/L (ref 95–110)
CO2 SERPL-SCNC: 29 MMOL/L (ref 23–29)
CREAT SERPL-MCNC: 0.7 MG/DL (ref 0.5–1.4)
DIFFERENTIAL METHOD: ABNORMAL
EOSINOPHIL # BLD AUTO: 0.1 K/UL (ref 0–0.5)
EOSINOPHIL NFR BLD: 0.6 % (ref 0–8)
ERYTHROCYTE [DISTWIDTH] IN BLOOD BY AUTOMATED COUNT: 12.4 % (ref 11.5–14.5)
EST. GFR  (AFRICAN AMERICAN): >60 ML/MIN/1.73 M^2
EST. GFR  (NON AFRICAN AMERICAN): >60 ML/MIN/1.73 M^2
GLUCOSE SERPL-MCNC: 91 MG/DL (ref 70–110)
HCT VFR BLD AUTO: 35.1 % (ref 37–48.5)
HGB BLD-MCNC: 11.7 G/DL (ref 12–16)
IMM GRANULOCYTES # BLD AUTO: 0.13 K/UL (ref 0–0.04)
IMM GRANULOCYTES NFR BLD AUTO: 1.1 % (ref 0–0.5)
LYMPHOCYTES # BLD AUTO: 2.3 K/UL (ref 1–4.8)
LYMPHOCYTES NFR BLD: 18.4 % (ref 18–48)
MAGNESIUM SERPL-MCNC: 1.8 MG/DL (ref 1.6–2.6)
MCH RBC QN AUTO: 29.7 PG (ref 27–31)
MCHC RBC AUTO-ENTMCNC: 33.3 G/DL (ref 32–36)
MCV RBC AUTO: 89 FL (ref 82–98)
MONOCYTES # BLD AUTO: 0.7 K/UL (ref 0.3–1)
MONOCYTES NFR BLD: 5.4 % (ref 4–15)
NEUTROPHILS # BLD AUTO: 9.2 K/UL (ref 1.8–7.7)
NEUTROPHILS NFR BLD: 74.2 % (ref 38–73)
NRBC BLD-RTO: 0 /100 WBC
PHOSPHATE SERPL-MCNC: 3.7 MG/DL (ref 2.7–4.5)
PLATELET # BLD AUTO: 143 K/UL (ref 150–450)
PMV BLD AUTO: 12.9 FL (ref 9.2–12.9)
POCT GLUCOSE: 104 MG/DL (ref 70–110)
POCT GLUCOSE: 110 MG/DL (ref 70–110)
POTASSIUM SERPL-SCNC: 4.6 MMOL/L (ref 3.5–5.1)
PROT SERPL-MCNC: 6.6 G/DL (ref 6–8.4)
RBC # BLD AUTO: 3.94 M/UL (ref 4–5.4)
SODIUM SERPL-SCNC: 139 MMOL/L (ref 136–145)
WBC # BLD AUTO: 12.37 K/UL (ref 3.9–12.7)

## 2022-05-30 PROCEDURE — G0378 HOSPITAL OBSERVATION PER HR: HCPCS

## 2022-05-30 PROCEDURE — 36415 COLL VENOUS BLD VENIPUNCTURE: CPT | Performed by: STUDENT IN AN ORGANIZED HEALTH CARE EDUCATION/TRAINING PROGRAM

## 2022-05-30 PROCEDURE — 96376 TX/PRO/DX INJ SAME DRUG ADON: CPT

## 2022-05-30 PROCEDURE — 99232 SBSQ HOSP IP/OBS MODERATE 35: CPT | Mod: NSCH,,, | Performed by: INTERNAL MEDICINE

## 2022-05-30 PROCEDURE — 83735 ASSAY OF MAGNESIUM: CPT | Performed by: STUDENT IN AN ORGANIZED HEALTH CARE EDUCATION/TRAINING PROGRAM

## 2022-05-30 PROCEDURE — 63600175 PHARM REV CODE 636 W HCPCS: Performed by: STUDENT IN AN ORGANIZED HEALTH CARE EDUCATION/TRAINING PROGRAM

## 2022-05-30 PROCEDURE — 85025 COMPLETE CBC W/AUTO DIFF WBC: CPT | Performed by: STUDENT IN AN ORGANIZED HEALTH CARE EDUCATION/TRAINING PROGRAM

## 2022-05-30 PROCEDURE — 25000003 PHARM REV CODE 250: Performed by: STUDENT IN AN ORGANIZED HEALTH CARE EDUCATION/TRAINING PROGRAM

## 2022-05-30 PROCEDURE — C9113 INJ PANTOPRAZOLE SODIUM, VIA: HCPCS | Performed by: STUDENT IN AN ORGANIZED HEALTH CARE EDUCATION/TRAINING PROGRAM

## 2022-05-30 PROCEDURE — 84100 ASSAY OF PHOSPHORUS: CPT | Performed by: STUDENT IN AN ORGANIZED HEALTH CARE EDUCATION/TRAINING PROGRAM

## 2022-05-30 PROCEDURE — 80053 COMPREHEN METABOLIC PANEL: CPT | Performed by: STUDENT IN AN ORGANIZED HEALTH CARE EDUCATION/TRAINING PROGRAM

## 2022-05-30 PROCEDURE — 99232 PR SUBSEQUENT HOSPITAL CARE,LEVL II: ICD-10-PCS | Mod: NSCH,,, | Performed by: INTERNAL MEDICINE

## 2022-05-30 RX ORDER — DICLOFENAC SODIUM 10 MG/G
4 GEL TOPICAL 3 TIMES DAILY PRN
Status: DISCONTINUED | OUTPATIENT
Start: 2022-05-30 | End: 2022-05-30 | Stop reason: HOSPADM

## 2022-05-30 RX ORDER — POLYETHYLENE GLYCOL 3350 17 G/17G
17 POWDER, FOR SOLUTION ORAL DAILY
Qty: 510 G | Refills: 3 | Status: SHIPPED | OUTPATIENT
Start: 2022-05-31 | End: 2022-06-30

## 2022-05-30 RX ORDER — DICYCLOMINE HYDROCHLORIDE 20 MG/1
20 TABLET ORAL 3 TIMES DAILY
Qty: 90 TABLET | Refills: 0 | Status: SHIPPED | OUTPATIENT
Start: 2022-05-30 | End: 2022-06-29

## 2022-05-30 RX ORDER — MESALAMINE 0.38 G/1
1.5 CAPSULE, EXTENDED RELEASE ORAL DAILY
Qty: 120 CAPSULE | Refills: 0 | Status: SHIPPED | OUTPATIENT
Start: 2022-05-30 | End: 2023-11-08

## 2022-05-30 RX ADMIN — ACETAMINOPHEN 650 MG: 325 TABLET ORAL at 08:05

## 2022-05-30 RX ADMIN — ACETAMINOPHEN 650 MG: 325 TABLET ORAL at 01:05

## 2022-05-30 RX ADMIN — PANTOPRAZOLE SODIUM 40 MG: 40 INJECTION, POWDER, FOR SOLUTION INTRAVENOUS at 08:05

## 2022-05-30 RX ADMIN — FLUOXETINE HYDROCHLORIDE 60 MG: 20 CAPSULE ORAL at 08:05

## 2022-05-30 RX ADMIN — DICYCLOMINE HYDROCHLORIDE 20 MG: 20 TABLET ORAL at 08:05

## 2022-05-30 RX ADMIN — MESALAMINE 1000 MG: 250 CAPSULE ORAL at 08:05

## 2022-05-30 RX ADMIN — MESALAMINE 1000 MG: 250 CAPSULE ORAL at 11:05

## 2022-05-30 RX ADMIN — DICLOFENAC SODIUM 4 G: 10 GEL TOPICAL at 11:05

## 2022-05-30 RX ADMIN — POLYETHYLENE GLYCOL 3350 17 G: 17 POWDER, FOR SOLUTION ORAL at 08:05

## 2022-05-30 RX ADMIN — DIVALPROEX SODIUM 500 MG: 250 TABLET, EXTENDED RELEASE ORAL at 08:05

## 2022-05-30 NOTE — PLAN OF CARE
Discharge orders noted. No DME or HH ordered. Family will transport home at discharge. CM requested PCP, Colorectal Surgery, and GI follow-up from access navigators. She will called to schedule if appointment information is not on. No further CM needs.    1658--CM called patient to notify of GI and Colorectal follow-up appointments scheduled. She stated the hospital follow-up and GI appointment were reviewed and on her papers. I reviewed her Colorectal follow-up, but she asked that I call her again tomorrow and review again since she was in the car. CM will call and review appointments again with patient tomorrow.    Patient Contacts    Name Relation Home Work Mobile   José Harding,Raj Spouse   [phone]     Future Appointments   Date Time Provider Department Center   6/6/2022  2:00 PM Dewayne Diana MD [facility] [place]   6/7/2022  1:30 PM Paulette Fuentes NP [facility] [place] [facility]   6/14/2022  1:10 PM [facility] [place]   6/27/2022  8:40 AM Anurag Lopez MD [facility] MD Mary Li [phone] [facility] [address]  [place] [id number] Steps: Follow upAppointment: Instructions: Gastroenterology and Colorectal Surgery Follow-Up Requested       05/30/22 1214   Final Note   Assessment Type Final Discharge Note   Anticipated Discharge Disposition Home   Hospital Resources/Appts/Education Provided Appointments scheduled by Navigator/Coordinator   Post-Acute Status   Discharge Delays None known at this time     Lor Vidales RN    [phone]

## 2022-05-30 NOTE — PLAN OF CARE
went to meet with patient. Patient reports she is independent and lives at home with her spouse. She does not have any DME or HH. Patient does not drive, but her sister-in-law or family will transport to appointments. Family will transport home at discharge. Patient is agreeable to be set up with [facility] at discharge. CM requested PCP, Colorectal Surgery, and GI follow-up from access navigator. Patient encouraged to call with any questions or concerns.   will continue to follow patient through transitions of care and assist with any discharge needs.     1425--CM spoke with patient and nurse Eric. Eric stated patient is stating she does not have anyone to pick her up and family is going fishing. I spoke with patient on the phone and Eric in room. Patient confirmed her sister-in-law is coming to pick her up. She just didn't like what her sister-in-law told her.    Patient Contacts    Name Relation Home Work Mobile   Raj Kumar Sr Spouse   [phone]     Future Appointments   Date Time Provider Department Center   6/6/2022  2:00 PM [facility]   6/27/2022  8:40 AM [facility]         05/30/22 1211   Discharge Assessment   Assessment Type Discharge Planning Assessment   Confirmed/corrected address, phone number and insurance Yes   Confirmed Demographics Correct on Facesheet   Source of Information patient   Lives With significant other;spouse;sibling(s)   Facility Arrived From: Home   Do you expect to return to your current living situation? Yes   Do you have help at home or someone to help you manage your care at home? Yes   Who are your caregiver(s) and their phone number(s)? Spouse   Prior to hospitilization cognitive status: Alert/Oriented   Current cognitive status: Alert/Oriented   Walking or Climbing Stairs Difficulty none   Dressing/Bathing Difficulty none   Equipment Currently Used at Home none   Do  you take prescription medications? Yes   Do you have prescription coverage? Yes   Do you have any problems affording any of your prescribed medications? No   Is the patient taking medications as prescribed? yes   How do you get to doctors appointments? family or friend will provide   Are you on dialysis? No   Do you take coumadin? No   Discharge Plan A Home   Discharge Plan B Home with family   DME Needed Upon Discharge  none   Discharge Plan discussed with: Patient   Discharge Barriers Identified None     Lor Vidales RN    [phone]

## 2022-05-30 NOTE — PROGRESS NOTES
"[facility]    CC: rectal bleeding    HPI 61 y.o. female with PMH significant for PMH HTN, HLD, bipolar disorder, prior GI bleed with diverticulosis and non-bleeding gastric ulcers in 2017, HFpEF who presents with GI bleed. Pt was at the store when she started to feel weak. She went to the restroom, and had to sit down as was feeling as if she was going to faint, but she never lost consciousness. She reports BRBM, with no abdominal pain. Patient reports post menopausal symptoms of hot flashes, night sweats, and irregular bleeding.     This morning patient denied any rectal bleeding or abdominal pain. Denies N/V, remains afebrile. Able to tolerate PO well.     Past Medical History  Past Medical History:   Diagnosis Date    Anxiety     Arthritis     Depression     GERD (gastroesophageal reflux disease)     High cholesterol     Hypertension      Review of Systems  General ROS: negative for chills, fever or weight loss  Cardiovascular ROS: no chest pain or dyspnea on exertion  Gastrointestinal ROS: no abdominal pain, change in bowel habits, or black/ bloody stools    Physical Examination  BP (!) 151/70   Pulse 78   Temp 98.1 °F (36.7 °C)   Resp 20   Ht 5' 5" (1.651 m)   Wt 82 kg (180 lb 12.4 oz)   SpO2 100%   Breastfeeding No   BMI 30.08 kg/m²   General appearance: alert, cooperative, no distress  HENT: Normocephalic, atraumatic, neck symmetrical, no nasal discharge  Eyes: no scleral icterus, EOMI, PERRL  Lungs: clear to auscultation bilaterally, no dullness to percussion bilaterally  Heart: regular rate and rhythm without rub; no displacement of the PMI   Abdomen: soft, non-tender; non-distended, dullness to percussion, bowel sounds normoactive; no organomegaly  Extremities: extremities symmetric; no clubbing, cyanosis, or edema  Neurologic: Alert and oriented X 3, normal sensation, normal coordination    Recent Labs   Lab 05/30/22  0451   WBC 12.37   RBC 3.94*   HGB 11.7*   HCT 35.1*   * "   MCV 89   MCH 29.7   MCHC 33.3       Recent Labs   Lab 05/30/22  0451      K 4.6   CL 99   CO2 29   GLU 91   BUN 8   CREATININE 0.7     Recent Labs   Lab 05/30/22  0451   PROT 6.6   ALBUMIN 3.2*   BILITOT 0.5   AST 15   ALT 9*   ALKPHOS 47*       Review and summarization of old records    Assessment:   60 yo F with PMH significant for PMH HTN, HLD, bipolar disorder, prior GI bleed with diverticulosis and non-bleeding gastric ulcers in 2017, HFpEF who presents with rectal bleeding. CT abdomen/pelvis with increased left colonic inflammation in a pattern that is atypical for acute diverticulitis; Inflammatory bowel disease, ischemia, or infectious enteritis should be considered. Colonic diverticulosis. Patient's symptoms improved, Hgb and VS remain stable.     Plan:  - CTAP 5/29 results as above  - Colonoscopy 5/17 with Diverticulosis in the sigmoid colon. Needs outpt repeat colonoscopy in setting of new rectal bleeding.   - Discharge on mesalamine (already initiated by previous GI team)    Attestation to follow which may differ from above plan. Please appreciate.    Marichuy Hardy  [facility]/Pediatrics, PGY-2

## 2022-05-30 NOTE — HOSPITAL COURSE
Pt admitted to  service for management of potential GI bleed with a possible reported syncopal episode. Pt was observed over admission and remained stable with progressive resolution of bloody BMs. Her H/H remained stable during admission. Pt's rectal bleed was thought to be diverticular vs hemorrhoidal bleeding. A repeat CT abd and pelvis was performed on HD#2 due to new LLQ abd pain. CT A/P showed increased L colonic inflammation in a pattern that is atypical for acute diverticulitis, and also Colonic diverticulosis. Per GI evaluaiton of CT study, the narrow segment from distal descending through sigmoid with inflammatory changes was thought likely due to SCAD. Pt tolerated PO intake on day of discharge. It was recommended that long term management of pt's GI issues should be referred to colorectal surgery for evaluation of possible resection. Pt was discharged home with 1 month of mesalamine, bentyl and miralax. Outpatient follow-up with GI made prior to discharge. Return precautions given to patient, she verbalized understanding.

## 2022-05-30 NOTE — DISCHARGE SUMMARY
"[facility] Medicine  Discharge Summary      Patient Name: Ashli Kumar  MRN: [id number]  Patient Class: IP- Inpatient  Admission Date: 5/27/2022  Hospital Length of Stay: 2 days  Discharge Date and Time:  05/30/2022 1:46 PM  Attending Physician: Beata Suarez MD   Discharging Provider: Bairon Hook MD  Primary Care Provider: Kobi Harrington MD      HPI:   Ashli Kumar is a 61 y.o. female with PMH HTN, HLD, bipolar disorder, prior GI bleed with diverticulosis and non-bleeding gastric ulcers in 2017, HFpEF who presents with GI bleed. Pt was at the store when she started to feel weak. She went to the restroom and had a BM, after her BM she continued to feel week and ended up sitting down on the ground. Pt reports blacking out but being able to hear everything that was going on and that she never lost consciousness. Pt denies history of GI bleed. Since this pre-syncopal episode the pt has progressively improved and was able to ambulate to the restroom in the ED without lightheadedness or dizziness. Pt had an additional bloody BM in the ED. Pt with difficulty describing the color and appearance of the BM. States it was "sticky" and "looked like blood" and was "darker" but could not say if it as bright red or black in appearance.     In the ED VSS. H/H 10.94/162. PT/INR wnl. CMP and CBC overall wnl. GI was consulted and recommended observation overnight. [facility] was consulted for admission for GI bleed.       * No surgery found *      Hospital Course:   Pt admitted to FM service for management of potential GI bleed with a possible reported syncopal episode. Pt was observed over admission and remained stable with progressive resolution of bloody BMs. Her H/H remained stable during admission. Pt's rectal bleed was thought to be diverticular vs hemorrhoidal bleeding. A repeat CT abd and pelvis was performed on HD#2 due to new LLQ abd pain. CT A/P showed increased L colonic " inflammation in a pattern that is atypical for acute diverticulitis, and also Colonic diverticulosis. Per GI evaluaiton of CT study, the narrow segment from distal descending through sigmoid with inflammatory changes was thought likely due to SCAD. Pt tolerated PO intake on day of discharge. It was recommended that long term management of pt's GI issues should be referred to colorectal surgery for evaluation of possible resection. Pt was discharged home with 1 month of mesalamine, bentyl and miralax. Outpatient follow-up with GI made prior to discharge. Return precautions given to patient, she verbalized understanding.       Interval History: Pt with improved LLQ abd pain overnight. Started on mesalamine and bentyl and miralax yesterday. Trial PO intake today and pending possible discharge if tolerating PO. Will need outpatient GI f/u and referral to colorectal surgery.    Review of Systems   Constitutional:  Negative for appetite change and fever.   HENT:  Negative for congestion and sore throat.    Respiratory:  Negative for cough, shortness of breath and wheezing.    Cardiovascular:  Negative for chest pain and leg swelling.   Gastrointestinal:  Negative for abdominal pain, blood in stool (resovled), diarrhea, nausea, rectal pain and vomiting.   Genitourinary:  Negative for decreased urine volume and difficulty urinating.   Musculoskeletal:  Negative for back pain and joint swelling.   Neurological:  Negative for dizziness, speech difficulty, light-headedness, numbness and headaches.   Hematological:  Negative for adenopathy. Does not bruise/bleed easily.   Psychiatric/Behavioral:  Negative for confusion and sleep disturbance.      Physical Exam  Vitals and nursing note reviewed.   Constitutional:       General: She is not in acute distress.     Appearance: Normal appearance. She is not ill-appearing.   Eyes:      General:         Right eye: No discharge.         Left eye: No discharge.      Conjunctiva/sclera:  Conjunctivae normal.   Cardiovascular:      Rate and Rhythm: Normal rate and regular rhythm.      Heart sounds: Normal heart sounds. No murmur heard.  Pulmonary:      Effort: Pulmonary effort is normal. No respiratory distress.      Breath sounds: Normal breath sounds. No wheezing.   Abdominal:      General: Bowel sounds are normal.      Palpations: Abdomen is soft.      Tenderness: There is no abdominal tenderness.   Neurological:      Mental Status: She is alert and oriented to person, place, and time.   Psychiatric:         Behavior: Behavior normal.     Goals of Care Treatment Preferences:  Code Status: Full Code      Consults:   Consults (From admission, onward)        Status Ordering Provider     Inpatient consult to [facility]  Once        Provider:  Mary Cotton MD    Completed AMBER PAK          No new Assessment & Plan notes have been filed under this hospital service since the last note was generated.  Service: Hospital Medicine    Final Active Diagnoses:    Diagnosis Date Noted POA    PRINCIPAL PROBLEM:  Rectal bleeding [K62.5] 05/27/2022 Yes    LLQ pain [R10.32] 05/29/2022 No    Bipolar affective disorder, currently depressed, moderate [F31.32] 05/27/2022 Yes    Essential hypertension [I10] 05/13/2019 Yes    Chronic heart failure with preserved ejection fraction [I50.32] 05/13/2019 Yes      Problems Resolved During this Admission:       Discharged Condition: stable    Disposition: Home or Self Care    Follow Up:   Follow-up Information     Mary Cotton MD Follow up.    Specialty: Gastroenterology  Why: Gastroenterology and Colorectal Surgery Follow-Up Requested  Contact information:  [address] [place] [address]  [address]   [facility] [address]  [phone]             Sunil Fulton PA-C Follow up.    Specialty: Family Medicine  Why:  requested sooner PCP follow-up from access navigator. Dr. Lopez follow-up previously scheduled ([facility]).  Contact  information:  [address]  [address]  [place] [address]  [phone]                       Patient Instructions:      Ambulatory referral/consult to Colorectal Surgery   Standing Status: Future   Referral Priority: Routine Referral Type: Consultation   Referral Reason: Specialty Services Required   Requested Specialty: Colon and Rectal Surgery   Number of Visits Requested: 1     Ambulatory referral/consult to Gastroenterology   Standing Status: Future   Referral Priority: Routine Referral Type: Consultation   Referral Reason: Specialty Services Required   Referred to Provider: RC OLGUIN Requested Specialty: Gastroenterology   Number of Visits Requested: 1     Diet Cardiac     Notify your health care provider if you experience any of the following:  temperature >100.4     Notify your health care provider if you experience any of the following:  persistent nausea and vomiting or diarrhea     Notify your health care provider if you experience any of the following:  severe uncontrolled pain     Notify your health care provider if you experience any of the following:  redness, tenderness, or signs of infection (pain, swelling, redness, odor or green/yellow discharge around incision site)     Notify your health care provider if you experience any of the following:  difficulty breathing or increased cough     Notify your health care provider if you experience any of the following:  severe persistent headache     Notify your health care provider if you experience any of the following:  worsening rash     Notify your health care provider if you experience any of the following:  persistent dizziness, light-headedness, or visual disturbances     Notify your health care provider if you experience any of the following:  increased confusion or weakness     Activity as tolerated       Significant Diagnostic Studies: Labs:   CMP   Recent Labs   Lab 05/30/22  0451      K 4.6   CL 99   CO2 29   GLU 91   BUN 8    CREATININE 0.7   CALCIUM 8.8   PROT 6.6   ALBUMIN 3.2*   BILITOT 0.5   ALKPHOS 47*   AST 15   ALT 9*   ANIONGAP 11   ESTGFRAFRICA >60   EGFRNONAA >60    and CBC   Recent Labs   Lab 05/29/22  1832 05/30/22  0451   WBC  --  12.37   HGB 11.1* 11.7*   HCT 33.8* 35.1*   PLT  --  143*       Pending Diagnostic Studies:     Procedure Component Value Units Date/Time    COVID-19 Routine Screening [[id number]] Collected: 05/27/22 2030    Order Status: Sent Lab Status: In process Updated: 05/27/22 2030    Specimen: Nasopharyngeal          Medications:  Reconciled Home Medications:      Medication List      START taking these medications    dicyclomine 20 mg tablet  Commonly known as: BENTYL  Take 1 tablet (20 mg total) by mouth 3 (three) times daily.     GAVILAX 17 gram/dose powder  Generic drug: polyethylene glycol  Take 17 g by mouth once daily.  Start taking on: May 31, 2022     mesalamine 0.375 gram Cp24  Commonly known as: APRISO  Take 4 capsules (1.5 g total) by mouth once daily.        CONTINUE taking these medications    divalproex  MG Tb24  Commonly known as: DEPAKOTE  Take 500 mg by mouth 2 (two) times a day.     * FLUoxetine 40 MG capsule  Take 40 mg by mouth once daily. 60 mg total     * FLUoxetine 20 MG capsule  Take 20 mg by mouth once daily. 60 mg total     lisinopriL 10 MG tablet  Take 1 tablet (10 mg total) by mouth once daily.     metoprolol tartrate 25 MG tablet  Commonly known as: LOPRESSOR  Take 25 mg by mouth 2 (two) times daily.     PREMARIN vaginal cream  Generic drug: conjugated estrogens  Place 0.5 g vaginally twice a week.         * This list has 2 medication(s) that are the same as other medications prescribed for you. Read the directions carefully, and ask your doctor or other care provider to review them with you.                Indwelling Lines/Drains at time of discharge:   Lines/Drains/Airways     None                 Time spent on the discharge of patient: >30 minutes         Bairon Hook  MD  Department of [facility] Medicine  [facility] Surg

## 2022-05-30 NOTE — PROGRESS NOTES
[facility]                    Pharmacy       Discharge Medication Education    Patient ACCEPTED medication education. Pharmacy has provided education on the name, indication, and possible side effects of the medication(s) prescribed, using teach-back method.     The following medications have also been discussed, during this admission.        Medication List        START taking these medications      dicyclomine 20 mg tablet  Commonly known as: BENTYL  Take 1 tablet (20 mg total) by mouth 3 (three) times daily.     mesalamine 250 mg Cpsr  Commonly known as: PENTASA  Take 4 capsules (1,000 mg total) by mouth 4 (four) times daily.     polyethylene glycol 17 gram/dose powder  Commonly known as: GLYCOLAX  Take 17 g by mouth once daily.  Start taking on: May 31, 2022            CONTINUE taking these medications      divalproex  MG Tb24  Commonly known as: DEPAKOTE     * FLUoxetine 40 MG capsule     * FLUoxetine 20 MG capsule     lisinopriL 10 MG tablet  Take 1 tablet (10 mg total) by mouth once daily.     metoprolol tartrate 25 MG tablet  Commonly known as: LOPRESSOR     PREMARIN vaginal cream  Generic drug: conjugated estrogens           * This list has 2 medication(s) that are the same as other medications prescribed for you. Read the directions carefully, and ask your doctor or other care provider to review them with you.                   Where to Get Your Medications        These medications were sent to [facility]  [address], [place] [address]      Hours: Mon-Fri, 8a-5:30p Phone: [phone]   dicyclomine 20 mg tablet  mesalamine 250 mg Cpsr  polyethylene glycol 17 gram/dose powder          Thank you  Jeff Canchola, PharmD

## 2022-05-30 NOTE — NURSING
Patient for d/c today. Home meds delivered at bedside. PIV and tele removed. AVs printed and handed out to patient.

## 2022-05-31 NOTE — PHYSICIAN QUERY
PT Name: Ashli Kumar  MR #: [id number]     DOCUMENTATION CLARIFICATION      CDS: Devyn HOYT,RN        Contact information:[email or web address]  This form is a permanent document in the medical record.     Query Date: May 31, 2022    By submitting this query, we are merely seeking further clarification of documentation.  Please utilize your independent clinical judgment when addressing the question(s) below.     The Medical Record contains the following:    Clinical Information Location in Medical Record   61 y.o. female with PMH HTN, HLD, bipolar disorder, prior GI bleed with diverticulosis and non-bleeding gastric ulcers in 2017, HFpEF who presents with GI bleed.Positive for abdominal pain and blood in stool.  Acute GI bleeding  S/p syncopal event with bloody BM, unclear if BM more consistent with upper or lower GI bleed based on history provided  FOBT positive.Diverticulosis on CT scan.Colonoscopy with diverticulosis in 2017. EGD with non-bleeding gastric ulcers in 2017.    Rectal bleeding  this has resolved, but now she has pain.  LLQ pain  Now with LLQ pain and tenderness on exam.Check repeat CT scan. Miralax. Ok to give Bentyl if no obstruction on CT scan.She has h/o severe diverticulosis with incomplete colonoscopy and thus may need surgical evaluation for segmental sigmoid resection.    CT shows narrow segment from distal descending through sigmoid with inflammatory changes. While ischemia is a possibility, this would be an unusual location. Infection is also possible but her presentation was not consistent with infection. 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 We will add a mesalamine product, and continue Bentyl for comfort     Pt's rectal bleed was thought to be diverticular vs hemorrhoidal bleeding. A repeat CT abd and pelvis was performed on HD#2 due to new LLQ abd pain. CT A/P showed increased L colonic inflammation in a pattern that is atypical for acute diverticulitis, and also Colonic  diverticulosis.Per GI evaluaiton of CT study, the narrow segment from distal descending through sigmoid with inflammatory changes was thought likely due to SCAD. It was recommended that long term management of pt's GI issues should be referred to colorectal surgery for evaluation of possible resection. Pt was discharged home with 1 month of mesalamine, bentyl and miralax.     5/27  Hospital Medicine H&P                    5/29 Gastroenterology   PN                5/29 Gastroenterology   PN               5/30 Hospital Medicine Discharge Summary     Please document your best medical opinion regarding the etiology of  GIB?       [ X  ] Segmental Colitis associated with Diverticulosis   [   ] Hemorrhoids   [   ] Other etiology (please specify):___________________   [  ] Clinically Undetermined     Please document in your progress notes daily for the duration of treatment, until resolved, and include in your discharge summary.

## 2022-06-06 ENCOUNTER — OFFICE VISIT (OUTPATIENT)
Dept: FAMILY MEDICINE | Facility: HOSPITAL | Age: 62
End: 2022-06-06
Attending: FAMILY MEDICINE
Payer: MEDICAID

## 2022-06-06 ENCOUNTER — LAB VISIT (OUTPATIENT)
Dept: LAB | Facility: HOSPITAL | Age: 62
End: 2022-06-06
Attending: FAMILY MEDICINE
Payer: MEDICAID

## 2022-06-06 ENCOUNTER — TELEPHONE (OUTPATIENT)
Dept: SURGERY | Facility: CLINIC | Age: 62
End: 2022-06-06
Payer: MEDICAID

## 2022-06-06 VITALS
DIASTOLIC BLOOD PRESSURE: 70 MMHG | WEIGHT: 175.94 LBS | SYSTOLIC BLOOD PRESSURE: 113 MMHG | HEIGHT: 65 IN | HEART RATE: 63 BPM | BODY MASS INDEX: 29.31 KG/M2

## 2022-06-06 DIAGNOSIS — K62.5 RECTAL BLEEDING: Primary | ICD-10-CM

## 2022-06-06 DIAGNOSIS — K62.5 RECTAL BLEEDING: ICD-10-CM

## 2022-06-06 LAB
ANISOCYTOSIS BLD QL SMEAR: SLIGHT
BASOPHILS # BLD AUTO: ABNORMAL K/UL (ref 0–0.2)
BASOPHILS NFR BLD: 1 % (ref 0–1.9)
DACRYOCYTES BLD QL SMEAR: ABNORMAL
DIFFERENTIAL METHOD: ABNORMAL
EOSINOPHIL # BLD AUTO: ABNORMAL K/UL (ref 0–0.5)
EOSINOPHIL NFR BLD: 0 % (ref 0–8)
ERYTHROCYTE [DISTWIDTH] IN BLOOD BY AUTOMATED COUNT: 12.7 % (ref 11.5–14.5)
HCT VFR BLD AUTO: 38 % (ref 37–48.5)
HGB BLD-MCNC: 12.1 G/DL (ref 12–16)
HYPOCHROMIA BLD QL SMEAR: ABNORMAL
IMM GRANULOCYTES # BLD AUTO: ABNORMAL K/UL (ref 0–0.04)
IMM GRANULOCYTES NFR BLD AUTO: ABNORMAL % (ref 0–0.5)
LYMPHOCYTES # BLD AUTO: ABNORMAL K/UL (ref 1–4.8)
LYMPHOCYTES NFR BLD: 44 % (ref 18–48)
MCH RBC QN AUTO: 29.2 PG (ref 27–31)
MCHC RBC AUTO-ENTMCNC: 31.8 G/DL (ref 32–36)
MCV RBC AUTO: 92 FL (ref 82–98)
MONOCYTES # BLD AUTO: ABNORMAL K/UL (ref 0.3–1)
MONOCYTES NFR BLD: 2 % (ref 4–15)
MYELOCYTES NFR BLD MANUAL: 3 %
NEUTROPHILS NFR BLD: 50 % (ref 38–73)
NRBC BLD-RTO: 0 /100 WBC
OVALOCYTES BLD QL SMEAR: ABNORMAL
PLATELET # BLD AUTO: 248 K/UL (ref 150–450)
PMV BLD AUTO: 11.7 FL (ref 9.2–12.9)
POIKILOCYTOSIS BLD QL SMEAR: SLIGHT
RBC # BLD AUTO: 4.15 M/UL (ref 4–5.4)
TARGETS BLD QL SMEAR: ABNORMAL
WBC # BLD AUTO: 9.14 K/UL (ref 3.9–12.7)

## 2022-06-06 PROCEDURE — 36415 COLL VENOUS BLD VENIPUNCTURE: CPT | Performed by: STUDENT IN AN ORGANIZED HEALTH CARE EDUCATION/TRAINING PROGRAM

## 2022-06-06 PROCEDURE — 85027 COMPLETE CBC AUTOMATED: CPT | Performed by: STUDENT IN AN ORGANIZED HEALTH CARE EDUCATION/TRAINING PROGRAM

## 2022-06-06 PROCEDURE — 99213 OFFICE O/P EST LOW 20 MIN: CPT | Performed by: STUDENT IN AN ORGANIZED HEALTH CARE EDUCATION/TRAINING PROGRAM

## 2022-06-06 PROCEDURE — 85007 BL SMEAR W/DIFF WBC COUNT: CPT | Performed by: STUDENT IN AN ORGANIZED HEALTH CARE EDUCATION/TRAINING PROGRAM

## 2022-06-06 NOTE — PROGRESS NOTES
"Progress Note  [facility]      Chief Complaint:   Chief Complaint   Patient presents with    Hospital Follow Up        Subjective:    Ashli Kumar is a 61 y.o.female who is here for hospital follow up.     Review of Systems    ROS     Past medical, past surgical, social, and family history reviewed.    Objective:    /70 (BP Location: Left arm)   Pulse 63   Ht 5' 5" (1.651 m)   Wt 79.8 kg (175 lb 14.8 oz)   BMI 29.28 kg/m²     Physical Exam:  Physical Exam    Laboratory:    Reviewed labs and imaging.      Assessment Plan    There are no diagnoses linked to this encounter.       Follow Up:  *** weeks    The patient's diagnosis and medications were discussed.    I will review labs and notify patient with results either by mail or contact by phone.      06/06/2022  Dewayne Diana M.D.  [facility] PGY-3    *This note was dictated using the M*Modal Fluency Direct word recognition program. There are word recognition mistakes that are occasionally missed on review.     "

## 2022-06-06 NOTE — MEDICAL/APP STUDENT
Progress Note  [facility]    Subjective:     Ashli Kumar is a 61 y.o. year old female with PMHx of HTN, HLD, bipolar disorder, prior GI bleed with diverticulosis and non-bleeding gastric ulcers in 2017, HFpEF. Ms. Kumar is here for a f/u after her inpt stay from 5/27 - 5/30 for a lower GI bleed and syncopal episode.    2 days ago the pt ate some pizza at a party and while at the grocery later began experiencing diffuse lower abdominal cramping w/ urgency. Pt tried to make it to the bathroom but ended up having bowel incontinence. She described the BM as reddish in color and when asked if it was similar to when she was recently admitted, she said no (was tarry/sticky at that time). Pt was with a relative at the time who brought her back to their house. When they arrived, pt was nauseous and vomited. She described the color as pink but said she had punch with her pizza a few hours before. Ms. Kumar felt light headed after this and went to bed, sleeping till morning. Denies syncopal episode. She has had no other complaints since.    Pt has an appointment w/ colorectal surgery scheduled tomorrow and also wants to establish this location as her primary care.        Patient Active Problem List    Diagnosis Date Noted    LLQ pain 05/29/2022    Rectal bleeding 05/27/2022    Bipolar affective disorder, currently depressed, moderate 05/27/2022    Chronic heart failure with preserved ejection fraction 05/13/2019    Essential hypertension 05/13/2019    SOB (shortness of breath) 05/13/2019    Obesity due to excess calories 05/13/2019    Angina, class III 05/01/2019    Screening for colorectal cancer 05/12/2017        Review of patient's allergies indicates:   Allergen Reactions    Codeine Hives    Sulfa (sulfonamide antibiotics) Hives    Benzoate analogues Itching        Past Medical History:   Diagnosis Date    Anxiety     Arthritis     Depression     GERD (gastroesophageal reflux  "disease)     High cholesterol     Hypertension       Past Surgical History:   Procedure Laterality Date    [facility]SY [facility]     [facility]    BREAST SURGERY       SECTION      COLONOSCOPY N/A 2017    Procedure: COLONOSCOPY Miralax;  Surgeon: Buddy Butcher Jr., MD;  Location: [facility] ENDO;  Service: Endoscopy;  Laterality: N/A;    HYSTERECTOMY      KIDNEY SURGERY Right     done at [facility], reported as mass by patient    LEFT HEART CATHETERIZATION Left 2019    Procedure: Left heart cath;  Surgeon: Gerhard Krishnan MD;  Location: [facility] CATH LAB;  Service: Cardiology;  Laterality: Left;    OOPHORECTOMY      s-section        No family history on file.   Social History     Tobacco Use    Smoking status: Never Smoker    Smokeless tobacco: Never Used   Substance Use Topics    Alcohol use: No        Objective:     Physical Examination  /70 (BP Location: Left arm)   Pulse 63   Ht 5' 5" (1.651 m)   Wt 79.8 kg (175 lb 14.8 oz)   BMI 29.28 kg/m²   Wt Readings from Last 3 Encounters:   22 79.8 kg (175 lb 14.8 oz)   22 82 kg (180 lb 12.4 oz)   22 81.5 kg (179 lb 10.8 oz)     BP Readings from Last 3 Encounters:   22 113/70   22 (!) 151/70   22 130/80     Estimated body mass index is 29.28 kg/m² as calculated from the following:    Height as of this encounter: 5' 5" (1.651 m).    Weight as of this encounter: 79.8 kg (175 lb 14.8 oz).     Physical Exam  Vitals and nursing note reviewed.   Constitutional:       Appearance: She is not ill-appearing.   HENT:      Head: Normocephalic and atraumatic.   Eyes:      Extraocular Movements: Extraocular movements intact.   Cardiovascular:      Rate and Rhythm: Normal rate and regular rhythm.      Heart sounds: Normal heart sounds.   Pulmonary:      Effort: Pulmonary effort is normal. No respiratory distress.   Abdominal:      General: Bowel sounds are normal. There is no distension.      Palpations: Abdomen is soft. "      Tenderness: There is no guarding or rebound.      Comments: Mild abdominal TTP at LLQ   Musculoskeletal:         General: Normal range of motion.      Cervical back: Normal range of motion.   Skin:     General: Skin is warm and dry.   Neurological:      General: No focal deficit present.      Mental Status: She is alert and oriented to person, place, and time.   Psychiatric:         Behavior: Behavior normal.         Thought Content: Thought content normal.          Assessment/Plan:     Ashli Kumar is a 61 y.o. year old female with PMHx of HTN, HLD, bipolar disorder, prior GI bleed with diverticulosis and non-bleeding gastric ulcers in 2017, HFpEF. Ms. Kumar is here for a f/u after her inpt stay from 5/27 - 5/30 for a lower GI bleed and syncopal episode.        Plan    Acute GI bleeding s/p discharge  - Possible recurrent episode 2 days ago but unclear 2/2 pt's retelling. However, pt clinically stable and has no signs of acute blood loss  - Pt will f/u w/ colorectal surgery tomorrow for further management.       Kobi Bedolla, MS3    I have reviewed and addended the above note.     Dewayne Diana MD  [facility] HO-3

## 2022-06-07 ENCOUNTER — OFFICE VISIT (OUTPATIENT)
Dept: SURGERY | Facility: CLINIC | Age: 62
End: 2022-06-07
Payer: MEDICAID

## 2022-06-07 VITALS
DIASTOLIC BLOOD PRESSURE: 80 MMHG | HEIGHT: 65 IN | SYSTOLIC BLOOD PRESSURE: 138 MMHG | BODY MASS INDEX: 28.98 KG/M2 | WEIGHT: 173.94 LBS | HEART RATE: 72 BPM

## 2022-06-07 DIAGNOSIS — K62.5 RECTAL BLEEDING: ICD-10-CM

## 2022-06-07 DIAGNOSIS — K52.9 COLITIS: Primary | ICD-10-CM

## 2022-06-07 PROCEDURE — 3075F PR MOST RECENT SYSTOLIC BLOOD PRESS GE 130-139MM HG: ICD-10-PCS | Mod: CPTII,,, | Performed by: NURSE PRACTITIONER

## 2022-06-07 PROCEDURE — 3008F PR BODY MASS INDEX (BMI) DOCUMENTED: ICD-10-PCS | Mod: CPTII,,, | Performed by: NURSE PRACTITIONER

## 2022-06-07 PROCEDURE — 3044F PR MOST RECENT HEMOGLOBIN A1C LEVEL <7.0%: ICD-10-PCS | Mod: CPTII,,, | Performed by: NURSE PRACTITIONER

## 2022-06-07 PROCEDURE — 3008F BODY MASS INDEX DOCD: CPT | Mod: CPTII,,, | Performed by: NURSE PRACTITIONER

## 2022-06-07 PROCEDURE — 4010F ACE/ARB THERAPY RXD/TAKEN: CPT | Mod: CPTII,,, | Performed by: NURSE PRACTITIONER

## 2022-06-07 PROCEDURE — 3079F DIAST BP 80-89 MM HG: CPT | Mod: CPTII,,, | Performed by: NURSE PRACTITIONER

## 2022-06-07 PROCEDURE — 3075F SYST BP GE 130 - 139MM HG: CPT | Mod: CPTII,,, | Performed by: NURSE PRACTITIONER

## 2022-06-07 PROCEDURE — 99204 OFFICE O/P NEW MOD 45 MIN: CPT | Mod: S$PBB,,, | Performed by: NURSE PRACTITIONER

## 2022-06-07 PROCEDURE — 4010F PR ACE/ARB THEARPY RXD/TAKEN: ICD-10-PCS | Mod: CPTII,,, | Performed by: NURSE PRACTITIONER

## 2022-06-07 PROCEDURE — 3079F PR MOST RECENT DIASTOLIC BLOOD PRESSURE 80-89 MM HG: ICD-10-PCS | Mod: CPTII,,, | Performed by: NURSE PRACTITIONER

## 2022-06-07 PROCEDURE — 1160F PR REVIEW ALL MEDS BY PRESCRIBER/CLIN PHARMACIST DOCUMENTED: ICD-10-PCS | Mod: CPTII,,, | Performed by: NURSE PRACTITIONER

## 2022-06-07 PROCEDURE — 99214 OFFICE O/P EST MOD 30 MIN: CPT | Mod: PBBFAC | Performed by: NURSE PRACTITIONER

## 2022-06-07 PROCEDURE — 3044F HG A1C LEVEL LT 7.0%: CPT | Mod: CPTII,,, | Performed by: NURSE PRACTITIONER

## 2022-06-07 PROCEDURE — 99999 PR PBB SHADOW E&M-EST. PATIENT-LVL IV: ICD-10-PCS | Mod: PBBFAC,,, | Performed by: NURSE PRACTITIONER

## 2022-06-07 PROCEDURE — 1111F DSCHRG MED/CURRENT MED MERGE: CPT | Mod: CPTII,,, | Performed by: NURSE PRACTITIONER

## 2022-06-07 PROCEDURE — 1159F MED LIST DOCD IN RCRD: CPT | Mod: CPTII,,, | Performed by: NURSE PRACTITIONER

## 2022-06-07 PROCEDURE — 1111F PR DISCHARGE MEDS RECONCILED W/ CURRENT OUTPATIENT MED LIST: ICD-10-PCS | Mod: CPTII,,, | Performed by: NURSE PRACTITIONER

## 2022-06-07 PROCEDURE — 99999 PR PBB SHADOW E&M-EST. PATIENT-LVL IV: CPT | Mod: PBBFAC,,, | Performed by: NURSE PRACTITIONER

## 2022-06-07 PROCEDURE — 1160F RVW MEDS BY RX/DR IN RCRD: CPT | Mod: CPTII,,, | Performed by: NURSE PRACTITIONER

## 2022-06-07 PROCEDURE — 1159F PR MEDICATION LIST DOCUMENTED IN MEDICAL RECORD: ICD-10-PCS | Mod: CPTII,,, | Performed by: NURSE PRACTITIONER

## 2022-06-07 PROCEDURE — 99204 PR OFFICE/OUTPT VISIT, NEW, LEVL IV, 45-59 MIN: ICD-10-PCS | Mod: S$PBB,,, | Performed by: NURSE PRACTITIONER

## 2022-06-07 RX ORDER — CIPROFLOXACIN 500 MG/1
500 TABLET ORAL EVERY 12 HOURS
Qty: 14 TABLET | Refills: 0 | Status: SHIPPED | OUTPATIENT
Start: 2022-06-07 | End: 2022-06-14 | Stop reason: ALTCHOICE

## 2022-06-07 RX ORDER — METRONIDAZOLE 500 MG/1
500 TABLET ORAL EVERY 8 HOURS
Qty: 7 TABLET | Refills: 0 | Status: SHIPPED | OUTPATIENT
Start: 2022-06-07 | End: 2022-06-14 | Stop reason: ALTCHOICE

## 2022-06-07 NOTE — PROGRESS NOTES
"CRS Office Visit History and Physical    Referring Md:   Bairon Hook Md  [address] [facility]  [address]  [place] [id number]    SUBJECTIVE:     Chief Complaint: rectal bleeding    History of Present Illness:  The patient is new patient to this practice.   Course is as follows:  Patient is a 61 y.o. female presents for ED/Obs f/u of colitis and rectal bleeding.  Presented to ED  after near syncopal event, had bloody bm in ED.   Admitted to obs, CT on day 2 d/t new onset LLQ pain.   D/c'd on  after no more rectal bleeding and H/H trending upward and improvement in pain.     Since home reports taking mesalamine.  No more episode of rectal bleeding.  States has to "watch what I eat" as certain foods will case abdominal pain and gas, followed by diarrhea.     Blood thinners: No    Last Colonoscopy completed on 2017  - The perianal and digital rectal examinations were normal.   - Multiple medium-mouthed diverticula were found in the sigmoid colon.   - Due to tortuosity and angulation in an area of numerous diverticula, the scope could not be advanced above the 45 cm level   - The rectum appeared normal.   - Rec: barium enema (pt unable to tolerate) and repeat in 10 years      Review of patient's allergies indicates:   Allergen Reactions    Codeine Hives    Sulfa (sulfonamide antibiotics) Hives    Benzoate analogues Itching       Past Medical History:   Diagnosis Date    Anxiety     Arthritis     Depression     GERD (gastroesophageal reflux disease)     High cholesterol     Hypertension      Past Surgical History:   Procedure Laterality Date    BREAST BIOPSY Left     h    BREAST SURGERY       SECTION      COLONOSCOPY N/A 2017    Procedure: COLONOSCOPY Miralax;  Surgeon: Buddy Butcher Jr., MD;  Location: [facility];  Service: Endoscopy;  Laterality: N/A;    HYSTERECTOMY      KIDNEY SURGERY Right     done at [facility], reported as mass by patient    LEFT HEART CATHETERIZATION Left " "5/13/2019    Procedure: Left heart cath;  Surgeon: Gerhard Krishnan MD;  Location: [facility] CATH LAB;  Service: Cardiology;  Laterality: Left;    OOPHORECTOMY      s-section       No family history on file.  Social History     Tobacco Use    Smoking status: Never Smoker    Smokeless tobacco: Never Used   Substance Use Topics    Alcohol use: No    Drug use: No        Review of Systems:  Review of Systems   Gastrointestinal: Positive for abdominal pain, blood in stool, diarrhea and nausea.       OBJECTIVE:     Vital Signs (Most Recent)  Blood Pressure 138/80 (BP Location: Right arm, Patient Position: Sitting, BP Method: Large (Automatic))   Pulse 72   Height 5' 5" (1.651 m)   Weight 78.9 kg (173 lb 15.1 oz)   Body Mass Index 28.95 kg/m²     Physical Exam:  General: White female in no distress   Neuro: Alert and oriented to person, place, and time.  Moves all extremities.     HEENT: No icterus.  Trachea midline  Respiratory: Respirations are even and unlabored, no cough or audible wheezing  Skin: Warm dry and intact, No visible rashes, no jaundice    Labs reviewed today:  Lab Results   Component Value Date    WBC 9.14 06/06/2022    HGB 12.1 06/06/2022    HCT 38.0 06/06/2022     06/06/2022    ALT 9 (L) 05/30/2022    AST 15 05/30/2022     05/30/2022    K 4.6 05/30/2022    CL 99 05/30/2022    CREATININE 0.7 05/30/2022    BUN 8 05/30/2022    CO2 29 05/30/2022    TSH 2.844 05/28/2022    INR 1.1 05/27/2022    HGBA1C 6.3 (H) 05/28/2022       Imaging reviewed today:  5/29/22 CT abdomen pelvis  - Sigmoid colon diverticulosis and fairly long segment wall thickening and pericolonic inflammatory stranding involving the distal descending colon and extending into the sigmoid region.    - Compared with the prior study the degree of surrounding inflammation is increased.    - The length of involvement of the colon is longer than expected for diverticulitis and no regional diverticula are seen.    - No evidence " of perforation or abscess on this limited noncontrast study.    - The remainder of the GI tract is unremarkable.    - There is a trace amount of pelvic ascites.       Endoscopy reviewed today:  Last Colonoscopy completed on 5/12/2017  - The perianal and digital rectal examinations were normal.   - Multiple medium-mouthed diverticula were found in the sigmoid colon.   - Due to tortuosity and angulation in an area of numerous diverticula, the scope could not be advanced above the 45 cm level   - The rectum appeared normal.   - Rec: barium enema (pt unable to tolerate) and repeat in 10 years      ASSESSMENT/PLAN:     Diagnoses and all orders for this visit:    Colitis  -     ciprofloxacin HCl (CIPRO) 500 MG tablet; Take 1 tablet (500 mg total) by mouth every 12 (twelve) hours. for 7 days  -     metroNIDAZOLE (FLAGYL) 500 MG tablet; Take 1 tablet (500 mg total) by mouth every 8 (eight) hours.        The patient was instructed to:  Cipro/flagyl  Continue mesalamine  F/u with MD - given difficult scope and abnormal findings on CT scan I feel MD f/u prior to scheduling scope is warranted.       Paulette Fuentes, FNP-C  Colon and Rectal Surgery

## 2022-06-09 ENCOUNTER — TELEPHONE (OUTPATIENT)
Dept: SURGERY | Facility: CLINIC | Age: 62
End: 2022-06-09
Payer: MEDICAID

## 2022-06-11 NOTE — PROGRESS NOTES
Ashli Kumar is a 61 y.o. year old female with PMHx of HTN, HLD, bipolar disorder, prior GI bleed with diverticulosis and non-bleeding gastric ulcers in 2017, HFpEF. Ms. Kumar is here for a f/u after her inpt stay from 5/27 - 5/30 for a lower GI bleed and syncopal episode.     2 days ago the pt ate some pizza at a party and while at the grocery later began experiencing diffuse lower abdominal cramping w/ urgency. Pt tried to make it to the bathroom but ended up having bowel incontinence. She described the BM as reddish in color and when asked if it was similar to when she was recently admitted, she said no (was tarry/sticky at that time). Pt was with a relative at the time who brought her back to their house. When they arrived, pt was nauseous and vomited. She described the color as pink but said she had punch with her pizza a few hours before. Ms. Kumar felt light headed after this and went to bed, sleeping till morning. Denies syncopal episode. She has had no other complaints since.     Pt has an appointment w/ colorectal surgery scheduled tomorrow and also wants to establish this location as her primary care.               Patient Active Problem List     Diagnosis Date Noted    LLQ pain 05/29/2022    Rectal bleeding 05/27/2022    Bipolar affective disorder, currently depressed, moderate 05/27/2022    Chronic heart failure with preserved ejection fraction 05/13/2019    Essential hypertension 05/13/2019    SOB (shortness of breath) 05/13/2019    Obesity due to excess calories 05/13/2019    Angina, class III 05/01/2019    Screening for colorectal cancer 05/12/2017              Review of patient's allergies indicates:   Allergen Reactions    Codeine Hives    Sulfa (sulfonamide antibiotics) Hives    Benzoate analogues Itching              Past Medical History:   Diagnosis Date    Anxiety      Arthritis      Depression      GERD (gastroesophageal reflux disease)      High cholesterol    "   Hypertension              Past Surgical History:   Procedure Laterality Date    BREAST BIOPSY Left       [facility]    BREAST SURGERY         SECTION        COLONOSCOPY N/A 2017     Procedure: COLONOSCOPY Miralax;  Surgeon: Buddy Butcher Jr., MD;  Location: [facility] ENDO;  Service: Endoscopy;  Laterality: N/A;    HYSTERECTOMY        KIDNEY SURGERY Right       done at [facility], reported as mass by patient    LEFT HEART CATHETERIZATION Left 2019     Procedure: Left heart cath;  Surgeon: Gerhard Krishnan MD;  [facility];  Service: Cardiology;  Laterality: Left;    OOPHORECTOMY        s-section          No family history on file.   Social History           Tobacco Use    Smoking status: Never Smoker    Smokeless tobacco: Never Used   Substance Use Topics    Alcohol use: No         Objective:      Physical Examination  /70 (BP Location: Left arm)   Pulse 63   Ht 5' 5" (1.651 m)   Wt 79.8 kg (175 lb 14.8 oz)   BMI 29.28 kg/m²       Wt Readings from Last 3 Encounters:   22 79.8 kg (175 lb 14.8 oz)   22 82 kg (180 lb 12.4 oz)   22 81.5 kg (179 lb 10.8 oz)          BP Readings from Last 3 Encounters:   22 113/70   22 (!) 151/70   22 130/80      Estimated body mass index is 29.28 kg/m² as calculated from the following:    Height as of this encounter: 5' 5" (1.651 m).    Weight as of this encounter: 79.8 kg (175 lb 14.8 oz).      Physical Exam  Vitals and nursing note reviewed.   Constitutional:       Appearance: She is not ill-appearing.   HENT:      Head: Normocephalic and atraumatic.   Eyes:      Extraocular Movements: Extraocular movements intact.   Cardiovascular:      Rate and Rhythm: Normal rate and regular rhythm.      Heart sounds: Normal heart sounds.   Pulmonary:      Effort: Pulmonary effort is normal. No respiratory distress.   Abdominal:      General: Bowel sounds are normal. There is no distension.      Palpations: Abdomen " is soft.      Tenderness: There is no guarding or rebound.      Comments: Mild abdominal TTP at LLQ   Musculoskeletal:         General: Normal range of motion.      Cervical back: Normal range of motion.   Skin:     General: Skin is warm and dry.   Neurological:      General: No focal deficit present.      Mental Status: She is alert and oriented to person, place, and time.   Psychiatric:         Behavior: Behavior normal.         Thought Content: Thought content normal.            Assessment/Plan:      Ashli Kumar is a 61 y.o. year old female with PMHx of HTN, HLD, bipolar disorder, prior GI bleed with diverticulosis and non-bleeding gastric ulcers in 2017, HFpEF. Ms. Kumar is here for a f/u after her inpt stay from 5/27 - 5/30 for a lower GI bleed and syncopal episode.         Plan     Acute GI bleeding s/p discharge  - Possible recurrent episode 2 days ago but unclear 2/2 pt's retelling. However, pt clinically stable and has no signs of acute blood loss  - Pt will f/u w/ colorectal surgery tomorrow for further management.         Kobi Bedolla, MS3     I have reviewed and addended the above note.      Dewayne Diana MD  [facility] HO-3

## 2022-06-13 NOTE — PROGRESS NOTES
I assume primary medical responsibility for this patient. I have reviewed the history, physical, and assessement & treatment plan with the resident and agree that the care is reasonable and necessary. This service has been performed by a resident without the presence of a teaching physician under the primary care exception. If necessary, an addendum of additional findings or evaluation beyond the resident documentation will be noted below.        Beata Suarez MD    [facility]

## 2022-06-14 ENCOUNTER — OFFICE VISIT (OUTPATIENT)
Dept: GASTROENTEROLOGY | Facility: CLINIC | Age: 62
End: 2022-06-14
Payer: MEDICAID

## 2022-06-14 VITALS
BODY MASS INDEX: 29.05 KG/M2 | WEIGHT: 174.63 LBS | DIASTOLIC BLOOD PRESSURE: 86 MMHG | SYSTOLIC BLOOD PRESSURE: 128 MMHG

## 2022-06-14 DIAGNOSIS — R19.7 DIARRHEA, UNSPECIFIED TYPE: ICD-10-CM

## 2022-06-14 DIAGNOSIS — K57.30 SEGMENTAL COLITIS ASSOCIATED WITH DIVERTICULOSIS: Primary | ICD-10-CM

## 2022-06-14 DIAGNOSIS — K50.10 SEGMENTAL COLITIS ASSOCIATED WITH DIVERTICULOSIS: Primary | ICD-10-CM

## 2022-06-14 PROCEDURE — 3074F SYST BP LT 130 MM HG: CPT | Mod: CPTII,,, | Performed by: INTERNAL MEDICINE

## 2022-06-14 PROCEDURE — 3008F PR BODY MASS INDEX (BMI) DOCUMENTED: ICD-10-PCS | Mod: CPTII,,, | Performed by: INTERNAL MEDICINE

## 2022-06-14 PROCEDURE — 4010F ACE/ARB THERAPY RXD/TAKEN: CPT | Mod: CPTII,,, | Performed by: INTERNAL MEDICINE

## 2022-06-14 PROCEDURE — 99999 PR PBB SHADOW E&M-EST. PATIENT-LVL III: CPT | Mod: PBBFAC,,, | Performed by: INTERNAL MEDICINE

## 2022-06-14 PROCEDURE — 99214 OFFICE O/P EST MOD 30 MIN: CPT | Mod: S$PBB,,, | Performed by: INTERNAL MEDICINE

## 2022-06-14 PROCEDURE — 1159F PR MEDICATION LIST DOCUMENTED IN MEDICAL RECORD: ICD-10-PCS | Mod: CPTII,,, | Performed by: INTERNAL MEDICINE

## 2022-06-14 PROCEDURE — 1111F PR DISCHARGE MEDS RECONCILED W/ CURRENT OUTPATIENT MED LIST: ICD-10-PCS | Mod: CPTII,,, | Performed by: INTERNAL MEDICINE

## 2022-06-14 PROCEDURE — 1159F MED LIST DOCD IN RCRD: CPT | Mod: CPTII,,, | Performed by: INTERNAL MEDICINE

## 2022-06-14 PROCEDURE — 3074F PR MOST RECENT SYSTOLIC BLOOD PRESSURE < 130 MM HG: ICD-10-PCS | Mod: CPTII,,, | Performed by: INTERNAL MEDICINE

## 2022-06-14 PROCEDURE — 1111F DSCHRG MED/CURRENT MED MERGE: CPT | Mod: CPTII,,, | Performed by: INTERNAL MEDICINE

## 2022-06-14 PROCEDURE — 3044F PR MOST RECENT HEMOGLOBIN A1C LEVEL <7.0%: ICD-10-PCS | Mod: CPTII,,, | Performed by: INTERNAL MEDICINE

## 2022-06-14 PROCEDURE — 3044F HG A1C LEVEL LT 7.0%: CPT | Mod: CPTII,,, | Performed by: INTERNAL MEDICINE

## 2022-06-14 PROCEDURE — 99213 OFFICE O/P EST LOW 20 MIN: CPT | Mod: PBBFAC,PO | Performed by: INTERNAL MEDICINE

## 2022-06-14 PROCEDURE — 1160F RVW MEDS BY RX/DR IN RCRD: CPT | Mod: CPTII,,, | Performed by: INTERNAL MEDICINE

## 2022-06-14 PROCEDURE — 99214 PR OFFICE/OUTPT VISIT, EST, LEVL IV, 30-39 MIN: ICD-10-PCS | Mod: S$PBB,,, | Performed by: INTERNAL MEDICINE

## 2022-06-14 PROCEDURE — 3008F BODY MASS INDEX DOCD: CPT | Mod: CPTII,,, | Performed by: INTERNAL MEDICINE

## 2022-06-14 PROCEDURE — 3079F DIAST BP 80-89 MM HG: CPT | Mod: CPTII,,, | Performed by: INTERNAL MEDICINE

## 2022-06-14 PROCEDURE — 4010F PR ACE/ARB THEARPY RXD/TAKEN: ICD-10-PCS | Mod: CPTII,,, | Performed by: INTERNAL MEDICINE

## 2022-06-14 PROCEDURE — 1160F PR REVIEW ALL MEDS BY PRESCRIBER/CLIN PHARMACIST DOCUMENTED: ICD-10-PCS | Mod: CPTII,,, | Performed by: INTERNAL MEDICINE

## 2022-06-14 PROCEDURE — 3079F PR MOST RECENT DIASTOLIC BLOOD PRESSURE 80-89 MM HG: ICD-10-PCS | Mod: CPTII,,, | Performed by: INTERNAL MEDICINE

## 2022-06-14 PROCEDURE — 99999 PR PBB SHADOW E&M-EST. PATIENT-LVL III: ICD-10-PCS | Mod: PBBFAC,,, | Performed by: INTERNAL MEDICINE

## 2022-06-14 RX ORDER — FLUTICASONE PROPIONATE 50 MCG
2 SPRAY, SUSPENSION (ML) NASAL DAILY PRN
COMMUNITY
Start: 2022-06-08

## 2022-06-15 ENCOUNTER — TELEPHONE (OUTPATIENT)
Dept: SURGERY | Facility: CLINIC | Age: 62
End: 2022-06-15
Payer: MEDICAID

## 2022-06-15 NOTE — PROGRESS NOTES
"Subjective:       Patient ID: Ashli Kumar is a 61 y.o. female.    Chief Complaint: Diarrhea    62 yo F here for hospital f/u.  She was admitted recently to [facility] for rectal bleeding associated with syncope.  The amount of blood loss was minor and Hgb was stable, but when she was going for d/c she then began to have severe LLQ pain, which had not been present initially.  Imaging showed inflammatory changes of the left colon; I felt this was likely SCAD as her presentation was not c/w ischemia nor infection.  I arranged for f/u with me, but unfortunately the primary team referred her to colorectal surgery, which I had planned to do myself.  She was seen by a CRS NP who then wanted the pt to see a CRS MD, but the pt got very confused with all of the appointments and stopped going to all of them until today.  At the CRS NP appt, she was given Cipro/Flagyl which has now given her diarrhea.  She states that the diarrhea was very severe "pouring down her legs" but her last dose was yesterday and today she has only had 3 BM thus far.  She is a poor historian.    Review of Systems   Constitutional: Negative for chills and fever.   Respiratory: Negative for shortness of breath and wheezing.    Cardiovascular: Negative for chest pain, palpitations and leg swelling.   Gastrointestinal: Positive for diarrhea. Negative for abdominal pain and blood in stool.   Neurological: Negative for dizziness and speech difficulty.         Objective:      Physical Exam  Constitutional:       Appearance: Normal appearance. She is well-developed. She is not ill-appearing.   HENT:      Head: Normocephalic and atraumatic.   Eyes:      Extraocular Movements: Extraocular movements intact.      Pupils: Pupils are equal, round, and reactive to light.   Pulmonary:      Effort: Pulmonary effort is normal. No respiratory distress.   Abdominal:      General: There is no distension.      Palpations: Abdomen is soft.   Musculoskeletal:         " General: No signs of injury. Normal range of motion.      Cervical back: Normal range of motion and neck supple.   Neurological:      General: No focal deficit present.      Mental Status: She is alert and oriented to person, place, and time.   Psychiatric:         Mood and Affect: Mood normal.         Behavior: Behavior normal.         Thought Content: Thought content normal.         Judgment: Judgment normal.       Lab Results   Component Value Date    WBC 9.14 06/06/2022    HGB 12.1 06/06/2022    HCT 38.0 06/06/2022    MCV 92 06/06/2022     06/06/2022     CT was independently visualized and reviewed by me and showed mild inflammatory changes left colon.     EGD and colonoscopy 5/2017:  EGD:  - Normal duodenal bulb and second portion of the                         duodenum.                        - Non-bleeding gastric ulcers with pigmented                         material. Biopsied.                        - The examination was otherwise normal.                        - Z-line regular, 40 cm from the incisors.                        - Normal esophagus.     Colonoscopy:    The perianal and digital rectal examinations were normal.       Multiple medium-mouthed diverticula were found in the sigmoid colon.       Due to tortuosity and angulation in an area of numerouds        diverticula, the scope could not be advanced above the 45 cm level       The rectum appeared normal.     Limited BE was normal    Assessment:       Problem List Items Addressed This Visit        GI    Segmental colitis associated with diverticulosis - Primary      Other Visit Diagnoses     Diarrhea, unspecified type              Plan:       Segmental colitis associated with diverticulosis        -     Cont mesalamine for now        -     I want her to see Dr. Kay specifically, which was my plan at d/c.  I defer to him whether to re-attempt colonoscopy; I am happy to perform it at his request.     Diarrhea, unspecified type  -      Antibiotic associated diarrhea.  If it does not continue to improve, will do stool studies.

## 2022-06-15 NOTE — TELEPHONE ENCOUNTER
----- Message from TREV Kay MD sent at 6/15/2022 10:33 AM CDT -----  Can this lady get an appt with me for diverticulitis?    Thank you!    Referring is Mary Cotton.    Sudeep

## 2022-06-16 ENCOUNTER — OFFICE VISIT (OUTPATIENT)
Dept: SURGERY | Facility: CLINIC | Age: 62
End: 2022-06-16
Payer: MEDICAID

## 2022-06-16 VITALS
DIASTOLIC BLOOD PRESSURE: 74 MMHG | WEIGHT: 174.19 LBS | HEIGHT: 65 IN | SYSTOLIC BLOOD PRESSURE: 138 MMHG | BODY MASS INDEX: 29.02 KG/M2 | HEART RATE: 78 BPM

## 2022-06-16 DIAGNOSIS — K50.111: Primary | ICD-10-CM

## 2022-06-16 PROCEDURE — 3008F BODY MASS INDEX DOCD: CPT | Mod: CPTII,,, | Performed by: COLON & RECTAL SURGERY

## 2022-06-16 PROCEDURE — 99999 PR PBB SHADOW E&M-EST. PATIENT-LVL III: ICD-10-PCS | Mod: PBBFAC,,, | Performed by: COLON & RECTAL SURGERY

## 2022-06-16 PROCEDURE — 3075F PR MOST RECENT SYSTOLIC BLOOD PRESS GE 130-139MM HG: ICD-10-PCS | Mod: CPTII,,, | Performed by: COLON & RECTAL SURGERY

## 2022-06-16 PROCEDURE — 1160F RVW MEDS BY RX/DR IN RCRD: CPT | Mod: CPTII,,, | Performed by: COLON & RECTAL SURGERY

## 2022-06-16 PROCEDURE — 1160F PR REVIEW ALL MEDS BY PRESCRIBER/CLIN PHARMACIST DOCUMENTED: ICD-10-PCS | Mod: CPTII,,, | Performed by: COLON & RECTAL SURGERY

## 2022-06-16 PROCEDURE — 3075F SYST BP GE 130 - 139MM HG: CPT | Mod: CPTII,,, | Performed by: COLON & RECTAL SURGERY

## 2022-06-16 PROCEDURE — 99214 OFFICE O/P EST MOD 30 MIN: CPT | Mod: S$PBB,,, | Performed by: COLON & RECTAL SURGERY

## 2022-06-16 PROCEDURE — 3008F PR BODY MASS INDEX (BMI) DOCUMENTED: ICD-10-PCS | Mod: CPTII,,, | Performed by: COLON & RECTAL SURGERY

## 2022-06-16 PROCEDURE — 3078F PR MOST RECENT DIASTOLIC BLOOD PRESSURE < 80 MM HG: ICD-10-PCS | Mod: CPTII,,, | Performed by: COLON & RECTAL SURGERY

## 2022-06-16 PROCEDURE — 4010F ACE/ARB THERAPY RXD/TAKEN: CPT | Mod: CPTII,,, | Performed by: COLON & RECTAL SURGERY

## 2022-06-16 PROCEDURE — 3078F DIAST BP <80 MM HG: CPT | Mod: CPTII,,, | Performed by: COLON & RECTAL SURGERY

## 2022-06-16 PROCEDURE — 4010F PR ACE/ARB THEARPY RXD/TAKEN: ICD-10-PCS | Mod: CPTII,,, | Performed by: COLON & RECTAL SURGERY

## 2022-06-16 PROCEDURE — 99213 OFFICE O/P EST LOW 20 MIN: CPT | Mod: PBBFAC,PN | Performed by: COLON & RECTAL SURGERY

## 2022-06-16 PROCEDURE — 1159F MED LIST DOCD IN RCRD: CPT | Mod: CPTII,,, | Performed by: COLON & RECTAL SURGERY

## 2022-06-16 PROCEDURE — 1159F PR MEDICATION LIST DOCUMENTED IN MEDICAL RECORD: ICD-10-PCS | Mod: CPTII,,, | Performed by: COLON & RECTAL SURGERY

## 2022-06-16 PROCEDURE — 99214 PR OFFICE/OUTPT VISIT, EST, LEVL IV, 30-39 MIN: ICD-10-PCS | Mod: S$PBB,,, | Performed by: COLON & RECTAL SURGERY

## 2022-06-16 PROCEDURE — 1111F DSCHRG MED/CURRENT MED MERGE: CPT | Mod: CPTII,,, | Performed by: COLON & RECTAL SURGERY

## 2022-06-16 PROCEDURE — 1111F PR DISCHARGE MEDS RECONCILED W/ CURRENT OUTPATIENT MED LIST: ICD-10-PCS | Mod: CPTII,,, | Performed by: COLON & RECTAL SURGERY

## 2022-06-16 PROCEDURE — 3044F HG A1C LEVEL LT 7.0%: CPT | Mod: CPTII,,, | Performed by: COLON & RECTAL SURGERY

## 2022-06-16 PROCEDURE — 3044F PR MOST RECENT HEMOGLOBIN A1C LEVEL <7.0%: ICD-10-PCS | Mod: CPTII,,, | Performed by: COLON & RECTAL SURGERY

## 2022-06-16 PROCEDURE — 99999 PR PBB SHADOW E&M-EST. PATIENT-LVL III: CPT | Mod: PBBFAC,,, | Performed by: COLON & RECTAL SURGERY

## 2022-06-16 RX ORDER — SODIUM, POTASSIUM,MAG SULFATES 17.5-3.13G
1 SOLUTION, RECONSTITUTED, ORAL ORAL DAILY
Qty: 1 KIT | Refills: 0 | Status: SHIPPED | OUTPATIENT
Start: 2022-06-16 | End: 2022-06-18

## 2022-06-16 NOTE — PROGRESS NOTES
"CRS Office Visit Follow-up  Referring Md:   No referring provider defined for this encounter.    SUBJECTIVE:     Chief Complaint: segmental colitis    History of Present Illness:  Patient is a 61 y.o. female presents with segmental colitis. The patient is a established patient to this practice.     Course is as follows:  PMH HTN, HLD, bipolar disorder, prior GI bleed with diverticulosis and non-bleeding gastric ulcers in 2017, HFpEF who presents with GI bleed    2017: colonoscopy done but aborted due to tortuosity at 45cm   - barium enema done  - "There were diverticula within the sigmoid colon which was tortuous.  There were some filling defects within the descending colon, may relate to retained stool although not fully evaluated due to early termination of the exam"    She did well until late May 2022 when she developed nausea, vomiting,.  She was taken to the hospital with bleeding per rectum and found to have segmental colitis.  Unclear etiology.  Bleeding stabilized on its own.  Repeat CT scan demonstrated increased inflammation in the left colon atypical for diverticulitis.  She was discharged once stabilized.  She presents for evaluation with her sister-in-law.  She feels that her abdominal pain is resolved.  Diarrhea is improving.  Past abdominal surgeries include  through a Pfannenstiel incision as well as a hysterectomy through a Pfannenstiel.  She had a right partial nephrectomy through a right paramedian incision.  She only has 1 bowel movement per day and spends approximately 20 minutes on the toilet for each bowel movement..  Denies constipation or straining.  Was previously told that she has heart failure with a preserved ejection fraction.    No family history of inflammatory bowel disease or colorectal cancer.    Last Colonoscopy completed on 2017  - The perianal and digital rectal examinations were normal.   - Multiple medium-mouthed diverticula were found in the sigmoid colon.   - " "Due to tortuosity and angulation in an area of numerous diverticula, the scope could not be advanced above the 45 cm level   - The rectum appeared normal.   - Rec: barium enema (pt unable to tolerate) and repeat in 10 years    Review of Systems:  Review of Systems   Constitutional: Negative for chills, diaphoresis, fever, malaise/fatigue and weight loss.   HENT: Negative for congestion.    Respiratory: Negative for shortness of breath.    Cardiovascular: Negative for chest pain and leg swelling.   Gastrointestinal: Positive for abdominal pain and blood in stool. Negative for constipation, nausea and vomiting.   Genitourinary: Negative for dysuria.   Musculoskeletal: Negative for back pain and myalgias.   Skin: Negative for rash.   Neurological: Negative for dizziness and weakness.   Endo/Heme/Allergies: Does not bruise/bleed easily.   Psychiatric/Behavioral: Negative for depression.       OBJECTIVE:     Vital Signs (Most Recent)  /74 (BP Location: Left arm, Patient Position: Sitting, BP Method: Large (Automatic))   Pulse 78   Ht 5' 5" (1.651 m)   Wt 79 kg (174 lb 2.6 oz)   BMI 28.98 kg/m²     Physical Exam:  General: White female in no distress   Neuro: alert and oriented x 4.  Moves all extremities.     HEENT: no icterus.  Trachea midline  Respiratory: respirations are even and unlabored  Cardiac: regular rate  Abdomen:  Protuberant, Pfannenstiel incision well healed.  Right upper quadrant incision well healed.  Abdomen nontender.  Extremities: Warm dry and intact  Skin: no rashes  Anorectal:  Deferred    Labs:  H&H 12 and 38.  Albumin 3.2.  Normal renal function    Imaging:   CT abd pelvis 5/29/22 personally reviewed and shows left sided colitis from the distal splenic flexure to the mid sigmoid.  No diverticula seen in the descending colon   CT abdomen pelvis 05/27/2022 personally reviewed demonstrates sigmoid diverticulosis with thickening of the sigmoid colon and distal descending colon.  CT abdomen " pelvis 02/19/2017 personally reviewed.  Limited evaluation secondary to no contrast.  Diverticulosis seen.  Tortuous sigmoid colon.    ASSESSMENT/PLAN:     Ashli was seen today for diverticulitis.    Diagnoses and all orders for this visit:    Segmental colitis with rectal bleeding  -     Case Request Endoscopy: COLONOSCOPY  -     sodium,potassium,mag sulfates (SUPREP BOWEL PREP KIT) 17.5-3.13-1.6 gram SolR; Take 177 mLs by mouth once daily. for 2 days        61 y.o. female with diffuse segmental colitis of the descending and proximal sigmoid colon.  Diverticulosis seen in the distal sigmoid colon.  Discussed the differential with her today including infection versus ischemia versus diverticular verses Crohn's disease versus malignancy.  Recommend further evaluation with repeat attempted colonoscopy.  If she has a significant stricture on her colonoscopy similar to her prior exam by Dr. Butcher in 2017, she would likely benefit from segmental resection.  This was discussed with her and detail multiple pictures were shown.  I will follow up with her at the time of her colonoscopy.    TREV Kay MD, FACS, FASCRS  Staff Surgeon  Colon & Rectal Surgery

## 2022-06-17 ENCOUNTER — TELEPHONE (OUTPATIENT)
Dept: ENDOSCOPY | Facility: HOSPITAL | Age: 62
End: 2022-06-17
Payer: MEDICAID

## 2022-06-17 NOTE — TELEPHONE ENCOUNTER
Phone rang several times then msg states call can not be completed at this time and phone hung up

## 2022-06-22 ENCOUNTER — TELEPHONE (OUTPATIENT)
Dept: ENDOSCOPY | Facility: HOSPITAL | Age: 62
End: 2022-06-22
Payer: MEDICAID

## 2022-06-23 ENCOUNTER — TELEPHONE (OUTPATIENT)
Dept: ENDOSCOPY | Facility: HOSPITAL | Age: 62
End: 2022-06-23
Payer: MEDICAID

## 2022-06-23 DIAGNOSIS — Z01.818 PRE-OP TESTING: ICD-10-CM

## 2022-06-23 RX ORDER — SODIUM, POTASSIUM,MAG SULFATES 17.5-3.13G
1 SOLUTION, RECONSTITUTED, ORAL ORAL DAILY
Qty: 1 KIT | Refills: 0 | Status: SHIPPED | OUTPATIENT
Start: 2022-06-23 | End: 2022-06-25

## 2022-06-23 NOTE — TELEPHONE ENCOUNTER
Endoscopy Scheduling Questionnaire:         1. Are you taking any blood thinners? n               If Yes  Have you been on them for longer than one year?     2. Have you been diagnosed with Diverticulitis in past three months? n     3. Are you on dialysis or have Kidney Disease? n    4. Previous Colonoscopy?  y         If yes Do you have a history of colon polyps?  n      6. Are you a diabetic?  n    7. Do you have a history of constipation?  n      Procedure scheduled with Dr. Kay   on  6/30/2022    The prep being used is Suprep     The patient's prep instructions were sent by verbal

## 2022-06-27 ENCOUNTER — LAB VISIT (OUTPATIENT)
Dept: LAB | Facility: HOSPITAL | Age: 62
End: 2022-06-27
Attending: STUDENT IN AN ORGANIZED HEALTH CARE EDUCATION/TRAINING PROGRAM
Payer: MEDICAID

## 2022-06-27 ENCOUNTER — TELEPHONE (OUTPATIENT)
Dept: ENDOSCOPY | Facility: HOSPITAL | Age: 62
End: 2022-06-27
Payer: MEDICAID

## 2022-06-27 ENCOUNTER — OFFICE VISIT (OUTPATIENT)
Dept: FAMILY MEDICINE | Facility: HOSPITAL | Age: 62
End: 2022-06-27
Payer: MEDICAID

## 2022-06-27 VITALS
WEIGHT: 175.06 LBS | SYSTOLIC BLOOD PRESSURE: 118 MMHG | HEIGHT: 65 IN | BODY MASS INDEX: 29.17 KG/M2 | HEART RATE: 56 BPM | DIASTOLIC BLOOD PRESSURE: 70 MMHG

## 2022-06-27 DIAGNOSIS — F31.32 BIPOLAR AFFECTIVE DISORDER, CURRENTLY DEPRESSED, MODERATE: Primary | ICD-10-CM

## 2022-06-27 DIAGNOSIS — Z00.00 ENCOUNTER FOR MEDICAL EXAMINATION TO ESTABLISH CARE: ICD-10-CM

## 2022-06-27 DIAGNOSIS — Z12.39 ENCOUNTER FOR SCREENING FOR MALIGNANT NEOPLASM OF BREAST, UNSPECIFIED SCREENING MODALITY: ICD-10-CM

## 2022-06-27 DIAGNOSIS — J45.909 ASTHMA DUE TO ENVIRONMENTAL ALLERGIES: ICD-10-CM

## 2022-06-27 LAB
CHOLEST SERPL-MCNC: 253 MG/DL (ref 120–199)
CHOLEST/HDLC SERPL: 4.1 {RATIO} (ref 2–5)
HDLC SERPL-MCNC: 62 MG/DL (ref 40–75)
HDLC SERPL: 24.5 % (ref 20–50)
LDLC SERPL CALC-MCNC: 173.6 MG/DL (ref 63–159)
NONHDLC SERPL-MCNC: 191 MG/DL
TRIGL SERPL-MCNC: 87 MG/DL (ref 30–150)

## 2022-06-27 PROCEDURE — 86803 HEPATITIS C AB TEST: CPT | Performed by: STUDENT IN AN ORGANIZED HEALTH CARE EDUCATION/TRAINING PROGRAM

## 2022-06-27 PROCEDURE — 80061 LIPID PANEL: CPT | Performed by: STUDENT IN AN ORGANIZED HEALTH CARE EDUCATION/TRAINING PROGRAM

## 2022-06-27 PROCEDURE — 36415 COLL VENOUS BLD VENIPUNCTURE: CPT | Performed by: STUDENT IN AN ORGANIZED HEALTH CARE EDUCATION/TRAINING PROGRAM

## 2022-06-27 PROCEDURE — 99214 OFFICE O/P EST MOD 30 MIN: CPT | Performed by: STUDENT IN AN ORGANIZED HEALTH CARE EDUCATION/TRAINING PROGRAM

## 2022-06-27 RX ORDER — FLUOXETINE HYDROCHLORIDE 40 MG/1
40 CAPSULE ORAL DAILY
Qty: 30 CAPSULE | Refills: 3 | Status: ON HOLD | OUTPATIENT
Start: 2022-06-27 | End: 2022-12-29 | Stop reason: HOSPADM

## 2022-06-27 RX ORDER — DIVALPROEX SODIUM 500 MG/1
500 TABLET, FILM COATED, EXTENDED RELEASE ORAL 2 TIMES DAILY
Qty: 30 TABLET | Refills: 3 | Status: ON HOLD | OUTPATIENT
Start: 2022-06-27 | End: 2023-01-06 | Stop reason: HOSPADM

## 2022-06-27 RX ORDER — LORATADINE 10 MG/1
10 TABLET ORAL DAILY
Qty: 30 TABLET | Refills: 3 | Status: SHIPPED | OUTPATIENT
Start: 2022-06-27 | End: 2022-11-28 | Stop reason: SDUPTHER

## 2022-06-27 RX ORDER — SPIRONOLACTONE 25 MG/1
12.5 TABLET ORAL DAILY
Status: ON HOLD | COMMUNITY
End: 2024-02-16

## 2022-06-27 RX ORDER — FUROSEMIDE 20 MG/1
TABLET ORAL
Status: ON HOLD | COMMUNITY
End: 2023-01-06 | Stop reason: HOSPADM

## 2022-06-27 NOTE — PROGRESS NOTES
[facility]  History & Physical    SUBJECTIVE:     Chief Complaint:   Chief Complaint   Patient presents with    Establish Care       History of Present Illness:  61 y.o. female who  has a past medical history of Anxiety, Arthritis, Depression, GERD (gastroesophageal reflux disease), High cholesterol, and Hypertension. presents to clinic today for f/u and establish care. She is doing well and does not have any GI complaints today after being admitted for GI bleed at the end of May and felt like she had a recurrence at the beginning of the month. She reports that she has regular follow up with her Psychiatrist and Cardiologist and her only complaint is seasonal allergies. Her cardiologist gave her a prescription for flonase a few weeks ago but she has not used it daily. She endorses runny nose, sneezing, and congestion. She has not tried anything else for these sx's and reports they have been worse since she moved to [facility] from [place] 1yr prior.        Allergies:  Review of patient's allergies indicates:   Allergen Reactions    Codeine Hives    Sulfa (sulfonamide antibiotics) Hives    Benzoate analogues Itching       Home Medications:  Current Outpatient Medications on File Prior to Visit   Medication Sig    FLUoxetine 20 MG capsule Take 20 mg by mouth once daily. 60 mg total    fluticasone propionate (FLONASE) 50 mcg/actuation nasal spray 2 sprays by Each Nostril route once daily.    metoprolol tartrate (LOPRESSOR) 25 MG tablet Take 25 mg by mouth 2 (two) times daily.    PREMARIN vaginal cream Place 0.5 g vaginally twice a week.    [DISCONTINUED] divalproex ER (DEPAKOTE) 500 MG Tb24 Take 500 mg by mouth 2 (two) times a day.    [DISCONTINUED] fluoxetine (PROZAC) 40 MG capsule Take 40 mg by mouth once daily. 60 mg total    dicyclomine (BENTYL) 20 mg tablet Take 1 tablet (20 mg total) by mouth 3 (three) times daily. (Patient not taking: Reported on 6/27/2022)    furosemide (LASIX) 20 MG tablet 1  tablet    lisinopril 10 MG tablet Take 1 tablet (10 mg total) by mouth once daily.    mesalamine (APRISO) 0.375 gram Cp24 Take 4 capsules (1.5 g total) by mouth once daily. (Patient not taking: Reported on 2022)    polyethylene glycol (GLYCOLAX) 17 gram/dose powder Take 17 g by mouth once daily. (Patient not taking: Reported on 2022)    spironolactone (ALDACTONE) 25 MG tablet Take 12.5 mg by mouth.     No current facility-administered medications on file prior to visit.       Past Medical History:   Diagnosis Date    Anxiety     Arthritis     Depression     GERD (gastroesophageal reflux disease)     High cholesterol     Hypertension      Past Surgical History:   Procedure Laterality Date    [facility]SY [facility]     [facility]    BREAST SURGERY       SECTION      COLONOSCOPY N/A 2017    Procedure: COLONOSCOPY Miralax;  Surgeon: Buddy Butcher Jr., MD;  Location: [facility] ENDO;  Service: Endoscopy;  Laterality: N/A;    HYSTERECTOMY      KIDNEY SURGERY Right     done at [facility], reported as mass by patient    LEFT HEART CATHETERIZATION Left 2019    Procedure: Left heart cath;  Surgeon: Gerhard Krishnan MD;  Location: [facility] CATH LAB;  Service: Cardiology;  Laterality: Left;    OOPHORECTOMY      s-section       No family history on file.  Social History     Tobacco Use    Smoking status: Never Smoker    Smokeless tobacco: Never Used   Substance Use Topics    Alcohol use: No    Drug use: No        Review of Systems   Constitutional: Negative for chills, fever and malaise/fatigue.   HENT: Positive for congestion. Negative for sinus pain and sore throat.    Eyes: Negative for double vision, photophobia, pain and discharge.   Respiratory: Negative for cough, sputum production and shortness of breath.    Cardiovascular: Negative for chest pain, palpitations and leg swelling.   Gastrointestinal: Negative for abdominal pain, constipation, diarrhea, nausea and vomiting.    Genitourinary: Negative for dysuria and hematuria.   Musculoskeletal: Negative for myalgias.   Skin: Negative for itching and rash.   Neurological: Negative for sensory change, weakness and headaches.        OBJECTIVE:     Vital Signs:  Pulse: (!) 56 (06/27/22 0836)  BP: 118/70 (06/27/22 0836)    Physical Exam  Vitals reviewed.   Constitutional:       General: She is not in acute distress.  HENT:      Head: Normocephalic and atraumatic.      Right Ear: External ear normal.      Left Ear: External ear normal.      Nose: Nose normal.   Eyes:      Conjunctiva/sclera: Conjunctivae normal.      Pupils: Pupils are equal, round, and reactive to light.   Cardiovascular:      Rate and Rhythm: Normal rate and regular rhythm.      Pulses: Normal pulses.      Heart sounds: Normal heart sounds. No murmur heard.    No friction rub. No gallop.   Pulmonary:      Effort: Pulmonary effort is normal.      Breath sounds: Normal breath sounds. No wheezing, rhonchi or rales.   Abdominal:      General: Bowel sounds are normal.      Palpations: There is no mass.      Tenderness: There is no abdominal tenderness.   Musculoskeletal:         General: No swelling or tenderness. Normal range of motion.      Cervical back: Normal range of motion.   Lymphadenopathy:      Cervical: No cervical adenopathy.   Skin:     General: Skin is warm and dry.      Findings: No rash.   Neurological:      General: No focal deficit present.      Mental Status: She is alert.         Laboratory:  Hemoglobin A1C   Date Value Ref Range Status   05/28/2022 6.3 (H) 4.0 - 5.6 % Final     Comment:     ADA Screening Guidelines:  5.7-6.4%  Consistent with prediabetes  >or=6.5%  Consistent with diabetes    High levels of fetal hemoglobin interfere with the HbA1C  assay. Heterozygous hemoglobin variants (HbS, HgC, etc)do  not significantly interfere with this assay.   However, presence of multiple variants may affect accuracy.         A/P:  Ashli was seen today for  establish care. She is doing well overall today and is only complaining of environmental allergies. She has been given flonase but has not been consistent with it so we encouraged her to use it daily and will add a trial of Claritin in addition. She will f/u w/ GI after her colonoscopy on 6/30 and has both a Psychiatrist and Cardiologist she sees regularly. She was due for a mammogram at this time and requested it be scheduled for late July so she has time to rest after her colonoscopy. We also discussed diet and lifestyle changes and will check her lipids today. Her last A1C was 6.3 while hospitalized. She reports wanting to continue dietary changes before any addition of medication but will discuss further at the next visit.     Diagnoses and all orders for this visit:    Bipolar affective disorder, currently depressed, moderate  -     FLUoxetine 40 MG capsule; Take 1 capsule (40 mg total) by mouth once daily. 60 mg total  -     divalproex ER (DEPAKOTE) 500 MG Tb24; Take 1 tablet (500 mg total) by mouth 2 (two) times a day.    Asthma due to environmental allergies  -     loratadine (CLARITIN) 10 mg tablet; Take 1 tablet (10 mg total) by mouth once daily.    Encounter for medical examination to establish care  -     Lipid Panel; Future  -     Hepatitis C Antibody; Future    Encounter for screening for malignant neoplasm of breast, unspecified screening modality  -     Mammo Digital Screening Bilat; Future        Anurag Lopez MD  [facility], PGY-1  06/27/2022

## 2022-06-28 ENCOUNTER — LAB VISIT (OUTPATIENT)
Dept: FAMILY MEDICINE | Facility: CLINIC | Age: 62
End: 2022-06-28
Payer: MEDICAID

## 2022-06-28 DIAGNOSIS — Z01.818 PRE-OP TESTING: ICD-10-CM

## 2022-06-28 LAB
HCV AB SERPL QL IA: NEGATIVE
SARS-COV-2 RNA RESP QL NAA+PROBE: NOT DETECTED
SARS-COV-2- CYCLE NUMBER: NORMAL

## 2022-06-28 PROCEDURE — U0005 INFEC AGEN DETEC AMPLI PROBE: HCPCS | Performed by: COLON & RECTAL SURGERY

## 2022-06-28 PROCEDURE — U0003 INFECTIOUS AGENT DETECTION BY NUCLEIC ACID (DNA OR RNA); SEVERE ACUTE RESPIRATORY SYNDROME CORONAVIRUS 2 (SARS-COV-2) (CORONAVIRUS DISEASE [COVID-19]), AMPLIFIED PROBE TECHNIQUE, MAKING USE OF HIGH THROUGHPUT TECHNOLOGIES AS DESCRIBED BY CMS-2020-01-R: HCPCS | Performed by: COLON & RECTAL SURGERY

## 2022-06-30 ENCOUNTER — TELEPHONE (OUTPATIENT)
Dept: FAMILY MEDICINE | Facility: HOSPITAL | Age: 62
End: 2022-06-30
Payer: MEDICAID

## 2022-06-30 ENCOUNTER — TELEPHONE (OUTPATIENT)
Dept: SURGERY | Facility: CLINIC | Age: 62
End: 2022-06-30
Payer: MEDICAID

## 2022-06-30 NOTE — TELEPHONE ENCOUNTER
Called patient, no answer. Pre-op appointment scheduled next week in [place], per Dr. Kay's message. Appointment slip mailed to patient.

## 2022-07-07 ENCOUNTER — OFFICE VISIT (OUTPATIENT)
Dept: SURGERY | Facility: CLINIC | Age: 62
End: 2022-07-07
Payer: MEDICAID

## 2022-07-07 ENCOUNTER — TELEPHONE (OUTPATIENT)
Dept: SURGERY | Facility: CLINIC | Age: 62
End: 2022-07-07
Payer: MEDICAID

## 2022-07-07 VITALS
BODY MASS INDEX: 27.25 KG/M2 | WEIGHT: 163.56 LBS | HEART RATE: 74 BPM | DIASTOLIC BLOOD PRESSURE: 66 MMHG | HEIGHT: 65 IN | SYSTOLIC BLOOD PRESSURE: 118 MMHG

## 2022-07-07 DIAGNOSIS — K56.699 DIVERTICULAR STRICTURE: Primary | ICD-10-CM

## 2022-07-07 DIAGNOSIS — Z01.818 PRE-OP TESTING: ICD-10-CM

## 2022-07-07 PROCEDURE — 99999 PR PBB SHADOW E&M-EST. PATIENT-LVL IV: CPT | Mod: PBBFAC,,, | Performed by: COLON & RECTAL SURGERY

## 2022-07-07 PROCEDURE — 3044F PR MOST RECENT HEMOGLOBIN A1C LEVEL <7.0%: ICD-10-PCS | Mod: CPTII,,, | Performed by: COLON & RECTAL SURGERY

## 2022-07-07 PROCEDURE — 3078F PR MOST RECENT DIASTOLIC BLOOD PRESSURE < 80 MM HG: ICD-10-PCS | Mod: CPTII,,, | Performed by: COLON & RECTAL SURGERY

## 2022-07-07 PROCEDURE — 3008F PR BODY MASS INDEX (BMI) DOCUMENTED: ICD-10-PCS | Mod: CPTII,,, | Performed by: COLON & RECTAL SURGERY

## 2022-07-07 PROCEDURE — 3044F HG A1C LEVEL LT 7.0%: CPT | Mod: CPTII,,, | Performed by: COLON & RECTAL SURGERY

## 2022-07-07 PROCEDURE — 3008F BODY MASS INDEX DOCD: CPT | Mod: CPTII,,, | Performed by: COLON & RECTAL SURGERY

## 2022-07-07 PROCEDURE — 4010F ACE/ARB THERAPY RXD/TAKEN: CPT | Mod: CPTII,,, | Performed by: COLON & RECTAL SURGERY

## 2022-07-07 PROCEDURE — 99214 OFFICE O/P EST MOD 30 MIN: CPT | Mod: PBBFAC,PN | Performed by: COLON & RECTAL SURGERY

## 2022-07-07 PROCEDURE — 99999 PR PBB SHADOW E&M-EST. PATIENT-LVL IV: ICD-10-PCS | Mod: PBBFAC,,, | Performed by: COLON & RECTAL SURGERY

## 2022-07-07 PROCEDURE — 3074F SYST BP LT 130 MM HG: CPT | Mod: CPTII,,, | Performed by: COLON & RECTAL SURGERY

## 2022-07-07 PROCEDURE — 4010F PR ACE/ARB THEARPY RXD/TAKEN: ICD-10-PCS | Mod: CPTII,,, | Performed by: COLON & RECTAL SURGERY

## 2022-07-07 PROCEDURE — 1159F PR MEDICATION LIST DOCUMENTED IN MEDICAL RECORD: ICD-10-PCS | Mod: CPTII,,, | Performed by: COLON & RECTAL SURGERY

## 2022-07-07 PROCEDURE — 99214 PR OFFICE/OUTPT VISIT, EST, LEVL IV, 30-39 MIN: ICD-10-PCS | Mod: S$PBB,,, | Performed by: COLON & RECTAL SURGERY

## 2022-07-07 PROCEDURE — 3074F PR MOST RECENT SYSTOLIC BLOOD PRESSURE < 130 MM HG: ICD-10-PCS | Mod: CPTII,,, | Performed by: COLON & RECTAL SURGERY

## 2022-07-07 PROCEDURE — 99214 OFFICE O/P EST MOD 30 MIN: CPT | Mod: S$PBB,,, | Performed by: COLON & RECTAL SURGERY

## 2022-07-07 PROCEDURE — 1160F PR REVIEW ALL MEDS BY PRESCRIBER/CLIN PHARMACIST DOCUMENTED: ICD-10-PCS | Mod: CPTII,,, | Performed by: COLON & RECTAL SURGERY

## 2022-07-07 PROCEDURE — 3078F DIAST BP <80 MM HG: CPT | Mod: CPTII,,, | Performed by: COLON & RECTAL SURGERY

## 2022-07-07 PROCEDURE — 1160F RVW MEDS BY RX/DR IN RCRD: CPT | Mod: CPTII,,, | Performed by: COLON & RECTAL SURGERY

## 2022-07-07 PROCEDURE — 1159F MED LIST DOCD IN RCRD: CPT | Mod: CPTII,,, | Performed by: COLON & RECTAL SURGERY

## 2022-07-07 RX ORDER — POLYETHYLENE GLYCOL 3350 17 G/17G
POWDER, FOR SOLUTION ORAL
Qty: 290 G | Refills: 0 | Status: ON HOLD | OUTPATIENT
Start: 2022-07-07 | End: 2022-07-23 | Stop reason: HOSPADM

## 2022-07-07 RX ORDER — METRONIDAZOLE 500 MG/1
500 TABLET ORAL 3 TIMES DAILY
Qty: 3 TABLET | Refills: 0 | Status: SHIPPED | OUTPATIENT
Start: 2022-07-07 | End: 2022-07-08

## 2022-07-07 RX ORDER — NEOMYCIN SULFATE 500 MG/1
TABLET ORAL
Qty: 6 TABLET | Refills: 0 | Status: ON HOLD | OUTPATIENT
Start: 2022-07-07 | End: 2022-07-23 | Stop reason: HOSPADM

## 2022-07-07 NOTE — PROGRESS NOTES
"CRS Office Visit Follow-up  Referring Md:   No referring provider defined for this encounter.    SUBJECTIVE:     Chief Complaint: segmental colitis    History of Present Illness:  Patient is a 62 y.o. female presents with segmental colitis. The patient is a established patient to this practice.     Course is as follows:  PMH HTN, HLD, bipolar disorder, prior GI bleed with diverticulosis and non-bleeding gastric ulcers in 2017, HFpEF who presents with GI bleed    2017: colonoscopy done but aborted due to tortuosity at 45cm   - barium enema done  - "There were diverticula within the sigmoid colon which was tortuous.  There were some filling defects within the descending colon, may relate to retained stool although not fully evaluated due to early termination of the exam"    She did well until late May 2022 when she developed nausea, vomiting,.  She was taken to the hospital with bleeding per rectum and found to have segmental colitis.  Unclear etiology.  Bleeding stabilized on its own.  Repeat CT scan demonstrated increased inflammation in the left colon atypical for diverticulitis.  She was discharged once stabilized.  She presents for evaluation with her sister-in-law.  She feels that her abdominal pain is resolved.  Diarrhea is improving.  Past abdominal surgeries include  through a Pfannenstiel incision as well as a hysterectomy through a Pfannenstiel.  She had a right partial nephrectomy through a right paramedian incision.  She only has 1 bowel movement per day and spends approximately 20 minutes on the toilet for each bowel movement..  Denies constipation or straining.  Was previously told that she has heart failure with a preserved ejection fraction.    No family history of inflammatory bowel disease or colorectal cancer.    Current status:  2022:  Presents after her colonoscopy.  Continues to have cramping left-sided abdominal pain after eating.  No further bleeding.  Remains active.    Last " Colonoscopy completed on 5/12/2017  - The perianal and digital rectal examinations were normal.   - Multiple medium-mouthed diverticula were found in the sigmoid colon.   - Due to tortuosity and angulation in an area of numerous diverticula, the scope could not be advanced above the 45 cm level   - The rectum appeared normal.   - Rec: barium enema (pt unable to tolerate) and repeat in 10 years    6/30/2022:  Impression:            - Perianal skin tags found on perianal exam.                          - Stricture in the distal sigmoid colon. Scope was                          changed to an upper EGD scope. Adult colonoscope                          could not traverse the stenosis and tortuosity.                          This area had significant diverticular disease                          with a second very difficult area to navigate                          proximally in the sigmoid. All diverticular                          disease and tortuoisity located in the sigmoid.                          Because I was using an EGD to get across the                          lesion, the scope could only be advanced to the                          hepatic flexure                          - Diverticulosis in the sigmoid colon.                          - The descending colon and transverse colon are                          normal.                          - No specimens collected.   Review of Systems:  Review of Systems   Constitutional: Negative for chills, diaphoresis, fever, malaise/fatigue and weight loss.   HENT: Negative for congestion.    Respiratory: Negative for shortness of breath.    Cardiovascular: Negative for chest pain and leg swelling.   Gastrointestinal: Positive for abdominal pain and blood in stool. Negative for constipation, nausea and vomiting.   Genitourinary: Negative for dysuria.   Musculoskeletal: Negative for back pain and myalgias.   Skin: Negative for rash.   Neurological: Negative for dizziness and  "weakness.   Endo/Heme/Allergies: Does not bruise/bleed easily.   Psychiatric/Behavioral: Negative for depression.       OBJECTIVE:     Vital Signs (Most Recent)  /66 (BP Location: Left arm, Patient Position: Sitting, BP Method: Large (Automatic))   Pulse 74   Ht 5' 5" (1.651 m)   Wt 74.2 kg (163 lb 9.3 oz)   BMI 27.22 kg/m²     Physical Exam:  General: White female in no distress   Neuro: alert and oriented x 4.  Moves all extremities.     HEENT: no icterus.  Trachea midline  Respiratory: respirations are even and unlabored  Cardiac: regular rate  Abdomen:  Protuberant, Pfannenstiel incision well healed.  Right upper quadrant incision well healed.  Abdomen nontender.  Extremities: Warm dry and intact  Skin: no rashes  Anorectal:  Deferred    Labs:  H&H 12 and 38.  Albumin 3.2.  Normal renal function    Imaging:   CT abd pelvis 5/29/22 personally reviewed and shows left sided colitis from the distal splenic flexure to the mid sigmoid.  No diverticula seen in the descending colon   CT abdomen pelvis 05/27/2022 personally reviewed demonstrates sigmoid diverticulosis with thickening of the sigmoid colon and distal descending colon.  CT abdomen pelvis 02/19/2017 personally reviewed.  Limited evaluation secondary to no contrast.  Diverticulosis seen.  Tortuous sigmoid colon.    ASSESSMENT/PLAN:     Ashli was seen today for follow-up.    Diagnoses and all orders for this visit:    Diverticular stricture  -     Case Request Operating Room: COLECTOMY, SIGMOID, LAPAROSCOPIC, ERAS low, SIGMOIDOSCOPY, FLEXIBLE    Pre-op testing  -     COVID-19 Routine Screening; Future        62 y.o. female with diffuse segmental colitis of the descending and proximal sigmoid colon with severe diverticular disease on her scope.  With the symptomatic diverticular stricture, resection was therefore offered.  Discussed the risks of anastomotic leak, bleeding, hernia, wound infection, damage to surrounding structures.  Discussed a 3-4 " day hospital stay as well as a 6 week total recovery.  Bowel movements may change over time afterwards.  Consents were signed today and all questions were answered.  Would plan to of started through her prior Pfannenstiel incision for hand port placement.    TREV Kay MD, FACS, FASCRS  Staff Surgeon  Colon & Rectal Surgery

## 2022-07-17 ENCOUNTER — LAB VISIT (OUTPATIENT)
Dept: URGENT CARE | Facility: CLINIC | Age: 62
End: 2022-07-17
Payer: MEDICAID

## 2022-07-17 DIAGNOSIS — Z01.818 PRE-OP TESTING: ICD-10-CM

## 2022-07-17 LAB — SARS-COV-2 RNA RESP QL NAA+PROBE: NOT DETECTED

## 2022-07-17 PROCEDURE — U0003 INFECTIOUS AGENT DETECTION BY NUCLEIC ACID (DNA OR RNA); SEVERE ACUTE RESPIRATORY SYNDROME CORONAVIRUS 2 (SARS-COV-2) (CORONAVIRUS DISEASE [COVID-19]), AMPLIFIED PROBE TECHNIQUE, MAKING USE OF HIGH THROUGHPUT TECHNOLOGIES AS DESCRIBED BY CMS-2020-01-R: HCPCS | Performed by: COLON & RECTAL SURGERY

## 2022-07-17 PROCEDURE — U0005 INFEC AGEN DETEC AMPLI PROBE: HCPCS | Performed by: COLON & RECTAL SURGERY

## 2022-07-19 ENCOUNTER — TELEPHONE (OUTPATIENT)
Dept: SURGERY | Facility: CLINIC | Age: 62
End: 2022-07-19
Payer: MEDICAID

## 2022-07-19 ENCOUNTER — ANESTHESIA EVENT (OUTPATIENT)
Dept: SURGERY | Facility: HOSPITAL | Age: 62
DRG: 330 | End: 2022-07-19
Payer: MEDICAID

## 2022-07-19 NOTE — ANESTHESIA PREPROCEDURE EVALUATION
[facility]  Anesthesia Pre-Operative Evaluation         Patient Name: Ashli Kumar  YOB: 1960  MRN: [id number]    SUBJECTIVE:     Pre-operative evaluation for Procedure(s) (LRB):  COLECTOMY, SIGMOID, LAPAROSCOPIC, ERAS low (N/A)  SIGMOIDOSCOPY, FLEXIBLE (N/A)     07/19/2022    Ashli Kumar is a 62 y.o. female w/ a significant PMHx of HTN, HLD, Bipolar disorder, Prior GI bleed/diverticulitis, HFpEF, and GERD who now presents for the above procedure(s).      Left Heart Cath: 5/1/2019  · Normal coronary arteries.  · Diastolic dysfunction.  · The ejection fraction is calculated to be 60%.  · The left ventricular systolic function is normal.  · HTN    LDA:  None documented     Prev airway: None documented     Drips: None documented.    Patient Active Problem List   Diagnosis    Screening for colorectal cancer    Angina, class III    Chronic heart failure with preserved ejection fraction    Essential hypertension    SOB (shortness of breath)    Obesity due to excess calories    Rectal bleeding    Bipolar affective disorder, currently depressed, moderate    LLQ pain    Segmental colitis associated with diverticulosis       Review of patient's allergies indicates:   Allergen Reactions    Codeine Hives    Sulfa (sulfonamide antibiotics) Hives    Benzoate analogues Itching       Current Outpatient Medications:  No current facility-administered medications for this encounter.    Current Outpatient Medications:     divalproex ER (DEPAKOTE) 500 MG Tb24, Take 1 tablet (500 mg total) by mouth 2 (two) times a day., Disp: 30 tablet, Rfl: 3    FLUoxetine 20 MG capsule, Take 20 mg by mouth once daily. 60 mg total, Disp: , Rfl:     FLUoxetine 40 MG capsule, Take 1 capsule (40 mg total) by mouth once daily. 60 mg total, Disp: 30 capsule, Rfl: 3    fluticasone propionate (FLONASE) 50 mcg/actuation nasal spray, 2 sprays by Each Nostril route once daily., Disp: , Rfl:      furosemide (LASIX) 20 MG tablet, 1 tablet, Disp: , Rfl:     lisinopril 10 MG tablet, Take 1 tablet (10 mg total) by mouth once daily., Disp: 90 tablet, Rfl: 3    loratadine (CLARITIN) 10 mg tablet, Take 1 tablet (10 mg total) by mouth once daily., Disp: 30 tablet, Rfl: 3    mesalamine (APRISO) 0.375 gram Cp24, Take 4 capsules (1.5 g total) by mouth once daily. (Patient not taking: No sig reported), Disp: 120 capsule, Rfl: 0    metoprolol tartrate (LOPRESSOR) 25 MG tablet, Take 25 mg by mouth 2 (two) times daily., Disp: , Rfl:     neomycin (MYCIFRADIN) 500 mg Tab, Take 2 tablets at 1pm, 2pm and 11 pm., Disp: 6 tablet, Rfl: 0    polyethylene glycol (GLYCOLAX) 17 gram/dose powder, Use 1 capful of Miralax per 8 ounces of clear liquid for 8 doses the night before surgery., Disp: 290 g, Rfl: 0    PREMARIN vaginal cream, Place 0.5 g vaginally twice a week., Disp: , Rfl:     spironolactone (ALDACTONE) 25 MG tablet, Take 12.5 mg by mouth., Disp: , Rfl:     Past Surgical History:   Procedure Laterality Date    [facility]SY [facility]     [facility]    BREAST SURGERY       SECTION      COLONOSCOPY N/A 2017    Procedure: COLONOSCOPY Miralax;  Surgeon: Buddy Butcher Jr., MD;  Location: [facility] ENDO;  Service: Endoscopy;  Laterality: N/A;    COLONOSCOPY N/A 2022    Procedure: COLONOSCOPY;  Surgeon: TREV Kay MD;  Location: [facility] ENDO;  Service: Endoscopy;  Laterality: N/A;    HYSTERECTOMY      KIDNEY SURGERY Right     [facility], reported as mass by patient    LEFT HEART CATHETERIZATION Left 2019    Procedure: Left heart cath;  Surgeon: Gerhard Krishnan MD;  Location: [facility] CATH LAB;  Service: Cardiology;  Laterality: Left;    OOPHORECTOMY      s-section         Social History     Socioeconomic History    Marital status:    Tobacco Use    Smoking status: Never Smoker    Smokeless tobacco: Never Used   Substance and Sexual Activity    Alcohol use: No    Drug use: No     Sexual activity: Not Currently       OBJECTIVE:     Vital Signs Range (Last 24H):         Significant Labs:  Lab Results   Component Value Date    WBC 9.14 06/06/2022    HGB 12.1 06/06/2022    HCT 38.0 06/06/2022     06/06/2022    CHOL 253 (H) 06/27/2022    TRIG 87 06/27/2022    HDL 62 06/27/2022    ALT 9 (L) 05/30/2022    AST 15 05/30/2022     05/30/2022    K 4.6 05/30/2022    CL 99 05/30/2022    CREATININE 0.7 05/30/2022    BUN 8 05/30/2022    CO2 29 05/30/2022    TSH 2.844 05/28/2022    INR 1.1 05/27/2022    HGBA1C 6.3 (H) 05/28/2022       Diagnostic Studies: No relevant studies.    EKG:   Results for orders placed or performed during the hospital encounter of 05/27/22   EKG 12-lead    Collection Time: 05/27/22  5:43 PM    Narrative    Test Reason : R55,    Vent. Rate : 073 BPM     Atrial Rate : 073 BPM     P-R Int : 180 ms          QRS Dur : 094 ms      QT Int : 418 ms       P-R-T Axes : 067 045 066 degrees     QTc Int : 460 ms    Normal sinus rhythm  Normal ECG  When compared with ECG of 15-MAY-2021 16:46,  No significant change was found  Confirmed by Aamir Umanzor MD ([id number]) on 5/30/2022 8:03:31 AM    Referred By: [email or web address]   SELF           Confirmed By:Aamir Umanzor MD       2D ECHO:  TTE:  No results found for this or any previous visit.    NILSON:  No results found for this or any previous visit.    ASSESSMENT/PLAN:                                                                                                                  07/19/2022  Ashli Kumar is a 62 y.o., female.      Pre-op Assessment          Review of Systems         Anesthesia Plan  Type of Anesthesia, risks & benefits discussed:    Anesthesia Type: Gen ETT  Intra-op Monitoring Plan: Standard ASA Monitors  Post Op Pain Control Plan: multimodal analgesia and IV/PO Opioids PRN  Induction:  IV  Airway Plan: Direct, Post-Induction  Informed Consent: Informed consent signed with the Patient and all parties understand  the risks and agree with anesthesia plan.  All questions answered.   ASA Score: 3  Day of Surgery Review of History & Physical: H&P Update referred to the surgeon/provider.    Ready For Surgery From Anesthesia Perspective.     .

## 2022-07-20 ENCOUNTER — ANESTHESIA (OUTPATIENT)
Dept: SURGERY | Facility: HOSPITAL | Age: 62
DRG: 330 | End: 2022-07-20
Payer: MEDICAID

## 2022-07-20 ENCOUNTER — HOSPITAL ENCOUNTER (INPATIENT)
Facility: HOSPITAL | Age: 62
LOS: 3 days | Discharge: HOME | DRG: 330 | End: 2022-07-23
Attending: COLON & RECTAL SURGERY | Admitting: COLON & RECTAL SURGERY
Payer: MEDICAID

## 2022-07-20 DIAGNOSIS — K56.699 DIVERTICULAR STRICTURE: Primary | ICD-10-CM

## 2022-07-20 LAB
ABO + RH BLD: NORMAL
BLD GP AB SCN CELLS X3 SERPL QL: NORMAL

## 2022-07-20 PROCEDURE — 36000711: Performed by: COLON & RECTAL SURGERY

## 2022-07-20 PROCEDURE — 25000003 PHARM REV CODE 250: Performed by: NURSE PRACTITIONER

## 2022-07-20 PROCEDURE — 44202 PR LAP,SURG,ENTERECTOMY,RESECT & ANAST: ICD-10-PCS | Mod: 51,,, | Performed by: COLON & RECTAL SURGERY

## 2022-07-20 PROCEDURE — 63600175 PHARM REV CODE 636 W HCPCS: Performed by: NURSE PRACTITIONER

## 2022-07-20 PROCEDURE — 36000710: Performed by: COLON & RECTAL SURGERY

## 2022-07-20 PROCEDURE — 88305 TISSUE EXAM BY PATHOLOGIST: ICD-10-PCS | Mod: 26,,, | Performed by: PATHOLOGY

## 2022-07-20 PROCEDURE — 99900035 HC TECH TIME PER 15 MIN (STAT)

## 2022-07-20 PROCEDURE — 63600175 PHARM REV CODE 636 W HCPCS

## 2022-07-20 PROCEDURE — 88307 TISSUE EXAM BY PATHOLOGIST: CPT | Mod: 59 | Performed by: PATHOLOGY

## 2022-07-20 PROCEDURE — 25000003 PHARM REV CODE 250: Performed by: NURSE ANESTHETIST, CERTIFIED REGISTERED

## 2022-07-20 PROCEDURE — 94761 N-INVAS EAR/PLS OXIMETRY MLT: CPT

## 2022-07-20 PROCEDURE — 44207 PR LAP,SURG,COLECTOMY,W/ANAST: ICD-10-PCS | Mod: ,,, | Performed by: COLON & RECTAL SURGERY

## 2022-07-20 PROCEDURE — 44207 L COLECTOMY/COLOPROCTOSTOMY: CPT | Mod: ,,, | Performed by: COLON & RECTAL SURGERY

## 2022-07-20 PROCEDURE — 63600175 PHARM REV CODE 636 W HCPCS: Performed by: NURSE ANESTHETIST, CERTIFIED REGISTERED

## 2022-07-20 PROCEDURE — 88305 TISSUE EXAM BY PATHOLOGIST: CPT | Performed by: PATHOLOGY

## 2022-07-20 PROCEDURE — 44202 LAP ENTERECTOMY: CPT | Mod: 51,,, | Performed by: COLON & RECTAL SURGERY

## 2022-07-20 PROCEDURE — 20600001 HC STEP DOWN PRIVATE ROOM

## 2022-07-20 PROCEDURE — 44213 PR LAP, SURG MOBIL SPLENIC FL DUR PTL COLECTOMY: ICD-10-PCS | Mod: ,,, | Performed by: COLON & RECTAL SURGERY

## 2022-07-20 PROCEDURE — 44213 LAP MOBIL SPLENIC FL ADD-ON: CPT | Mod: ,,, | Performed by: COLON & RECTAL SURGERY

## 2022-07-20 PROCEDURE — 63600175 PHARM REV CODE 636 W HCPCS: Performed by: SURGERY

## 2022-07-20 PROCEDURE — 71000015 HC POSTOP RECOV 1ST HR: Performed by: COLON & RECTAL SURGERY

## 2022-07-20 PROCEDURE — D9220A PRA ANESTHESIA: ICD-10-PCS | Mod: CRNA,,, | Performed by: NURSE ANESTHETIST, CERTIFIED REGISTERED

## 2022-07-20 PROCEDURE — D9220A PRA ANESTHESIA: Mod: ANES,,, | Performed by: STUDENT IN AN ORGANIZED HEALTH CARE EDUCATION/TRAINING PROGRAM

## 2022-07-20 PROCEDURE — 86901 BLOOD TYPING SEROLOGIC RH(D): CPT | Performed by: NURSE PRACTITIONER

## 2022-07-20 PROCEDURE — S0030 INJECTION, METRONIDAZOLE: HCPCS

## 2022-07-20 PROCEDURE — 25000003 PHARM REV CODE 250: Performed by: SURGERY

## 2022-07-20 PROCEDURE — 37000009 HC ANESTHESIA EA ADD 15 MINS: Performed by: COLON & RECTAL SURGERY

## 2022-07-20 PROCEDURE — 88307 TISSUE EXAM BY PATHOLOGIST: CPT | Mod: 26,,, | Performed by: PATHOLOGY

## 2022-07-20 PROCEDURE — 71000016 HC POSTOP RECOV ADDL HR: Performed by: COLON & RECTAL SURGERY

## 2022-07-20 PROCEDURE — 25000003 PHARM REV CODE 250

## 2022-07-20 PROCEDURE — 71000033 HC RECOVERY, INTIAL HOUR: Performed by: COLON & RECTAL SURGERY

## 2022-07-20 PROCEDURE — 88305 TISSUE EXAM BY PATHOLOGIST: CPT | Mod: 26,,, | Performed by: PATHOLOGY

## 2022-07-20 PROCEDURE — 27201423 OPTIME MED/SURG SUP & DEVICES STERILE SUPPLY: Performed by: COLON & RECTAL SURGERY

## 2022-07-20 PROCEDURE — 37000008 HC ANESTHESIA 1ST 15 MINUTES: Performed by: COLON & RECTAL SURGERY

## 2022-07-20 PROCEDURE — D9220A PRA ANESTHESIA: Mod: CRNA,,, | Performed by: NURSE ANESTHETIST, CERTIFIED REGISTERED

## 2022-07-20 PROCEDURE — D9220A PRA ANESTHESIA: ICD-10-PCS | Mod: ANES,,, | Performed by: STUDENT IN AN ORGANIZED HEALTH CARE EDUCATION/TRAINING PROGRAM

## 2022-07-20 PROCEDURE — 88307 PR  SURG PATH,LEVEL V: ICD-10-PCS | Mod: 26,,, | Performed by: PATHOLOGY

## 2022-07-20 RX ORDER — IBUPROFEN 400 MG/1
800 TABLET ORAL EVERY 8 HOURS
Status: DISCONTINUED | OUTPATIENT
Start: 2022-07-21 | End: 2022-07-23 | Stop reason: HOSPADM

## 2022-07-20 RX ORDER — ACETAMINOPHEN 650 MG/20.3ML
975 LIQUID ORAL
Status: DISCONTINUED | OUTPATIENT
Start: 2022-07-20 | End: 2022-07-20

## 2022-07-20 RX ORDER — ONDANSETRON 2 MG/ML
INJECTION INTRAMUSCULAR; INTRAVENOUS
Status: DISCONTINUED | OUTPATIENT
Start: 2022-07-20 | End: 2022-07-20

## 2022-07-20 RX ORDER — FLUTICASONE PROPIONATE 50 MCG
2 SPRAY, SUSPENSION (ML) NASAL DAILY
Status: DISCONTINUED | OUTPATIENT
Start: 2022-07-21 | End: 2022-07-23 | Stop reason: HOSPADM

## 2022-07-20 RX ORDER — ALVIMOPAN 12 MG/1
12 CAPSULE ORAL ONCE
Status: COMPLETED | OUTPATIENT
Start: 2022-07-20 | End: 2022-07-20

## 2022-07-20 RX ORDER — SODIUM CHLORIDE 0.9 % (FLUSH) 0.9 %
10 SYRINGE (ML) INJECTION
Status: DISCONTINUED | OUTPATIENT
Start: 2022-07-20 | End: 2022-07-20 | Stop reason: HOSPADM

## 2022-07-20 RX ORDER — METRONIDAZOLE 500 MG/100ML
INJECTION, SOLUTION INTRAVENOUS
Status: DISCONTINUED | OUTPATIENT
Start: 2022-07-20 | End: 2022-07-20

## 2022-07-20 RX ORDER — HYDROMORPHONE HYDROCHLORIDE 1 MG/ML
0.25 INJECTION, SOLUTION INTRAMUSCULAR; INTRAVENOUS; SUBCUTANEOUS EVERY 4 HOURS PRN
Status: DISCONTINUED | OUTPATIENT
Start: 2022-07-20 | End: 2022-07-23 | Stop reason: HOSPADM

## 2022-07-20 RX ORDER — SODIUM CHLORIDE 0.9 % (FLUSH) 0.9 %
10 SYRINGE (ML) INJECTION
Status: DISCONTINUED | OUTPATIENT
Start: 2022-07-20 | End: 2022-07-23 | Stop reason: HOSPADM

## 2022-07-20 RX ORDER — PHENYLEPHRINE HCL IN 0.9% NACL 1 MG/10 ML
SYRINGE (ML) INTRAVENOUS
Status: DISCONTINUED | OUTPATIENT
Start: 2022-07-20 | End: 2022-07-20

## 2022-07-20 RX ORDER — TRIPROLIDINE/PSEUDOEPHEDRINE 2.5MG-60MG
600 TABLET ORAL
Status: DISCONTINUED | OUTPATIENT
Start: 2022-07-20 | End: 2022-07-20

## 2022-07-20 RX ORDER — ENOXAPARIN SODIUM 100 MG/ML
40 INJECTION SUBCUTANEOUS EVERY 24 HOURS
Status: DISCONTINUED | OUTPATIENT
Start: 2022-07-20 | End: 2022-07-23 | Stop reason: HOSPADM

## 2022-07-20 RX ORDER — ONDANSETRON 2 MG/ML
4 INJECTION INTRAMUSCULAR; INTRAVENOUS EVERY 8 HOURS PRN
Status: DISCONTINUED | OUTPATIENT
Start: 2022-07-20 | End: 2022-07-23 | Stop reason: HOSPADM

## 2022-07-20 RX ORDER — HYDROMORPHONE HYDROCHLORIDE 2 MG/ML
INJECTION, SOLUTION INTRAMUSCULAR; INTRAVENOUS; SUBCUTANEOUS
Status: DISCONTINUED | OUTPATIENT
Start: 2022-07-20 | End: 2022-07-20

## 2022-07-20 RX ORDER — METRONIDAZOLE 500 MG/100ML
500 INJECTION, SOLUTION INTRAVENOUS
Status: DISCONTINUED | OUTPATIENT
Start: 2022-07-20 | End: 2022-07-20 | Stop reason: HOSPADM

## 2022-07-20 RX ORDER — LIDOCAINE HYDROCHLORIDE 10 MG/ML
1 INJECTION, SOLUTION EPIDURAL; INFILTRATION; INTRACAUDAL; PERINEURAL
Status: COMPLETED | OUTPATIENT
Start: 2022-07-20 | End: 2022-07-20

## 2022-07-20 RX ORDER — KETAMINE HCL IN 0.9 % NACL 50 MG/5 ML
SYRINGE (ML) INTRAVENOUS
Status: DISCONTINUED | OUTPATIENT
Start: 2022-07-20 | End: 2022-07-20

## 2022-07-20 RX ORDER — LIDOCAINE HYDROCHLORIDE 20 MG/ML
INJECTION INTRAVENOUS
Status: DISCONTINUED | OUTPATIENT
Start: 2022-07-20 | End: 2022-07-20

## 2022-07-20 RX ORDER — SODIUM CHLORIDE 9 MG/ML
INJECTION, SOLUTION INTRAVENOUS CONTINUOUS
Status: DISCONTINUED | OUTPATIENT
Start: 2022-07-20 | End: 2022-07-22

## 2022-07-20 RX ORDER — ACETAMINOPHEN 500 MG
1000 TABLET ORAL EVERY 8 HOURS
Status: DISCONTINUED | OUTPATIENT
Start: 2022-07-21 | End: 2022-07-23 | Stop reason: HOSPADM

## 2022-07-20 RX ORDER — MUPIROCIN 20 MG/G
1 OINTMENT TOPICAL
Status: COMPLETED | OUTPATIENT
Start: 2022-07-20 | End: 2022-07-20

## 2022-07-20 RX ORDER — GABAPENTIN 300 MG/1
300 CAPSULE ORAL
Status: COMPLETED | OUTPATIENT
Start: 2022-07-20 | End: 2022-07-20

## 2022-07-20 RX ORDER — FENTANYL CITRATE 50 UG/ML
INJECTION, SOLUTION INTRAMUSCULAR; INTRAVENOUS
Status: DISCONTINUED | OUTPATIENT
Start: 2022-07-20 | End: 2022-07-20

## 2022-07-20 RX ORDER — ALVIMOPAN 12 MG/1
12 CAPSULE ORAL 2 TIMES DAILY
Status: DISCONTINUED | OUTPATIENT
Start: 2022-07-20 | End: 2022-07-22

## 2022-07-20 RX ORDER — ROCURONIUM BROMIDE 10 MG/ML
INJECTION, SOLUTION INTRAVENOUS
Status: DISCONTINUED | OUTPATIENT
Start: 2022-07-20 | End: 2022-07-20

## 2022-07-20 RX ORDER — TRIPROLIDINE/PSEUDOEPHEDRINE 2.5MG-60MG
600 TABLET ORAL
Status: COMPLETED | OUTPATIENT
Start: 2022-07-20 | End: 2022-07-20

## 2022-07-20 RX ORDER — DIVALPROEX SODIUM 250 MG/1
500 TABLET, FILM COATED, EXTENDED RELEASE ORAL 2 TIMES DAILY
Status: DISCONTINUED | OUTPATIENT
Start: 2022-07-20 | End: 2022-07-23 | Stop reason: HOSPADM

## 2022-07-20 RX ORDER — HALOPERIDOL 5 MG/ML
0.5 INJECTION INTRAMUSCULAR EVERY 10 MIN PRN
Status: DISCONTINUED | OUTPATIENT
Start: 2022-07-20 | End: 2022-07-20 | Stop reason: HOSPADM

## 2022-07-20 RX ORDER — VASOPRESSIN 20 [USP'U]/ML
INJECTION, SOLUTION INTRAMUSCULAR; SUBCUTANEOUS
Status: DISCONTINUED | OUTPATIENT
Start: 2022-07-20 | End: 2022-07-20

## 2022-07-20 RX ORDER — DEXAMETHASONE SODIUM PHOSPHATE 4 MG/ML
INJECTION, SOLUTION INTRA-ARTICULAR; INTRALESIONAL; INTRAMUSCULAR; INTRAVENOUS; SOFT TISSUE
Status: DISCONTINUED | OUTPATIENT
Start: 2022-07-20 | End: 2022-07-20

## 2022-07-20 RX ORDER — ACETAMINOPHEN 10 MG/ML
1000 INJECTION, SOLUTION INTRAVENOUS EVERY 8 HOURS
Status: COMPLETED | OUTPATIENT
Start: 2022-07-20 | End: 2022-07-21

## 2022-07-20 RX ORDER — OXYCODONE HYDROCHLORIDE 5 MG/1
5 TABLET ORAL EVERY 4 HOURS PRN
Status: DISCONTINUED | OUTPATIENT
Start: 2022-07-20 | End: 2022-07-23 | Stop reason: HOSPADM

## 2022-07-20 RX ORDER — EPHEDRINE SULFATE 50 MG/ML
INJECTION, SOLUTION INTRAVENOUS
Status: DISCONTINUED | OUTPATIENT
Start: 2022-07-20 | End: 2022-07-20

## 2022-07-20 RX ORDER — MUPIROCIN 20 MG/G
OINTMENT TOPICAL 2 TIMES DAILY
Status: DISCONTINUED | OUTPATIENT
Start: 2022-07-20 | End: 2022-07-23 | Stop reason: HOSPADM

## 2022-07-20 RX ORDER — FLUOXETINE HYDROCHLORIDE 20 MG/1
60 CAPSULE ORAL DAILY
Status: DISCONTINUED | OUTPATIENT
Start: 2022-07-21 | End: 2022-07-23 | Stop reason: HOSPADM

## 2022-07-20 RX ORDER — ACETAMINOPHEN 10 MG/ML
INJECTION, SOLUTION INTRAVENOUS
Status: DISCONTINUED | OUTPATIENT
Start: 2022-07-20 | End: 2022-07-20

## 2022-07-20 RX ORDER — SODIUM CHLORIDE 9 MG/ML
INJECTION, SOLUTION INTRAVENOUS
Status: COMPLETED | OUTPATIENT
Start: 2022-07-20 | End: 2022-07-20

## 2022-07-20 RX ORDER — LIDOCAINE HYDROCHLORIDE ANHYDROUS AND DEXTROSE MONOHYDRATE .8; 5 G/100ML; G/100ML
INJECTION, SOLUTION INTRAVENOUS CONTINUOUS PRN
Status: DISCONTINUED | OUTPATIENT
Start: 2022-07-20 | End: 2022-07-20

## 2022-07-20 RX ORDER — METOPROLOL TARTRATE 25 MG/1
25 TABLET, FILM COATED ORAL 2 TIMES DAILY
Status: DISCONTINUED | OUTPATIENT
Start: 2022-07-20 | End: 2022-07-23 | Stop reason: HOSPADM

## 2022-07-20 RX ORDER — GABAPENTIN 300 MG/1
300 CAPSULE ORAL 3 TIMES DAILY
Status: DISCONTINUED | OUTPATIENT
Start: 2022-07-20 | End: 2022-07-23 | Stop reason: HOSPADM

## 2022-07-20 RX ORDER — SODIUM CHLORIDE 0.9 % (FLUSH) 0.9 %
3 SYRINGE (ML) INJECTION
Status: DISCONTINUED | OUTPATIENT
Start: 2022-07-20 | End: 2022-07-20 | Stop reason: HOSPADM

## 2022-07-20 RX ORDER — ACETAMINOPHEN 325 MG/1
650 TABLET ORAL ONCE
Status: DISCONTINUED | OUTPATIENT
Start: 2022-07-20 | End: 2022-07-20

## 2022-07-20 RX ORDER — LISINOPRIL 10 MG/1
10 TABLET ORAL DAILY
Status: DISCONTINUED | OUTPATIENT
Start: 2022-07-21 | End: 2022-07-23 | Stop reason: HOSPADM

## 2022-07-20 RX ORDER — PROPOFOL 10 MG/ML
VIAL (ML) INTRAVENOUS
Status: DISCONTINUED | OUTPATIENT
Start: 2022-07-20 | End: 2022-07-20

## 2022-07-20 RX ORDER — HEPARIN SODIUM 5000 [USP'U]/ML
5000 INJECTION, SOLUTION INTRAVENOUS; SUBCUTANEOUS EVERY 8 HOURS
Status: COMPLETED | OUTPATIENT
Start: 2022-07-20 | End: 2022-07-20

## 2022-07-20 RX ORDER — ACETAMINOPHEN 650 MG/20.3ML
975 LIQUID ORAL
Status: COMPLETED | OUTPATIENT
Start: 2022-07-20 | End: 2022-07-20

## 2022-07-20 RX ORDER — HYDROMORPHONE HYDROCHLORIDE 1 MG/ML
0.2 INJECTION, SOLUTION INTRAMUSCULAR; INTRAVENOUS; SUBCUTANEOUS EVERY 5 MIN PRN
Status: DISCONTINUED | OUTPATIENT
Start: 2022-07-20 | End: 2022-07-20 | Stop reason: HOSPADM

## 2022-07-20 RX ADMIN — Medication 100 MCG: at 12:07

## 2022-07-20 RX ADMIN — ROCURONIUM BROMIDE 10 MG: 10 INJECTION, SOLUTION INTRAVENOUS at 02:07

## 2022-07-20 RX ADMIN — VASOPRESSIN 1 UNITS: 20 INJECTION, SOLUTION INTRAMUSCULAR; SUBCUTANEOUS at 02:07

## 2022-07-20 RX ADMIN — SODIUM CHLORIDE: 0.9 INJECTION, SOLUTION INTRAVENOUS at 10:07

## 2022-07-20 RX ADMIN — HYDROMORPHONE HYDROCHLORIDE 0.2 MG: 1 INJECTION, SOLUTION INTRAMUSCULAR; INTRAVENOUS; SUBCUTANEOUS at 05:07

## 2022-07-20 RX ADMIN — ACETAMINOPHEN 976.6 MG: 160 SOLUTION ORAL at 11:07

## 2022-07-20 RX ADMIN — EPHEDRINE SULFATE 5 MG: 50 INJECTION INTRAVENOUS at 01:07

## 2022-07-20 RX ADMIN — SODIUM CHLORIDE, SODIUM GLUCONATE, SODIUM ACETATE, POTASSIUM CHLORIDE, MAGNESIUM CHLORIDE, SODIUM PHOSPHATE, DIBASIC, AND POTASSIUM PHOSPHATE: .53; .5; .37; .037; .03; .012; .00082 INJECTION, SOLUTION INTRAVENOUS at 11:07

## 2022-07-20 RX ADMIN — LIDOCAINE HYDROCHLORIDE 100 MG: 20 INJECTION, SOLUTION INTRAVENOUS at 11:07

## 2022-07-20 RX ADMIN — METOPROLOL TARTRATE 25 MG: 25 TABLET, FILM COATED ORAL at 08:07

## 2022-07-20 RX ADMIN — ALVIMOPAN 12 MG: 12 CAPSULE ORAL at 11:07

## 2022-07-20 RX ADMIN — SUGAMMADEX 200 MG: 100 INJECTION, SOLUTION INTRAVENOUS at 04:07

## 2022-07-20 RX ADMIN — PROPOFOL 150 MG: 10 INJECTION, EMULSION INTRAVENOUS at 11:07

## 2022-07-20 RX ADMIN — DIVALPROEX SODIUM 500 MG: 250 TABLET, EXTENDED RELEASE ORAL at 08:07

## 2022-07-20 RX ADMIN — ROCURONIUM BROMIDE 10 MG: 10 INJECTION, SOLUTION INTRAVENOUS at 03:07

## 2022-07-20 RX ADMIN — CEFTRIAXONE SODIUM 2 G: 1 INJECTION, SOLUTION INTRAVENOUS at 12:07

## 2022-07-20 RX ADMIN — ENOXAPARIN SODIUM 40 MG: 40 INJECTION SUBCUTANEOUS at 05:07

## 2022-07-20 RX ADMIN — VASOPRESSIN 1 UNITS: 20 INJECTION, SOLUTION INTRAMUSCULAR; SUBCUTANEOUS at 03:07

## 2022-07-20 RX ADMIN — HEPARIN SODIUM 5000 UNITS: 5000 INJECTION INTRAVENOUS; SUBCUTANEOUS at 11:07

## 2022-07-20 RX ADMIN — GABAPENTIN 300 MG: 300 CAPSULE ORAL at 08:07

## 2022-07-20 RX ADMIN — METRONIDAZOLE 500 MG: 500 INJECTION, SOLUTION INTRAVENOUS at 12:07

## 2022-07-20 RX ADMIN — OXYCODONE 5 MG: 5 TABLET ORAL at 05:07

## 2022-07-20 RX ADMIN — Medication 20 MG: at 11:07

## 2022-07-20 RX ADMIN — LIDOCAINE HYDROCHLORIDE 10 MG: 10 INJECTION, SOLUTION EPIDURAL; INFILTRATION; INTRACAUDAL at 11:07

## 2022-07-20 RX ADMIN — ALVIMOPAN 12 MG: 12 CAPSULE ORAL at 08:07

## 2022-07-20 RX ADMIN — LIDOCAINE HYDROCHLORIDE 0.02 MG/KG/MIN: 8 INJECTION, SOLUTION INTRAVENOUS at 11:07

## 2022-07-20 RX ADMIN — MUPIROCIN 1 G: 20 OINTMENT TOPICAL at 11:07

## 2022-07-20 RX ADMIN — IBUPROFEN 800 MG: 800 INJECTION INTRAVENOUS at 09:07

## 2022-07-20 RX ADMIN — ACETAMINOPHEN 1000 MG: 10 INJECTION INTRAVENOUS at 09:07

## 2022-07-20 RX ADMIN — ROCURONIUM BROMIDE 10 MG: 10 INJECTION, SOLUTION INTRAVENOUS at 01:07

## 2022-07-20 RX ADMIN — GABAPENTIN 300 MG: 300 CAPSULE ORAL at 11:07

## 2022-07-20 RX ADMIN — EPHEDRINE SULFATE 10 MG: 50 INJECTION INTRAVENOUS at 12:07

## 2022-07-20 RX ADMIN — ONDANSETRON 4 MG: 2 INJECTION INTRAMUSCULAR; INTRAVENOUS at 03:07

## 2022-07-20 RX ADMIN — DEXAMETHASONE SODIUM PHOSPHATE 8 MG: 4 INJECTION, SOLUTION INTRAMUSCULAR; INTRAVENOUS at 12:07

## 2022-07-20 RX ADMIN — EPHEDRINE SULFATE 5 MG: 50 INJECTION INTRAVENOUS at 12:07

## 2022-07-20 RX ADMIN — ROCURONIUM BROMIDE 50 MG: 10 INJECTION, SOLUTION INTRAVENOUS at 11:07

## 2022-07-20 RX ADMIN — HYDROMORPHONE HYDROCHLORIDE 0.5 MG: 2 INJECTION, SOLUTION INTRAMUSCULAR; INTRAVENOUS; SUBCUTANEOUS at 03:07

## 2022-07-20 RX ADMIN — SODIUM CHLORIDE: 0.9 INJECTION, SOLUTION INTRAVENOUS at 11:07

## 2022-07-20 RX ADMIN — MUPIROCIN: 20 OINTMENT TOPICAL at 08:07

## 2022-07-20 RX ADMIN — HYDROMORPHONE HYDROCHLORIDE 0.25 MG: 1 INJECTION, SOLUTION INTRAMUSCULAR; INTRAVENOUS; SUBCUTANEOUS at 08:07

## 2022-07-20 RX ADMIN — CEFTRIAXONE 2 G: 2 INJECTION, SOLUTION INTRAVENOUS at 11:07

## 2022-07-20 RX ADMIN — VASOPRESSIN 1 UNITS: 20 INJECTION, SOLUTION INTRAMUSCULAR; SUBCUTANEOUS at 01:07

## 2022-07-20 RX ADMIN — Medication 10 MG: at 01:07

## 2022-07-20 RX ADMIN — IBUPROFEN 600 MG: 100 SUSPENSION ORAL at 11:07

## 2022-07-20 RX ADMIN — SODIUM CHLORIDE: 0.9 INJECTION, SOLUTION INTRAVENOUS at 05:07

## 2022-07-20 RX ADMIN — FENTANYL CITRATE 100 MCG: 50 INJECTION, SOLUTION INTRAMUSCULAR; INTRAVENOUS at 11:07

## 2022-07-20 RX ADMIN — ACETAMINOPHEN 1000 MG: 10 INJECTION INTRAVENOUS at 03:07

## 2022-07-20 RX ADMIN — Medication 10 MG: at 02:07

## 2022-07-20 NOTE — TRANSFER OF CARE
"Anesthesia Transfer of Care Note    Patient: Ashli Kumar    Procedure(s) Performed: Procedure(s) (LRB):  COLECTOMY, SIGMOID, LAPAROSCOPIC, ERAS low (Left)  SIGMOIDOSCOPY, FLEXIBLE (N/A)  EXCISION, SMALL INTESTINE (N/A)  MOBILIZATION, SPLENIC FLEXURE (N/A)    Patient location: PACU    Anesthesia Type: general    Transport from OR: Transported from OR on 6-10 L/min O2 by face mask with adequate spontaneous ventilation    Post pain: adequate analgesia    Post assessment: no apparent anesthetic complications and tolerated procedure well    Post vital signs: stable    Level of consciousness: sedated    Nausea/Vomiting: no nausea/vomiting    Complications: none    Transfer of care protocol was followed      Last vitals:   Visit Vitals  BP (!) 126/70 (BP Location: Left arm, Patient Position: Lying)   Pulse 70   Temp 36.6 °C (97.8 °F)   Resp 16   Ht 5' 5" (1.651 m)   Wt 75.6 kg (166 lb 10.7 oz)   SpO2 98%   Breastfeeding No   BMI 27.73 kg/m²     "

## 2022-07-20 NOTE — NURSING TRANSFER
Nursing Transfer Note      7/20/2022     Transfer To: 1003    Transfer via stretcher    Transfer with cardiac monitoring    Transported by transport    Medicines sent: iv fluids    Any special needs or follow-up needed: routine    Chart send with patient: Yes    Notified: spouse    Patient reassessed at: [id number]      Report called to zoila

## 2022-07-20 NOTE — PROGRESS NOTES
Colon rectal team called an notified that pt. Started to involuntarily shake L leg. She said she did not have this pre operatively. It appears to have subsided. Per team, pt. To be transferred up to room with warm blanket .

## 2022-07-20 NOTE — ANESTHESIA POSTPROCEDURE EVALUATION
Anesthesia Post Evaluation    Patient: Ashli Kumar    Procedure(s) Performed: Procedure(s) (LRB):  COLECTOMY, SIGMOID, LAPAROSCOPIC, ERAS low (Left)  SIGMOIDOSCOPY, FLEXIBLE (N/A)  EXCISION, SMALL INTESTINE (N/A)  MOBILIZATION, SPLENIC FLEXURE (N/A)    Final Anesthesia Type: general      Patient location during evaluation: PACU  Patient participation: Yes- Able to Participate  Level of consciousness: awake and alert  Post-procedure vital signs: reviewed and stable  Pain management: adequate  Airway patency: patent    PONV status at discharge: No PONV  Anesthetic complications: no      Cardiovascular status: blood pressure returned to baseline  Respiratory status: unassisted  Hydration status: euvolemic  Follow-up not needed.          Vitals Value Taken Time   /62 07/20/22 1822   Temp 98 07/20/22 1829   Pulse 81 07/20/22 1828   Resp 10 07/20/22 1820   SpO2 97 % 07/20/22 1828   Vitals shown include unvalidated device data.      Event Time   Out of Recovery 17:15:00         Pain/Geraldo Score: Pain Rating Prior to Med Admin: 7 (7/20/2022  5:41 PM)  Geraldo Score: 9 (7/20/2022  5:15 PM)

## 2022-07-20 NOTE — NURSING TRANSFER
Nursing Transfer Note      7/20/2022     Reason patient is being transferred: s/p Left Colectomy    Transfer To: 1003    Transfer via stretcher    Transfer with cardiac monitoring    Transported by PCT    Medicines sent: IV Fluids    Any special needs or follow-up needed: routine    Chart send with patient: Yes    Notified: spouse    Patient reassessed at: 7/20/22 @1900    Report given to RAMÍREZ Morris per RAMÍREZ Sadler  Notified RAMÍREZ Jones patient is en route

## 2022-07-20 NOTE — BRIEF OP NOTE
[facility] - Surgery ([facility])  Brief Operative Note    SUMMARY     Surgery Date: 7/20/2022     Surgeon(s) and Role:     * TREV Conrad MD - Primary     * Silvestre Aquino MD - Fellow    Assisting Surgeon: None    Pre-op Diagnosis:  Diverticular stricture [K56.699]    Post-op Diagnosis:  Post-Op Diagnosis Codes:     * Diverticular stricture [K56.699]    Procedure(s) (LRB):  COLECTOMY, SIGMOID, LAPAROSCOPIC, ERAS low (Left)  SIGMOIDOSCOPY, FLEXIBLE (N/A)  EXCISION, SMALL INTESTINE (N/A)  MOBILIZATION, SPLENIC FLEXURE (N/A)    Anesthesia: General    Operative Findings: Significant diverticular disease from splenic flexure to rectum.  Dense adhesions to bladder and small bowel.    Estimated Blood Loss: * No values recorded between 7/20/2022 12:05 PM and 7/20/2022  4:19 PM *    EBL 100cc         Specimens:   Specimen (24h ago, onward)             Start     Ordered    07/20/22 1541  Specimen to Pathology, Surgery General Surgery (colon rectal)  Once        Comments: Pre-op Diagnosis: Diverticular stricture [K56.699]Procedure(s):COLECTOMY, SIGMOID, LAPAROSCOPIC, ERAS lowSIGMOIDOSCOPY, FLEXIBLE Number of specimens: 2Name of specimens: 1. Descending and sigmoid colon - perm2. Proximal and distal anastomotic donuts - perm3. Ileum - perm     References:    Click here for ordering Quick Tip   Question Answer Comment   Procedure Type: General Surgery colon rectal   Specimen Class: Complex case/Special    Which provider would you like to cc? TREV CONRAD    Release to patient Immediate        07/20/22 8274                [id number]

## 2022-07-20 NOTE — ANESTHESIA PROCEDURE NOTES
Intubation    Date/Time: 7/20/2022 11:54 AM  Performed by: Diana Larios MD  Authorized by: Arnoldo Johnson MD     Intubation:     Induction:  Intravenous    Intubated:  Postinduction    Mask Ventilation:  Easy with oral airway    Attempts:  1    Attempted By:  Resident anesthesiologist    Method of Intubation:  Direct    Blade:  Tolbert 2    Laryngeal View Grade: Grade I - full view of cords      Difficult Airway Encountered?: No      Complications:  None    Airway Device:  Oral endotracheal tube    Airway Device Size:  7.0    Style/Cuff Inflation:  Cuffed (inflated to minimal occlusive pressure)    Tube secured:  22    Secured at:  The teeth    Placement Verified By:  Capnometry    Complicating Factors:  None    Findings Post-Intubation:  BS equal bilateral and atraumatic/condition of teeth unchanged

## 2022-07-21 LAB
ANION GAP SERPL CALC-SCNC: 12 MMOL/L (ref 8–16)
BASOPHILS # BLD AUTO: 0.01 K/UL (ref 0–0.2)
BASOPHILS NFR BLD: 0.1 % (ref 0–1.9)
BUN SERPL-MCNC: 12 MG/DL (ref 8–23)
CALCIUM SERPL-MCNC: 8.3 MG/DL (ref 8.7–10.5)
CHLORIDE SERPL-SCNC: 101 MMOL/L (ref 95–110)
CO2 SERPL-SCNC: 19 MMOL/L (ref 23–29)
CREAT SERPL-MCNC: 0.9 MG/DL (ref 0.5–1.4)
DIFFERENTIAL METHOD: ABNORMAL
EOSINOPHIL # BLD AUTO: 0 K/UL (ref 0–0.5)
EOSINOPHIL NFR BLD: 0 % (ref 0–8)
ERYTHROCYTE [DISTWIDTH] IN BLOOD BY AUTOMATED COUNT: 12.3 % (ref 11.5–14.5)
EST. GFR  (AFRICAN AMERICAN): >60 ML/MIN/1.73 M^2
EST. GFR  (NON AFRICAN AMERICAN): >60 ML/MIN/1.73 M^2
GLUCOSE SERPL-MCNC: 109 MG/DL (ref 70–110)
HCT VFR BLD AUTO: 34.1 % (ref 37–48.5)
HGB BLD-MCNC: 11.5 G/DL (ref 12–16)
IMM GRANULOCYTES # BLD AUTO: 0.05 K/UL (ref 0–0.04)
IMM GRANULOCYTES NFR BLD AUTO: 0.4 % (ref 0–0.5)
LYMPHOCYTES # BLD AUTO: 0.9 K/UL (ref 1–4.8)
LYMPHOCYTES NFR BLD: 7.5 % (ref 18–48)
MCH RBC QN AUTO: 30 PG (ref 27–31)
MCHC RBC AUTO-ENTMCNC: 33.7 G/DL (ref 32–36)
MCV RBC AUTO: 89 FL (ref 82–98)
MONOCYTES # BLD AUTO: 0.9 K/UL (ref 0.3–1)
MONOCYTES NFR BLD: 7.4 % (ref 4–15)
NEUTROPHILS # BLD AUTO: 10.3 K/UL (ref 1.8–7.7)
NEUTROPHILS NFR BLD: 84.6 % (ref 38–73)
NRBC BLD-RTO: 0 /100 WBC
PLATELET # BLD AUTO: 155 K/UL (ref 150–450)
PMV BLD AUTO: 13.6 FL (ref 9.2–12.9)
POTASSIUM SERPL-SCNC: 4.7 MMOL/L (ref 3.5–5.1)
RBC # BLD AUTO: 3.83 M/UL (ref 4–5.4)
SODIUM SERPL-SCNC: 132 MMOL/L (ref 136–145)
WBC # BLD AUTO: 12.14 K/UL (ref 3.9–12.7)

## 2022-07-21 PROCEDURE — 36415 COLL VENOUS BLD VENIPUNCTURE: CPT | Performed by: SURGERY

## 2022-07-21 PROCEDURE — 97165 OT EVAL LOW COMPLEX 30 MIN: CPT

## 2022-07-21 PROCEDURE — 85025 COMPLETE CBC W/AUTO DIFF WBC: CPT | Performed by: SURGERY

## 2022-07-21 PROCEDURE — 97161 PT EVAL LOW COMPLEX 20 MIN: CPT

## 2022-07-21 PROCEDURE — 80048 BASIC METABOLIC PNL TOTAL CA: CPT | Performed by: SURGERY

## 2022-07-21 PROCEDURE — 63600175 PHARM REV CODE 636 W HCPCS: Performed by: SURGERY

## 2022-07-21 PROCEDURE — 25000242 PHARM REV CODE 250 ALT 637 W/ HCPCS: Performed by: SURGERY

## 2022-07-21 PROCEDURE — 97535 SELF CARE MNGMENT TRAINING: CPT

## 2022-07-21 PROCEDURE — 25000003 PHARM REV CODE 250: Performed by: SURGERY

## 2022-07-21 PROCEDURE — 25000003 PHARM REV CODE 250: Performed by: NURSE PRACTITIONER

## 2022-07-21 PROCEDURE — 20600001 HC STEP DOWN PRIVATE ROOM

## 2022-07-21 PROCEDURE — 97530 THERAPEUTIC ACTIVITIES: CPT

## 2022-07-21 RX ADMIN — GABAPENTIN 300 MG: 300 CAPSULE ORAL at 02:07

## 2022-07-21 RX ADMIN — FLUOXETINE 60 MG: 20 CAPSULE ORAL at 08:07

## 2022-07-21 RX ADMIN — OXYCODONE 5 MG: 5 TABLET ORAL at 11:07

## 2022-07-21 RX ADMIN — MUPIROCIN: 20 OINTMENT TOPICAL at 09:07

## 2022-07-21 RX ADMIN — IBUPROFEN 800 MG: 400 TABLET, FILM COATED ORAL at 09:07

## 2022-07-21 RX ADMIN — IBUPROFEN 800 MG: 800 INJECTION INTRAVENOUS at 05:07

## 2022-07-21 RX ADMIN — ACETAMINOPHEN 1000 MG: 500 TABLET ORAL at 09:07

## 2022-07-21 RX ADMIN — ACETAMINOPHEN 1000 MG: 10 INJECTION INTRAVENOUS at 05:07

## 2022-07-21 RX ADMIN — FLUTICASONE PROPIONATE 100 MCG: 50 SPRAY, METERED NASAL at 08:07

## 2022-07-21 RX ADMIN — DIVALPROEX SODIUM 500 MG: 250 TABLET, EXTENDED RELEASE ORAL at 08:07

## 2022-07-21 RX ADMIN — ALVIMOPAN 12 MG: 12 CAPSULE ORAL at 08:07

## 2022-07-21 RX ADMIN — ENOXAPARIN SODIUM 40 MG: 40 INJECTION SUBCUTANEOUS at 04:07

## 2022-07-21 RX ADMIN — ALVIMOPAN 12 MG: 12 CAPSULE ORAL at 09:07

## 2022-07-21 RX ADMIN — DIVALPROEX SODIUM 500 MG: 250 TABLET, EXTENDED RELEASE ORAL at 09:07

## 2022-07-21 RX ADMIN — GABAPENTIN 300 MG: 300 CAPSULE ORAL at 09:07

## 2022-07-21 RX ADMIN — IBUPROFEN 800 MG: 800 INJECTION INTRAVENOUS at 02:07

## 2022-07-21 RX ADMIN — METOPROLOL TARTRATE 25 MG: 25 TABLET, FILM COATED ORAL at 08:07

## 2022-07-21 RX ADMIN — MUPIROCIN: 20 OINTMENT TOPICAL at 08:07

## 2022-07-21 RX ADMIN — OXYCODONE 5 MG: 5 TABLET ORAL at 03:07

## 2022-07-21 RX ADMIN — GABAPENTIN 300 MG: 300 CAPSULE ORAL at 08:07

## 2022-07-21 RX ADMIN — ACETAMINOPHEN 1000 MG: 10 INJECTION INTRAVENOUS at 03:07

## 2022-07-21 RX ADMIN — LISINOPRIL 10 MG: 10 TABLET ORAL at 08:07

## 2022-07-21 NOTE — PT/OT/SLP EVAL
Occupational Therapy  Co Evaluation    Name: Ashli Kumar  MRN: [id number]  Admitting Diagnosis:  Diverticular stricture  Recent Surgery: Procedure(s) (LRB):  COLECTOMY, SIGMOID, LAPAROSCOPIC, ERAS low (Left)  SIGMOIDOSCOPY, FLEXIBLE (N/A)  EXCISION, SMALL INTESTINE (N/A)  MOBILIZATION, SPLENIC FLEXURE (N/A) 1 Day Post-Op    Recommendations:     Discharge Recommendations: home    Assessment:     Ashli Kumar is a 62 y.o. female with a medical diagnosis of Diverticular stricture. Performance deficits affecting function: weakness, impaired endurance, impaired self care skills, impaired functional mobility, gait instability, impaired balance, pain.    Pt tolerated session fairly well with good effort. Pt limited by dizziness this date. Anticipate pt will progress well and will be ready for d/c home with family support when medically stable.     Rehab Prognosis: Good; patient would benefit from acute skilled OT services to address these deficits and reach maximum level of function.       Plan:     Patient to be seen 3 x/week to address the above listed problems via self-care/home management, therapeutic activities, therapeutic exercises  · Plan of Care Expires:    · Plan of Care Reviewed with: patient    Subjective     Pt agreeable to therapy.   Pt reports persistent dizziness during session.     Occupational Profile:  Pt plans to d/c to [id number] where she and her spouse will live upon d/c from hospital.   Pt was independent prior to arrival without AE/DME.  Pt reports trailer has tub combo with grab bars.  Ghkhzl-h-wji will be able to assist pt upon d/c.     Pain/Comfort:  · Pain Rating 1: 8/10  · Location 1: abdomen  · Pain Addressed 1: Reposition, Distraction    Patients cultural, spiritual, Holiness conflicts given the current situation: no    Objective:     Communicated with: nsdavina prior to session.  Patient found supine in bed on RA with devine in place.  Fan completed this date  to optimize functional performance and safety given impaired tolerance for activity given acuity of medical status.     General Precautions: Standard,   fall    Occupational Performance:    Bed Mobility:    Supine>sit with SBA with cues for hand placement/technique.     Functional Mobility/Transfers:  Sit>stand with CGA  Few steps to chair with CGA     Activities of Daily Living:  Feeding: set-up  G/H: seated with set-up oral care, washing face/hands and combing hair. Pt unable to complete task in stand due to reported dizziness. Pt with good B UE integration and sitting balance during task.  LE dressing: TOTAL A     Cognitive/Visual Perceptual:  Pt agreeable to therapy.     Physical Exam:  Pt is right hand dominant and demo WFL UE. Pt with good coordination and intact sensation during functional skills.     AMPAC 6 Click ADL:  AMPAC Total Score: 16    Treatment & Education:  Pt demo fair+ sitting balance and Fair standing balance. Pt able to take few steps to chair with CGA.   Pt did have persistent dizziness EOB and in standing. Dizziness resolved once in chair with LE's elevated.  BP monitored with vitals as following: supin 116/58, EOB 97/52 and 108/55.   Education:    Patient left reclined in chair with needs in reach.   GOALS:   Multidisciplinary Problems     Occupational Therapy Goals        Problem: Occupational Therapy    Goal Priority Disciplines Outcome Interventions   Occupational Therapy Goal     OT, PT/OT Ongoing, Progressing    Description: Goals to be met by: 7 days 7/28/22     Patient will increase functional independence with ADLs by performing:    Pt to complete UE dressing with set-up  Pt to complete LE dressing with SBA  Pt to complete toileting with SBA  Pt to complete t/f bed, chair and commode with SBA  Pt to complete standing g/h skills with SBA.                    History:     Past Medical History:   Diagnosis Date    Anxiety     Arthritis     CHF (congestive heart failure)      Depression     GERD (gastroesophageal reflux disease)     High cholesterol     Hypertension        Past Surgical History:   Procedure Laterality Date    BREAST [facility]OPSY Left     [facility]    BREAST SURGERY       SECTION      COLONOSCOPY N/A 2017    Procedure: COLONOSCOPY Miralax;  Surgeon: Buddy Butcher Jr., MD;  Location: [facility] ENDO;  Service: Endoscopy;  Laterality: N/A;    COLONOSCOPY N/A 2022    Procedure: COLONOSCOPY;  Surgeon: TREV Kay MD;  Location: [facility] ENDO;  Service: Endoscopy;  Laterality: N/A;    FLEXIBLE SIGMOIDOSCOPY N/A 2022    Procedure: SIGMOIDOSCOPY, FLEXIBLE;  Surgeon: TREV Kay MD;  [facility];  Service: Colon and Rectal;  Laterality: N/A;    HYSTERECTOMY      KIDNEY SURGERY Right     done at [facility], reported as mass by patient    LAPAROSCOPIC SIGMOIDECTOMY Left 2022    Procedure: COLECTOMY, SIGMOID, LAPAROSCOPIC, ERAS low;  Surgeon: TREV Kay MD;  [facility];  Service: Colon and Rectal;  Laterality: Left;    LEFT HEART CATHETERIZATION Left 2019    Procedure: Left heart cath;  Surgeon: Gerhard Krishnan MD;  Location: [facility] CATH LAB;  Service: Cardiology;  Laterality: Left;    MOBILIZATION OF SPLENIC FLEXURE N/A 2022    Procedure: MOBILIZATION, SPLENIC FLEXURE;  Surgeon: TREV Kay MD;  [facility];  Service: Colon and Rectal;  Laterality: N/A;    OOPHORECTOMY      s-section         Time Tracking:     OT Date of Treatment: 22  OT Start Time: 836  OT Stop Time: 858  OT Total Time (min): 22 min    Billable Minutes:Evaluation 11  Self Care/Home Management 11    2022

## 2022-07-21 NOTE — PLAN OF CARE
Problem: Physical Therapy  Goal: Physical Therapy Goal  Description: Goals to be met by: 22     Patient will increase functional independence with mobility by performin. Supine to sit with Modified [place]  2. Sit to supine with Modified [place]  3. Sit to stand transfer with Modified [place]  4. Gait  x 150 feet with Supervision using LRAD  5. Ascend/descend 4 stair with bilateral Handrails Supervision using LRAD  Outcome: Ongoing, Progressing     Discharge Recommendation: HHPT.    Evaluation completed today. PT goals appropriate.    Patient is safe to perform transfers with CGA with nursing staff.    Please continue Progressive Mobility Protocol as appropriate.    Darleen Castillo, SPT  2022

## 2022-07-21 NOTE — PLAN OF CARE
Pt Aox4, VS remained stable throughout shift, pain controlled with PRN meds. Pt has devine catheter for urinary elimination. No BM this shift. Educated on POC, resting comfortably, bed low and locked, call light within reach, will continue to monitor.       Problem: Adult Inpatient Plan of Care  Goal: Plan of Care Review  Outcome: Ongoing, Progressing     Problem: Adult Inpatient Plan of Care  Goal: Patient-Specific Goal (Individualized)  Outcome: Ongoing, Progressing     Problem: Adult Inpatient Plan of Care  Goal: Absence of Hospital-Acquired Illness or Injury  Outcome: Ongoing, Progressing     Problem: Adult Inpatient Plan of Care  Goal: Optimal Comfort and Wellbeing  Outcome: Ongoing, Progressing

## 2022-07-21 NOTE — OP NOTE
ASHLI STANLEY  [id number]  [id number]    OPERATIVE NOTE:    DATE OF OPERATION: 07/20/2022    PREOPERATIVE DIAGNOSIS: Segmental colitis with stricture    POSTOPERATIVE DIAGNOSIS:  Segmental colitis with stricture    PROCEDURE PERFORMED:   1. Laparoscopic extended left colectomy   2. Laparoscopic mobilization of the splenic flexure  3. Small-bowel resection  4. Flexible sigmoidoscopy.      ATTENDING SURGEON: TREV Kay MD    RESIDENT/FELLOW SURGEON: Wilbert Aquino MD    ANESTHESIA: General    ESTIMATED BLOOD LOSS:  100 mL    FINDINGS:  1. Extensive stricture and twisting of the sigmoid colon densely adherent to the anterior abdominal wall.  Multiple loops of small bowel densely adherent to the anterior abdominal wall.  Multiple serosal tears made in the terminal ileum that were inherent to the procedure upon entry into the abdomen.  This was treated with Lembert sutures x2 areas as well as a 8 cm small-bowel resection of the terminal ileum.  Bilateral ureters identified and protected.  Bladder was distended with dilute methylene blue no bladder injury identified.  Negative anastomotic leak test.    SPECIMENS:   1. Left colon.  2. Proximal anastomotic donut  3. Distal anastomotic donut.   4. Ileum    COMPLICATIONS:  None apparent    DRAINS:  None    DISPOSITION: PACU    CONDITION: good    INDICATION FOR PROCEDURE:  Ashli Stanley is a 62 y.o. female who presented with segmental colitis with associated stricture unable to be traversed with a colonoscope.  This resulted in significant abdominal pain and discomfort.  Resection was therefore warranted.  Segmental colitis extended up to the proximal descending colon.  Therefore, extended left hemicolectomy was recommended.       DESCRIPTION OF PROCEDURE:    After informed consent was reviewed, the patient was taken to the Operating Room and placed under general anesthesia.  A Zepeda  catheter was sterilely inserted.  Preoperative antibiotics with  ceftriaxone and Flagyl were given.  The patient was placed in lithotomy position. The arms were tucked and the anterior abdominal wall was prepped and draped in the usual sterile fashion.  A time out was perfomed.                 The abdomen was entered through a 8 cm Pfannenstiel incision 2 fingerbreadths above the pubic symphysis.  The skin was incised sharply.  The fascia was incised with electrocautery.  Fascial flaps were elevated up to the umbilicus and down to the pubic symphysis.  The midline of the rectus muscles was then divided.  The abdomen was entered sharply.  There were multiple loops of small bowel, omentum, and colon densely adherent to the anterior abdominal wall.  These were tedious lead taken down sharply.  During this process, multiple serosal tears were made in the ileum.  This was inherent to the procedure.  The GelPort of the Darrel was then placed.  The abdomen was insufflated.  We were able to look through the GelPort to see over to the right lower quadrant.  A 5 mm trocar was placed in the right lower quadrant with an additional trocar placed in the right upper quadrant.  Laparoscopically, adhesions to the anterior abdominal wall in her right paramedian incision were taken down sharply.  The omentum was densely adherent to the abdominal wall.  This was tedious lead dissected free.  Two additional 5 mm trocars were placed with 1 at the umbilicus and 1 in the left lower quadrant.    Once the omental and small-bowel adhesions were freed, there were no significant interloop adhesions.  The omentum was placed over the transverse colon.  The ligament Treitz was identified.  Next to the ligament of Treitz, the inferior mesenteric vein was identified.  The IMV was elevated and the peritoneum was incised.  The retroperitoneum was identified and swept posteriorly out to the abdominal sidewall and to the superior pole of the kidney.  Dissection continued inferiorly around the left colic artery.  The  CAROLINE pedicle was then elevated.  The peritoneum was incised in the medial aspect.  The retrorectal space in the hypogastric nerves were identified and swept posteriorly.  The left ureter was identified and swept posteriorly.  The inferior mesenteric artery and the left colic artery were then ligated with the ligature.  The remainder of the medial to lateral dissection was performed bluntly.  The retroperitoneum was left intact.  Lateral attachments were taken down with the ligature.  The omentum was taken off of the transverse colon the lesser sac was entered.  Splenic flexure was completely mobilized using the ligature.  Splenic flexure was then rotated medially and attachments to the inferior border the pancreas were divided.  Prior to mobilization of the splenic flexure, the splenic flexure was marked with a marker to indicate the proximal extent of resection.  The transverse colon mesentery was mobilized up and over the 4th portion of the duodenum to the left branch of the middle colic artery.  With this, the colon had excellent laxity.   The cap of the GelPort was then removed.  The specimen was brought out.  At the  robin in the proximal descending colon, a window was made in the mesentery.  The mesentery was divided with the exception of the marginal artery.  The marginal artery at this level was tested and found pulsatile.  The colon was divided with a Furness clamp after a 2 0 nylon pursestring had been placed. The colon was divided and opened and appeared healthy.  A 29 EEA anvil was inserted and the pursestring was tied down and wrapped around the 2nd time for reinforcement.  The distal transverse colon was then packed out of the abdomen. Dissection continued down towards the top of the rectum.  there were multiple adhesions of the sigmoid colon out to the left lateral abdominal sidewall as well as anteriorly.  Dissection was very challenging.  Blunt dissection was unable to be performed.  The retrorectal  space was entered.  On the patient's right side, the distal rectum was able to be identified.  We then worked proximally to identify the proximal rectum.  A loop of sigmoid colon folded onto itself and attached to bladder anteriorly.  This was sharply dissected free.  Once the colon had been straightened, the upper rectum was identified.  The upper rectum was then divided with a contour stapler with a green load the remaining mesentery was divided with the ligature.  To rule out any underlying bladder injury, the bladder was filled with 300 cc of dilute saline with methylene blue.  No bladder injury was seen.  Both ureters were identified and were protected.  The small bowel was then inspected.  There were multiple areas in terminal ileum with multiple serosal tears.  One segment approximately 20 cm away from the ileocecal valve had approximately 5 serosal tears.  A small-bowel resection was performed of this area measuring approximately 8 cm. Windows were made in the mesentery.  The mesentery was divided with the ligature.  Enterotomies were made on the anti mesenteric border.  A JAMAL 75 mm stapler with a blue load was inserted and fired to form a common channel.  The anastomosis was inspected and found to be hemostatic.  The staple lines were then offset.  Second firing of the JAMAL 75 with a blue load was used to come across transversely to amputate the specimen and close the common defect.  The transverse staple line was oversewn with 3-0 Vicryl in simple fashion.  The anterior staple line was oversewn with 3-0 Vicryl in Lembert fashion.  The small bowel mesenteric defect was reapproximated with interrupted 3-0 Vicryl.  Approximately 20 cm proximal to this, there were 2 other serosal defects seen.  These were imbricated with 3-0 Vicryl in Lembert fashion.                low rectal anastomosis was then performed.  The EEA stapler was placed into the anal canal and advanced to the top of the rectal stump.  The spike  was deployed just posterior to the staple line.  The anvil was connected and the stapler closed.  Care was taken to avoid entrapment of the bladder or ureters.  The stapler was fired and removed.  On the back table, two circumferential anastmotic donuts were found.  Leak testing was done with a colonoscope.  The anastomosis appeared healthy.  Leak test was negative. The anastomosis was then oversewn with 3-0 vicryl sutures.  The abdomen was reinsufflated.  The transverse colon set straight in abdomen without any twisting.  The small bowel was placed on top of the transverse colon and the omentum was pulled down into the pelvis.              All members of the team then changed gowns and gloves and new instruments were used for a clean closure.    The Pfannenstiel incision was closed in layers.  The posterior peritoneum and fascia were closed with a #1 Running PDS.  The anterior rectus fascia was closed with #1 Running PDS after hemostasis had been ensured.    All incisions were irrigated with saline and hemostasis was noted.  Skin incisions were closed with 4-0 Vicryl in subcuticular fashion and steri-strips were applied.                The patient tolerated the procedure well.  There were no apparent intraoperative complications.  All sponge, needle, and instrument counts were correct x2.  The patient was extubated and taken to PACU in stable condition.                TREV Kay MD, FACS, FASCRS  Staff Surgeon  Colon & Rectal Surgery

## 2022-07-21 NOTE — PROGRESS NOTES
[facility]  Colorectal Surgery  Progress Note    Patient Name: Ashli Kumar  MRN: [id number]  Admission Date: 7/20/2022  Hospital Length of Stay: 1 days  Attending Physician: TREV Kay MD    Subjective:     Interval History: no acute events overnite, no n/v, adequate pain control, denies flatur or bm     Post-Op Info:  Procedure(s) (LRB):  COLECTOMY, SIGMOID, LAPAROSCOPIC, ERAS low (Left)  SIGMOIDOSCOPY, FLEXIBLE (N/A)  EXCISION, SMALL INTESTINE (N/A)  MOBILIZATION, SPLENIC FLEXURE (N/A)   1 Day Post-Op      Medications:  Continuous Infusions:   sodium chloride 0.9% 40 mL/hr at 07/20/22 1705    sodium chloride 0.9%       Scheduled Meds:   acetaminophen  1,000 mg Intravenous Q8H    Followed by    acetaminophen  1,000 mg Oral Q8H    alvimopan  12 mg Oral BID    divalproex ER  500 mg Oral BID    enoxaparin  40 mg Subcutaneous Daily    FLUoxetine  60 mg Oral Daily    fluticasone propionate  2 spray Each Nostril Daily    gabapentin  300 mg Oral TID    ibuprofen  800 mg Intravenous Q8H    Followed by    ibuprofen  800 mg Oral Q8H    lisinopriL  10 mg Oral Daily    metoprolol tartrate  25 mg Oral BID    mupirocin   Nasal BID     PRN Meds:   HYDROmorphone    ondansetron    oxyCODONE    sodium chloride 0.9%        Objective:     Vital Signs (Most Recent):  Temp: 97.7 °F (36.5 °C) (07/21/22 0811)  Pulse: 73 (07/21/22 0811)  Resp: 16 (07/21/22 0811)  BP: (!) 116/58 (07/21/22 0811)  SpO2: (!) 89 % (07/21/22 0811)   Vital Signs (24h Range):  Temp:  [97.7 °F (36.5 °C)-98.4 °F (36.9 °C)] 97.7 °F (36.5 °C)  Pulse:  [73-84] 73  Resp:  [11-23] 16  SpO2:  [89 %-100 %] 89 %  BP: ()/(52-63) 116/58     Intake/Output - Last 3 Shifts         07/19 0700  07/20 0659 07/20 0700 07/21 0659 07/21 0700 07/22 0659    I.V. (mL/kg)  76.2 (1)     IV Piggyback  1450     Total Intake(mL/kg)  1526.2 (20.2)     Urine (mL/kg/hr)  840     Stool  0     Total Output  840     Net  +686.2            Stool  Occurrence  0 x             Physical Exam  Vitals and nursing note reviewed.   Constitutional:       Appearance: She is well-developed.   HENT:      Head: Normocephalic.   Eyes:      Pupils: Pupils are equal, round, and reactive to light.   Cardiovascular:      Rate and Rhythm: Normal rate and regular rhythm.      Heart sounds: Normal heart sounds.   Pulmonary:      Effort: Pulmonary effort is normal. No respiratory distress.      Breath sounds: Normal breath sounds. No wheezing or rales.   Abdominal:      Palpations: Abdomen is soft. There is no mass.      Tenderness: There is no guarding or rebound.      Comments: Abd inc line healing well   Skin:     General: Skin is warm and dry.   Neurological:      Mental Status: She is alert and oriented to person, place, and time.   Psychiatric:         Behavior: Behavior normal.         Thought Content: Thought content normal.         Judgment: Judgment normal.           Significant Labs:  BMP (Last 3 Results):   Recent Labs   Lab 07/21/22  0248      *   K 4.7      CO2 19*   BUN 12   CREATININE 0.9   CALCIUM 8.3*     CBC (Last 3 Results):   Recent Labs   Lab 07/21/22  0248   WBC 12.14   RBC 3.83*   HGB 11.5*   HCT 34.1*      MCV 89   MCH 30.0   MCHC 33.7       Significant Diagnostics:  None    Assessment/Plan:     * Diverticular stricture  OR 7/20 sigmoid resection    -await return of bowel function, lfd  -continue multi modality for pain control  -encourage ambulation and use of IS  -amanda hose and SCD  -prophalactic lovenox and PPI  -keep devine till am due to bladder repair during surgery    Bipolar affective disorder, currently depressed, moderate  Resume home meds    Essential hypertension  Chronic, controlled.  Latest blood pressure and vitals reviewed-   Temp:  [97.7 °F (36.5 °C)-98.4 °F (36.9 °C)]   Pulse:  [73-84]   Resp:  [11-23]   BP: ()/(52-63)   SpO2:  [89 %-100 %] .   Home meds for hypertension were reviewed and noted below.    Hypertension Medications             furosemide (LASIX) 20 MG tablet 1 tablet    lisinopril 10 MG tablet Take 1 tablet (10 mg total) by mouth once daily.    metoprolol tartrate (LOPRESSOR) 25 MG tablet Take 25 mg by mouth 2 (two) times daily.    spironolactone (ALDACTONE) 25 MG tablet Take 12.5 mg by mouth.          While in the hospital, will manage blood pressure as follows; Continue home antihypertensive regimen    Will utilize p.r.n. blood pressure medication only if patient's blood pressure greater than  180/110 and she develops symptoms such as worsening chest pain or shortness of breath.    Chronic heart failure with preserved ejection fraction  Patient is identified as having chf heart failure that is Chronic. CHF is currently controlled. Latest ECHO performed and demonstrates- No results found for this or any previous visit.  . contimue home meds  -tele  -monitor I&O              Matilda Vickers NP  Colorectal Surgery  [facility]

## 2022-07-21 NOTE — PT/OT/SLP EVAL
Physical Therapy Co-Evaluation and Co-Treatment    Patient Name:  Ashli Kumar   MRN:  [id number]  Admit Date: 7/20/2022  Admitting Diagnosis:  Diverticular stricture   Length of Stay: 1 days  Recent Surgery: Procedure(s) (LRB):  COLECTOMY, SIGMOID, LAPAROSCOPIC, ERAS low (Left)  SIGMOIDOSCOPY, FLEXIBLE (N/A)  EXCISION, SMALL INTESTINE (N/A)  MOBILIZATION, SPLENIC FLEXURE (N/A) 1 Day Post-Op    Recommendations:     Discharge Recommendations:  home health PT   Discharge Equipment Recommendations: other (see comments) (TBD closer to discharge)   Barriers to discharge: None    Plan:     During this hospitalization, patient to be seen 4 x/week to address the identified rehab impairments via gait training, therapeutic activities, therapeutic exercises and progress towards the established goals.  · Plan of Care Expires:  08/20/22  · Plan of Care Reviewed with: patient    Assessment:     Ashli Kumar is a 62 y.o. female admitted with a medical diagnosis of Diverticular stricture. Pt found alert with HOB elevated. Pt agreeable to therapy and tolerated evaluation well. Pt's BP was monitored via cuff throughout session due to patient report of dizziness/lightheadedness. Pt's BP was 116/58 (82) prior to session, 97/52 (67) sitting EOB, and 108/55 (79) sitting in bedside chair with feet reclined. Pt demonstrated fair activity tolerance. Pt reported dizziness/lightheadedness with position change from supine to sitting EOB with MAP of 67. Dizziness did not increase but remained constant throughout the remainder of the session. Pt able to perform STS transfer with CGA demonstrating good LE strength. Pt able to perform 5 side steps from EOB to bedside chair with CGA demonstrating fair endurance. Ambulation limited by patient report of dizziness/lightheadedness. Pt demonstrated decreased melia, decreased step length, decreased foot clearance, and instability during ambulation. It is anticipated that patient will  be able to ambulate further in next session.  Pt will continue to benefit from PT to increase endurance, improve functional mobility, and return to PLOF. Upon discharge, patient would benefit from home health physical therapy to maximize safety and return to PLOF.    Problem List: weakness, impaired endurance, impaired self care skills, impaired functional mobility, gait instability, impaired balance, decreased safety awareness, pain, impaired cardiopulmonary response to activity.  Rehab Prognosis: Good; patient would benefit from acute skilled PT services to address these deficits and reach maximum level of function.      Goals:   Multidisciplinary Problems     Physical Therapy Goals        Problem: Physical Therapy    Goal Priority Disciplines Outcome Goal Variances Interventions   Physical Therapy Goal     PT, PT/OT Ongoing, Progressing     Description: Goals to be met by: 22     Patient will increase functional independence with mobility by performin. Supine to sit with Modified [place]  2. Sit to supine with Modified [place]  3. Sit to stand transfer with Modified [place]  4. Gait  x 150 feet with Supervision using LRAD  5. Ascend/descend 4 stair with bilateral Handrails Supervision using LRAD                   Subjective     RN notified prior to session.  Patient agreeable to PT evaluation.    Chief Complaint: No chief complaint on file.    Patient/Family Comments/goals: To return to PLOF  Pain/Comfort:  · Pain Rating 1: 6/10 (abdomen pain)  · Pain Addressed 1: Reposition, Distraction  · Pain Rating Post-Intervention 1: other (see comments) (did not rate)    Social History:  Residence: lives with their family trailer/mobile home with 4 MAKAYLA and B HR. Pt's bathroom has a tub-shower combination with grab bars.  Support available: family  Equipment Used: none  Equipment Owned (not using): None  Prior level of function: independent  Work: Disability.   Drive: no.     Patient reports they  "will have assistance from sister in law upon discharge.    Objective:     Additional staff present: OT and Supervising PT; OT for co-evaluation due to patient's medical complexities requiring two skilled therapists in order to appropriately assess patient's functional deficits as well as ensure patient safety, accommodate for limited activity tolerance, and provide appropriate, skilled assistance to maximize functional potential during evaluation.    Patient found HOB elevated with: peripheral IV, devine catheter     General Precautions: Standard, Cardiac fall   Orthopedic Precautions:N/A   Braces: N/A   Body mass index is 27.73 kg/m².  Oxygen Device: Room Air  Vitals: BP (!) 123/59 (BP Location: Right arm, Patient Position: Lying)   Pulse 66   Temp 98.4 °F (36.9 °C) (Oral)   Resp 20   Ht 5' 5" (1.651 m)   Wt 75.6 kg (166 lb 10.7 oz)   SpO2 97%   Breastfeeding No   BMI 27.73 kg/m²     Exams:  · Cognition:   · Alert and Cooperative  · AxOx4  · Command following: Follows multistep verbal commands  · Fluency: clear/fluent  · Hearing: Intact  · Vision:  Intact  · Skin Integrity: Visible skin intact  · Postural Assessment: slouched posture  · Physical Exam:    Left UE Left LE Right UE Right LE   Edema absent absent absent absent   ROM AROM WFL AROM WFL AROM WFL AROM WFL   Strength within normal limits within normal limits within normal limits within normal limits     Outcome Measures:  AM-PAC 6 CLICK MOBILITY  Turning over in bed (including adjusting bedclothes, sheets and blankets)?: 4  Sitting down on and standing up from a chair with arms (e.g., wheelchair, bedside commode, etc.): 3  Moving from lying on back to sitting on the side of the bed?: 3  Moving to and from a bed to a chair (including a wheelchair)?: 3  Need to walk in hospital room?: 3  Climbing 3-5 steps with a railing?: 2  Basic Mobility Total Score: 18     Functional Mobility:    Bed Mobility:   · Supine to Sit: stand by assistance; from L side of " bed  · Scooting anteriorly to EOB to have both feet planted on floor: stand by assistance    Sitting Balance at Edge of Bed:   Static Sitting Balance: Fair : able to sit unsupported without balance loss and without UE support   Dynamic Sitting Balance: Fair : minimal weight shifting ipsilaterally or anteriorly. Difficulty crossing midline   Assistance Level Required: Stand-by Assistance   Time: 4 minutes   Postural deviations noted: slouched posture   Comments: Pt reported dizziness upon moving from supine to sitting; Pt's MAP at 67 while sitting EOB; Dizziness did not increase but remained constant throughout remainder of session; No LOB    Transfers:   · Sit <> Stand Transfer: contact guard assistance with no assistive device   · Stand <> Sit Transfer: contact guard assistance with no assistive device   · [id number] from EOB  · Bed <> Chair Transfer: Stand Pivot technique with contact guard assistance with hand-held assist  · Chair on patient's L    Standing Balance:   Static Standing Balance: Fair- : requires minimal assistance or UE support in order to stand without LOB   Dynamic Standing Balance: Poor+ : able to stand with minimal assistance and reach ipsilaterally. Unable to weight shift.   Assistance Level Required: Contact Guard Assistance   Patient used: hand-held assist    Postural deviations noted: slouched posture   Comments: no LOB      Gait:   · Patient ambulated: 5 side steps   · Patient required: contact guard  · Patient used:  hand-held assist   · Gait Pattern observed: step to  · Gait Deviation(s): occasional unsteady gait, decreased arm swing, shuffle gait and decreased melia  · Impairments due to: impaired balance, pain, decreased strength and decreased endurance  · all lines remained intact throughout ambulation trial  · Comments: Ambulation limited by patient report of dizziness/lightheadedness; Pt's MAP at 79 upon returning to bedside chair with feet reclined; no  LOB    Education:   Time provided for education, counseling and discussion of health disposition in regards to patient's current status   All questions answered within PT scope of practice and to patient's satisfaction   PT role in POC to address current functional deficits   Pt educated on proper body mechanics, safety techniques, and energy conservation with PT facilitation and cueing throughout session   Call nursing/pct to transfer to chair/use bathroom. Pt stated understanding.    Patient left up in chair with all lines intact, call button in reach and RN notified.      History:     Past Medical History:   Diagnosis Date    Anxiety     Arthritis     CHF (congestive heart failure)     Depression     GERD (gastroesophageal reflux disease)     High cholesterol     Hypertension        Past Surgical History:   Procedure Laterality Date    [facility]    BREAST SURGERY       SECTION      COLONOSCOPY N/A 2017    Procedure: COLONOSCOPY Miralax;  Surgeon: Buddy Butcher Jr., MD;  Location: [facility] ENDO;  Service: Endoscopy;  Laterality: N/A;    COLONOSCOPY N/A 2022    Procedure: COLONOSCOPY;  Surgeon: TREV Kay MD;  Location: [facility] ENDO;  Service: Endoscopy;  Laterality: N/A;    FLEXIBLE SIGMOIDOSCOPY N/A 2022    Procedure: SIGMOIDOSCOPY, FLEXIBLE;  Surgeon: TREV Kay MD;  Location: [facility] OR [address] [place] [address];  Service: Colon and Rectal;  Laterality: N/A;    HYSTERECTOMY      KIDNEY SURGERY Right     done at [facility], reported as mass by patient    LAPAROSCOPIC SIGMOIDECTOMY Left 2022    Procedure: COLECTOMY, SIGMOID, LAPAROSCOPIC, ERAS low;  Surgeon: TREV Kay MD;  [facility];  Service: Colon and Rectal;  Laterality: Left;    LEFT HEART CATHETERIZATION Left 2019    Procedure: Left heart cath;  Surgeon: Gerhard Krishnan MD;  Location: [facility] CATH LAB;  Service: Cardiology;  Laterality: Left;    MOBILIZATION OF  SPLENIC FLEXURE N/A 7/20/2022    Procedure: MOBILIZATION, SPLENIC FLEXURE;  Surgeon: TREV Kay MD;  Location: [facility] OR [address] [place] [address];  Service: Colon and Rectal;  Laterality: N/A;    OOPHORECTOMY      s-section         History reviewed. No pertinent family history.    Social History     Socioeconomic History    Marital status:    Tobacco Use    Smoking status: Never Smoker    Smokeless tobacco: Never Used   Substance and Sexual Activity    Alcohol use: No    Drug use: No    Sexual activity: Not Currently       Time Tracking:     PT Received On: 07/21/22  PT Start Time: 0837     PT Stop Time: 0858  PT Total Time (min): 21 min     Billable Minutes: Evaluation 8 minutes and Therapeutic Activity 13 minutes    Darleen Castillo, SPT  7/21/2022

## 2022-07-21 NOTE — SUBJECTIVE & OBJECTIVE
Subjective:     Interval History: no acute events overnite, no n/v, adequate pain control, denies flatur or bm     Post-Op Info:  Procedure(s) (LRB):  COLECTOMY, SIGMOID, LAPAROSCOPIC, ERAS low (Left)  SIGMOIDOSCOPY, FLEXIBLE (N/A)  EXCISION, SMALL INTESTINE (N/A)  MOBILIZATION, SPLENIC FLEXURE (N/A)   1 Day Post-Op      Medications:  Continuous Infusions:   sodium chloride 0.9% 40 mL/hr at 07/20/22 1705    sodium chloride 0.9%       Scheduled Meds:   acetaminophen  1,000 mg Intravenous Q8H    Followed by    acetaminophen  1,000 mg Oral Q8H    alvimopan  12 mg Oral BID    divalproex ER  500 mg Oral BID    enoxaparin  40 mg Subcutaneous Daily    FLUoxetine  60 mg Oral Daily    fluticasone propionate  2 spray Each Nostril Daily    gabapentin  300 mg Oral TID    ibuprofen  800 mg Intravenous Q8H    Followed by    ibuprofen  800 mg Oral Q8H    lisinopriL  10 mg Oral Daily    metoprolol tartrate  25 mg Oral BID    mupirocin   Nasal BID     PRN Meds:   HYDROmorphone    ondansetron    oxyCODONE    sodium chloride 0.9%        Objective:     Vital Signs (Most Recent):  Temp: 97.7 °F (36.5 °C) (07/21/22 0811)  Pulse: 73 (07/21/22 0811)  Resp: 16 (07/21/22 0811)  BP: (!) 116/58 (07/21/22 0811)  SpO2: (!) 89 % (07/21/22 0811)   Vital Signs (24h Range):  Temp:  [97.7 °F (36.5 °C)-98.4 °F (36.9 °C)] 97.7 °F (36.5 °C)  Pulse:  [73-84] 73  Resp:  [11-23] 16  SpO2:  [89 %-100 %] 89 %  BP: ()/(52-63) 116/58     Intake/Output - Last 3 Shifts         07/19 0700 07/20 0659 07/20 0700 07/21 0659 07/21 0700 07/22 0659    I.V. (mL/kg)  76.2 (1)     IV Piggyback  1450     Total Intake(mL/kg)  1526.2 (20.2)     Urine (mL/kg/hr)  840     Stool  0     Total Output  840     Net  +686.2            Stool Occurrence  0 x             Physical Exam  Vitals and nursing note reviewed.   Constitutional:       Appearance: She is well-developed.   HENT:      Head: Normocephalic.   Eyes:      Pupils: Pupils are equal, round, and reactive to  light.   Cardiovascular:      Rate and Rhythm: Normal rate and regular rhythm.      Heart sounds: Normal heart sounds.   Pulmonary:      Effort: Pulmonary effort is normal. No respiratory distress.      Breath sounds: Normal breath sounds. No wheezing or rales.   Abdominal:      Palpations: Abdomen is soft. There is no mass.      Tenderness: There is no guarding or rebound.      Comments: Abd inc line healing well   Skin:     General: Skin is warm and dry.   Neurological:      Mental Status: She is alert and oriented to person, place, and time.   Psychiatric:         Behavior: Behavior normal.         Thought Content: Thought content normal.         Judgment: Judgment normal.           Significant Labs:  BMP (Last 3 Results):   Recent Labs   Lab 07/21/22  0248      *   K 4.7      CO2 19*   BUN 12   CREATININE 0.9   CALCIUM 8.3*     CBC (Last 3 Results):   Recent Labs   Lab 07/21/22  0248   WBC 12.14   RBC 3.83*   HGB 11.5*   HCT 34.1*      MCV 89   MCH 30.0   MCHC 33.7       Significant Diagnostics:  None

## 2022-07-21 NOTE — PLAN OF CARE
Problem: Occupational Therapy  Goal: Occupational Therapy Goal  Description: Goals to be met by: 7 days 7/28/22     Patient will increase functional independence with ADLs by performing:    Pt to complete UE dressing with set-up  Pt to complete LE dressing with SBA  Pt to complete toileting with SBA  Pt to complete t/f bed, chair and commode with SBA  Pt to complete standing g/h skills with SBA.   Outcome: Ongoing, Progressing

## 2022-07-21 NOTE — PLAN OF CARE
[facility]  Initial Discharge Assessment       Primary Care Provider: Anurag Lopez MD    Admission Diagnosis: Diverticular stricture [K56.699]    Admission Date: 7/20/2022  Expected Discharge Date: 7/23/2022    Discharge Barriers Identified: None    Payor: MEDICAID / Plan: HE[place] BLUE [place] / Product Type: Managed Medicaid /     Extended Emergency Contact Information  Primary Emergency Contact: JoséRaj ty Sr  Address: [address] [place] [address] [place]  Mobile Phone: [phone]  Relation: Spouse    Discharge Plan A: Home  Discharge Plan B: Home      [id number] - [place] - [address] AT [facility] [address]  [address]  [place] [address]  Phone: [phone] Fax: [phone]      Initial Assessment (most recent)     Adult Discharge Assessment - 07/21/22 1142        Discharge Assessment    Assessment Type Discharge Planning Assessment     Confirmed/corrected address, phone number and insurance Yes     Confirmed Demographics Correct on Facesheet     Source of Information patient     Communicated TAYLOR with patient/caregiver Yes     Lives With other (see comments)   sister-in-law    Do you expect to return to your current living situation? Yes     Do you have help at home or someone to help you manage your care at home? Yes     Prior to hospitilization cognitive status: Alert/Oriented     Current cognitive status: Alert/Oriented     Walking or Climbing Stairs Difficulty none     Dressing/Bathing Difficulty none     Equipment Currently Used at Home none     Readmission within 30 days? No     Do you currently have service(s) that help you manage your care at home? No     Do you take prescription medications? Yes     Do you have prescription coverage? Yes     Do you have any problems affording any of your prescribed medications? No     Is the patient taking medications as prescribed? yes     Who is going to help  you get home at discharge? Family     Are you on dialysis? No     Do you take coumadin? No     Discharge Plan A Home     Discharge Plan B Home     DME Needed Upon Discharge  none     Discharge Plan discussed with: Patient     Discharge Barriers Identified None                  Zonia Jain RN, CM   Ext: [phone]

## 2022-07-22 PROCEDURE — 25000003 PHARM REV CODE 250: Performed by: NURSE PRACTITIONER

## 2022-07-22 PROCEDURE — 25000003 PHARM REV CODE 250: Performed by: SURGERY

## 2022-07-22 PROCEDURE — 20600001 HC STEP DOWN PRIVATE ROOM

## 2022-07-22 PROCEDURE — 63600175 PHARM REV CODE 636 W HCPCS: Performed by: SURGERY

## 2022-07-22 RX ADMIN — IBUPROFEN 800 MG: 400 TABLET, FILM COATED ORAL at 05:07

## 2022-07-22 RX ADMIN — GABAPENTIN 300 MG: 300 CAPSULE ORAL at 08:07

## 2022-07-22 RX ADMIN — METOPROLOL TARTRATE 25 MG: 25 TABLET, FILM COATED ORAL at 08:07

## 2022-07-22 RX ADMIN — ENOXAPARIN SODIUM 40 MG: 40 INJECTION SUBCUTANEOUS at 05:07

## 2022-07-22 RX ADMIN — MUPIROCIN: 20 OINTMENT TOPICAL at 09:07

## 2022-07-22 RX ADMIN — GABAPENTIN 300 MG: 300 CAPSULE ORAL at 02:07

## 2022-07-22 RX ADMIN — ACETAMINOPHEN 1000 MG: 500 TABLET ORAL at 02:07

## 2022-07-22 RX ADMIN — DIVALPROEX SODIUM 500 MG: 250 TABLET, EXTENDED RELEASE ORAL at 10:07

## 2022-07-22 RX ADMIN — IBUPROFEN 800 MG: 400 TABLET, FILM COATED ORAL at 10:07

## 2022-07-22 RX ADMIN — ACETAMINOPHEN 1000 MG: 500 TABLET ORAL at 05:07

## 2022-07-22 RX ADMIN — DIVALPROEX SODIUM 500 MG: 250 TABLET, EXTENDED RELEASE ORAL at 08:07

## 2022-07-22 RX ADMIN — METOPROLOL TARTRATE 25 MG: 25 TABLET, FILM COATED ORAL at 10:07

## 2022-07-22 RX ADMIN — ALVIMOPAN 12 MG: 12 CAPSULE ORAL at 08:07

## 2022-07-22 RX ADMIN — FLUTICASONE PROPIONATE 100 MCG: 50 SPRAY, METERED NASAL at 09:07

## 2022-07-22 RX ADMIN — IBUPROFEN 800 MG: 400 TABLET, FILM COATED ORAL at 02:07

## 2022-07-22 RX ADMIN — ACETAMINOPHEN 1000 MG: 500 TABLET ORAL at 10:07

## 2022-07-22 RX ADMIN — FLUOXETINE 60 MG: 20 CAPSULE ORAL at 08:07

## 2022-07-22 RX ADMIN — LISINOPRIL 10 MG: 10 TABLET ORAL at 08:07

## 2022-07-22 RX ADMIN — GABAPENTIN 300 MG: 300 CAPSULE ORAL at 10:07

## 2022-07-22 NOTE — PLAN OF CARE
[facility]      HOME HEALTH ORDERS  FACE TO FACE ENCOUNTER    Patient Name: Ashli Kumar  YOB: 1960    PCP: Anurag Lopez MD   PCP Address: [address] / [place] [address]  PCP Phone Number: [phone]  PCP Fax: [phone]    Encounter Date: 7/7/22    Admit to Home Health    Diagnoses:  Active Hospital Problems    Diagnosis  POA    *Diverticular stricture [K56.699]  Yes    Bipolar affective disorder, currently depressed, moderate [F31.32]  Yes    Essential hypertension [I10]  Yes    Chronic heart failure with preserved ejection fraction [I50.32]  Yes      Resolved Hospital Problems   No resolved problems to display.       Follow Up Appointments:  Future Appointments   Date Time Provider Department Center   8/4/2022  1:40 PM TREV Kay MD [facility] [place]   8/8/2022  9:30 AM [facility] [place]       Allergies:  Review of patient's allergies indicates:   Allergen Reactions    Codeine Hives    Sulfa (sulfonamide antibiotics) Hives    Benzoate analogues Itching       Medications: Review discharge medications with patient and family and provide education.      I have seen and examined this patient within the last 30 days. My clinical findings that support the need for the home health skilled services and home bound status are the following:no   Weakness/numbness causing balance and gait disturbance due to Surgery making it taxing to leave home.  Requiring assistive device to leave home due to unsteady gait caused by  Surgery.     Diet:   regular diet    Labs:  Report Lab results to PCP.    Referrals/ Consults  Physical Therapy to evaluate and treat. Evaluate for home safety and equipment needs; Establish/upgrade home exercise program. Perform / instruct on therapeutic exercises, gait training, transfer training, and Range of Motion.  Occupational Therapy to evaluate and treat. Evaluate home environment for safety and equipment  needs. Perform/Instruct on transfers, ADL training, ROM, and therapeutic exercises.    Activities:   activity as tolerated and ambulate in house with assistance    Nursing:   Agency to admit patient within 24 hours of hospital discharge unless specified on physician order or at patient request    SN to complete comprehensive assessment including routine vital signs. Instruct on disease process and s/s of complications to report to MD. Review/verify medication list sent home with the patient at time of discharge  and instruct patient/caregiver as needed. Frequency may be adjusted depending on start of care date.     Skilled nurse to perform up to 3 visits PRN for symptoms related to diagnosis    Notify MD if SBP > 160 or < 90; DBP > 90 or < 50; HR > 120 or < 50; Temp > 101; O2 < 88%; Other:       Ok to schedule additional visits based on staff availability and patient request on consecutive days within the home health episode.        Home Health Aide:  Physical Therapy Three times weekly and Occupational Therapy Three times weekly    Wound Care Orders  no    I certify that this patient is confined to her home and needs physical therapy and occupational therapy

## 2022-07-22 NOTE — PROGRESS NOTES
Colon & Rectal Surgery Progress Note    Subjective:      Feeling well.  Passing some gas and tolerating diet.  Zepeda out this morning, awaiting void.  Pain well controlled.  Walked a little with physical therapy yesterday    Current Facility-Administered Medications   Medication Dose Route Frequency Provider Last Rate Last Admin    0.9%  NaCl infusion   Intravenous Continuous Paulette Fuentes NP 40 mL/hr at 07/20/22 1705 New Bag at 07/20/22 1705    0.9%  NaCl infusion   Intravenous Continuous Silvestre Aquino MD        acetaminophen tablet 1,000 mg  1,000 mg Oral Q8H Silvestre Aquino MD   1,000 mg at 07/22/22 0555    alvimopan capsule 12 mg  12 mg Oral BID Silvestre Aquino MD   12 mg at 07/22/22 0859    divalproex ER 24 hr tablet 500 mg  500 mg Oral BID Silvestre Aquino MD   500 mg at 07/22/22 0859    enoxaparin injection 40 mg  40 mg Subcutaneous Daily Silvestre Aquino MD   40 mg at 07/21/22 1605    FLUoxetine capsule 60 mg  60 mg Oral Daily Silvestre Aquino MD   60 mg at 07/22/22 0859    fluticasone propionate 50 mcg/actuation nasal spray 100 mcg  2 spray Each Nostril Daily iSlvestre Aquino MD   100 mcg at 07/22/22 0900    gabapentin capsule 300 mg  300 mg Oral TID Paulette Fuentes NP   300 mg at 07/22/22 0859    HYDROmorphone injection 0.25 mg  0.25 mg Intravenous Q4H PRN Silvestre Aquino MD   0.25 mg at 07/20/22 2027    ibuprofen tablet 800 mg  800 mg Oral Q8H Silvestre Aquino MD   800 mg at 07/22/22 0555    lisinopriL tablet 10 mg  10 mg Oral Daily Silvestre Aquino MD   10 mg at 07/22/22 0859    metoprolol tartrate (LOPRESSOR) tablet 25 mg  25 mg Oral BID Silvestre Aquino MD   25 mg at 07/22/22 0859    mupirocin 2 % ointment   Nasal BID Silvestre Aquino MD   Given at 07/22/22 0900    ondansetron injection 4 mg  4 mg Intravenous Q8H PRN Silvestre Aquino MD        oxyCODONE immediate release tablet 5 mg  5 mg Oral Q4H PRN Silvestre Aquino MD   5 mg at 07/21/22 2350    sodium chloride 0.9% flush 10 mL  10 mL Intra-Catheter  HAO Aquino MD               Objective:      General:  Alert and oriented no acute distress  CV:  Normal rate, good peripheral perfusion  Pulmonary:  No increased work of breathing, symmetrical expansion  GI:  Abdomen soft, nondistended, appropriate tender to palpation.  Incisions clean and dry with Steri-Strips intact  Neuro:  Alert and oriented x3, no focal deficits  Psych:  Cooperative, appropriate mood and affect          Lab Results   Component Value Date    WBC 12.14 07/21/2022    HGB 11.5 (L) 07/21/2022    HCT 34.1 (L) 07/21/2022    MCV 89 07/21/2022     07/21/2022     BMP  Lab Results   Component Value Date     (L) 07/21/2022    K 4.7 07/21/2022     07/21/2022    CO2 19 (L) 07/21/2022    BUN 12 07/21/2022    CREATININE 0.9 07/21/2022    CALCIUM 8.3 (L) 07/21/2022    ANIONGAP 12 07/21/2022    ESTGFRAFRICA >60.0 07/21/2022    EGFRNONAA >60.0 07/21/2022     CMP  Sodium   Date Value Ref Range Status   07/21/2022 132 (L) 136 - 145 mmol/L Final     Potassium   Date Value Ref Range Status   07/21/2022 4.7 3.5 - 5.1 mmol/L Final     Chloride   Date Value Ref Range Status   07/21/2022 101 95 - 110 mmol/L Final     CO2   Date Value Ref Range Status   07/21/2022 19 (L) 23 - 29 mmol/L Final     Glucose   Date Value Ref Range Status   07/21/2022 109 70 - 110 mg/dL Final     BUN   Date Value Ref Range Status   07/21/2022 12 8 - 23 mg/dL Final     Creatinine   Date Value Ref Range Status   07/21/2022 0.9 0.5 - 1.4 mg/dL Final     Calcium   Date Value Ref Range Status   07/21/2022 8.3 (L) 8.7 - 10.5 mg/dL Final     Total Protein   Date Value Ref Range Status   05/30/2022 6.6 6.0 - 8.4 g/dL Final     Albumin   Date Value Ref Range Status   05/30/2022 3.2 (L) 3.5 - 5.2 g/dL Final     Total Bilirubin   Date Value Ref Range Status   05/30/2022 0.5 0.1 - 1.0 mg/dL Final     Comment:     For infants and newborns, interpretation of results should be based  on gestational age, weight and in agreement with  clinical  observations.    Premature Infant recommended reference ranges:  Up to 24 hours.............<8.0 mg/dL  Up to 48 hours............<12.0 mg/dL  3-5 days..................<15.0 mg/dL  6-29 days.................<15.0 mg/dL       Alkaline Phosphatase   Date Value Ref Range Status   05/30/2022 47 (L) 55 - 135 U/L Final     AST   Date Value Ref Range Status   05/30/2022 15 10 - 40 U/L Final     Comment:     Specimen slightly hemolyzed     ALT   Date Value Ref Range Status   05/30/2022 9 (L) 10 - 44 U/L Final     Anion Gap   Date Value Ref Range Status   07/21/2022 12 8 - 16 mmol/L Final     eGFR if    Date Value Ref Range Status   07/21/2022 >60.0 >60 mL/min/1.73 m^2 Final     eGFR if non    Date Value Ref Range Status   07/21/2022 >60.0 >60 mL/min/1.73 m^2 Final     Comment:     Calculation used to obtain the estimated glomerular filtration  rate (eGFR) is the CKD-EPI equation.        No results found for: CEA        Assessment:       1. Diverticular stricture        Plan:       62-year-old female status post laparoscopic left colectomy on 07/20/2022    Patient doing well postoperatively.  Zepeda out, awaiting void, and working well with PT.  Passing gas, tolerating diet    Plan:  -continue low residue diet  -awaiting full return of bowel function  -continue to work with physical therapy, recommending home health  -Lovenox for DVT prophylaxis    Disposition:  Continue inpatient care.  Potential discharge tomorrow    Wilbert Aquino MD  Fellow  Colon & Rectal Surgery    This note was created using voice recognition software and may contain some unrecognized transcriptional errors.

## 2022-07-22 NOTE — PLAN OF CARE
POC is reviewed and understood by patient. Rolls well, no c/o pain, nausea. No sign of distress noted. HOB elevated. Bed in lowest position. wWCTM.

## 2022-07-22 NOTE — PLAN OF CARE
07/22/22 1400   Post-Acute Status   Post-Acute Authorization Home Health   Home Health Status Referrals Sent   Pt declined by [facility] (barrierL Medicaid). Pt will need to be given extra ostomy supplies prior to DC.      Brittaney Chavez LMSW  [facility]  [id number]

## 2022-07-23 VITALS
SYSTOLIC BLOOD PRESSURE: 99 MMHG | HEIGHT: 65 IN | OXYGEN SATURATION: 93 % | RESPIRATION RATE: 16 BRPM | HEART RATE: 78 BPM | BODY MASS INDEX: 27.77 KG/M2 | TEMPERATURE: 99 F | DIASTOLIC BLOOD PRESSURE: 63 MMHG | WEIGHT: 166.69 LBS

## 2022-07-23 PROBLEM — I10 ESSENTIAL HYPERTENSION: Status: RESOLVED | Noted: 2019-05-13 | Resolved: 2022-07-23

## 2022-07-23 LAB — CRP SERPL-MCNC: 94.9 MG/L (ref 0–8.2)

## 2022-07-23 PROCEDURE — 36415 COLL VENOUS BLD VENIPUNCTURE: CPT | Performed by: SURGERY

## 2022-07-23 PROCEDURE — 25000003 PHARM REV CODE 250: Performed by: SURGERY

## 2022-07-23 PROCEDURE — 25000003 PHARM REV CODE 250: Performed by: NURSE PRACTITIONER

## 2022-07-23 PROCEDURE — 86140 C-REACTIVE PROTEIN: CPT | Performed by: SURGERY

## 2022-07-23 RX ORDER — PSYLLIUM HUSK 3.4 G/5.8G
1 POWDER ORAL DAILY
Qty: 30 PACKET | Refills: 3 | Status: SHIPPED | OUTPATIENT
Start: 2022-07-23 | End: 2022-11-28

## 2022-07-23 RX ORDER — OXYCODONE HYDROCHLORIDE 5 MG/1
5 TABLET ORAL EVERY 4 HOURS PRN
Qty: 30 TABLET | Refills: 0 | Status: SHIPPED | OUTPATIENT
Start: 2022-07-23 | End: 2022-11-28

## 2022-07-23 RX ADMIN — FLUOXETINE 60 MG: 20 CAPSULE ORAL at 08:07

## 2022-07-23 RX ADMIN — DIVALPROEX SODIUM 500 MG: 250 TABLET, EXTENDED RELEASE ORAL at 08:07

## 2022-07-23 RX ADMIN — MUPIROCIN: 20 OINTMENT TOPICAL at 08:07

## 2022-07-23 RX ADMIN — IBUPROFEN 800 MG: 400 TABLET, FILM COATED ORAL at 05:07

## 2022-07-23 RX ADMIN — FLUTICASONE PROPIONATE 100 MCG: 50 SPRAY, METERED NASAL at 08:07

## 2022-07-23 RX ADMIN — GABAPENTIN 300 MG: 300 CAPSULE ORAL at 08:07

## 2022-07-23 NOTE — PROGRESS NOTES
[facility]  Colorectal Surgery  Progress Note    Patient Name: Ashli Kumar  MRN: [id number]  Admission Date: 7/20/2022  Hospital Length of Stay: 3 days  Attending Physician: TREV Kay MD    Subjective:     Interval History: NAEON. Patient feeling well today, no pain. Patient voiding spontaneously, having bowel movements and passing flatus. No nausea, vomiting or fever.     Post-Op Info:  Procedure(s) (LRB):  COLECTOMY, SIGMOID, LAPAROSCOPIC, ERAS low (Left)  SIGMOIDOSCOPY, FLEXIBLE (N/A)  EXCISION, SMALL INTESTINE (N/A)  MOBILIZATION, SPLENIC FLEXURE (N/A)   3 Days Post-Op      Medications:  Continuous Infusions:      Scheduled Meds:   acetaminophen  1,000 mg Oral Q8H    divalproex ER  500 mg Oral BID    enoxaparin  40 mg Subcutaneous Daily    FLUoxetine  60 mg Oral Daily    fluticasone propionate  2 spray Each Nostril Daily    gabapentin  300 mg Oral TID    ibuprofen  800 mg Oral Q8H    lisinopriL  10 mg Oral Daily    metoprolol tartrate  25 mg Oral BID    mupirocin   Nasal BID     PRN Meds:   HYDROmorphone    ondansetron    oxyCODONE    sodium chloride 0.9%        Objective:     Vital Signs (Most Recent):  Temp: (!) 95 °F (35 °C) (07/23/22 0531)  Pulse: 74 (07/23/22 0531)  Resp: 16 (07/23/22 0531)  BP: (!) 108/59 (07/23/22 0531)  SpO2: (!) 93 % (07/23/22 0531)   Vital Signs (24h Range):  Temp:  [95 °F (35 °C)-98.5 °F (36.9 °C)] 95 °F (35 °C)  Pulse:  [71-77] 74  Resp:  [16-18] 16  SpO2:  [92 %-95 %] 93 %  BP: ()/(34-62) 108/59     Intake/Output - Last 3 Shifts         07/21 0700 07/22 0659 07/22 0700 07/23 0659 07/23 0700 07/24 0659    P.O. 360      I.V. (mL/kg) 1291.9 (17.1)      IV Piggyback       Total Intake(mL/kg) 1651.9 (21.9)      Urine (mL/kg/hr) 1250 (0.7)      Stool 0      Total Output 1250      Net +401.9             Urine Occurrence  2 x     Stool Occurrence 1 x 4 x             Physical Exam  Vitals and nursing note reviewed.   Constitutional:        Appearance: She is well-developed.   HENT:      Head: Normocephalic.   Eyes:      Pupils: Pupils are equal, round, and reactive to light.   Cardiovascular:      Rate and Rhythm: Normal rate and regular rhythm.      Heart sounds: Normal heart sounds.   Pulmonary:      Effort: Pulmonary effort is normal. No respiratory distress.      Breath sounds: Normal breath sounds. No wheezing or rales.   Abdominal:      Palpations: Abdomen is soft. There is no mass.      Tenderness: There is no guarding or rebound.      Comments: Abd inc line healing well   Skin:     General: Skin is warm and dry.   Neurological:      Mental Status: She is alert and oriented to person, place, and time.   Psychiatric:         Behavior: Behavior normal.         Thought Content: Thought content normal.         Judgment: Judgment normal.           Significant Labs:  BMP (Last 3 Results):   Recent Labs   Lab 07/21/22  0248      *   K 4.7      CO2 19*   BUN 12   CREATININE 0.9   CALCIUM 8.3*       CBC (Last 3 Results):   Recent Labs   Lab 07/21/22  0248   WBC 12.14   RBC 3.83*   HGB 11.5*   HCT 34.1*      MCV 89   MCH 30.0   MCHC 33.7         Significant Diagnostics:  None    Assessment/Plan:     * Diverticular stricture  OR 7/20 sigmoid resection  Patient progressing well, having BM, and voiding spontaneously, tolerating diet and sleeping through the night. Pain well controlled.     Plan  -continue multi modality for pain control  -encourage ambulation and use of IS  -Discharge planning    Bipolar affective disorder, currently depressed, moderate  Resume home meds    Chronic heart failure with preserved ejection fraction  Patient is identified as having chf heart failure that is Chronic. CHF is currently controlled. Latest ECHO performed and demonstrates- No results found for this or any previous visit.  . contimue home meds  -tele  -monitor I&O              Allison Jim MD  Colorectal Surgery  [facility]

## 2022-07-23 NOTE — DISCHARGE SUMMARY
[facility]  Colorectal Surgery  Discharge Summary      Patient Name: Ashli Kumar  MRN: [id number]  Admission Date: 7/20/2022  Hospital Length of Stay: 3 days  Discharge Date and Time:  07/23/2022 8:53 AM  Attending Physician: TREV Kay MD   Discharging Provider: Precious Payne MD  Primary Care Provider: Anurag Lopez MD     HPI: 62 y.o. female with diffuse segmental colitis of the descending and proximal sigmoid colon with severe diverticular disease on her scope.  With the symptomatic diverticular stricture, resection was therefore offered.  Discussed the risks of anastomotic leak, bleeding, hernia, wound infection, damage to surrounding structures.  Discussed a 3-4 day hospital stay as well as a 6 week total recovery.  Bowel movements may change over time afterwards.  Consents were signed today and all questions were answered.  Would plan to of started through her prior Pfannenstiel incision for hand port placement.    Procedure(s) (LRB):  COLECTOMY, SIGMOID, LAPAROSCOPIC, ERAS low (Left)  SIGMOIDOSCOPY, FLEXIBLE (N/A)  EXCISION, SMALL INTESTINE (N/A)  MOBILIZATION, SPLENIC FLEXURE (N/A)     Hospital Course: Patient underwent extended L colectomy with SBR, which she tolerated well. Postoperatively she was admitted to [facility]S. On POD1 she tolerated clears and diet was advanced. Her devine was removed on POD2 and she voided. On POD3 she was in stable condition, ambulating with pain well controlled, tolerating diet, and having bowel function. She was discharged to home with instructions to follow up as an outpatient.        Significant Diagnostic Studies: Labs: CBC No results for input(s): WBC, HGB, HCT, PLT in the last 48 hours.    Pending Diagnostic Studies:     Procedure Component Value Units Date/Time    Specimen to Pathology, Surgery General Surgery (colon rectal) [[id number]] Collected: 07/20/22 1610    Order Status: Sent Lab Status: In process Updated: 07/20/22 4083    Specimen: Tissue          Final Active Diagnoses:    Diagnosis Date Noted POA    PRINCIPAL PROBLEM:  Diverticular stricture [K56.699] 07/20/2022 Yes    Bipolar affective disorder, currently depressed, moderate [F31.32] 05/27/2022 Yes    Chronic heart failure with preserved ejection fraction [I50.32] 05/13/2019 Yes      Problems Resolved During this Admission:    Diagnosis Date Noted Date Resolved POA    Essential hypertension [I10] 05/13/2019 07/23/2022 Yes      Discharged Condition: good    Disposition: Home or Self Care    Follow Up:   Follow-up Information     W Sudeep Kay MD Follow up in 2 week(s).    Specialty: Colon and Rectal Surgery  Contact information:  [address] [place] [address] [phone]                       Patient Instructions:      Diet Adult Regular     Lifting restrictions   Order Comments: No heavy lifting >15lbs     No driving until:   Order Comments: No driving while taking narcotic pain medication     Activity as tolerated     Medications:  Reconciled Home Medications:      Medication List      START taking these medications    METAMUCIL FIBER SINGLES 3.4 gram Pwpk packet  Generic drug: psyllium husk (aspartame)  Take 1 packet by mouth once daily.     oxyCODONE 5 MG immediate release tablet  Commonly known as: ROXICODONE  Take 1 tablet (5 mg total) by mouth every 4 (four) hours as needed for Pain.        CONTINUE taking these medications    divalproex  MG Tb24  Commonly known as: DEPAKOTE  Take 1 tablet (500 mg total) by mouth 2 (two) times a day.     * FLUoxetine 20 MG capsule  Take 20 mg by mouth once daily. 60 mg total     * FLUoxetine 40 MG capsule  Take 1 capsule (40 mg total) by mouth once daily. 60 mg total     fluticasone propionate 50 mcg/actuation nasal spray  Commonly known as: FLONASE  2 sprays by Each Nostril route once daily.     furosemide 20 MG tablet  Commonly known as: LASIX  1 tablet     lisinopriL 10 MG tablet  Take 1 tablet (10 mg total) by mouth once  daily.     loratadine 10 mg tablet  Commonly known as: CLARITIN  Take 1 tablet (10 mg total) by mouth once daily.     metoprolol tartrate 25 MG tablet  Commonly known as: LOPRESSOR  Take 25 mg by mouth 2 (two) times daily.     PREMARIN vaginal cream  Generic drug: conjugated estrogens  Place 0.5 g vaginally twice a week.     spironolactone 25 MG tablet  Commonly known as: ALDACTONE  Take 12.5 mg by mouth.         * This list has 2 medication(s) that are the same as other medications prescribed for you. Read the directions carefully, and ask your doctor or other care provider to review them with you.            STOP taking these medications    APRISO 0.375 gram Cp24  Generic drug: mesalamine     neomycin 500 mg Tab  Commonly known as: MYCIFRADIN     polyethylene glycol 17 gram/dose powder  Commonly known as: GLYCOLAX            Precious Payne MD  Colorectal Surgery  [facility]

## 2022-07-23 NOTE — SUBJECTIVE & OBJECTIVE
Subjective:     Interval History: NAEON. Patient feeling well today, no pain. Patient voiding spontaneously, having bowel movements and passing flatus. No nausea, vomiting or fever.     Post-Op Info:  Procedure(s) (LRB):  COLECTOMY, SIGMOID, LAPAROSCOPIC, ERAS low (Left)  SIGMOIDOSCOPY, FLEXIBLE (N/A)  EXCISION, SMALL INTESTINE (N/A)  MOBILIZATION, SPLENIC FLEXURE (N/A)   3 Days Post-Op      Medications:  Continuous Infusions:      Scheduled Meds:   acetaminophen  1,000 mg Oral Q8H    divalproex ER  500 mg Oral BID    enoxaparin  40 mg Subcutaneous Daily    FLUoxetine  60 mg Oral Daily    fluticasone propionate  2 spray Each Nostril Daily    gabapentin  300 mg Oral TID    ibuprofen  800 mg Oral Q8H    lisinopriL  10 mg Oral Daily    metoprolol tartrate  25 mg Oral BID    mupirocin   Nasal BID     PRN Meds:   HYDROmorphone    ondansetron    oxyCODONE    sodium chloride 0.9%        Objective:     Vital Signs (Most Recent):  Temp: (!) 95 °F (35 °C) (07/23/22 0531)  Pulse: 74 (07/23/22 0531)  Resp: 16 (07/23/22 0531)  BP: (!) 108/59 (07/23/22 0531)  SpO2: (!) 93 % (07/23/22 0531)   Vital Signs (24h Range):  Temp:  [95 °F (35 °C)-98.5 °F (36.9 °C)] 95 °F (35 °C)  Pulse:  [71-77] 74  Resp:  [16-18] 16  SpO2:  [92 %-95 %] 93 %  BP: ()/(34-62) 108/59     Intake/Output - Last 3 Shifts         07/21 0700  07/22 0659 07/22 0700 07/23 0659 07/23 0700  07/24 0659    P.O. 360      I.V. (mL/kg) 1291.9 (17.1)      IV Piggyback       Total Intake(mL/kg) 1651.9 (21.9)      Urine (mL/kg/hr) 1250 (0.7)      Stool 0      Total Output 1250      Net +401.9             Urine Occurrence  2 x     Stool Occurrence 1 x 4 x             Physical Exam  Vitals and nursing note reviewed.   Constitutional:       Appearance: She is well-developed.   HENT:      Head: Normocephalic.   Eyes:      Pupils: Pupils are equal, round, and reactive to light.   Cardiovascular:      Rate and Rhythm: Normal rate and regular rhythm.      Heart sounds:  Normal heart sounds.   Pulmonary:      Effort: Pulmonary effort is normal. No respiratory distress.      Breath sounds: Normal breath sounds. No wheezing or rales.   Abdominal:      Palpations: Abdomen is soft. There is no mass.      Tenderness: There is no guarding or rebound.      Comments: Abd inc line healing well   Skin:     General: Skin is warm and dry.   Neurological:      Mental Status: She is alert and oriented to person, place, and time.   Psychiatric:         Behavior: Behavior normal.         Thought Content: Thought content normal.         Judgment: Judgment normal.           Significant Labs:  BMP (Last 3 Results):   Recent Labs   Lab 07/21/22  0248      *   K 4.7      CO2 19*   BUN 12   CREATININE 0.9   CALCIUM 8.3*       CBC (Last 3 Results):   Recent Labs   Lab 07/21/22  0248   WBC 12.14   RBC 3.83*   HGB 11.5*   HCT 34.1*      MCV 89   MCH 30.0   MCHC 33.7         Significant Diagnostics:  None

## 2022-07-23 NOTE — ASSESSMENT & PLAN NOTE
Chronic, controlled.  Latest blood pressure and vitals reviewed-   Temp:  [95 °F (35 °C)-98.5 °F (36.9 °C)]   Pulse:  [71-77]   Resp:  [16-18]   BP: ()/(34-62)   SpO2:  [92 %-95 %] .   Home meds for hypertension were reviewed and noted below.   Hypertension Medications             furosemide (LASIX) 20 MG tablet 1 tablet    lisinopril 10 MG tablet Take 1 tablet (10 mg total) by mouth once daily.    metoprolol tartrate (LOPRESSOR) 25 MG tablet Take 25 mg by mouth 2 (two) times daily.    spironolactone (ALDACTONE) 25 MG tablet Take 12.5 mg by mouth.          While in the hospital, will manage blood pressure as follows; Continue home antihypertensive regimen    Will utilize p.r.n. blood pressure medication only if patient's blood pressure greater than  180/110 and she develops symptoms such as worsening chest pain or shortness of breath.  
Chronic, controlled.  Latest blood pressure and vitals reviewed-   Temp:  [97.7 °F (36.5 °C)-98.4 °F (36.9 °C)]   Pulse:  [73-84]   Resp:  [11-23]   BP: ()/(52-63)   SpO2:  [89 %-100 %] .   Home meds for hypertension were reviewed and noted below.   Hypertension Medications             furosemide (LASIX) 20 MG tablet 1 tablet    lisinopril 10 MG tablet Take 1 tablet (10 mg total) by mouth once daily.    metoprolol tartrate (LOPRESSOR) 25 MG tablet Take 25 mg by mouth 2 (two) times daily.    spironolactone (ALDACTONE) 25 MG tablet Take 12.5 mg by mouth.          While in the hospital, will manage blood pressure as follows; Continue home antihypertensive regimen    Will utilize p.r.n. blood pressure medication only if patient's blood pressure greater than  180/110 and she develops symptoms such as worsening chest pain or shortness of breath.  
OR 7/20 sigmoid resection    -await return of bowel function, lfd  -continue multi modality for pain control  -encourage ambulation and use of IS  -amanda hose and SCD  -prophalactic lovenox and PPI  -keep devine till am due to bladder repair during surgery  
OR 7/20 sigmoid resection  Patient progressing well, having BM, and voiding spontaneously, tolerating diet and sleeping through the night. Pain well controlled.     Plan  -continue multi modality for pain control  -encourage ambulation and use of IS  -Discharge planning  
Patient is identified as having chf heart failure that is Chronic. CHF is currently controlled. Latest ECHO performed and demonstrates- No results found for this or any previous visit.  . contimue home meds  -tele  -monitor I&O      
Patient is identified as having chf heart failure that is Chronic. CHF is currently controlled. Latest ECHO performed and demonstrates- No results found for this or any previous visit.  . contimue home meds  -tele  -monitor I&O      
Resume home meds  
36.7

## 2022-07-23 NOTE — PLAN OF CARE
Problem: Adult Inpatient Plan of Care  Goal: Plan of Care Review  Outcome: Met  Goal: Patient-Specific Goal (Individualized)  Outcome: Met  Goal: Absence of Hospital-Acquired Illness or Injury  Outcome: Met  Goal: Optimal Comfort and Wellbeing  Outcome: Met  Goal: Readiness for Transition of Care  Outcome: Met     Problem: Infection  Goal: Absence of Infection Signs and Symptoms  Outcome: Met     Problem: Skin Injury Risk Increased  Goal: Skin Health and Integrity  Outcome: Met     Problem: Fall Injury Risk  Goal: Absence of Fall and Fall-Related Injury  Outcome: Met

## 2022-07-26 ENCOUNTER — TELEPHONE (OUTPATIENT)
Dept: SURGERY | Facility: CLINIC | Age: 62
End: 2022-07-26
Payer: MEDICAID

## 2022-07-26 NOTE — TELEPHONE ENCOUNTER
Colon and Rectal Surgery Post Discharge Follow Up    1. Have you experienced any chest pain or trouble breathing: No  2. Have you had trouble eating or drinking (ie nausea, vomiting) for more than a day: No  3. Have you had any fevers? How high was the fever if present: No  4. When was the last time you had a BM (or ostomy output): More than 24 hours ago, but passing flatus.  5. If has an ostomy: How much is the output per day: Not applicable  6. Have you had any trouble urinating--dark color, foul smell, or pain: No  7. Have has your wounds been healing--any redness, swelling, drainage. Request a picture. Healing well.  8. How has your abdominal pain been: Controlled; pain present but able to function.  9. Any new concerns or changes in how you have been feeling: No     Spoke with patient regarding post operative care. Instructed to call back for increased pain, fever, or any other symptoms. Patient verbalizes understanding.

## 2022-07-28 ENCOUNTER — TELEPHONE (OUTPATIENT)
Dept: SURGERY | Facility: CLINIC | Age: 62
End: 2022-07-28
Payer: MEDICAID

## 2022-07-29 LAB
FINAL PATHOLOGIC DIAGNOSIS: NORMAL
Lab: NORMAL

## 2022-08-04 ENCOUNTER — OFFICE VISIT (OUTPATIENT)
Dept: SURGERY | Facility: CLINIC | Age: 62
End: 2022-08-04
Payer: MEDICAID

## 2022-08-04 VITALS
WEIGHT: 165.38 LBS | SYSTOLIC BLOOD PRESSURE: 152 MMHG | DIASTOLIC BLOOD PRESSURE: 88 MMHG | HEIGHT: 65 IN | HEART RATE: 82 BPM | BODY MASS INDEX: 27.56 KG/M2

## 2022-08-04 DIAGNOSIS — K56.699 DIVERTICULAR STRICTURE: Primary | ICD-10-CM

## 2022-08-04 PROCEDURE — 1160F RVW MEDS BY RX/DR IN RCRD: CPT | Mod: CPTII,,, | Performed by: COLON & RECTAL SURGERY

## 2022-08-04 PROCEDURE — 3079F DIAST BP 80-89 MM HG: CPT | Mod: CPTII,,, | Performed by: COLON & RECTAL SURGERY

## 2022-08-04 PROCEDURE — 1160F PR REVIEW ALL MEDS BY PRESCRIBER/CLIN PHARMACIST DOCUMENTED: ICD-10-PCS | Mod: CPTII,,, | Performed by: COLON & RECTAL SURGERY

## 2022-08-04 PROCEDURE — 3079F PR MOST RECENT DIASTOLIC BLOOD PRESSURE 80-89 MM HG: ICD-10-PCS | Mod: CPTII,,, | Performed by: COLON & RECTAL SURGERY

## 2022-08-04 PROCEDURE — 3044F PR MOST RECENT HEMOGLOBIN A1C LEVEL <7.0%: ICD-10-PCS | Mod: CPTII,,, | Performed by: COLON & RECTAL SURGERY

## 2022-08-04 PROCEDURE — 3044F HG A1C LEVEL LT 7.0%: CPT | Mod: CPTII,,, | Performed by: COLON & RECTAL SURGERY

## 2022-08-04 PROCEDURE — 3008F BODY MASS INDEX DOCD: CPT | Mod: CPTII,,, | Performed by: COLON & RECTAL SURGERY

## 2022-08-04 PROCEDURE — 99999 PR PBB SHADOW E&M-EST. PATIENT-LVL III: ICD-10-PCS | Mod: PBBFAC,,, | Performed by: COLON & RECTAL SURGERY

## 2022-08-04 PROCEDURE — 1159F PR MEDICATION LIST DOCUMENTED IN MEDICAL RECORD: ICD-10-PCS | Mod: CPTII,,, | Performed by: COLON & RECTAL SURGERY

## 2022-08-04 PROCEDURE — 99024 POSTOP FOLLOW-UP VISIT: CPT | Mod: ,,, | Performed by: COLON & RECTAL SURGERY

## 2022-08-04 PROCEDURE — 3008F PR BODY MASS INDEX (BMI) DOCUMENTED: ICD-10-PCS | Mod: CPTII,,, | Performed by: COLON & RECTAL SURGERY

## 2022-08-04 PROCEDURE — 99024 PR POST-OP FOLLOW-UP VISIT: ICD-10-PCS | Mod: ,,, | Performed by: COLON & RECTAL SURGERY

## 2022-08-04 PROCEDURE — 3077F PR MOST RECENT SYSTOLIC BLOOD PRESSURE >= 140 MM HG: ICD-10-PCS | Mod: CPTII,,, | Performed by: COLON & RECTAL SURGERY

## 2022-08-04 PROCEDURE — 3077F SYST BP >= 140 MM HG: CPT | Mod: CPTII,,, | Performed by: COLON & RECTAL SURGERY

## 2022-08-04 PROCEDURE — 99213 OFFICE O/P EST LOW 20 MIN: CPT | Mod: PBBFAC,PN | Performed by: COLON & RECTAL SURGERY

## 2022-08-04 PROCEDURE — 99999 PR PBB SHADOW E&M-EST. PATIENT-LVL III: CPT | Mod: PBBFAC,,, | Performed by: COLON & RECTAL SURGERY

## 2022-08-04 PROCEDURE — 4010F ACE/ARB THERAPY RXD/TAKEN: CPT | Mod: CPTII,,, | Performed by: COLON & RECTAL SURGERY

## 2022-08-04 PROCEDURE — 4010F PR ACE/ARB THEARPY RXD/TAKEN: ICD-10-PCS | Mod: CPTII,,, | Performed by: COLON & RECTAL SURGERY

## 2022-08-04 PROCEDURE — 1159F MED LIST DOCD IN RCRD: CPT | Mod: CPTII,,, | Performed by: COLON & RECTAL SURGERY

## 2022-08-04 NOTE — PROGRESS NOTES
"  CRS Office Post Operative Visit    SUBJECTIVE:     Chief Complaint: followup from surgery.     Procedure:   7/20/22:  1. Laparoscopic extended left colectomy   2. Laparoscopic mobilization of the splenic flexure  3. Small-bowel resection  4. Flexible sigmoidoscopy.       Indication: diverticular stricture.      Significant post operative events: did well and discharged on POD3     Pathology:   A.   DESCENDING AND SIGMOID COLON, COLECTOMY:          -Benign colonic parenchyma with diverticulosis and focal stricture.   B.   PROXIMAL AND DISTAL ANASTOMOTIC DONUTS, COLECTOMY:          -Benign colonic mucosa, consistent with anastomotic donuts.   C.   ILEUM, SEGMENTAL RESECTION:          -Benign small intestinal mucosa with serosal adhesions, focal   submucosal abscess and changes consistent with anastomotic site.     Current Status:   8/4/2022:  Doing very well.  Having 2 bowel movements per day.  Diarrhea improving.  Abdominal pain resolved.  No issues with her incision.  No fevers.    Review of Systems:  Review of Systems   Constitutional: Negative for chills, diaphoresis, fever, malaise/fatigue and weight loss.   HENT: Negative for congestion.    Respiratory: Negative for shortness of breath.    Cardiovascular: Negative for chest pain and leg swelling.   Gastrointestinal: Negative for abdominal pain, blood in stool, constipation, nausea and vomiting.   Genitourinary: Negative for dysuria.   Musculoskeletal: Negative for back pain and myalgias.   Skin: Negative for rash.   Neurological: Negative for dizziness and weakness.   Endo/Heme/Allergies: Does not bruise/bleed easily.   Psychiatric/Behavioral: Negative for depression.       OBJECTIVE:     Vital Signs (Most Recent)  BP (!) 152/88 (BP Location: Left arm, Patient Position: Sitting, BP Method: Large (Automatic))   Pulse 82   Ht 5' 5" (1.651 m)   Wt 75 kg (165 lb 5.5 oz)   BMI 27.51 kg/m²     Physical Exam:  General: White female in no distress   Respiratory: " respirations are even and unlabored  Cardiac: regular rate  Abdomen:  Incisions healing well.  Bruising improved  Extremities: Warm dry and intact  Anorectal:  Deferred    ASSESSMENT/PLAN:     Ashli was seen today for post-op evaluation.    Diagnoses and all orders for this visit:    Diverticular stricture        62 y.o. female post op from lap left colectomy and small bowel resection for diverticular disease on 7/20/22.  She is overall have healed very well.  Steri-Strips removed today.  Encouraged her to return to all normal activities.  I will follow up with her for a 2nd postop visit in 4 weeks.  At that time, we will plan to proceed with completion colonoscopy to complete her colorectal screening.      TREV Kay MD, FACS  Staff Surgeon  Colon & Rectal Surgery

## 2022-08-08 ENCOUNTER — HOSPITAL ENCOUNTER (OUTPATIENT)
Dept: RADIOLOGY | Facility: HOSPITAL | Age: 62
Discharge: HOME | End: 2022-08-08
Attending: STUDENT IN AN ORGANIZED HEALTH CARE EDUCATION/TRAINING PROGRAM
Payer: MEDICAID

## 2022-08-08 DIAGNOSIS — Z12.39 ENCOUNTER FOR SCREENING FOR MALIGNANT NEOPLASM OF BREAST, UNSPECIFIED SCREENING MODALITY: ICD-10-CM

## 2022-08-08 PROCEDURE — 77067 SCR MAMMO BI INCL CAD: CPT | Mod: TC,PO

## 2022-08-31 ENCOUNTER — TELEPHONE (OUTPATIENT)
Dept: SURGERY | Facility: CLINIC | Age: 62
End: 2022-08-31
Payer: MEDICAID

## 2022-09-01 ENCOUNTER — TELEPHONE (OUTPATIENT)
Dept: SURGERY | Facility: CLINIC | Age: 62
End: 2022-09-01
Payer: MEDICAID

## 2022-09-01 NOTE — TELEPHONE ENCOUNTER
Spoke with patient and appointment scheduled for next week in [place] due to family emergency. Appointment details confirmed verbally with patient.

## 2022-09-01 NOTE — TELEPHONE ENCOUNTER
----- Message from Carissa Sidhu sent at 9/1/2022 11:13 AM CDT -----  Regarding: Reschedule post appointment  Contact: [phone]  Calling to reschedule post op appointment due to family emergency.

## 2022-09-06 ENCOUNTER — HOSPITAL ENCOUNTER (EMERGENCY)
Facility: HOSPITAL | Age: 62
Discharge: HOME | End: 2022-09-06
Attending: EMERGENCY MEDICINE
Payer: MEDICAID

## 2022-09-06 VITALS
RESPIRATION RATE: 20 BRPM | TEMPERATURE: 99 F | WEIGHT: 165 LBS | SYSTOLIC BLOOD PRESSURE: 151 MMHG | HEART RATE: 68 BPM | OXYGEN SATURATION: 100 % | BODY MASS INDEX: 27.46 KG/M2 | DIASTOLIC BLOOD PRESSURE: 78 MMHG

## 2022-09-06 DIAGNOSIS — J06.9 VIRAL URI: Primary | ICD-10-CM

## 2022-09-06 LAB — SARS-COV-2 RDRP RESP QL NAA+PROBE: NEGATIVE

## 2022-09-06 PROCEDURE — U0002 COVID-19 LAB TEST NON-CDC: HCPCS | Mod: ER | Performed by: EMERGENCY MEDICINE

## 2022-09-06 PROCEDURE — 99284 EMERGENCY DEPT VISIT MOD MDM: CPT | Mod: ER

## 2022-09-06 RX ORDER — ALBUTEROL SULFATE 90 UG/1
1-2 AEROSOL, METERED RESPIRATORY (INHALATION) EVERY 6 HOURS PRN
Qty: 18 G | Refills: 0 | Status: SHIPPED | OUTPATIENT
Start: 2022-09-06 | End: 2022-11-28

## 2022-09-06 RX ORDER — FLUTICASONE PROPIONATE 50 MCG
1 SPRAY, SUSPENSION (ML) NASAL 2 TIMES DAILY PRN
Qty: 15 G | Refills: 0 | Status: SHIPPED | OUTPATIENT
Start: 2022-09-06 | End: 2022-11-28 | Stop reason: SDUPTHER

## 2022-09-06 NOTE — ED PROVIDER NOTES
Encounter Date: 2022       History     Chief Complaint   Patient presents with    COVID-19 Concerns     Headache, sneezing, chills and fatigue that began this morning.      HPI  62 y.o.    Co frontal ha, sneezing, cough, congestion x 1 day  Not vaccinated  During COVID pandemic    Review of patient's allergies indicates:   Allergen Reactions    Codeine Hives    Sulfa (sulfonamide antibiotics) Hives    Benzoate analogues Itching     Past Medical History:   Diagnosis Date    Anxiety     Arthritis     CHF (congestive heart failure)     Depression     GERD (gastroesophageal reflux disease)     High cholesterol     Hypertension      Past Surgical History:   Procedure Laterality Date    BREAST BIOPSY Left     [facility]    BREAST SURGERY       SECTION      COLONOSCOPY N/A 2017    Procedure: COLONOSCOPY Miralax;  Surgeon: Buddy Butcher Jr., MD;  Location: [facility] ENDO;  Service: Endoscopy;  Laterality: N/A;    COLONOSCOPY N/A 2022    Procedure: COLONOSCOPY;  Surgeon: TREV Kay MD;  Location: [facility] ENDO;  Service: Endoscopy;  Laterality: N/A;    FLEXIBLE SIGMOIDOSCOPY N/A 2022    Procedure: SIGMOIDOSCOPY, FLEXIBLE;  Surgeon: TREV Kay MD;  Location: [facility] OR [address] [place] [address];  Service: Colon and Rectal;  Laterality: N/A;    HYSTERECTOMY      KIDNEY SURGERY Right     done at [facility], reported as mass by patient    LAPAROSCOPIC SIGMOIDECTOMY Left 2022    Procedure: COLECTOMY, SIGMOID, LAPAROSCOPIC, ERAS low;  Surgeon: TREV Kay MD;  [facility];  Service: Colon and Rectal;  Laterality: Left;    LEFT HEART CATHETERIZATION Left 2019    Procedure: Left heart cath;  Surgeon: Gerhard Krishnan MD;  Location: [facility] CATH LAB;  Service: Cardiology;  Laterality: Left;    MOBILIZATION OF SPLENIC FLEXURE N/A 2022    Procedure: MOBILIZATION, SPLENIC FLEXURE;  Surgeon: TREV Kay MD;  [facility];  Service: Colon and Rectal;  Laterality:  N/A;    OOPHORECTOMY      s-section       No family history on file.  Social History     Tobacco Use    Smoking status: Never    Smokeless tobacco: Never   Substance Use Topics    Alcohol use: No    Drug use: No     Review of Systems  All systems were reviewed/examined and were negative except as noted in the HPI.    Physical Exam     Initial Vitals [09/06/22 1829]   BP Pulse Resp Temp SpO2   (!) 151/78 68 20 98.8 °F (37.1 °C) 100 %      MAP       --         Physical Exam    General: the patient is awake, alert, and in no apparent distress.  Head: normocephalic and atraumatic, sclera are clear  Neck: supple without meningismus  OP wnl  Chest: clear to auscultation bilaterally, no respiratory distress  Heart: regular rate and rhythm  Extremities: warm and well perfused  Skin: warm and dry  Psych conversant  Neuro: awake, alert, moving all extremities    ED Course   Procedures  Labs Reviewed   SARS-COV-2 RNA AMPLIFICATION, QUAL    Narrative:     Is the patient symptomatic?->Yes          Imaging Results    None          Medications - No data to display     Medical Decision Making:    This is an emergent evaluation of a patient presenting to the ED.  Nursing notes were reviewed.  Covid neg    I decided to obtain and review old medical records, which showed: outpatient fu    Evaluation for Emergency Medical Condition  The patient received a medical screening exam and within a reasonable degree of clinical confidence an emergency medical condition has not been identified.  The patient is instructed on proper follow up and return precautions to the ED.    The patient was encouraged strongly to get the COVID-19 vaccine either after asymptomatic (if COVID positive) or offered it here in the ED is COVID negative.      Froy Larios MD, SYLVIA                      Clinical Impression:   Final diagnoses:  [J06.9] Viral URI (Primary)      ED Disposition Condition    Discharge Stable          ED Prescriptions       Medication Sig  Dispense Start Date End Date Auth. Provider    albuterol (PROVENTIL/VENTOLIN HFA) 90 mcg/actuation inhaler Inhale 1-2 puffs into the lungs every 6 (six) hours as needed for Wheezing. Rescue 18 g 9/6/2022 9/6/2023 Monster Larios MD    fluticasone propionate (FLONASE) 50 mcg/actuation nasal spray 1 spray (50 mcg total) by Each Nostril route 2 (two) times daily as needed for Rhinitis. 15 g 9/6/2022 -- Monster Larios MD          Follow-up Information       Follow up With Specialties Details Why Contact Info    Anurag Lopez MD Family Medicine Schedule an appointment as soon as possible for a visit   [address], [facility] [address]  [facility] [address] [phone]            Discharged to home in stable condition, return to ED warnings given, follow up and patient care instructions given.      Froy Larios MD, SYLVIA, FACEP  Department of Emergency Medicine       Monster Larios MD  09/07/22 0250

## 2022-09-07 ENCOUNTER — TELEPHONE (OUTPATIENT)
Dept: SURGERY | Facility: CLINIC | Age: 62
End: 2022-09-07
Payer: MEDICAID

## 2022-09-07 NOTE — DISCHARGE INSTRUCTIONS
The COVID test is not perfect, especially when you've had a couple of days of symptoms or less.  Continue to monitor with a home test

## 2022-09-08 ENCOUNTER — OFFICE VISIT (OUTPATIENT)
Dept: SURGERY | Facility: CLINIC | Age: 62
End: 2022-09-08
Payer: MEDICAID

## 2022-09-08 VITALS
WEIGHT: 160.94 LBS | SYSTOLIC BLOOD PRESSURE: 116 MMHG | BODY MASS INDEX: 26.81 KG/M2 | HEART RATE: 76 BPM | DIASTOLIC BLOOD PRESSURE: 62 MMHG | HEIGHT: 65 IN

## 2022-09-08 DIAGNOSIS — Z12.11 SCREENING FOR COLON CANCER: Primary | ICD-10-CM

## 2022-09-08 PROCEDURE — 4010F ACE/ARB THERAPY RXD/TAKEN: CPT | Mod: CPTII,,, | Performed by: COLON & RECTAL SURGERY

## 2022-09-08 PROCEDURE — 99024 PR POST-OP FOLLOW-UP VISIT: ICD-10-PCS | Mod: ,,, | Performed by: COLON & RECTAL SURGERY

## 2022-09-08 PROCEDURE — 3044F PR MOST RECENT HEMOGLOBIN A1C LEVEL <7.0%: ICD-10-PCS | Mod: CPTII,,, | Performed by: COLON & RECTAL SURGERY

## 2022-09-08 PROCEDURE — 1160F RVW MEDS BY RX/DR IN RCRD: CPT | Mod: CPTII,,, | Performed by: COLON & RECTAL SURGERY

## 2022-09-08 PROCEDURE — 3074F PR MOST RECENT SYSTOLIC BLOOD PRESSURE < 130 MM HG: ICD-10-PCS | Mod: CPTII,,, | Performed by: COLON & RECTAL SURGERY

## 2022-09-08 PROCEDURE — 3008F PR BODY MASS INDEX (BMI) DOCUMENTED: ICD-10-PCS | Mod: CPTII,,, | Performed by: COLON & RECTAL SURGERY

## 2022-09-08 PROCEDURE — 99999 PR PBB SHADOW E&M-EST. PATIENT-LVL III: ICD-10-PCS | Mod: PBBFAC,,, | Performed by: COLON & RECTAL SURGERY

## 2022-09-08 PROCEDURE — 3078F PR MOST RECENT DIASTOLIC BLOOD PRESSURE < 80 MM HG: ICD-10-PCS | Mod: CPTII,,, | Performed by: COLON & RECTAL SURGERY

## 2022-09-08 PROCEDURE — 3078F DIAST BP <80 MM HG: CPT | Mod: CPTII,,, | Performed by: COLON & RECTAL SURGERY

## 2022-09-08 PROCEDURE — 1159F PR MEDICATION LIST DOCUMENTED IN MEDICAL RECORD: ICD-10-PCS | Mod: CPTII,,, | Performed by: COLON & RECTAL SURGERY

## 2022-09-08 PROCEDURE — 3008F BODY MASS INDEX DOCD: CPT | Mod: CPTII,,, | Performed by: COLON & RECTAL SURGERY

## 2022-09-08 PROCEDURE — 99213 OFFICE O/P EST LOW 20 MIN: CPT | Mod: PBBFAC,PN | Performed by: COLON & RECTAL SURGERY

## 2022-09-08 PROCEDURE — 99999 PR PBB SHADOW E&M-EST. PATIENT-LVL III: CPT | Mod: PBBFAC,,, | Performed by: COLON & RECTAL SURGERY

## 2022-09-08 PROCEDURE — 3074F SYST BP LT 130 MM HG: CPT | Mod: CPTII,,, | Performed by: COLON & RECTAL SURGERY

## 2022-09-08 PROCEDURE — 1159F MED LIST DOCD IN RCRD: CPT | Mod: CPTII,,, | Performed by: COLON & RECTAL SURGERY

## 2022-09-08 PROCEDURE — 4010F PR ACE/ARB THEARPY RXD/TAKEN: ICD-10-PCS | Mod: CPTII,,, | Performed by: COLON & RECTAL SURGERY

## 2022-09-08 PROCEDURE — 3044F HG A1C LEVEL LT 7.0%: CPT | Mod: CPTII,,, | Performed by: COLON & RECTAL SURGERY

## 2022-09-08 PROCEDURE — 99024 POSTOP FOLLOW-UP VISIT: CPT | Mod: ,,, | Performed by: COLON & RECTAL SURGERY

## 2022-09-08 PROCEDURE — 1160F PR REVIEW ALL MEDS BY PRESCRIBER/CLIN PHARMACIST DOCUMENTED: ICD-10-PCS | Mod: CPTII,,, | Performed by: COLON & RECTAL SURGERY

## 2022-09-08 NOTE — PROGRESS NOTES
CRS Office Post Operative Visit    SUBJECTIVE:     Chief Complaint: followup from surgery.     Procedure:   7/20/22:  1. Laparoscopic extended left colectomy   2. Laparoscopic mobilization of the splenic flexure  3. Small-bowel resection  4. Flexible sigmoidoscopy.       Indication: diverticular stricture.      Significant post operative events: did well and discharged on POD3     Pathology:   A.   DESCENDING AND SIGMOID COLON, COLECTOMY:          -Benign colonic parenchyma with diverticulosis and focal stricture.   B.   PROXIMAL AND DISTAL ANASTOMOTIC DONUTS, COLECTOMY:          -Benign colonic mucosa, consistent with anastomotic donuts.   C.   ILEUM, SEGMENTAL RESECTION:          -Benign small intestinal mucosa with serosal adhesions, focal   submucosal abscess and changes consistent with anastomotic site.     Current Status:   8/4/2022:  Doing very well.  Having 2 bowel movements per day.  Diarrhea improving.  Abdominal pain resolved.  No issues with her incision.  No fevers.  9/1/22:  Continues to do well.  N1 2 bowel movements per day.  No issues with fecal incontinence.  Intermittent passage of mucus with flatus.    Review of Systems:  Review of Systems   Constitutional:  Negative for chills, diaphoresis, fever, malaise/fatigue and weight loss.   HENT:  Negative for congestion.    Respiratory:  Negative for shortness of breath.    Cardiovascular:  Negative for chest pain and leg swelling.   Gastrointestinal:  Negative for abdominal pain, blood in stool, constipation, nausea and vomiting.   Genitourinary:  Negative for dysuria.   Musculoskeletal:  Negative for back pain and myalgias.   Skin:  Negative for rash.   Neurological:  Negative for dizziness and weakness.   Endo/Heme/Allergies:  Does not bruise/bleed easily.   Psychiatric/Behavioral:  Negative for depression.      OBJECTIVE:     Vital Signs (Most Recent)  /62 (BP Location: Left arm, Patient Position: Sitting, BP Method: Large (Automatic))    "Pulse 76   Ht 5' 5" (1.651 m)   Wt 73 kg (160 lb 15 oz)   BMI 26.78 kg/m²     Physical Exam:  General: White female in no distress   Respiratory: respirations are even and unlabored  Cardiac: regular rate  Abdomen:  Incisions healing well.  Pfannenstiel incision healing well  Extremities: Warm dry and intact  Anorectal:  Deferred    ASSESSMENT/PLAN:     Ashli was seen today for post-op evaluation.    Diagnoses and all orders for this visit:    Screening for colon cancer  -     Case Request Endoscopy: COLONOSCOPY        62 y.o. female post op from lap left colectomy and small bowel resection for diverticular disease on 7/20/22.  She is overall have healed very well.      From a colon cancer screening, she is never completed a complete colonoscopy.  Repeat colonoscopy was therefore requested.    Guarding her bowel movements, recommended that she start taking FiberCon to assist with management of the mucous    W. Sudeep Kay MD, FACS  Staff Surgeon  Colon & Rectal Surgery            "

## 2022-09-13 ENCOUNTER — TELEPHONE (OUTPATIENT)
Dept: ENDOSCOPY | Facility: HOSPITAL | Age: 62
End: 2022-09-13
Payer: MEDICAID

## 2022-09-19 ENCOUNTER — NURSE TRIAGE (OUTPATIENT)
Dept: ADMINISTRATIVE | Facility: CLINIC | Age: 62
End: 2022-09-19
Payer: MEDICAID

## 2022-09-19 NOTE — TELEPHONE ENCOUNTER
[place]    PCP:  Dr. Anurag Lopez    Reports problems with her stomach being upset this morning.  She was instructed to take Pepto-bismol and drink gatorade.  C/O diarrhea x 1.  Per protocol, care advised is callback by PCP today.  Instructed to call for worsening/questions/concerns.  VU.     Reason for Disposition   Recent antibiotic therapy (i.e., within last 2 months)    Additional Information   Negative: Shock suspected (e.g., cold/pale/clammy skin, too weak to stand)   Negative: Difficult to awaken or acting confused (e.g., disoriented, slurred speech)   Negative: Sounds like a life-threatening emergency to the triager   Negative: Vomiting also present and worse than the diarrhea   Negative: Blood in stool and without diarrhea   Negative: SEVERE abdominal pain (e.g., excruciating)   Negative: Bloody, black, or tarry bowel movements   Negative: Drinking very little and has signs of dehydration (e.g., no urine > 12 hours, very dry mouth, very lightheaded)   Negative: Patient sounds very sick or weak to the triager   Negative: Constant abdominal pain lasting > 2 hours   Negative: Age < 60 years and has had > 15 diarrhea stools in past 24 hours   Negative: Age > 60 years and has had > 6 diarrhea stools in past 24 hours   Negative: Abdominal pain  (Exception: pain clears completely with each passage of diarrhea stool)   Negative: Fever > 101 F (38.3 C)   Negative: Blood in the stool   Negative: Mucus or pus in stool has been present > 2 days and diarrhea is more than mild   Negative: Weak immune system (e.g., HIV positive, cancer chemo, splenectomy, organ transplant, chronic steroids)   Negative: Travel to a foreign country in past month    Protocols used: Diarrhea-A-OH

## 2022-10-03 ENCOUNTER — TELEPHONE (OUTPATIENT)
Dept: ENDOSCOPY | Facility: HOSPITAL | Age: 62
End: 2022-10-03
Payer: MEDICAID

## 2022-10-03 NOTE — TELEPHONE ENCOUNTER
Attempted to contact Ashli Kumar  to schedule procedure , no answer, states call can con be completed at this time to try call later

## 2022-11-10 ENCOUNTER — TELEPHONE (OUTPATIENT)
Dept: ENDOSCOPY | Facility: HOSPITAL | Age: 62
End: 2022-11-10
Payer: MEDICAID

## 2022-11-10 RX ORDER — SODIUM, POTASSIUM,MAG SULFATES 17.5-3.13G
1 SOLUTION, RECONSTITUTED, ORAL ORAL DAILY
Qty: 1 KIT | Refills: 0 | Status: SHIPPED | OUTPATIENT
Start: 2022-11-10 | End: 2022-11-12

## 2022-11-10 NOTE — TELEPHONE ENCOUNTER
Endoscopy Scheduling Questionnaire:     COVID VACCINE?     Are you taking any blood thinners? No               If Yes  Have you been on them for longer than one year?     2. Have you been diagnosed with Diverticulitis in past three months?  No    3. Are you on dialysis or have Kidney Disease? Mas to right kidney     4. Previous Colonoscopy?  Yes          If yes Do you have a history of colon polyps?  No      6. Are you a diabetic?  No    7. Do you have a history of constipation?  Yes     8. On Adipex or Phenterimine  No      Procedure scheduled with Dr. Kay on  12/29/2022    The prep being used is Suprep     The patient's prep instructions were sent by mail

## 2022-11-28 ENCOUNTER — OFFICE VISIT (OUTPATIENT)
Dept: FAMILY MEDICINE | Facility: HOSPITAL | Age: 62
End: 2022-11-28
Payer: MEDICAID

## 2022-11-28 VITALS — WEIGHT: 166.88 LBS | HEIGHT: 65 IN | BODY MASS INDEX: 27.81 KG/M2

## 2022-11-28 DIAGNOSIS — K90.9 DIARRHEA DUE TO MALABSORPTION: ICD-10-CM

## 2022-11-28 DIAGNOSIS — R19.7 DIARRHEA DUE TO MALABSORPTION: ICD-10-CM

## 2022-11-28 DIAGNOSIS — N30.01 ACUTE CYSTITIS WITH HEMATURIA: Primary | ICD-10-CM

## 2022-11-28 DIAGNOSIS — J45.909 ASTHMA DUE TO ENVIRONMENTAL ALLERGIES: ICD-10-CM

## 2022-11-28 DIAGNOSIS — Z90.49 HISTORY OF COLECTOMY: ICD-10-CM

## 2022-11-28 PROCEDURE — 99213 OFFICE O/P EST LOW 20 MIN: CPT | Performed by: STUDENT IN AN ORGANIZED HEALTH CARE EDUCATION/TRAINING PROGRAM

## 2022-11-28 RX ORDER — LORATADINE 10 MG/1
10 TABLET ORAL DAILY
Qty: 30 TABLET | Refills: 3 | Status: SHIPPED | OUTPATIENT
Start: 2022-11-28 | End: 2023-01-17 | Stop reason: SDUPTHER

## 2022-11-28 RX ORDER — NITROFURANTOIN 25; 75 MG/1; MG/1
100 CAPSULE ORAL 2 TIMES DAILY
Qty: 10 CAPSULE | Refills: 0 | Status: SHIPPED | OUTPATIENT
Start: 2022-11-28 | End: 2022-12-03

## 2022-11-28 NOTE — PROGRESS NOTES
"Progress Note  [facility] Family Medicine    Subjective:      Ashli Kumar is a 62 y.o. female here     #diarrhea: per colorectal surgery note   "Procedure:   7/20/22:  1. Laparoscopic extended left colectomy   2. Laparoscopic mobilization of the splenic flexure  3. Small-bowel resection  4. Flexible sigmoidoscopy.       Indication: diverticular stricture.      Significant post operative events: did well and discharged on POD3     Pathology:   A.   DESCENDING AND SIGMOID COLON, COLECTOMY:          -Benign colonic parenchyma with diverticulosis and focal stricture.   B.   PROXIMAL AND DISTAL ANASTOMOTIC DONUTS, COLECTOMY:          -Benign colonic mucosa, consistent with anastomotic donuts.   C.   ILEUM, SEGMENTAL RESECTION:          -Benign small intestinal mucosa with serosal adhesions, focal   submucosal abscess and changes consistent with anastomotic site."    Today, patient states she is overall doing better, but reports off/on diarrhea since her surgery; fried/fatty/greasy foods trigger the diarrhea; denies abdominal pain, nausea/vomiting; takes pepto bismol occasionally     #dysuria: x1 month; admits to burning, polyuria; has had UTI in past and states this feels the same     Preventative Healthcare: NEEDS HIV screening COVID 19 Vaccine Tetanus Vaccine Shingles Vaccine Flu vaccine    Review of Systems   Constitutional:  Negative for chills and fever.   Respiratory:  Negative for cough and shortness of breath.    Cardiovascular:  Negative for chest pain and leg swelling.   Gastrointestinal:  Positive for diarrhea. Negative for abdominal pain, blood in stool, constipation, heartburn, melena, nausea and vomiting.   Genitourinary:  Negative for dysuria.   Musculoskeletal:  Negative for myalgias.       Objective:   Ht 5' 5" (1.651 m)   Wt 75.7 kg (166 lb 14.2 oz)   BMI 27.77 kg/m²      Physical Exam  Vitals reviewed.   Constitutional:       General: She is not in acute distress.     Appearance: Normal " appearance. She is not ill-appearing, toxic-appearing or diaphoretic.   HENT:      Head: Normocephalic and atraumatic.      Right Ear: External ear normal.      Left Ear: External ear normal.      Nose: Nose normal.      Mouth/Throat:      Mouth: Mucous membranes are moist.   Eyes:      Extraocular Movements: Extraocular movements intact.   Cardiovascular:      Rate and Rhythm: Normal rate.   Pulmonary:      Effort: Pulmonary effort is normal. No respiratory distress.   Abdominal:      General: Abdomen is flat. There is no distension.      Palpations: Abdomen is soft. There is no mass.      Tenderness: There is no abdominal tenderness. There is no guarding or rebound.      Hernia: No hernia is present.   Musculoskeletal:         General: Normal range of motion.      Cervical back: Normal range of motion.   Skin:     General: Skin is warm.      Capillary Refill: Capillary refill takes less than 2 seconds.   Neurological:      Mental Status: She is alert and oriented to person, place, and time.   Psychiatric:         Mood and Affect: Mood normal.         Behavior: Behavior normal.        Assessment:   62 y.o. with        1. Acute cystitis with hematuria    2. History of colectomy    3. Diarrhea due to malabsorption    4. Asthma due to environmental allergies         Plan:   Ashli was seen today for diarrhea, medication refill and urinary tract infection.    Diagnoses and all orders for this visit:    Acute cystitis with hematuria  -     nitrofurantoin, macrocrystal-monohydrate, (MACROBID) 100 MG capsule; Take 1 capsule (100 mg total) by mouth 2 (two) times daily. for 5 days    History of colectomy  - followed by colorectal surgery    Diarrhea due to malabsorption  -advise patient to avoid foods that trigger her diarrhea (fried, greasy, fatty foods)  -pepto bismol PRN  -imodium PRN     Asthma due to environmental allergies  -     loratadine (CLARITIN) 10 mg tablet; Take 1 tablet (10 mg total) by mouth once  daily.      Patient instructed to receive or provide proof of all deficient vaccines in chart, including Flu, COVID #1, Tdap, and Shingles - patient verbalized understanding       Follow up in about 3 months (around 2/28/2023) for check up with regular PCP .    Jarocho Orozco DO  [facility] Family Medicine, PGY-3  9:55 AM, 11/28/2022    *This note was dictated using the M*Modal Fluency Direct word recognition program. There are word rescognition mistakes that are occasionally missed on review.     The following information is provided to all patients.  This information is to help you find resources for any of the problems found today that may be affecting your health:                Living healthy guide: www.[facility].[place].gov       Understanding Diabetes: www.diabetes.org       Eating healthy: www.cdc.gov/healthyweight      CDC home safety checklist: www.cdc.gov/steadi/patient.html      Agency on Aging: www.goea.[place].[facility]       Alcoholics anonymous (AA): www.aa.org      Physical Activity: www.mike.nih.gov/ij6edep       Tobacco use: www.quitwithusla.org

## 2022-11-30 ENCOUNTER — HOSPITAL ENCOUNTER (EMERGENCY)
Facility: HOSPITAL | Age: 62
Discharge: HOME | End: 2022-11-30
Attending: EMERGENCY MEDICINE
Payer: MEDICAID

## 2022-11-30 VITALS
RESPIRATION RATE: 15 BRPM | BODY MASS INDEX: 27.46 KG/M2 | TEMPERATURE: 98 F | HEART RATE: 90 BPM | OXYGEN SATURATION: 98 % | SYSTOLIC BLOOD PRESSURE: 160 MMHG | WEIGHT: 165 LBS | DIASTOLIC BLOOD PRESSURE: 67 MMHG

## 2022-11-30 DIAGNOSIS — J40 BRONCHITIS: Primary | ICD-10-CM

## 2022-11-30 LAB
GROUP A STREP, MOLECULAR: NEGATIVE
INFLUENZA A, MOLECULAR: NEGATIVE
INFLUENZA B, MOLECULAR: NEGATIVE
SARS-COV-2 RDRP RESP QL NAA+PROBE: NEGATIVE
SPECIMEN SOURCE: NORMAL

## 2022-11-30 PROCEDURE — 99283 EMERGENCY DEPT VISIT LOW MDM: CPT | Mod: ER

## 2022-11-30 PROCEDURE — U0002 COVID-19 LAB TEST NON-CDC: HCPCS | Mod: ER | Performed by: EMERGENCY MEDICINE

## 2022-11-30 PROCEDURE — 87502 INFLUENZA DNA AMP PROBE: CPT | Mod: ER | Performed by: EMERGENCY MEDICINE

## 2022-11-30 PROCEDURE — 87651 STREP A DNA AMP PROBE: CPT | Mod: ER | Performed by: EMERGENCY MEDICINE

## 2022-11-30 RX ORDER — AZITHROMYCIN 250 MG/1
250 TABLET, FILM COATED ORAL DAILY
Qty: 6 TABLET | Refills: 0 | Status: SHIPPED | OUTPATIENT
Start: 2022-11-30 | End: 2022-12-19

## 2022-11-30 NOTE — ED PROVIDER NOTES
Encounter Date: 2022       History     Chief Complaint   Patient presents with    Cough     Cough since last night. I went to the dr yesterday for a uti.      Patient presents with a complaint of cough of green productive sputum started last night.  No fever.  No wheezing.  She said she just was very on restful last night.  She says that she is up-to-date on her COVID as well as influenza.  She denies any back pain vomiting fever or chills.  No change in appetite.    The history is provided by the patient.   Cough  This is a new problem. The current episode started yesterday. The problem occurs every few hours. The problem has been unchanged. The cough is Productive of sputum. There has been no fever. Associated symptoms include rhinorrhea. Pertinent negatives include no chest pain, no sweats, no ear congestion, no ear pain, no headaches, no sore throat, no shortness of breath and no wheezing. She has tried nothing for the symptoms. The treatment provided no relief. She is not a smoker.   Review of patient's allergies indicates:   Allergen Reactions    Codeine Hives    Sulfa (sulfonamide antibiotics) Hives    Benzoate analogues Itching     Past Medical History:   Diagnosis Date    Anxiety     Arthritis     CHF (congestive heart failure)     Depression     GERD (gastroesophageal reflux disease)     High cholesterol     Hypertension      Past Surgical History:   Procedure Laterality Date    BREAST BIOPSY Left     h    BREAST SURGERY       SECTION      COLONOSCOPY N/A 2017    Procedure: COLONOSCOPY Miralax;  Surgeon: Buddy Butcher Jr., MD;  Location: [facility];  Service: Endoscopy;  Laterality: N/A;    COLONOSCOPY N/A 2022    Procedure: COLONOSCOPY;  Surgeon: TREV Kay MD;  Location: [facility];  Service: Endoscopy;  Laterality: N/A;    FLEXIBLE SIGMOIDOSCOPY N/A 2022    Procedure: SIGMOIDOSCOPY, FLEXIBLE;  Surgeon: TREV Kay MD;  Location: [facility] OR [address] [place] [address];   Service: Colon and Rectal;  Laterality: N/A;    HYSTERECTOMY      KIDNEY SURGERY Right     done at [facility], reported as mass by patient    LAPAROSCOPIC SIGMOIDECTOMY Left 7/20/2022    Procedure: COLECTOMY, SIGMOID, LAPAROSCOPIC, ERAS low;  Surgeon: TREV Kay MD;  Location: [facility] OR [address] [place] [address];  Service: Colon and Rectal;  Laterality: Left;    LEFT HEART CATHETERIZATION Left 5/13/2019    Procedure: Left heart cath;  Surgeon: Gerhard Krishnan MD;  Location: [facility] CATH LAB;  Service: Cardiology;  Laterality: Left;    MOBILIZATION OF SPLENIC FLEXURE N/A 7/20/2022    Procedure: MOBILIZATION, SPLENIC FLEXURE;  Surgeon: TREV Kay MD;  Location: [facility] OR [address] [place] [address];  Service: Colon and Rectal;  Laterality: N/A;    OOPHORECTOMY      s-section       History reviewed. No pertinent family history.  Social History     Tobacco Use    Smoking status: Never    Smokeless tobacco: Never   Substance Use Topics    Alcohol use: No    Drug use: No     Review of Systems   Constitutional:  Negative for fever.   HENT:  Positive for rhinorrhea. Negative for ear pain and sore throat.    Respiratory:  Positive for cough. Negative for shortness of breath and wheezing.    Cardiovascular:  Negative for chest pain.   Gastrointestinal:  Negative for nausea.   Genitourinary:  Negative for dysuria.   Musculoskeletal:  Negative for back pain.   Skin:  Negative for rash.   Neurological:  Negative for weakness and headaches.   Hematological:  Does not bruise/bleed easily.     Physical Exam     Initial Vitals [11/30/22 0808]   BP Pulse Resp Temp SpO2   (!) 160/67 90 15 98.4 °F (36.9 °C) 98 %      MAP       --         Physical Exam    Constitutional: She appears well-developed and well-nourished. She does not appear ill. No distress.   HENT:   Head: Normocephalic and atraumatic.   Right Ear: Hearing normal. No hemotympanum.   Left Ear: Hearing normal. No hemotympanum.   Nose: Nose normal. No rhinorrhea.   Mouth/Throat: Uvula is midline  and oropharynx is clear and moist. No posterior oropharyngeal edema.   Eyes: Conjunctivae, EOM and lids are normal. Pupils are equal, round, and reactive to light.   Neck: Trachea normal. Neck supple.   Normal range of motion.  Cardiovascular:  Normal rate and regular rhythm.           Musculoskeletal:      Cervical back: Normal range of motion and neck supple. No spinous process tenderness or muscular tenderness.     Lymphadenopathy:     She has no cervical adenopathy.     She has no axillary adenopathy.   Neurological: She is alert. GCS eye subscore is 4. GCS verbal subscore is 5. GCS motor subscore is 6.   Skin: Skin is warm and dry.   Psychiatric: She has a normal mood and affect. Her speech is normal and behavior is normal.       ED Course   Procedures  Labs Reviewed   INFLUENZA A & B BY MOLECULAR   GROUP A STREP, MOLECULAR   SARS-COV-2 RNA AMPLIFICATION, QUAL    Narrative:     Is the patient symptomatic?->Yes          Imaging Results    None          Medications - No data to display  Medical Decision Making:   Differential Diagnosis:   Differential diagnosis includes but is not limited to bronchitis influenza and COVID  ED Management:  The patient was seen examined influenza and COVID tests were obtained.  If those are negative I will put the patient on azithromycin.    Influenza and COVID                        Clinical Impression:   Final diagnoses:  [J40] Bronchitis (Primary)      ED Disposition Condition    Discharge Stable          ED Prescriptions       Medication Sig Dispense Start Date End Date Auth. Provider    azithromycin (Z-KANDICE) 250 MG tablet Take 1 tablet (250 mg total) by mouth once daily. Take first 2 tablets together, then 1 every day until finished. 6 tablet 11/30/2022 -- Jin Lozano, DO          Follow-up Information       Follow up With Specialties Details Why Contact Info    Anurag Lopez MD Family Medicine  If symptoms worsen [address]  [place]  [address]  [phone]               Jin Lozano, DO  11/30/22 1026

## 2023-01-02 ENCOUNTER — HOSPITAL ENCOUNTER (EMERGENCY)
Facility: HOSPITAL | Age: 63
Discharge: PSYCHIATRIC OR SUBSTANCE USE FACILITY | End: 2023-01-02
Attending: EMERGENCY MEDICINE
Payer: MEDICAID

## 2023-01-02 VITALS
TEMPERATURE: 99 F | DIASTOLIC BLOOD PRESSURE: 69 MMHG | HEART RATE: 99 BPM | BODY MASS INDEX: 26.46 KG/M2 | WEIGHT: 159 LBS | OXYGEN SATURATION: 99 % | SYSTOLIC BLOOD PRESSURE: 135 MMHG | RESPIRATION RATE: 18 BRPM

## 2023-01-02 DIAGNOSIS — Z00.8 MEDICAL CLEARANCE FOR PSYCHIATRIC ADMISSION: ICD-10-CM

## 2023-01-02 DIAGNOSIS — F23 ACUTE PSYCHOSIS: Primary | ICD-10-CM

## 2023-01-02 PROBLEM — F48.9 MENTAL HEALTH PROBLEM: Status: ACTIVE | Noted: 2023-01-02

## 2023-01-02 LAB
ALBUMIN SERPL BCP-MCNC: 5 G/DL (ref 3.5–5.2)
ALP SERPL-CCNC: 56 U/L (ref 38–126)
ALT SERPL W/O P-5'-P-CCNC: 18 U/L (ref 10–44)
AMPHET+METHAMPHET UR QL: NEGATIVE
ANION GAP SERPL CALC-SCNC: 13 MMOL/L (ref 8–16)
APAP SERPL-MCNC: <10 UG/ML (ref 10–20)
AST SERPL-CCNC: 27 U/L (ref 15–46)
BARBITURATES UR QL SCN>200 NG/ML: NEGATIVE
BASOPHILS # BLD AUTO: 0.02 K/UL (ref 0–0.2)
BASOPHILS NFR BLD: 0.3 % (ref 0–1.9)
BENZODIAZ UR QL SCN>200 NG/ML: NEGATIVE
BILIRUB SERPL-MCNC: 0.7 MG/DL (ref 0.1–1)
BILIRUB UR QL STRIP: ABNORMAL
BZE UR QL SCN: NEGATIVE
CALCIUM SERPL-MCNC: 9.3 MG/DL (ref 8.7–10.5)
CANNABINOIDS UR QL SCN: NEGATIVE
CHLORIDE SERPL-SCNC: 101 MMOL/L (ref 95–110)
CLARITY UR REFRACT.AUTO: CLEAR
CO2 SERPL-SCNC: 25 MMOL/L (ref 23–29)
COLOR UR AUTO: YELLOW
CREAT SERPL-MCNC: 0.82 MG/DL (ref 0.5–1.4)
CREAT UR-MCNC: 232.8 MG/DL (ref 15–325)
DIFFERENTIAL METHOD: ABNORMAL
EOSINOPHIL # BLD AUTO: 0 K/UL (ref 0–0.5)
EOSINOPHIL NFR BLD: 0 % (ref 0–8)
ERYTHROCYTE [DISTWIDTH] IN BLOOD BY AUTOMATED COUNT: 12.5 % (ref 11.5–14.5)
EST. GFR  (NO RACE VARIABLE): >60 ML/MIN/1.73 M^2
ETHANOL SERPL-MCNC: <10 MG/DL
GLUCOSE SERPL-MCNC: 176 MG/DL (ref 70–110)
GLUCOSE UR QL STRIP: NEGATIVE
HCT VFR BLD AUTO: 33.9 % (ref 37–48.5)
HGB BLD-MCNC: 11.5 G/DL (ref 12–16)
HGB UR QL STRIP: ABNORMAL
IMM GRANULOCYTES # BLD AUTO: 0.01 K/UL (ref 0–0.04)
IMM GRANULOCYTES NFR BLD AUTO: 0.1 % (ref 0–0.5)
KETONES UR QL STRIP: NEGATIVE
LEUKOCYTE ESTERASE UR QL STRIP: ABNORMAL
LYMPHOCYTES # BLD AUTO: 1.8 K/UL (ref 1–4.8)
LYMPHOCYTES NFR BLD: 24.9 % (ref 18–48)
MCH RBC QN AUTO: 29.7 PG (ref 27–31)
MCHC RBC AUTO-ENTMCNC: 33.9 G/DL (ref 32–36)
MCV RBC AUTO: 88 FL (ref 82–98)
METHADONE UR QL SCN>300 NG/ML: NEGATIVE
MICROSCOPIC COMMENT: NORMAL
MONOCYTES # BLD AUTO: 0.6 K/UL (ref 0.3–1)
MONOCYTES NFR BLD: 8.2 % (ref 4–15)
NEUTROPHILS # BLD AUTO: 4.9 K/UL (ref 1.8–7.7)
NEUTROPHILS NFR BLD: 66.5 % (ref 38–73)
NITRITE UR QL STRIP: NEGATIVE
NRBC BLD-RTO: 0 /100 WBC
OPIATES UR QL SCN: NEGATIVE
PCP UR QL SCN>25 NG/ML: NEGATIVE
PH UR STRIP: 6 [PH] (ref 5–8)
PLATELET # BLD AUTO: 186 K/UL (ref 150–450)
PMV BLD AUTO: 12.1 FL (ref 9.2–12.9)
POTASSIUM SERPL-SCNC: 4.4 MMOL/L (ref 3.5–5.1)
PROT SERPL-MCNC: 8 G/DL (ref 6–8.4)
PROT UR QL STRIP: ABNORMAL
RBC # BLD AUTO: 3.87 M/UL (ref 4–5.4)
RBC #/AREA URNS AUTO: 2 /HPF (ref 0–4)
SARS-COV-2 RDRP RESP QL NAA+PROBE: NEGATIVE
SODIUM SERPL-SCNC: 139 MMOL/L (ref 136–145)
SP GR UR STRIP: 1.02 (ref 1–1.03)
TOXICOLOGY INFORMATION: NORMAL
URN SPEC COLLECT METH UR: ABNORMAL
UROBILINOGEN UR STRIP-ACNC: NEGATIVE EU/DL
UUN UR-MCNC: 14 MG/DL (ref 7–17)
WBC # BLD AUTO: 7.32 K/UL (ref 3.9–12.7)
WBC #/AREA URNS AUTO: 4 /HPF (ref 0–5)

## 2023-01-02 PROCEDURE — 82077 ASSAY SPEC XCP UR&BREATH IA: CPT | Mod: ER | Performed by: PHYSICIAN ASSISTANT

## 2023-01-02 PROCEDURE — 93005 ELECTROCARDIOGRAM TRACING: CPT | Mod: ER

## 2023-01-02 PROCEDURE — 96372 THER/PROPH/DIAG INJ SC/IM: CPT | Performed by: PHYSICIAN ASSISTANT

## 2023-01-02 PROCEDURE — 80053 COMPREHEN METABOLIC PANEL: CPT | Mod: ER | Performed by: PHYSICIAN ASSISTANT

## 2023-01-02 PROCEDURE — 80307 DRUG TEST PRSMV CHEM ANLYZR: CPT | Mod: ER | Performed by: PHYSICIAN ASSISTANT

## 2023-01-02 PROCEDURE — 85025 COMPLETE CBC W/AUTO DIFF WBC: CPT | Mod: ER | Performed by: PHYSICIAN ASSISTANT

## 2023-01-02 PROCEDURE — 99285 EMERGENCY DEPT VISIT HI MDM: CPT | Mod: ER

## 2023-01-02 PROCEDURE — 80143 DRUG ASSAY ACETAMINOPHEN: CPT | Mod: ER | Performed by: PHYSICIAN ASSISTANT

## 2023-01-02 PROCEDURE — 81000 URINALYSIS NONAUTO W/SCOPE: CPT | Mod: 59,ER | Performed by: PHYSICIAN ASSISTANT

## 2023-01-02 PROCEDURE — 99900035 HC TECH TIME PER 15 MIN (STAT): Mod: ER

## 2023-01-02 PROCEDURE — U0002 COVID-19 LAB TEST NON-CDC: HCPCS | Mod: ER | Performed by: PHYSICIAN ASSISTANT

## 2023-01-02 PROCEDURE — 93010 ELECTROCARDIOGRAM REPORT: CPT | Mod: ,,, | Performed by: INTERNAL MEDICINE

## 2023-01-02 PROCEDURE — 63600175 PHARM REV CODE 636 W HCPCS: Mod: ER | Performed by: PHYSICIAN ASSISTANT

## 2023-01-02 PROCEDURE — 93010 EKG 12-LEAD: ICD-10-PCS | Mod: ,,, | Performed by: INTERNAL MEDICINE

## 2023-01-02 RX ORDER — LORAZEPAM 2 MG/ML
2 INJECTION INTRAMUSCULAR
Status: COMPLETED | OUTPATIENT
Start: 2023-01-02 | End: 2023-01-02

## 2023-01-02 RX ADMIN — LORAZEPAM 2 MG: 2 INJECTION INTRAMUSCULAR; INTRAVENOUS at 01:01
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
## 2023-01-02 NOTE — ED NOTES
Pt walked to bathroom with no issues,  pt was able to give a urine sample, pt also undressed into facility approved apparel with assistance from sitter and security

## 2023-01-09 ENCOUNTER — HOSPITAL ENCOUNTER (EMERGENCY)
Facility: HOSPITAL | Age: 63
Discharge: HOME | End: 2023-01-09
Attending: EMERGENCY MEDICINE
Payer: MEDICARE

## 2023-01-09 VITALS
OXYGEN SATURATION: 99 % | WEIGHT: 159 LBS | HEIGHT: 65 IN | DIASTOLIC BLOOD PRESSURE: 81 MMHG | RESPIRATION RATE: 20 BRPM | BODY MASS INDEX: 26.49 KG/M2 | HEART RATE: 71 BPM | SYSTOLIC BLOOD PRESSURE: 182 MMHG | TEMPERATURE: 98 F

## 2023-01-09 DIAGNOSIS — J20.9 ACUTE BRONCHITIS, UNSPECIFIED ORGANISM: Primary | ICD-10-CM

## 2023-01-09 DIAGNOSIS — R05.9 COUGH: ICD-10-CM

## 2023-01-09 PROCEDURE — 99283 EMERGENCY DEPT VISIT LOW MDM: CPT | Mod: 25,ER

## 2023-01-09 PROCEDURE — 87502 INFLUENZA DNA AMP PROBE: CPT | Mod: ER | Performed by: EMERGENCY MEDICINE

## 2023-01-09 PROCEDURE — U0002 COVID-19 LAB TEST NON-CDC: HCPCS | Mod: ER | Performed by: EMERGENCY MEDICINE

## 2023-01-09 PROCEDURE — 87651 STREP A DNA AMP PROBE: CPT | Mod: ER | Performed by: EMERGENCY MEDICINE

## 2023-01-09 RX ORDER — GUAIFENESIN/DEXTROMETHORPHAN 100-10MG/5
5 SYRUP ORAL 4 TIMES DAILY PRN
Qty: 120 ML | Refills: 0 | Status: SHIPPED | OUTPATIENT
Start: 2023-01-09 | End: 2023-01-19

## 2023-01-09 NOTE — ED PROVIDER NOTES
Encounter Date: 2023       History     Chief Complaint   Patient presents with    Cough     Cough that started Saturday. PT reports chest pain with cough     62-year-old female with history of CHF, GERD, hypertension presents with 2 days of productive cough, ear pain, body aches, some diarrhea and posttussive chest pain.  Patient was admitted to [facility] facility a week ago.  While she was there she developed a cough.  She says they tried to give her Flonase and cough medications but it did not help.  No fever, vomiting, shortness of breath.    Review of patient's allergies indicates:   Allergen Reactions    Codeine Hives    Sulfa (sulfonamide antibiotics) Hives    Benzoate analogues Itching     Past Medical History:   Diagnosis Date    Anxiety     Arthritis     CHF (congestive heart failure)     Depression     GERD (gastroesophageal reflux disease)     High cholesterol     Hypertension      Past Surgical History:   Procedure Laterality Date    BREAST BIOPSY Left     h    BREAST SURGERY       SECTION      COLONOSCOPY N/A 2017    Procedure: COLONOSCOPY Miralax;  Surgeon: Buddy Butcher Jr., MD;  Location: [facility];  Service: Endoscopy;  Laterality: N/A;    COLONOSCOPY N/A 2022    Procedure: COLONOSCOPY;  Surgeon: TREV Kay MD;  Location: [facility];  Service: Endoscopy;  Laterality: N/A;    COLONOSCOPY N/A 2022    Procedure: COLONOSCOPY;  Surgeon: TREV Kay MD;  Location: [facility];  Service: Endoscopy;  Laterality: N/A;    FLEXIBLE SIGMOIDOSCOPY N/A 2022    Procedure: SIGMOIDOSCOPY, FLEXIBLE;  Surgeon: TREV Kay MD;  Location: [address];  Service: Colon and Rectal;  Laterality: N/A;    HYSTERECTOMY      KIDNEY SURGERY Right     done at [facility], reported as mass by patient    LAPAROSCOPIC SIGMOIDECTOMY Left 2022    Procedure: COLECTOMY, SIGMOID, LAPAROSCOPIC, ERAS low;  Surgeon: TREV Kay MD;  Location: [facility] OR [address] [place] [address];   Service: Colon and Rectal;  Laterality: Left;    LEFT HEART CATHETERIZATION Left 5/13/2019    Procedure: Left heart cath;  Surgeon: Gerhard Krishnan MD;  Location: [facility] CATH LAB;  Service: Cardiology;  Laterality: Left;    MOBILIZATION OF SPLENIC FLEXURE N/A 7/20/2022    Procedure: MOBILIZATION, SPLENIC FLEXURE;  Surgeon: TREV Kay MD;  Location: [facility] OR [address] [place] [address];  Service: Colon and Rectal;  Laterality: N/A;    OOPHORECTOMY      s-section       History reviewed. No pertinent family history.  Social History     Tobacco Use    Smoking status: Never    Smokeless tobacco: Never   Substance Use Topics    Alcohol use: Not Currently     Comment: 12-31-22    Drug use: No     Review of Systems   Constitutional:  Negative for fever.   HENT:  Positive for ear pain and sore throat.    Respiratory:  Positive for cough. Negative for shortness of breath.    Cardiovascular:  Negative for chest pain.   Gastrointestinal:  Positive for diarrhea. Negative for vomiting.   Musculoskeletal:  Positive for myalgias. Negative for back pain and neck pain.   Skin:  Negative for rash.   Neurological:  Negative for headaches.     Physical Exam     Initial Vitals [01/09/23 0712]   BP Pulse Resp Temp SpO2   (!) 182/81 71 20 97.8 °F (36.6 °C) 99 %      MAP       --         Physical Exam    Nursing note and vitals reviewed.  HENT:   Head: Atraumatic.   Right Ear: External ear normal.   Left Ear: External ear normal.   Mouth/Throat: Oropharynx is clear and moist.   Eyes: EOM are normal.   Neck:   Normal range of motion.  Cardiovascular:      Exam reveals no gallop and no friction rub.       No murmur heard.  Pulmonary/Chest: Breath sounds normal. No respiratory distress. She has no wheezes. She has no rhonchi. She has no rales.   Abdominal: Abdomen is soft. Bowel sounds are normal. She exhibits no distension.   Musculoskeletal:         General: Normal range of motion.      Cervical back: Normal range of motion.     Neurological: She  is alert and oriented to person, place, and time.   Psychiatric: She has a normal mood and affect.       ED Course   Procedures  Labs Reviewed   INFLUENZA A & B BY MOLECULAR   GROUP A STREP, MOLECULAR   SARS-COV-2 RNA AMPLIFICATION, QUAL    Narrative:     Is the patient symptomatic?->Yes          Imaging Results              X-Ray Chest PA And Lateral (Final result)  Result time 01/09/23 08:23:20      Final result by Buddy John MD (01/09/23 08:23:20)                   Impression:      No acute findings.      Electronically signed by: Buddy John MD  Date:    01/09/2023  Time:    08:23               Narrative:    EXAMINATION:  XR CHEST PA AND LATERAL    CLINICAL HISTORY:  Cough, unspecified    TECHNIQUE:  PA and lateral views of the chest were performed.    COMPARISON:  05/15/2021    FINDINGS:  The cardiomediastinal silhouette is normal.    The lungs are clear.  No pleural effusions.    No acute osseous findings.  No advanced arthritic changes.                                       Medications - No data to display  Medical Decision Making:   Initial Assessment:   62-year-old female presenting with flu-like symptoms for 2 days.  Vital signs notable for mild hypertension.  Patient appears to be in no distress.  Lungs are clear.  Oropharynx clear.  Chest x-ray reviewed interpreted myself shows no infiltrate suggestive of pneumonia.  Influenza, COVID and strep swabs negative.  Suspect viral process.  Plan for symptomatic treatment and primary care follow-up.                        Clinical Impression:   Final diagnoses:  [R05.9] Cough  [J20.9] Acute bronchitis, unspecified organism (Primary)        ED Disposition Condition    Discharge Stable          ED Prescriptions       Medication Sig Dispense Start Date End Date Auth. Provider    dextromethorphan-guaiFENesin  mg/5 ml (ROBITUSSIN-DM)  mg/5 mL liquid Take 5 mLs by mouth 4 (four) times daily as needed (cough). 120 mL 1/9/2023 1/19/2023 Ronnie DE JESUS  MD Gurjit          Follow-up Information       Follow up With Specialties Details Why Contact Info    Anurag Lopez MD Family Medicine Schedule an appointment as soon as possible for a visit in 3 days  [address] [facility] [address]  [facility] [address] [phone]               Ronnie Cagle MD  01/09/23 0817

## 2023-01-17 ENCOUNTER — OFFICE VISIT (OUTPATIENT)
Dept: FAMILY MEDICINE | Facility: HOSPITAL | Age: 63
End: 2023-01-17
Payer: MEDICAID

## 2023-01-17 VITALS
SYSTOLIC BLOOD PRESSURE: 188 MMHG | HEART RATE: 63 BPM | BODY MASS INDEX: 28.4 KG/M2 | WEIGHT: 170.44 LBS | DIASTOLIC BLOOD PRESSURE: 76 MMHG | HEIGHT: 65 IN

## 2023-01-17 DIAGNOSIS — I10 ESSENTIAL HYPERTENSION: ICD-10-CM

## 2023-01-17 DIAGNOSIS — J45.909 ASTHMA DUE TO ENVIRONMENTAL ALLERGIES: ICD-10-CM

## 2023-01-17 DIAGNOSIS — F31.32 BIPOLAR AFFECTIVE DISORDER, CURRENTLY DEPRESSED, MODERATE: Primary | ICD-10-CM

## 2023-01-17 PROCEDURE — 99214 OFFICE O/P EST MOD 30 MIN: CPT | Performed by: STUDENT IN AN ORGANIZED HEALTH CARE EDUCATION/TRAINING PROGRAM

## 2023-01-17 RX ORDER — LORATADINE 10 MG/1
10 TABLET ORAL DAILY PRN
Qty: 30 TABLET | Refills: 3 | Status: SHIPPED | OUTPATIENT
Start: 2023-01-17 | End: 2023-11-08

## 2023-01-17 NOTE — PROGRESS NOTES
Subjective:       Patient ID: Ashli Kumar is a 62 y.o. female.    Chief Complaint: Follow-up    HPI 61 yo female     #Recent psych hospitalization 1/2/23  Adherent since discharge  Follow up with psychiatrist next month, has been following since 38 yo  Denies SI/HI/AVH    #HTN  Adherent  Has not been checking at home because cuff was packed for storage    Recommended Influenza, Shingles vaccinations. Patient agreed to receive at pharmacy.     Past Medical History:   Diagnosis Date    Anxiety     Arthritis     CHF (congestive heart failure)     Depression     GERD (gastroesophageal reflux disease)     High cholesterol     Hypertension      Review of Systems     10 point review of systems was conducted and only the pertinent positives and pertinent negatives are noted above in the HPI section.    Objective:      Vitals:    01/17/23 0901   BP: (!) 188/76   Pulse: 63     Physical Exam  Vitals reviewed.   Constitutional:       General: She is not in acute distress.     Appearance: Normal appearance.   Eyes:      Conjunctiva/sclera: Conjunctivae normal.      Pupils: Pupils are equal, round, and reactive to light.   Cardiovascular:      Rate and Rhythm: Normal rate and regular rhythm.      Pulses: Normal pulses.      Heart sounds: Normal heart sounds.   Pulmonary:      Effort: Pulmonary effort is normal. No respiratory distress.      Breath sounds: Normal breath sounds.   Neurological:      General: No focal deficit present.      Mental Status: She is alert and oriented to person, place, and time.       Assessment:       1. Bipolar affective disorder, currently depressed, moderate    2. Essential hypertension    3. Asthma due to environmental allergies        Plan:       Bipolar affective disorder, currently depressed, moderate   Follow up scheduled with psychiatry    Essential hypertension   Follow up with home checks, possibly elevated with recent stressors    Asthma due to environmental allergies  -      loratadine (CLARITIN) 10 mg tablet; Take 1 tablet (10 mg total) by mouth daily as needed for Allergies.  Dispense: 30 tablet; Refill: 3      Follow up if symptoms worsen or fail to improve.        Fely Fritz MD, [facility] Family Medicine PGY-3  01/17/2023

## 2023-01-25 NOTE — ED PROVIDER NOTES
Encounter Date: 2020       History     Chief Complaint   Patient presents with    Nasal Congestion     c/o runny nose, cough, and chest tightness with SOB over the past week; O2 sat on RA 99% with no resp distress    Cough    Shortness of Breath    Chest Tightness     59-year-old female presents to the emergency room.  She states runny nose cough and  chest tightness and shortness of breath.  Patient states that she thought it was a sinus issues and has been taking Claritin daily as directed.  Patient states no improvement with increased shortness of breath and chest tightness her PCP told her to go to the emergency room for chest x-ray.    Patient's past medical history: Anxiety ,Depression GERD (gastroesophageal reflux disease) , High cholesterol, Hypertension            Review of patient's allergies indicates:   Allergen Reactions    Codeine Hives    Sulfa (sulfonamide antibiotics) Hives     Past Medical History:   Diagnosis Date    Anxiety     Depression     GERD (gastroesophageal reflux disease)     High cholesterol     Hypertension      Past Surgical History:   Procedure Laterality Date    BRE[facility]SY [facility]     [facility]    BREAST SURGERY       SECTION      COLONOSCOPY N/A 2017    Procedure: COLONOSCOPY Miralax;  Surgeon: Buddy Butcher Jr., MD;  Location: [facility];  Service: Endoscopy;  Laterality: N/A;    HYSTERECTOMY      KIDNEY SURGERY Right     done at [facility], reported as mass by patient    LEFT HEART CATHETERIZATION Left 2019    Procedure: Left heart cath;  Surgeon: Gerhard Krishnan MD;  Location: [facility] CATH LAB;  Service: Cardiology;  Laterality: Left;    OOPHORECTOMY      s-section       History reviewed. No pertinent family history.  Social History     Tobacco Use    Smoking status: Never Smoker    Smokeless tobacco: Never Used   Substance Use Topics    Alcohol use: No    Drug use: No     Review of Systems   Constitutional: Negative for fever.   HENT:  Negative for sore throat.    Respiratory: Positive for cough, chest tightness and shortness of breath.    Cardiovascular: Negative for chest pain.   Gastrointestinal: Negative for nausea.   Genitourinary: Negative for dysuria.   Musculoskeletal: Negative for back pain.   Skin: Negative for rash.   Neurological: Negative for weakness.   Hematological: Does not bruise/bleed easily.       Physical Exam     Initial Vitals [04/16/20 1051]   BP Pulse Resp Temp SpO2   128/76 (!) 59 20 97.7 °F (36.5 °C) 99 %      MAP       --         Physical Exam    Nursing note and vitals reviewed.  Constitutional: She appears well-developed and well-nourished. She is not diaphoretic. No distress.   HENT:   Head: Normocephalic and atraumatic.   Mouth/Throat: Oropharynx is clear and moist.   Eyes: Conjunctivae are normal.   Cardiovascular: Normal rate, regular rhythm and intact distal pulses.   Pulmonary/Chest: Breath sounds normal. No respiratory distress.   Abdominal: Soft. Bowel sounds are normal.   Musculoskeletal: Normal range of motion.   Neurological: She is alert and oriented to person, place, and time.   Skin: Skin is warm and dry. Capillary refill takes less than 2 seconds. No rash noted. No erythema.   Psychiatric: She has a normal mood and affect.         ED Course   Procedures  Labs Reviewed   SARS-COV-2 RNA AMPLIFICATION, QUAL    Narrative:     What symptom criteria does the patient meet?->Cough  What symptom criteria does the patient meet?->Difficulty  breathing          Imaging Results          X-Ray Chest AP Portable (Final result)  Result time 04/16/20 11:59:20    Final result by Issa Page MD (04/16/20 11:59:20)                 Impression:      No acute abnormality.      Electronically signed by: Issa Page  Date:    04/16/2020  Time:    11:59             Narrative:    EXAMINATION:  XR CHEST AP PORTABLE    CLINICAL HISTORY:  Increased cough and SOB at times;.    TECHNIQUE:  Single frontal portable view of the chest  was performed.    COMPARISON:  02/03/2020    FINDINGS:  Support devices: None    The lungs are clear, with normal appearance of pulmonary vasculature and no pleural effusion or pneumothorax.    The cardiac silhouette is normal in size. The hilar and mediastinal contours are unremarkable.    Bones are intact.                                 Medical Decision Making:   Initial Assessment:   Initial assessment patient with mild cough.  Vitals within normal limits oxygen saturation is 99% on room air.  Chest x-ray obtained within normal limits  Differential Diagnosis:   Viral illness with cough, bronchitis, cough, seasonal allergies  Clinical Tests:   Lab Tests: Ordered and Reviewed  Radiological Study: Ordered and Reviewed                                 Clinical Impression:       ICD-10-CM ICD-9-CM   1. Cough R05 786.2             ED Disposition Condition    Discharge Stable        ED Prescriptions     Medication Sig Dispense Start Date End Date Auth. Provider    benzonatate (TESSALON) 100 MG capsule Take 1 capsule (100 mg total) by mouth 3 (three) times daily as needed. 20 capsule 4/16/2020 4/26/2020 DAVIDE Cohen        Follow-up Information     Follow up With Specialties Details Why Contact Info    Kobi Harrington MD Family Medicine Schedule an appointment as soon as possible for a visit in 1 day If symptoms worsen [address]  [facility] [address]  [phone]      Kobi Harrington MD Family Medicine Schedule an appointment as soon as possible for a visit   [address]  [facility] [address]  [phone]                                       DAVIDE Cohen  04/16/20 1244     Tranexamic Acid Counseling:  Patient advised of the small risk of bleeding problems with tranexamic acid. They were also instructed to call if they developed any nausea, vomiting or diarrhea. All of the patient's questions and concerns were addressed.

## 2023-01-28 ENCOUNTER — HOSPITAL ENCOUNTER (EMERGENCY)
Facility: HOSPITAL | Age: 63
Discharge: HOME HEALTH | End: 2023-01-28
Attending: EMERGENCY MEDICINE
Payer: MEDICARE

## 2023-01-28 VITALS
WEIGHT: 170 LBS | TEMPERATURE: 98 F | HEIGHT: 65 IN | OXYGEN SATURATION: 100 % | SYSTOLIC BLOOD PRESSURE: 160 MMHG | RESPIRATION RATE: 16 BRPM | DIASTOLIC BLOOD PRESSURE: 76 MMHG | HEART RATE: 62 BPM | BODY MASS INDEX: 28.32 KG/M2

## 2023-01-28 DIAGNOSIS — M25.512 ACUTE PAIN OF LEFT SHOULDER: Primary | ICD-10-CM

## 2023-01-28 PROCEDURE — 99284 EMERGENCY DEPT VISIT MOD MDM: CPT | Mod: ER

## 2023-01-28 PROCEDURE — 96372 THER/PROPH/DIAG INJ SC/IM: CPT | Performed by: EMERGENCY MEDICINE

## 2023-01-28 PROCEDURE — 63600175 PHARM REV CODE 636 W HCPCS: Mod: ER | Performed by: EMERGENCY MEDICINE

## 2023-01-28 RX ORDER — CYCLOBENZAPRINE HCL 10 MG
10 TABLET ORAL EVERY 8 HOURS PRN
Qty: 14 TABLET | Refills: 0 | Status: SHIPPED | OUTPATIENT
Start: 2023-01-28 | End: 2023-02-02

## 2023-01-28 RX ORDER — PROCHLORPERAZINE EDISYLATE 5 MG/ML
10 INJECTION INTRAMUSCULAR; INTRAVENOUS
Status: COMPLETED | OUTPATIENT
Start: 2023-01-28 | End: 2023-01-28

## 2023-01-28 RX ORDER — KETOROLAC TROMETHAMINE 30 MG/ML
30 INJECTION, SOLUTION INTRAMUSCULAR; INTRAVENOUS
Status: COMPLETED | OUTPATIENT
Start: 2023-01-28 | End: 2023-01-28

## 2023-01-28 RX ADMIN — PROCHLORPERAZINE EDISYLATE 10 MG: 5 INJECTION INTRAMUSCULAR; INTRAVENOUS at 10:01

## 2023-01-28 RX ADMIN — KETOROLAC TROMETHAMINE 30 MG: 30 INJECTION, SOLUTION INTRAMUSCULAR; INTRAVENOUS at 10:01

## 2023-01-28 NOTE — ED NOTES
Pt assisted to bed. Pt identifiers verified.    APPEARANCE: Pt is awake and alert. Pt is clean and well groomed with clothes appropriately fastened.   RESPIRATORY: Respirations are even and unlabored. Airway is patent.   SKIN: Skin is warm, dry, intact and appropriately colored for ethnicity.   NEURO: Pt is moving all extremities without difficulty. Sensation is intact. Speech is clear and appropriate. Facial movement is symmetrical.   CARDIAC: No edema noted. Peripheral pulses are intact and palpable. Cap refill is <3 seconds.     Bed is in low, locked position with side rails upx2. Call light is in reach.

## 2023-01-28 NOTE — DISCHARGE INSTRUCTIONS
I recommend taking ibuprofen 600 mg every 6 hours with food and/or Tylenol 650 mg every 6 hours in addition to the prescribed medication as needed for pain.  Please call your primary care physician Monday for a follow-up appointment for further evaluation and management.  Please return with any new or worsening symptoms.

## 2023-01-28 NOTE — ED PROVIDER NOTES
Encounter Date: 2023       History     Chief Complaint   Patient presents with    Shoulder Pain     I have shoulder pain on the left for 3 days. Unknown injury      62-year-old female presents emergency department complaining of left anterior shoulder pain.  Onset a few days ago.  No specific injury reported.  Describes an aching and throbbing pain worse with certain movements and positions and without alleviating factors.  No relief with over-the-counter medications.  Denies any numbness or weakness.  Pain does not radiate.  No other symptoms reported at this time.     Review of patient's allergies indicates:   Allergen Reactions    Codeine Hives    Sulfa (sulfonamide antibiotics) Hives    Benzoate analogues Itching     Past Medical History:   Diagnosis Date    Anxiety     Arthritis     CHF (congestive heart failure)     Depression     GERD (gastroesophageal reflux disease)     High cholesterol     Hypertension      Past Surgical History:   Procedure Laterality Date    BREAST BIOPSY Left     [facility]    BREAST SURGERY       SECTION      COLONOSCOPY N/A 2017    Procedure: COLONOSCOPY Miralax;  Surgeon: Buddy Butcher Jr., MD;  Location: [facility];  Service: Endoscopy;  Laterality: N/A;    COLONOSCOPY N/A 2022    Procedure: COLONOSCOPY;  Surgeon: TREV Kay MD;  Location: [facility];  Service: Endoscopy;  Laterality: N/A;    COLONOSCOPY N/A 2022    Procedure: COLONOSCOPY;  Surgeon: TREV Kay MD;  Location: [facility];  Service: Endoscopy;  Laterality: N/A;    FLEXIBLE SIGMOIDOSCOPY N/A 2022    Procedure: SIGMOIDOSCOPY, FLEXIBLE;  Surgeon: TREV Kay MD;  Location: [address];  Service: Colon and Rectal;  Laterality: N/A;    HYSTERECTOMY      KIDNEY SURGERY Right     done at [facility], reported as mass by patient    LAPAROSCOPIC SIGMOIDECTOMY Left 2022    Procedure: COLECTOMY, SIGMOID, LAPAROSCOPIC, ERAS low;  Surgeon: TREV Kay MD;   [facility];  Service: Colon and Rectal;  Laterality: Left;    LEFT HEART CATHETERIZATION Left 5/13/2019    Procedure: Left heart cath;  Surgeon: Gerhard Krishnan MD;  Location: [facility] CATH LAB;  Service: Cardiology;  Laterality: Left;    MOBILIZATION OF SPLENIC FLEXURE N/A 7/20/2022    Procedure: MOBILIZATION, SPLENIC FLEXURE;  Surgeon: TREV Kay MD;  Location: [facility] OR [facility];  Service: Colon and Rectal;  Laterality: N/A;    OOPHORECTOMY      s-section       History reviewed. No pertinent family history.  Social History     Tobacco Use    Smoking status: Never    Smokeless tobacco: Never   Substance Use Topics    Alcohol use: Not Currently     Comment: 12-31-22    Drug use: No     Review of Systems   Constitutional:  Negative for chills and fever.   HENT:  Negative for congestion.    Respiratory:  Negative for cough and shortness of breath.    Cardiovascular:  Negative for chest pain.   Gastrointestinal:  Negative for abdominal pain.   Musculoskeletal:  Negative for back pain.   Neurological:  Negative for light-headedness and headaches.     Physical Exam     Initial Vitals [01/28/23 0959]   BP Pulse Resp Temp SpO2   (!) 160/76 62 16 97.7 °F (36.5 °C) 100 %      MAP       --         Physical Exam    Nursing note and vitals reviewed.  Constitutional: She appears well-developed and well-nourished. No distress.   HENT:   Head: Normocephalic and atraumatic.   Eyes: Conjunctivae and EOM are normal. Pupils are equal, round, and reactive to light.   Neck: Neck supple. No tracheal deviation present.   Normal range of motion.  Cardiovascular:  Normal rate and intact distal pulses.           Pulmonary/Chest: No respiratory distress.   Musculoskeletal:         General: Tenderness present. No edema. Normal range of motion.        Arms:       Cervical back: Normal range of motion and neck supple.     Neurological: She is alert and oriented to person, place, and time. She has normal strength. No  cranial nerve deficit. GCS score is 15. GCS eye subscore is 4. GCS verbal subscore is 5. GCS motor subscore is 6.   Skin: Skin is warm and dry.       ED Course   Procedures  Labs Reviewed - No data to display              Medications   ketorolac injection 30 mg (30 mg Intramuscular Given 1/28/23 1016)   prochlorperazine injection Soln 10 mg (10 mg Intramuscular Given 1/28/23 1016)     Medical Decision Making:   Initial Assessment:   62-year-old female presents emergency department complaining of left shoulder pain  Differential Diagnosis:   Differential: Fracture, dislocation, contusion, sprain, strain  ED Management:  Exam was fairly benign.  I considered an x-ray, but without any incidence of trauma and with normal range of motion I do not feel patient would benefit from an x-ray at this time.  Treated symptomatically with IM Toradol and Compazine as well as with a sling.     On re-evaluation reports improvement in symptoms.  Vital signs stable.  We discussed disposition including discharge with prescription for Flexeril, instructions on home management, follow up with primary care physician, strict return precautions given.  Patient comfortable with discharge at this time.                         Clinical Impression:   Final diagnoses:  [M25.512] Acute pain of left shoulder (Primary)        ED Disposition Condition    Discharge Stable          ED Prescriptions       Medication Sig Dispense Start Date End Date Auth. Provider    cyclobenzaprine (FLEXERIL) 10 MG tablet Take 1 tablet (10 mg total) by mouth every 8 (eight) hours as needed for Muscle spasms. 14 tablet 1/28/2023 2/2/2023 Danial Mina MD          Follow-up Information       Follow up With Specialties Details Why Contact Info    Anurag Lopez MD Family Medicine Schedule an appointment as soon as possible for a visit   [address], [facility] [address]  [place] [address] [phone]               Danial Mina MD  01/28/23 6704

## 2023-02-15 ENCOUNTER — LAB VISIT (OUTPATIENT)
Dept: LAB | Facility: HOSPITAL | Age: 63
End: 2023-02-15
Attending: STUDENT IN AN ORGANIZED HEALTH CARE EDUCATION/TRAINING PROGRAM
Payer: MEDICAID

## 2023-02-15 ENCOUNTER — OFFICE VISIT (OUTPATIENT)
Dept: FAMILY MEDICINE | Facility: HOSPITAL | Age: 63
End: 2023-02-15
Payer: MEDICAID

## 2023-02-15 VITALS
HEART RATE: 65 BPM | DIASTOLIC BLOOD PRESSURE: 77 MMHG | HEIGHT: 65 IN | SYSTOLIC BLOOD PRESSURE: 141 MMHG | BODY MASS INDEX: 28.87 KG/M2 | WEIGHT: 173.31 LBS

## 2023-02-15 DIAGNOSIS — M19.012 OSTEOARTHRITIS OF LEFT SHOULDER, UNSPECIFIED OSTEOARTHRITIS TYPE: Primary | ICD-10-CM

## 2023-02-15 DIAGNOSIS — N39.0 URINARY TRACT INFECTION WITHOUT HEMATURIA, SITE UNSPECIFIED: ICD-10-CM

## 2023-02-15 LAB
BACTERIA #/AREA URNS HPF: ABNORMAL /HPF
BILIRUB UR QL STRIP: NEGATIVE
CLARITY UR: ABNORMAL
COLOR UR: YELLOW
GLUCOSE UR QL STRIP: NEGATIVE
HGB UR QL STRIP: ABNORMAL
HYALINE CASTS #/AREA URNS LPF: 0 /LPF
KETONES UR QL STRIP: NEGATIVE
LEUKOCYTE ESTERASE UR QL STRIP: ABNORMAL
MICROSCOPIC COMMENT: ABNORMAL
NITRITE UR QL STRIP: NEGATIVE
PH UR STRIP: 7 [PH] (ref 5–8)
PROT UR QL STRIP: ABNORMAL
RBC #/AREA URNS HPF: 29 /HPF (ref 0–4)
SP GR UR STRIP: 1.02 (ref 1–1.03)
SQUAMOUS #/AREA URNS HPF: 5 /HPF
URN SPEC COLLECT METH UR: ABNORMAL
UROBILINOGEN UR STRIP-ACNC: NEGATIVE EU/DL
WBC #/AREA URNS HPF: >100 /HPF (ref 0–5)
WBC CLUMPS URNS QL MICRO: ABNORMAL

## 2023-02-15 PROCEDURE — 87086 URINE CULTURE/COLONY COUNT: CPT | Performed by: STUDENT IN AN ORGANIZED HEALTH CARE EDUCATION/TRAINING PROGRAM

## 2023-02-15 PROCEDURE — 81000 URINALYSIS NONAUTO W/SCOPE: CPT | Performed by: STUDENT IN AN ORGANIZED HEALTH CARE EDUCATION/TRAINING PROGRAM

## 2023-02-15 PROCEDURE — 99214 OFFICE O/P EST MOD 30 MIN: CPT | Performed by: STUDENT IN AN ORGANIZED HEALTH CARE EDUCATION/TRAINING PROGRAM

## 2023-02-15 RX ORDER — NITROFURANTOIN 25; 75 MG/1; MG/1
100 CAPSULE ORAL 2 TIMES DAILY
Qty: 10 CAPSULE | Refills: 0 | Status: SHIPPED | OUTPATIENT
Start: 2023-02-15 | End: 2023-02-20

## 2023-02-15 RX ORDER — DICLOFENAC SODIUM 10 MG/G
2 GEL TOPICAL 4 TIMES DAILY
Qty: 350 G | Refills: 11 | Status: SHIPPED | OUTPATIENT
Start: 2023-02-15 | End: 2023-11-08

## 2023-02-15 NOTE — PROGRESS NOTES
[facility] Family Medicine  History & Physical    SUBJECTIVE:     Chief Complaint:   Chief Complaint   Patient presents with    Follow-up    Dark Urine    Shoulder Pain    Gas    Diarrhea       History of Present Illness:  62 y.o. female who  has a past medical history of Anxiety, Arthritis, CHF (congestive heart failure), Depression, GERD (gastroesophageal reflux disease), High cholesterol, and Hypertension. presents to clinic today for recent ED follow up. She went for L sided shoulder pain. She denies any incident of trauma or injury. She is describing 1 mo of ache/soreness worse with abduction and lifting objects. She denies any sensory changes. She is also complaining of concern for a UTI. She states that she has been having worsening dysuria, increased frequency, and dark urine which began 1 day ago. She states she has had similar sx's in the past. She denies any bleeding, discharge, or CVA tenderness.       Allergies:  Review of patient's allergies indicates:   Allergen Reactions    Codeine Hives    Sulfa (sulfonamide antibiotics) Hives    Benzoate analogues Itching       Home Medications:  Current Outpatient Medications on File Prior to Visit   Medication Sig    divalproex (DEPAKOTE) 500 MG TbEC Take 1 tablet (500 mg total) by mouth 2 (two) times a day.    FLUoxetine 20 MG capsule Take 1 capsule (20 mg total) by mouth once daily.    fluticasone propionate (FLONASE) 50 mcg/actuation nasal spray 2 sprays by Each Nostril route once daily.    loratadine (CLARITIN) 10 mg tablet Take 1 tablet (10 mg total) by mouth daily as needed for Allergies.    metoprolol tartrate (LOPRESSOR) 25 MG tablet Take 25 mg by mouth 2 (two) times daily.    OLANZapine (ZYPREXA) 2.5 MG tablet Take 1 tablet (2.5 mg total) by mouth 2 (two) times a day.    PREMARIN vaginal cream Place 0.5 g vaginally twice a week.    spironolactone (ALDACTONE) 25 MG tablet Take 12.5 mg by mouth.    [DISCONTINUED] mesalamine (APRISO) 0.375 gram Cp24 Take 4  capsules (1.5 g total) by mouth once daily. (Patient not taking: No sig reported)     No current facility-administered medications on file prior to visit.       Past Medical History:   Diagnosis Date    Anxiety     Arthritis     CHF (congestive heart failure)     Depression     GERD (gastroesophageal reflux disease)     High cholesterol     Hypertension      Past Surgical History:   Procedure Laterality Date    BREAST BIOPSY Left     h    BREAST SURGERY       SECTION      COLONOSCOPY N/A 2017    Procedure: COLONOSCOPY Miralax;  Surgeon: Buddy Butcher Jr., MD;  Location: [facility] ENDO;  Service: Endoscopy;  Laterality: N/A;    COLONOSCOPY N/A 2022    Procedure: COLONOSCOPY;  Surgeon: TREV Kay MD;  Location: [facility] ENDO;  Service: Endoscopy;  Laterality: N/A;    COLONOSCOPY N/A 2022    Procedure: COLONOSCOPY;  Surgeon: TREV Kay MD;  Location: [facility] ENDO;  Service: Endoscopy;  Laterality: N/A;    FLEXIBLE SIGMOIDOSCOPY N/A 2022    Procedure: SIGMOIDOSCOPY, FLEXIBLE;  Surgeon: TREV Kay MD;  Location: [facility] OR [address] [place] [address];  Service: Colon and Rectal;  Laterality: N/A;    HYSTERECTOMY      KIDNEY SURGERY Right     done at [facility], reported as mass by patient    LAPAROSCOPIC SIGMOIDECTOMY Left 2022    Procedure: COLECTOMY, SIGMOID, LAPAROSCOPIC, ERAS low;  Surgeon: TREV Kay MD;  [facility];  Service: Colon and Rectal;  Laterality: Left;    LEFT HEART CATHETERIZATION Left 2019    Procedure: Left heart cath;  Surgeon: Gerhard Krishnan MD;  Location: [facility] CATH LAB;  Service: Cardiology;  Laterality: Left;    MOBILIZATION OF SPLENIC FLEXURE N/A 2022    Procedure: MOBILIZATION, SPLENIC FLEXURE;  Surgeon: TREV Kay MD;  [facility];  Service: Colon and Rectal;  Laterality: N/A;    OOPHORECTOMY      s-section       No family history on file.  Social History     Tobacco Use    Smoking status: Never     Smokeless tobacco: Never   Substance Use Topics    Alcohol use: Not Currently     Comment: 12-31-22    Drug use: No        Review of Systems   Constitutional:  Negative for chills, fever and malaise/fatigue.   HENT:  Negative for congestion, sinus pain and sore throat.    Eyes:  Negative for photophobia.   Respiratory:  Negative for cough, sputum production and shortness of breath.    Cardiovascular:  Negative for chest pain, palpitations and leg swelling.   Gastrointestinal:  Negative for abdominal pain, constipation, diarrhea, nausea and vomiting.   Genitourinary:  Positive for dysuria and frequency. Negative for flank pain and hematuria.   Musculoskeletal:  Negative for myalgias.   Skin:  Negative for rash.   Neurological:  Negative for sensory change, weakness and headaches.      OBJECTIVE:     Vital Signs:  Pulse: 65 (02/15/23 1350)  BP: (!) 141/77 (02/15/23 1350)    Physical Exam  Vitals reviewed.   Constitutional:       General: She is not in acute distress.     Appearance: Normal appearance.   Eyes:      Conjunctiva/sclera: Conjunctivae normal.      Pupils: Pupils are equal, round, and reactive to light.   Cardiovascular:      Rate and Rhythm: Normal rate and regular rhythm.      Pulses: Normal pulses.      Heart sounds: Normal heart sounds.   Pulmonary:      Effort: Pulmonary effort is normal. No respiratory distress.      Breath sounds: Normal breath sounds.   Musculoskeletal:         General: No swelling, tenderness, deformity or signs of injury.      Right lower leg: No edema.      Left lower leg: No edema.   Skin:     Capillary Refill: Capillary refill takes less than 2 seconds.   Neurological:      General: No focal deficit present.      Mental Status: She is alert and oriented to person, place, and time.       Laboratory:  Hemoglobin A1C   Date Value Ref Range Status   01/03/2023 5.4 4.0 - 5.6 % Final     Comment:     ADA Screening Guidelines:  5.7-6.4%  Consistent with prediabetes  >or=6.5%   Consistent with diabetes    High levels of fetal hemoglobin interfere with the HbA1C  assay. Heterozygous hemoglobin variants (HbS, HgC, etc)do  not significantly interfere with this assay.   However, presence of multiple variants may affect accuracy.     05/28/2022 6.3 (H) 4.0 - 5.6 % Final     Comment:     ADA Screening Guidelines:  5.7-6.4%  Consistent with prediabetes  >or=6.5%  Consistent with diabetes    High levels of fetal hemoglobin interfere with the HbA1C  assay. Heterozygous hemoglobin variants (HbS, HgC, etc)do  not significantly interfere with this assay.   However, presence of multiple variants may affect accuracy.         A/P:  Ashli was seen today for follow-up, dark urine, shoulder pain, gas and diarrhea. On UA in clinic w/ occult blood and leukocytes. Will tx w/ 5d macrobid and send urine for culture. Previous urine cx w/o growth. For her shoulder she has not had any trauma or injury but w/ 1 mo of discomfort. She is interested in PT as well as topical therapy and will f/u after starting tx.     Diagnoses and all orders for this visit:    Osteoarthritis of left shoulder, unspecified osteoarthritis type  -     diclofenac sodium (VOLTAREN) 1 % Gel; Apply 2 g topically 4 (four) times daily.  -     Ambulatory referral/consult to Physical/Occupational Therapy; Future    Urinary tract infection without hematuria, site unspecified  -     nitrofurantoin, macrocrystal-monohydrate, (MACROBID) 100 MG capsule; Take 1 capsule (100 mg total) by mouth 2 (two) times daily. for 5 days  -     Urine Culture High Risk; Future  -     Urinalysis; Future  -     diclofenac sodium (VOLTAREN) 1 % Gel; Apply 2 g topically 4 (four) times daily.      Anurag Lopez MD  [facility], PGY-2  02/15/2023

## 2023-02-17 LAB — BACTERIA UR CULT: NORMAL

## 2023-03-02 ENCOUNTER — OFFICE VISIT (OUTPATIENT)
Dept: FAMILY MEDICINE | Facility: HOSPITAL | Age: 63
End: 2023-03-02
Payer: MEDICAID

## 2023-03-02 VITALS
DIASTOLIC BLOOD PRESSURE: 82 MMHG | BODY MASS INDEX: 28.62 KG/M2 | WEIGHT: 171.94 LBS | SYSTOLIC BLOOD PRESSURE: 134 MMHG | HEART RATE: 80 BPM

## 2023-03-02 DIAGNOSIS — R19.7 DIARRHEA, UNSPECIFIED TYPE: Primary | ICD-10-CM

## 2023-03-02 PROCEDURE — 99213 OFFICE O/P EST LOW 20 MIN: CPT | Performed by: STUDENT IN AN ORGANIZED HEALTH CARE EDUCATION/TRAINING PROGRAM

## 2023-03-02 NOTE — PROGRESS NOTES
I assume primary medical responsibility for this patient. I have reviewed the history, physical, and assessement & treatment plan with the resident and agree that the care is reasonable and necessary. This service has been performed by a resident without the presence of a teaching physician under the primary care exception. If necessary, an addendum of additional findings or evaluation beyond the resident documentation will be noted below.      Beata Suarez MD    [facility]

## 2023-03-03 NOTE — PROGRESS NOTES
"[facility]  History & Physical    SUBJECTIVE:     Chief Complaint:   Chief Complaint   Patient presents with    Diarrhea    Nausea       History of Present Illness:  62 y.o. female who  has a past medical history of Anxiety, Arthritis, CHF (congestive heart failure), Depression, GERD (gastroesophageal reflux disease), High cholesterol, and Hypertension. presents to clinic today for diarrhea and abdominal pain. Pt reports this has been present for the past 6mo. She states she is having abdominal cramping, bloating, and diarrhea following meals and relieved with Bms which have been non-bloody. She believes this started after moving in with her Sue following Joanna. She believes it is related to the food she cooks since she uses "different seasonings than me." Pt has no other complaints this morning.     Allergies:  Review of patient's allergies indicates:   Allergen Reactions    Codeine Hives    Sulfa (sulfonamide antibiotics) Hives    Benzoate analogues Itching       Home Medications:  Current Outpatient Medications on File Prior to Visit   Medication Sig    diclofenac sodium (VOLTAREN) 1 % Gel Apply 2 g topically 4 (four) times daily.    divalproex (DEPAKOTE) 500 MG TbEC Take 1 tablet (500 mg total) by mouth 2 (two) times a day.    FLUoxetine 20 MG capsule Take 1 capsule (20 mg total) by mouth once daily.    fluticasone propionate (FLONASE) 50 mcg/actuation nasal spray 2 sprays by Each Nostril route once daily.    loratadine (CLARITIN) 10 mg tablet Take 1 tablet (10 mg total) by mouth daily as needed for Allergies.    metoprolol tartrate (LOPRESSOR) 25 MG tablet Take 25 mg by mouth 2 (two) times daily.    OLANZapine (ZYPREXA) 2.5 MG tablet Take 1 tablet (2.5 mg total) by mouth 2 (two) times a day.    PREMARIN vaginal cream Place 0.5 g vaginally twice a week.    spironolactone (ALDACTONE) 25 MG tablet Take 12.5 mg by mouth.    [DISCONTINUED] mesalamine (APRISO) 0.375 gram Cp24 Take 4 capsules (1.5 g total) by " mouth once daily. (Patient not taking: No sig reported)     No current facility-administered medications on file prior to visit.       Past Medical History:   Diagnosis Date    Anxiety     Arthritis     CHF (congestive heart failure)     Depression     GERD (gastroesophageal reflux disease)     High cholesterol     Hypertension      Past Surgical History:   Procedure Laterality Date    BREAST BIOPSY Left     h    BREAST SURGERY       SECTION      COLONOSCOPY N/A 2017    Procedure: COLONOSCOPY Miralax;  Surgeon: Buddy Butcher Jr., MD;  Location: [facility] ENDO;  Service: Endoscopy;  Laterality: N/A;    COLONOSCOPY N/A 2022    Procedure: COLONOSCOPY;  Surgeon: TREV Kay MD;  Location: [facility] ENDO;  Service: Endoscopy;  Laterality: N/A;    COLONOSCOPY N/A 2022    Procedure: COLONOSCOPY;  Surgeon: TREV Kay MD;  Location: [facility] ENDO;  Service: Endoscopy;  Laterality: N/A;    FLEXIBLE SIGMOIDOSCOPY N/A 2022    Procedure: SIGMOIDOSCOPY, FLEXIBLE;  Surgeon: TREV Kya MD;  Location: [address];  Service: Colon and Rectal;  Laterality: N/A;    HYSTERECTOMY      KIDNEY SURGERY Right     done at [facility], reported as mass by patient    LAPAROSCOPIC SIGMOIDECTOMY Left 2022    Procedure: COLECTOMY, SIGMOID, LAPAROSCOPIC, ERAS low;  Surgeon: TREV Kay MD;  Location: [address];  Service: Colon and Rectal;  Laterality: Left;    LEFT HEART CATHETERIZATION Left 2019    Procedure: Left heart cath;  Surgeon: Gerhard Krishnan MD;  Location: [facility] CATH LAB;  Service: Cardiology;  Laterality: Left;    MOBILIZATION OF SPLENIC FLEXURE N/A 2022    Procedure: MOBILIZATION, SPLENIC FLEXURE;  Surgeon: TREV Kay MD;  Location: [facility] OR [address] [place] [address];  Service: Colon and Rectal;  Laterality: N/A;    OOPHORECTOMY      s-section       History reviewed. No pertinent family history.  Social History     Tobacco Use    Smoking status: Never     Smokeless tobacco: Never   Substance Use Topics    Alcohol use: Not Currently     Comment: 12-31-22    Drug use: No        Review of Systems   Constitutional:  Negative for chills, fever and malaise/fatigue.   HENT:  Negative for congestion, sinus pain and sore throat.    Eyes:  Negative for photophobia.   Respiratory:  Negative for cough, sputum production and shortness of breath.    Cardiovascular:  Negative for chest pain, palpitations and leg swelling.   Gastrointestinal:  Positive for abdominal pain and diarrhea. Negative for constipation, nausea and vomiting.   Genitourinary:  Negative for dysuria and hematuria.   Musculoskeletal:  Negative for myalgias.   Skin:  Negative for rash.   Neurological:  Negative for sensory change, weakness and headaches.      OBJECTIVE:     Vital Signs:  Pulse: 80 (03/02/23 1507)  BP: 134/82 (03/02/23 1507)    Physical Exam  Vitals reviewed.   Constitutional:       General: She is not in acute distress.  HENT:      Head: Normocephalic and atraumatic.      Right Ear: External ear normal.      Left Ear: External ear normal.      Nose: Nose normal.   Eyes:      Conjunctiva/sclera: Conjunctivae normal.      Pupils: Pupils are equal, round, and reactive to light.   Cardiovascular:      Rate and Rhythm: Normal rate and regular rhythm.      Pulses: Normal pulses.      Heart sounds: Normal heart sounds. No murmur heard.    No friction rub. No gallop.   Pulmonary:      Effort: Pulmonary effort is normal.      Breath sounds: Normal breath sounds. No wheezing, rhonchi or rales.   Abdominal:      General: Bowel sounds are normal.      Palpations: There is no mass.      Tenderness: There is no abdominal tenderness.   Musculoskeletal:         General: No swelling or tenderness. Normal range of motion.      Cervical back: Normal range of motion.   Lymphadenopathy:      Cervical: No cervical adenopathy.   Skin:     General: Skin is warm and dry.      Findings: No rash.   Neurological:       General: No focal deficit present.      Mental Status: She is alert.       Laboratory:  Hemoglobin A1C   Date Value Ref Range Status   01/03/2023 5.4 4.0 - 5.6 % Final     Comment:     ADA Screening Guidelines:  5.7-6.4%  Consistent with prediabetes  >or=6.5%  Consistent with diabetes    High levels of fetal hemoglobin interfere with the HbA1C  assay. Heterozygous hemoglobin variants (HbS, HgC, etc)do  not significantly interfere with this assay.   However, presence of multiple variants may affect accuracy.     05/28/2022 6.3 (H) 4.0 - 5.6 % Final     Comment:     ADA Screening Guidelines:  5.7-6.4%  Consistent with prediabetes  >or=6.5%  Consistent with diabetes    High levels of fetal hemoglobin interfere with the HbA1C  assay. Heterozygous hemoglobin variants (HbS, HgC, etc)do  not significantly interfere with this assay.   However, presence of multiple variants may affect accuracy.         A/P:  Ashli was seen today for diarrhea. Pt reports this has been a chronic issue for the past 6mo following her move into her [employer] after Joanna. She believes it is related to the food her sister cooks. She has had multiple c-scopes without any concern for IBD only diverticular disease. Pt reports being unable to change her diet while she is living with her [employer]. Pt endorses sx relief with peptobismal. Encouraged pt to avoid food triggers as best as possible and can supplement with fiber and probiotic. Gave strict return precatuions and encouraged maximizing PO hydration. Pt in agreement with this plan.     Diagnoses and all orders for this visit:    Diarrhea, unspecified type      Anurag Lopez MD  [facility], PGY-2  03/03/2023

## 2023-03-06 NOTE — PROGRESS NOTES
I assume primary medical responsibility for this patient. I have reviewed the history, physical, and assessment & treatment plan with the resident and agree that the care is reasonable and necessary. This service has been performed by a resident without the presence of a teaching physician under the primary care exception. If necessary, an addendum of additional findings or evaluation beyond the resident documentation will be noted below.        Reece Lott Jr., DO    [facility]

## 2023-03-14 ENCOUNTER — CLINICAL SUPPORT (OUTPATIENT)
Dept: REHABILITATION | Facility: HOSPITAL | Age: 63
End: 2023-03-14
Payer: MEDICAID

## 2023-03-14 DIAGNOSIS — M25.612 DECREASED RANGE OF MOTION OF LEFT SHOULDER: ICD-10-CM

## 2023-03-14 DIAGNOSIS — R29.898 WEAKNESS OF SHOULDER: ICD-10-CM

## 2023-03-14 DIAGNOSIS — M19.012 OSTEOARTHRITIS OF LEFT SHOULDER, UNSPECIFIED OSTEOARTHRITIS TYPE: ICD-10-CM

## 2023-03-14 DIAGNOSIS — R29.3 POSTURE IMBALANCE: ICD-10-CM

## 2023-03-14 PROCEDURE — 97161 PT EVAL LOW COMPLEX 20 MIN: CPT | Mod: PO

## 2023-03-14 NOTE — PLAN OF CARE
"[facility]  Physical Therapy Initial Evaluation    Name: Ashli Kumar  Clinic Number: [id number]    Therapy Diagnosis:   Encounter Diagnoses   Name Primary?    Osteoarthritis of left shoulder, unspecified osteoarthritis type     Posture imbalance     Decreased range of motion of left shoulder     Weakness of shoulder      Physician: Anurag Lopez MD    Physician Orders: PT Eval and Treat   Medical Diagnosis from Referral: M19.012 (ICD-10-CM) - Osteoarthritis of left shoulder, unspecified osteoarthritis type   Evaluation Date: 3/14/2023  Authorization Period Expiration: 02/15/2024  Plan of Care Expiration: 5/23/2023  Visit # / Visits authorized: 1/ 1 eval,   Did not capture foto, pt requires assistance for questions , capture next visit     Time In: 7 30 am   Time Out: 8 00 am   Total Billable Time: 30 minutes (late arrival)     Precautions: Standard,  pt reported being dx with Bi-polar dis order and depression, no longer seeing a therapist"    Subjective   Date of onset: Feb 2023'  History of current condition - Ashli reports:  First started to develop pain in the morning and noticed it was very difficult to lift and use the arm, had trouble lifting kitchen items and reaching over head. Feels a pulling sensation in the upper shoulder and neck region. Reports increased pressure front aspect of the left shoulder. Reports morning stiffness does not get better throughout the day, pain is constant through the day. Denies any clicking or popping. Pt reports a recent fall but unable to recall when, reports landing on hips and not hitting shoulder/heads and not reaching out with LUE to catch herself. Denies numbness or radiating pain down the arm, very focal to front aspect of shoulder        Past Medical History:   Diagnosis Date    Anxiety     Arthritis     CHF (congestive heart failure)     Depression     GERD (gastroesophageal reflux disease)     High cholesterol     Hypertension " "Ashli Kumar  has a past surgical history that includes Hysterectomy; s-section;  section; Breast surgery; Kidney surgery (Right); Colonoscopy (N/A, 2017); Left heart catheterization (Left, 2019); Oophorectomy; Breast biopsy (Left); Colonoscopy (N/A, 2022); Laparoscopic sigmoidectomy (Left, 2022); Flexible sigmoidoscopy (N/A, 2022); Mobilization of splenic flexure (N/A, 2022); and Colonoscopy (N/A, 2022).    Ashli has a current medication list which includes the following prescription(s): diclofenac sodium, divalproex, fluoxetine, fluticasone propionate, loratadine, metoprolol tartrate, olanzapine, premarin, spironolactone, and [DISCONTINUED] mesalamine.    Review of patient's allergies indicates:   Allergen Reactions    Codeine Hives    Sulfa (sulfonamide antibiotics) Hives    Benzoate analogues Itching      Prior Therapy: no  Social History:  lives with their family  Occupation:  No work history, reports working "odds and ends job but nothing permanent"   Prior Level of Function: Independent   Current Level of Function: Severe symptoms prevent over head activities and hobbies     Pain:  Current 7/10, worst 9/10, best 4/10   Location: left shoulder    Description: Aching, Dull, Tight, Deep, and Sharp  Aggravating Factors: reaching/pulling/lifting/over head activities   Easing Factors: relaxation and pain medication    Pts goals: decrease shoulder pain so that she can use it as needed for cooking and arts and crafts     Objective     Observation:  LUE held across body, decrease trunk rotation and arm swing during ambulation     Posture: FHP, rounded shoulders     Active Range of Motion:   Shoulder Right Left   Flexion 125 (180) 105 (180)   Abduction 155 (180) 130 (180)   Fx reach ER  C7 C5   Fx reach IR L3 PSIS      Strength:  Shoulder Right Left   Flexion 4-/5 4-/5   Abduction 4-/5 3-/5   ER 4-/5 3-/5   IR 4-/5 4-/5                 Special " Tests:      Rotator Cuff Tear    Left   Hawkin's Delfino  +   Drop Arm test  +   Resisted ER  +     Joint Mobility: UR      Palpation: LUE  Infraspinatus -increase in pain   Supraspinatus -increase in pain  Deltoid -increase in pain  Upper trap -increase in pain  Subscapularis -increase in pain    Sensation:   Intact     Flexibility:   Lat: R + ; L +   Pec Minor/major: R + ; L +      CMS Impairment/Limitation/Restriction for FOTO  Survey    Therapist reviewed FOTO scores for Ashli Kumar on 3/14/2023.   FOTO documents entered into [employer] - see Media section.    Limitation Score: %         TREATMENT   Treatment Time In:   Treatment Time Out:   Total Treatment time separate from Evaluation:  minutes    Ashli received therapeutic exercises to develop strength and endurance for  minutes including:  Gentle RTC ex  Pec stretch , lat stretch,   UBA    Ashli received the following manual therapy techniques: Joint mobilizations and Soft tissue Mobilization were applied to the:  for  minutes, including:  Astym to RTC, upper trap    Ashli participated in neuromuscular re-education activities to improve: Coordination, Proprioception, and Posture for  minutes. The following activities were included:  Postural corrections movements, gentle RTC dynamic stabiliation, cervical isometrics     Ashli participated in dynamic functional therapeutic activities to improve functional performance for \  minutes, including:      Ashli participated in gait training to improve functional mobility and safety for   minutes, including:    Home Exercises and Patient Education Provided    Education provided re: , goals for therapy, role of therapy for care,    Written Home Exercises Provided: No, next visit due time limitation .    See EMR under patient instructions for exercises given.   Assessment   Ashli is a 62 y.o. female referred to outpatient Physical Therapy with a medical diagnosis of M19.012 (ICD-10-CM) -  Osteoarthritis of left shoulder, unspecified osteoarthritis type    and signs and symptoms that are suggestive of RTC strain due to nature of pain, TTP locations, aggravating factors, decrease strength of isolated RTC musculature  .  The patients clinical presentation as described in this initial evaluation has resulted in limitations of arom, strength, stability, posture  which impact the patients ability to perform functional activities such as cooking, reaching, lifting, carrying, over head activities with the LUE. The patient would benefit from skilled physical therapy in order to return to documented prior levels of function.       Pt prognosis is Fair.   Pt will benefit from skilled outpatient Physical Therapy to address the deficits stated above and in the chart below, provide pt/family education, and to maximize pt's level of independence.     Plan of care discussed with patient: Yes  Pt's spiritual, cultural and educational needs considered and patient is agreeable to the plan of care and goals as stated below:     Anticipated Barriers for therapy: attendance to therapy, sedentary lifestyle, HEP compliance     Clinical Presentation stable and uncomplicated low   Decision Making/ Complexity Score: low     GOALS: Short Term Goals:  5 weeks  1.Report decreased  pain < / =  2/10  to increase tolerance for therapeutic interventions   2. Increase AROM by 20 degrees for flexion and abduction    3. Increased strength by 1/3 MMT grade  to increase tolerance for ADL and work activities.  4. Pt to tolerate HEP to improve ROM and independence with ADL's    Long Term Goals: 10 weeks  2.Increase AROM to be symmetrical of non invovled side in a pain free manner   3.Increase strength to >/= 4/5  to increase tolerance for ADL and work activities.  4. Pt goal: be able to lift and carry pots and pans with no pain   5. Pt will have improved gcode of CJ (20-40% limited) on FOTO shoulder in order to demonstrate true functional  improvement.    Plan   Plan of care Certification: 3/14/2023 to 5/23/2023. Initiation of postural retraining and proper positioning scapula. Use of manual therapy to improve mobility of glenohumeral joint and upper thoracic spine.Progress to rotator cuff strengthening as patient is able to move through appropriate range of motion without significant discomfort Use of modalities as appropriate.        Outpatient Physical Therapy 2 times weekly for 10 weeks to include the following interventions: Manual Therapy, Moist Heat/ Ice, Neuromuscular Re-ed, Therapeutic Activities, and Therapeutic Exercise.     Arnoldo Goodman, PT

## 2023-03-16 ENCOUNTER — CLINICAL SUPPORT (OUTPATIENT)
Dept: REHABILITATION | Facility: HOSPITAL | Age: 63
End: 2023-03-16
Payer: MEDICAID

## 2023-03-16 DIAGNOSIS — R29.898 WEAKNESS OF SHOULDER: ICD-10-CM

## 2023-03-16 DIAGNOSIS — M25.612 DECREASED RANGE OF MOTION OF LEFT SHOULDER: ICD-10-CM

## 2023-03-16 DIAGNOSIS — R29.3 POSTURE IMBALANCE: Primary | ICD-10-CM

## 2023-03-16 PROCEDURE — 97110 THERAPEUTIC EXERCISES: CPT | Mod: PO

## 2023-03-16 NOTE — PROGRESS NOTES
"  Physical Therapy Treatment Note     Name: Ashli Kumar  Clinic Number: [id number]    Therapy Diagnosis:   Encounter Diagnoses   Name Primary?    Posture imbalance Yes    Decreased range of motion of left shoulder     Weakness of shoulder      Physician: Anurag Lopez MD    Visit Date: 3/16/2023    Physician Orders: PT Eval and Treat   Medical Diagnosis from Referral: M19.012 (ICD-10-CM) - Osteoarthritis of left shoulder, unspecified osteoarthritis type   Evaluation Date: 3/14/2023  Authorization Period Expiration: 02/15/2024  Plan of Care Expiration: 12/31/2023  Visit #/Visits authorized: 1/ 20 (+eval)     Time In: 3:00 pm  Time Out: 4:00 pm  Total Billable Time: 60 minutes    Precautions: Standard,  pt reported being dx with Bi-polar dis order and depression, no longer seeing a therapist"    Subjective     Pt reports: I am feeling okay.  She was compliant with home exercise program.  Response to previous treatment: Eval  Functional change: Ongoing    Pain: /10  Location: L shoulder    Objective     Ashli received therapeutic exercises to develop strength, endurance, and posture for 60 minutes including:    - UBE - 4' fwd/4' bwd  - Wall slides flex L - 15x w 5" holds  - Wall slides abd L - 15x w 5" holds  - Serratus slides - 3 x 10   - Wand flex - 10x w 2# dowel; 3 x 10 w 3#  - Serratus punches - 1' w 1#; 1' w 0#  - SL shoulder ER - 2 x 10 B w 1#  - Prone I's, Y's, T's  - TB rows  - TB lat pull downs  - TB ER  - TB IR  - Pulleys  - IR str      Home Exercises Provided and Patient Education Provided     Education provided:   - importance of consistent performance of HEP    Written Home Exercises Provided: yes.  Exercises were reviewed and Ashli was able to demonstrate them prior to the end of the session.  Ashli demonstrated good  understanding of the education provided.       Assessment     Ashli presents to therapy with some discomfort in her L shoulder. She tolerates today's session without " symptom provocation. She displays increased challenge with weighted wand flexion, SL shoulder ER, and Serratus punches, with focus of this session on B shoulder strengthening and endurance. She requires moderate VC and TC in order to perform prescribed activities avoid compensatory movements as well as to perform activities slower and with increased muscle activation.    Ashli Is progressing well towards her goals.   Pt prognosis is Fair.     Pt will continue to benefit from skilled outpatient physical therapy to address the deficits listed in the problem list box on initial evaluation, provide pt/family education and to maximize pt's level of independence in the home and community environment.     Pt's spiritual, cultural and educational needs considered and pt agreeable to plan of care and goals.     Anticipated barriers to physical therapy:  attendance to therapy, sedentary lifestyle, HEP compliance     Goals:   Short Term Goals:  5 weeks  1.Report decreased  pain < / =  2/10  to increase tolerance for therapeutic interventions   2. Increase AROM by 20 degrees for flexion and abduction    3. Increased strength by 1/3 MMT grade  to increase tolerance for ADL and work activities.  4. Pt to tolerate HEP to improve ROM and independence with ADL's     Long Term Goals: 10 weeks  2.Increase AROM to be symmetrical of non invovled side in a pain free manner   3.Increase strength to >/= 4/5  to increase tolerance for ADL and work activities.  4. Pt goal: be able to lift and carry pots and pans with no pain   5. Pt will have improved gcode of CJ (20-40% limited) on FOTO shoulder in order to demonstrate true functional improvement.    Plan     Continue with PT POC and progress as tolerated.     Ghazal Caceres, PT

## 2023-03-17 ENCOUNTER — OFFICE VISIT (OUTPATIENT)
Dept: GASTROENTEROLOGY | Facility: CLINIC | Age: 63
End: 2023-03-17
Payer: MEDICAID

## 2023-03-17 VITALS — WEIGHT: 175.13 LBS | BODY MASS INDEX: 29.18 KG/M2 | HEIGHT: 65 IN

## 2023-03-17 DIAGNOSIS — K21.9 GASTROESOPHAGEAL REFLUX DISEASE, UNSPECIFIED WHETHER ESOPHAGITIS PRESENT: Primary | ICD-10-CM

## 2023-03-17 DIAGNOSIS — R10.84 GENERALIZED ABDOMINAL PAIN: ICD-10-CM

## 2023-03-17 PROCEDURE — 3044F HG A1C LEVEL LT 7.0%: CPT | Mod: CPTII,,, | Performed by: NURSE PRACTITIONER

## 2023-03-17 PROCEDURE — 99999 PR PBB SHADOW E&M-EST. PATIENT-LVL III: ICD-10-PCS | Mod: PBBFAC,,, | Performed by: NURSE PRACTITIONER

## 2023-03-17 PROCEDURE — 3008F PR BODY MASS INDEX (BMI) DOCUMENTED: ICD-10-PCS | Mod: CPTII,,, | Performed by: NURSE PRACTITIONER

## 2023-03-17 PROCEDURE — 99213 OFFICE O/P EST LOW 20 MIN: CPT | Mod: PBBFAC,PO | Performed by: NURSE PRACTITIONER

## 2023-03-17 PROCEDURE — 99999 PR PBB SHADOW E&M-EST. PATIENT-LVL III: CPT | Mod: PBBFAC,,, | Performed by: NURSE PRACTITIONER

## 2023-03-17 PROCEDURE — 3044F PR MOST RECENT HEMOGLOBIN A1C LEVEL <7.0%: ICD-10-PCS | Mod: CPTII,,, | Performed by: NURSE PRACTITIONER

## 2023-03-17 PROCEDURE — 3008F BODY MASS INDEX DOCD: CPT | Mod: CPTII,,, | Performed by: NURSE PRACTITIONER

## 2023-03-17 PROCEDURE — 1159F MED LIST DOCD IN RCRD: CPT | Mod: CPTII,,, | Performed by: NURSE PRACTITIONER

## 2023-03-17 PROCEDURE — 99214 OFFICE O/P EST MOD 30 MIN: CPT | Mod: S$PBB,,, | Performed by: NURSE PRACTITIONER

## 2023-03-17 PROCEDURE — 1160F PR REVIEW ALL MEDS BY PRESCRIBER/CLIN PHARMACIST DOCUMENTED: ICD-10-PCS | Mod: CPTII,,, | Performed by: NURSE PRACTITIONER

## 2023-03-17 PROCEDURE — 1160F RVW MEDS BY RX/DR IN RCRD: CPT | Mod: CPTII,,, | Performed by: NURSE PRACTITIONER

## 2023-03-17 PROCEDURE — 99214 PR OFFICE/OUTPT VISIT, EST, LEVL IV, 30-39 MIN: ICD-10-PCS | Mod: S$PBB,,, | Performed by: NURSE PRACTITIONER

## 2023-03-17 PROCEDURE — 1159F PR MEDICATION LIST DOCUMENTED IN MEDICAL RECORD: ICD-10-PCS | Mod: CPTII,,, | Performed by: NURSE PRACTITIONER

## 2023-03-17 RX ORDER — OMEPRAZOLE 40 MG/1
40 CAPSULE, DELAYED RELEASE ORAL DAILY
Qty: 30 CAPSULE | Refills: 2 | Status: SHIPPED | OUTPATIENT
Start: 2023-03-17 | End: 2023-05-24 | Stop reason: SDUPTHER

## 2023-03-17 RX ORDER — DICYCLOMINE HYDROCHLORIDE 20 MG/1
20 TABLET ORAL 3 TIMES DAILY PRN
Qty: 60 TABLET | Refills: 2 | Status: SHIPPED | OUTPATIENT
Start: 2023-03-17 | End: 2024-02-15

## 2023-03-17 NOTE — PROGRESS NOTES
Subjective:       Patient ID: Ashli Kumar is a 62 y.o. female.    Chief Complaint: Gastroesophageal Reflux and Abdominal Pain    63 y/o female with multiple diagnoses as listed in problem list and medical history presents to clinic with c/o GERD and abdominal pain.     Gastroesophageal Reflux  She complains of abdominal pain, belching, heartburn and nausea. She reports no chest pain, no dysphagia, no early satiety or no globus sensation. This is a recurrent problem. The problem occurs frequently. The problem has been gradually worsening. The heartburn duration is several minutes. The heartburn is located in the abdomen. The symptoms are aggravated by certain foods. Pertinent negatives include no anemia or melena. There are no known risk factors. She has tried an antacid for the symptoms. The treatment provided mild relief.     Past Medical History:   Diagnosis Date    Anxiety     Arthritis     CHF (congestive heart failure)     Depression     GERD (gastroesophageal reflux disease)     High cholesterol     Hypertension        Past Surgical History:   Procedure Laterality Date    BREAST BIOPSY Left     rph    BREAST SURGERY       SECTION      COLONOSCOPY N/A 2017    Procedure: COLONOSCOPY Miralax;  Surgeon: Buddy Butcher Jr., MD;  Location: [facility];  Service: Endoscopy;  Laterality: N/A;    COLONOSCOPY N/A 2022    Procedure: COLONOSCOPY;  Surgeon: TREV Kay MD;  Location: [facility];  Service: Endoscopy;  Laterality: N/A;    COLONOSCOPY N/A 2022    Procedure: COLONOSCOPY;  Surgeon: TREV Kay MD;  Location: [facility];  Service: Endoscopy;  Laterality: N/A;    FLEXIBLE SIGMOIDOSCOPY N/A 2022    Procedure: SIGMOIDOSCOPY, FLEXIBLE;  Surgeon: TREV Kay MD;  Location: [facility] OR [address] [place] [address];  Service: Colon and Rectal;  Laterality: N/A;    HYSTERECTOMY      KIDNEY SURGERY Right     done at [facility], reported as mass by patient    LAPAROSCOPIC  SIGMOIDECTOMY Left 7/20/2022    Procedure: COLECTOMY, SIGMOID, LAPAROSCOPIC, ERAS low;  Surgeon: TREV Kay MD;  [facility];  Service: Colon and Rectal;  Laterality: Left;    LEFT HEART CATHETERIZATION Left 5/13/2019    Procedure: Left heart cath;  Surgeon: Gerhard Krishnan MD;  Location: [facility] C[facility];  Service: Cardiology;  Laterality: Left;    MOBILIZATION OF SPLENIC FLEXURE N/A 7/20/2022    Procedure: MOBILIZATION, SPLENIC FLEXURE;  Surgeon: TREV Kay MD;  [facility];  Service: Colon and Rectal;  Laterality: N/A;    OOPHORECTOMY      s-section         History reviewed. No pertinent family history.    Social History     Socioeconomic History    Marital status:    Tobacco Use    Smoking status: Never    Smokeless tobacco: Never   Substance and Sexual Activity    Alcohol use: Not Currently     Comment: 12-31-22    Drug use: No    Sexual activity: Not Currently     Social Determinants of Health     Financial Resource Strain: Low Risk     Difficulty of Paying Living Expenses: Not hard at all   Food Insecurity: No Food Insecurity    Worried About Running Out of Food in the Last Year: Never true    Ran Out of Food in the Last Year: Never true   Transportation Needs: Unmet Transportation Needs    Lack of Transportation (Medical): Yes    Lack of Transportation (Non-Medical): Yes   Physical Activity: Inactive    Days of Exercise per Week: 0 days    Minutes of Exercise per Session: 0 min   Stress: Stress Concern Present    Feeling of Stress : Very much   Social Connections: Moderately Integrated    Frequency of Communication with Friends and Family: More than three times a week    Frequency of Social Gatherings with Friends and Family: More than three times a week    Attends Taoist Services: 1 to 4 times per year    Active Member of Clubs or Organizations: No    Attends Club or Organization Meetings: Never    Marital Status:    Housing Stability: High  "Risk    Unable to Pay for Housing in the Last Year: No    Number of Places Lived in the Last Year: 3    Unstable Housing in the Last Year: No       Review of Systems   Constitutional:  Negative for appetite change and unexpected weight change.   HENT:  Negative for trouble swallowing.    Respiratory:  Negative for shortness of breath.    Cardiovascular:  Negative for chest pain.   Gastrointestinal:  Positive for abdominal pain, heartburn and nausea. Negative for blood in stool, constipation, diarrhea, dysphagia and melena.   Hematological:  Negative for adenopathy. Does not bruise/bleed easily.   Psychiatric/Behavioral:  Negative for dysphoric mood.        Objective:     Vitals:    03/17/23 1113   Weight: 79.4 kg (175 lb 1.6 oz)   Height: 5' 5" (1.651 m)          Physical Exam  Constitutional:       General: She is not in acute distress.     Appearance: Normal appearance. She is not ill-appearing.   HENT:      Head: Normocephalic.   Eyes:      Conjunctiva/sclera: Conjunctivae normal.      Pupils: Pupils are equal, round, and reactive to light.   Pulmonary:      Effort: Pulmonary effort is normal. No respiratory distress.   Musculoskeletal:         General: Normal range of motion.      Cervical back: Normal range of motion.   Skin:     General: Skin is warm and dry.   Neurological:      Mental Status: She is alert and oriented to person, place, and time.   Psychiatric:         Mood and Affect: Mood normal.         Behavior: Behavior normal.             Assessment:         ICD-10-CM ICD-9-CM   1. Gastroesophageal reflux disease, unspecified whether esophagitis present  K21.9 530.81   2. Generalized abdominal pain  R10.84 789.07       Plan:       Gastroesophageal reflux disease, unspecified whether esophagitis present  -     omeprazole (PRILOSEC) 40 MG capsule; Take 1 capsule (40 mg total) by mouth once daily.  Dispense: 30 capsule; Refill: 2    Generalized abdominal pain  -     dicyclomine (BENTYL) 20 mg tablet; Take " 1 tablet (20 mg total) by mouth 3 (three) times daily as needed (abdominal pain).  Dispense: 60 tablet; Refill: 2    Follow up 2 months, if symptoms persist will schedule upper endoscopy    Follow up if symptoms worsen or fail to improve.     Patient's Medications   New Prescriptions    DICYCLOMINE (BENTYL) 20 MG TABLET    Take 1 tablet (20 mg total) by mouth 3 (three) times daily as needed (abdominal pain).    OMEPRAZOLE (PRILOSEC) 40 MG CAPSULE    Take 1 capsule (40 mg total) by mouth once daily.   Previous Medications    DICLOFENAC SODIUM (VOLTAREN) 1 % GEL    Apply 2 g topically 4 (four) times daily.    DIVALPROEX (DEPAKOTE) 500 MG TBEC    Take 1 tablet (500 mg total) by mouth 2 (two) times a day.    FLUOXETINE 20 MG CAPSULE    Take 1 capsule (20 mg total) by mouth once daily.    FLUTICASONE PROPIONATE (FLONASE) 50 MCG/ACTUATION NASAL SPRAY    2 sprays by Each Nostril route once daily.    LORATADINE (CLARITIN) 10 MG TABLET    Take 1 tablet (10 mg total) by mouth daily as needed for Allergies.    METOPROLOL TARTRATE (LOPRESSOR) 25 MG TABLET    Take 25 mg by mouth 2 (two) times daily.    OLANZAPINE (ZYPREXA) 2.5 MG TABLET    Take 1 tablet (2.5 mg total) by mouth 2 (two) times a day.    PREMARIN VAGINAL CREAM    Place 0.5 g vaginally twice a week.    SPIRONOLACTONE (ALDACTONE) 25 MG TABLET    Take 12.5 mg by mouth.   Modified Medications    No medications on file   Discontinued Medications    No medications on file

## 2023-03-27 ENCOUNTER — TELEPHONE (OUTPATIENT)
Dept: GASTROENTEROLOGY | Facility: CLINIC | Age: 63
End: 2023-03-27
Payer: MEDICAID

## 2023-03-27 DIAGNOSIS — R19.7 DIARRHEA, UNSPECIFIED TYPE: Primary | ICD-10-CM

## 2023-03-27 NOTE — TELEPHONE ENCOUNTER
Patient called asking what can she take for diarrhea. I informed patient that per Carla De Leon NP, she can take Imodium for diarrhea. Patient then stated that she wants to do a stool test to see if there are parasites in her stool. Informed patient that I will forward her request to the provider.    ----- Message from Jeanie Ruelas sent at 3/27/2023 11:27 AM CDT -----  Pt Requesting Call Back    Who called: pt  Who called for pt:  Best call back #: [phone]  Add notes: pt said it's regarding advice on what to take for her having diarrhea

## 2023-03-28 ENCOUNTER — CLINICAL SUPPORT (OUTPATIENT)
Dept: REHABILITATION | Facility: HOSPITAL | Age: 63
End: 2023-03-28
Payer: MEDICAID

## 2023-03-28 DIAGNOSIS — R29.898 WEAKNESS OF SHOULDER: ICD-10-CM

## 2023-03-28 DIAGNOSIS — M25.612 DECREASED RANGE OF MOTION OF LEFT SHOULDER: ICD-10-CM

## 2023-03-28 DIAGNOSIS — R29.3 POSTURE IMBALANCE: Primary | ICD-10-CM

## 2023-03-28 PROCEDURE — 97110 THERAPEUTIC EXERCISES: CPT | Mod: PO

## 2023-03-28 NOTE — PROGRESS NOTES
"  Physical Therapy Treatment Note     Name: Ashli Kumar  Clinic Number: [id number]    Therapy Diagnosis:   Encounter Diagnoses   Name Primary?    Posture imbalance Yes    Decreased range of motion of left shoulder     Weakness of shoulder      Physician: Anurag Lopez MD    Visit Date: 3/28/2023    Physician Orders: PT Eval and Treat   Medical Diagnosis from Referral: M19.012 (ICD-10-CM) - Osteoarthritis of left shoulder, unspecified osteoarthritis type   Evaluation Date: 3/14/2023  Authorization Period Expiration: 02/15/2024  Plan of Care Expiration: 12/31/2023  Visit #/Visits authorized: 1/ 20 (+eval)     Time In: 8 00 am  Time Out: 9 00 am  Total Billable Time: 55 minutes    Precautions: Standard,  pt reported being dx with Bi-polar dis order and depression, no longer seeing a therapist"    Subjective     Pt reports: less shoulder discomfort as of late  She was compliant with home exercise program.  Response to previous treatment: Eval  Functional change: Ongoing    Pain: /10  Location: L shoulder    Objective     Ashli received therapeutic exercises to develop strength, endurance, and posture for 55 minutes including:    - UBE - 4' fwd/4' bwd  - Wall slides flex L - 15x w 5" holds  - Wall slides abd L - 15x w 5" holds  - Serratus slides - 3 x 10   - Wand flex - 10x w 2# dowel; 3 x 10 w 3#  - Serratus punches - 1' w 1#; 1' w 0#  - SL shoulder ER - 2 x 10 B w 1#  - Prone I's, Y's, T's  - TB rows  - TB lat pull downs  - TB ER  - TB IR  - Pulleys  - IR str      Home Exercises Provided and Patient Education Provided     Education provided:   - importance of consistent performance of HEP    Written Home Exercises Provided: yes.  Exercises were reviewed and Ashli was able to demonstrate them prior to the end of the session.  Ashli demonstrated good  understanding of the education provided.       Assessment   Pt tolerated treatment well, limited with decreased strength and endurance of bilateral RTC and " scapular musculature leading to poor balance and compensatory movement patterns of the bilateral UE, continue to strengthen as tolerable in order to normalize movement patterns.     Ashli Is progressing well towards her goals.   Pt prognosis is Fair.     Pt will continue to benefit from skilled outpatient physical therapy to address the deficits listed in the problem list box on initial evaluation, provide pt/family education and to maximize pt's level of independence in the home and community environment.     Pt's spiritual, cultural and educational needs considered and pt agreeable to plan of care and goals.     Anticipated barriers to physical therapy:  attendance to therapy, sedentary lifestyle, HEP compliance     Goals:   Short Term Goals:  5 weeks  1.Report decreased  pain < / =  2/10  to increase tolerance for therapeutic interventions   2. Increase AROM by 20 degrees for flexion and abduction    3. Increased strength by 1/3 MMT grade  to increase tolerance for ADL and work activities.  4. Pt to tolerate HEP to improve ROM and independence with ADL's     Long Term Goals: 10 weeks  2.Increase AROM to be symmetrical of non invovled side in a pain free manner   3.Increase strength to >/= 4/5  to increase tolerance for ADL and work activities.  4. Pt goal: be able to lift and carry pots and pans with no pain   5. Pt will have improved gcode of CJ (20-40% limited) on FOTO shoulder in order to demonstrate true functional improvement.    Plan     Continue with PT POC and progress as tolerated.     Arnoldo Goodman, PT

## 2023-03-31 ENCOUNTER — CLINICAL SUPPORT (OUTPATIENT)
Dept: REHABILITATION | Facility: HOSPITAL | Age: 63
End: 2023-03-31
Payer: MEDICAID

## 2023-03-31 DIAGNOSIS — R29.898 WEAKNESS OF SHOULDER: ICD-10-CM

## 2023-03-31 DIAGNOSIS — M25.612 DECREASED RANGE OF MOTION OF LEFT SHOULDER: ICD-10-CM

## 2023-03-31 DIAGNOSIS — R29.3 POSTURE IMBALANCE: Primary | ICD-10-CM

## 2023-03-31 PROCEDURE — 97110 THERAPEUTIC EXERCISES: CPT | Mod: PO,CQ

## 2023-03-31 NOTE — PROGRESS NOTES
"  Physical Therapy Treatment Note     Name: Ashli Kumar  Clinic Number: [id number]    Therapy Diagnosis:   Encounter Diagnoses   Name Primary?    Posture imbalance Yes    Decreased range of motion of left shoulder     Weakness of shoulder      Physician: Anurag Lopez MD    Visit Date: 3/31/2023    Physician Orders: PT Eval and Treat   Medical Diagnosis from Referral: M19.012 (ICD-10-CM) - Osteoarthritis of left shoulder, unspecified osteoarthritis type   Evaluation Date: 3/14/2023  Authorization Period Expiration: 02/15/2024  Plan of Care Expiration: 12/31/2023  Visit #/Visits authorized: 3/ 20 (+eval)     Time In: 9 00 am  Time Out: 9:53  am  Total Billable Time: 53 minutes    Precautions: Standard,  pt reported being dx with Bi-polar dis order and depression, no longer seeing a therapist"    Subjective     Pt reports: she was sore after her last visit.    She was compliant with home exercise program.  Response to previous treatment: Eval  Functional change: Ongoing    Pain: /10  Location: L shoulder    Objective     Ashli received therapeutic exercises to develop strength, endurance, and posture for 55 minutes including:    - UBE - 4' fwd/4' bwd  - Wall slides flex L - 15x w 5" holds  - Wall slides abd L - 15x w 5" holds  - Serratus slides - 3 x 10   - Wand flex - 10x w 2# dowel; 3 x 10 w 3#  - Serratus punches - 1' w 1#; 1' w 0#  - SL shoulder ER - 2 x 10 B w 1#  - Prone I's, Y's, T's  - TB rows RTB   - TB lat pull downs RTB   - TB ER YTB  - TB IR YTB  - Pulleys  - IR str      Home Exercises Provided and Patient Education Provided     Education provided:   - importance of consistent performance of HEP    Written Home Exercises Provided: yes.  Exercises were reviewed and Ashli was able to demonstrate them prior to the end of the session.  Ashli demonstrated good  understanding of the education provided.       Assessment     Pt with fair exercise tolerance this morning throughout treatment " session.  Pt required verbal as well as tactile cueing on proper exercise form with theraband ER to prevent rounded shoulder posture to facilitate appropriate RTC activation.  Will continue to monitor and progress pt within her tolerance.     Ashli Is progressing well towards her goals.   Pt prognosis is Fair.     Pt will continue to benefit from skilled outpatient physical therapy to address the deficits listed in the problem list box on initial evaluation, provide pt/family education and to maximize pt's level of independence in the home and community environment.     Pt's spiritual, cultural and educational needs considered and pt agreeable to plan of care and goals.     Anticipated barriers to physical therapy:  attendance to therapy, sedentary lifestyle, HEP compliance     Goals:   Short Term Goals:  5 weeks  1.Report decreased  pain < / =  2/10  to increase tolerance for therapeutic interventions   2. Increase AROM by 20 degrees for flexion and abduction    3. Increased strength by 1/3 MMT grade  to increase tolerance for ADL and work activities.  4. Pt to tolerate HEP to improve ROM and independence with ADL's     Long Term Goals: 10 weeks  2.Increase AROM to be symmetrical of non invovled side in a pain free manner   3.Increase strength to >/= 4/5  to increase tolerance for ADL and work activities.  4. Pt goal: be able to lift and carry pots and pans with no pain   5. Pt will have improved gcode of CJ (20-40% limited) on FOTO shoulder in order to demonstrate true functional improvement.    Plan     Continue with PT POC and progress as tolerated.     Dominick Cage, PTA

## 2023-04-05 ENCOUNTER — CLINICAL SUPPORT (OUTPATIENT)
Dept: REHABILITATION | Facility: HOSPITAL | Age: 63
End: 2023-04-05
Payer: MEDICAID

## 2023-04-05 DIAGNOSIS — R29.898 WEAKNESS OF SHOULDER: ICD-10-CM

## 2023-04-05 DIAGNOSIS — R29.3 POSTURE IMBALANCE: Primary | ICD-10-CM

## 2023-04-05 DIAGNOSIS — M25.612 DECREASED RANGE OF MOTION OF LEFT SHOULDER: ICD-10-CM

## 2023-04-05 PROCEDURE — 97110 THERAPEUTIC EXERCISES: CPT | Mod: PO

## 2023-04-05 NOTE — PROGRESS NOTES
"  Physical Therapy Treatment Note     Name: Ashli Kumar  Clinic Number: [id number]    Therapy Diagnosis:   Encounter Diagnoses   Name Primary?    Posture imbalance Yes    Decreased range of motion of left shoulder     Weakness of shoulder      Physician: Anurag Lopez MD    Visit Date: 4/5/2023    Physician Orders: PT Eval and Treat   Medical Diagnosis from Referral: M19.012 (ICD-10-CM) - Osteoarthritis of left shoulder, unspecified osteoarthritis type   Evaluation Date: 3/14/2023  Authorization Period Expiration: 02/15/2024  Plan of Care Expiration: 12/31/2023  Visit #/Visits authorized: 3/ 20 (+eval)     Time In: 10 00 am  Time Out: 11 00  am  Total Billable Time: 55 minutes    Precautions: Standard,  pt reported being dx with Bi-polar dis order and depression, no longer seeing a therapist"    Subjective     Pt reports: slowly starting to feel better   She was compliant with home exercise program.  Response to previous treatment: Eval  Functional change: Ongoing    Pain: /10  Location: L shoulder    Objective     Ashli received therapeutic exercises to develop strength, endurance, and posture for 55 minutes including:    - UBE - 4' fwd/4' bwd  - Wall slides flex L - 15x w 5" holds  - Wall slides abd L - 15x w 5" holds  - Serratus slides - 3 x 10   - Wand flex - 10x w 2# dowel; 3 x 10 w 3#  - Serratus punches - 1' w 1#; 1' w 0#  - SL shoulder ER - 2 x 10 B w 1#  - Prone I's, Y's, T's  - TB rows RTB   - TB lat pull downs RTB   - TB ER YTB  - TB IR YTB  - Pulleys  - IR str      Home Exercises Provided and Patient Education Provided     Education provided:   - importance of consistent performance of HEP    Written Home Exercises Provided: yes.  Exercises were reviewed and Ashli was able to demonstrate them prior to the end of the session.  Ashli demonstrated good  understanding of the education provided.       Assessment   Pt tolerated treatment well, continue per POC.       Ashli Is progressing " well towards her goals.   Pt prognosis is Fair.     Pt will continue to benefit from skilled outpatient physical therapy to address the deficits listed in the problem list box on initial evaluation, provide pt/family education and to maximize pt's level of independence in the home and community environment.     Pt's spiritual, cultural and educational needs considered and pt agreeable to plan of care and goals.     Anticipated barriers to physical therapy:  attendance to therapy, sedentary lifestyle, HEP compliance     Goals:   Short Term Goals:  5 weeks  1.Report decreased  pain < / =  2/10  to increase tolerance for therapeutic interventions   2. Increase AROM by 20 degrees for flexion and abduction    3. Increased strength by 1/3 MMT grade  to increase tolerance for ADL and work activities.  4. Pt to tolerate HEP to improve ROM and independence with ADL's     Long Term Goals: 10 weeks  2.Increase AROM to be symmetrical of non invovled side in a pain free manner   3.Increase strength to >/= 4/5  to increase tolerance for ADL and work activities.  4. Pt goal: be able to lift and carry pots and pans with no pain   5. Pt will have improved gcode of CJ (20-40% limited) on FOTO shoulder in order to demonstrate true functional improvement.    Plan     Continue with PT POC and progress as tolerated.     Arnoldo Goodman, PT

## 2023-04-11 ENCOUNTER — CLINICAL SUPPORT (OUTPATIENT)
Dept: REHABILITATION | Facility: HOSPITAL | Age: 63
End: 2023-04-11
Payer: MEDICAID

## 2023-04-11 DIAGNOSIS — R29.3 POSTURE IMBALANCE: Primary | ICD-10-CM

## 2023-04-11 DIAGNOSIS — M25.612 DECREASED RANGE OF MOTION OF LEFT SHOULDER: ICD-10-CM

## 2023-04-11 DIAGNOSIS — R29.898 WEAKNESS OF SHOULDER: ICD-10-CM

## 2023-04-11 PROCEDURE — 97110 THERAPEUTIC EXERCISES: CPT | Mod: PO,CQ

## 2023-04-11 NOTE — PROGRESS NOTES
"  Physical Therapy Treatment Note     Name: Ashli Kumar  Clinic Number: [id number]    Therapy Diagnosis:   Encounter Diagnoses   Name Primary?    Posture imbalance Yes    Decreased range of motion of left shoulder     Weakness of shoulder        Physician: Anurag Lopez MD    Visit Date: 4/11/2023    Physician Orders: PT Eval and Treat   Medical Diagnosis from Referral: M19.012 (ICD-10-CM) - Osteoarthritis of left shoulder, unspecified osteoarthritis type   Evaluation Date: 3/14/2023  Authorization Period Expiration: 02/15/2024  Plan of Care Expiration: 12/31/2023  Visit #/Visits authorized: 5/ 20 (+eval)     Time In: 8 00 am  Time Out: 8:53 am  Total Billable Time: 53 minutes    Precautions: Standard,  pt reported being dx with Bi-polar dis order and depression, no longer seeing a therapist"    Subjective     Pt reports: no new issues or concerns at this time.   She was compliant with home exercise program.  Response to previous treatment: Eval  Functional change: Ongoing    Pain: /10  Location: L shoulder    Objective     Ashli received therapeutic exercises to develop strength, endurance, and posture for 53 minutes including:    - UBE - 4' fwd/4' bwd  - Wall slides flex L - 20x w 5" holds  - Wall slides abd L - 20x w 5" holds  - Serratus slides - 3 x 10   - Wand flex - 10x w 2# dowel; 3 x 10 w 3#  - Serratus punches - 1' w 1#; 1' w 0#  - SL shoulder ER - 3 x 10 B w 1#  - Prone I's, Y's, T's  - TB rows RTB 3 x 10   - TB lat pull downs RTB 3 x 10   - TB ER YTB  3 x 10   - TB IR YTB 3 x 10   - Pulleys 3'   - IR str      Home Exercises Provided and Patient Education Provided     Education provided:   - importance of consistent performance of HEP    Written Home Exercises Provided: yes.  Exercises were reviewed and Ashli was able to demonstrate them prior to the end of the session.  Ashli demonstrated good  understanding of the education provided.       Assessment     Pt with no pain pre or post " treatment session.  Pt required minor tactile cueing on proper scap retraction during sidelying external rotation to facilitate appropriate RTC activation.  Will continue to monitor and progress pt within her tolerance.      Ashli Is progressing well towards her goals.   Pt prognosis is Fair.     Pt will continue to benefit from skilled outpatient physical therapy to address the deficits listed in the problem list box on initial evaluation, provide pt/family education and to maximize pt's level of independence in the home and community environment.     Pt's spiritual, cultural and educational needs considered and pt agreeable to plan of care and goals.     Anticipated barriers to physical therapy:  attendance to therapy, sedentary lifestyle, HEP compliance     Goals:   Short Term Goals:  5 weeks  1.Report decreased  pain < / =  2/10  to increase tolerance for therapeutic interventions   2. Increase AROM by 20 degrees for flexion and abduction    3. Increased strength by 1/3 MMT grade  to increase tolerance for ADL and work activities.  4. Pt to tolerate HEP to improve ROM and independence with ADL's     Long Term Goals: 10 weeks  2.Increase AROM to be symmetrical of non invovled side in a pain free manner   3.Increase strength to >/= 4/5  to increase tolerance for ADL and work activities.  4. Pt goal: be able to lift and carry pots and pans with no pain   5. Pt will have improved gcode of CJ (20-40% limited) on FOTO shoulder in order to demonstrate true functional improvement.    Plan     Continue with PT POC and progress as tolerated.     Dominick Cage, PTA

## 2023-04-14 ENCOUNTER — CLINICAL SUPPORT (OUTPATIENT)
Dept: REHABILITATION | Facility: HOSPITAL | Age: 63
End: 2023-04-14
Payer: MEDICAID

## 2023-04-14 DIAGNOSIS — R29.3 POSTURE IMBALANCE: Primary | ICD-10-CM

## 2023-04-14 DIAGNOSIS — M25.612 DECREASED RANGE OF MOTION OF LEFT SHOULDER: ICD-10-CM

## 2023-04-14 DIAGNOSIS — R29.898 WEAKNESS OF SHOULDER: ICD-10-CM

## 2023-04-14 PROCEDURE — 97110 THERAPEUTIC EXERCISES: CPT | Mod: PO,CQ

## 2023-04-14 NOTE — PROGRESS NOTES
"  Physical Therapy Treatment Note     Name: Ashli Kumar  Clinic Number: [id number]    Therapy Diagnosis:   Encounter Diagnoses   Name Primary?    Posture imbalance Yes    Decreased range of motion of left shoulder     Weakness of shoulder          Physician: Anurag Lopez MD    Visit Date: 4/14/2023    Physician Orders: PT Eval and Treat   Medical Diagnosis from Referral: M19.012 (ICD-10-CM) - Osteoarthritis of left shoulder, unspecified osteoarthritis type   Evaluation Date: 3/14/2023  Authorization Period Expiration: 02/15/2024  Plan of Care Expiration: 12/31/2023  Visit #/Visits authorized: 6/20 (+eval)     Time In: 8 00 am  Time Out: 8:53 am  Total Billable Time: 53 minutes    Precautions: Standard,  pt reported being dx with Bi-polar dis order and depression, no longer seeing a therapist"    Subjective     Pt reports: her L shoulder is doing pretty good.    She was compliant with home exercise program.  Response to previous treatment: Eval  Functional change: Ongoing    Pain: /10  Location: L shoulder    Objective     Ashli received therapeutic exercises to develop strength, endurance, and posture for 53 minutes including:    - UBE - 4' fwd/4' bwd  - Wall slides flex L - 20x w 5" holds  - Wall slides abd L - 20x w 5" holds  - Serratus slides - 3 x 15  - Wand flex - 10x w 2# dowel; 3 x 10 w 3#  - Serratus punches - 1' w 1#; 1' w 0#  - SL shoulder ER - 3 x 10 B w 1#  - Prone I's, Y's, T's  - TB rows RTB 3 x 15   - TB lat pull downs RTB 3 x 15   - TB ER YTB  3 x 15   - TB IR YTB 3 x 15  - Pulleys 3'   - IR str      Home Exercises Provided and Patient Education Provided     Education provided:   - importance of consistent performance of HEP    Written Home Exercises Provided: yes.  Exercises were reviewed and Ashli was able to demonstrate them prior to the end of the session.  Ashli demonstrated good  understanding of the education provided.       Assessment     Pt tolerated increased repetitions " on majority of her exercises this morning without reports of pain.  Pt continues to do well with her outpatient PT at this time.  Will continue to monitor and progress pt within her tolerance.      Ashli Is progressing well towards her goals.   Pt prognosis is Fair.     Pt will continue to benefit from skilled outpatient physical therapy to address the deficits listed in the problem list box on initial evaluation, provide pt/family education and to maximize pt's level of independence in the home and community environment.     Pt's spiritual, cultural and educational needs considered and pt agreeable to plan of care and goals.     Anticipated barriers to physical therapy:  attendance to therapy, sedentary lifestyle, HEP compliance     Goals:   Short Term Goals:  5 weeks  1.Report decreased  pain < / =  2/10  to increase tolerance for therapeutic interventions   2. Increase AROM by 20 degrees for flexion and abduction    3. Increased strength by 1/3 MMT grade  to increase tolerance for ADL and work activities.  4. Pt to tolerate HEP to improve ROM and independence with ADL's     Long Term Goals: 10 weeks  2.Increase AROM to be symmetrical of non invovled side in a pain free manner   3.Increase strength to >/= 4/5  to increase tolerance for ADL and work activities.  4. Pt goal: be able to lift and carry pots and pans with no pain   5. Pt will have improved gcode of CJ (20-40% limited) on FOTO shoulder in order to demonstrate true functional improvement.    Plan     Continue with PT POC and progress as tolerated.     Dominick Cage, PTA                 Previously Negative (within the last year)

## 2023-04-17 ENCOUNTER — CLINICAL SUPPORT (OUTPATIENT)
Dept: REHABILITATION | Facility: HOSPITAL | Age: 63
End: 2023-04-17
Payer: MEDICAID

## 2023-04-17 DIAGNOSIS — R29.898 WEAKNESS OF SHOULDER: ICD-10-CM

## 2023-04-17 DIAGNOSIS — M25.612 DECREASED RANGE OF MOTION OF LEFT SHOULDER: ICD-10-CM

## 2023-04-17 DIAGNOSIS — R29.3 POSTURE IMBALANCE: Primary | ICD-10-CM

## 2023-04-17 PROCEDURE — 97110 THERAPEUTIC EXERCISES: CPT | Mod: PO,CQ

## 2023-04-17 NOTE — PROGRESS NOTES
"  Physical Therapy Treatment Note     Name: Ashli Kumar  Clinic Number: [id number]    Therapy Diagnosis:   Encounter Diagnoses   Name Primary?    Posture imbalance Yes    Decreased range of motion of left shoulder     Weakness of shoulder          Physician: Anurag Lopez MD    Visit Date: 4/17/2023    Physician Orders: PT Eval and Treat   Medical Diagnosis from Referral: M19.012 (ICD-10-CM) - Osteoarthritis of left shoulder, unspecified osteoarthritis type   Evaluation Date: 3/14/2023  Authorization Period Expiration: 02/15/2024  Plan of Care Expiration: 12/31/2023  Visit #/Visits authorized: 7/20 (+eval)     Time In: 1:00 pm  Time Out: 1:53 pm  Total Billable Time: 53 minutes    Precautions: Standard,  pt reported being dx with Bi-polar dis order and depression, no longer seeing a therapist"    Subjective     Pt reports:  her L shoulder continues to feel good.    She was compliant with home exercise program.  Response to previous treatment: Eval  Functional change: Ongoing    Pain: /10  Location: L shoulder    Objective     Ashli received therapeutic exercises to develop strength, endurance, and posture for 53 minutes including:    - UBE - 4' fwd/4' bwd  - Wall slides flex L - 20x w 5" holds  - Wall slides abd L - 20x w 5" holds  - Serratus slides - 3 x 15  - Wand flex - 10x w 2# dowel; 3 x 10 w 3#  - Serratus punches - 1' w 1#; 1' w 0#  - SL shoulder ER - 3 x 10 B w 1#  - Prone I's, Y's, T's  - TB rows RTB 3 x 15   - TB lat pull downs RTB 3 x 15     - TB ER YTB  3 x 15   - TB IR YTB 3 x 15  - Pulleys 3'   - IR str      Home Exercises Provided and Patient Education Provided     Education provided:   - importance of consistent performance of HEP    Written Home Exercises Provided: yes.  Exercises were reviewed and Ashli was able to demonstrate them prior to the end of the session.  Ashli demonstrated good  understanding of the education provided.       Assessment     Pt with good exercise tolerance " throughout treatment session and reported no pain post session.  Will continue to monitor and progress pt within her tolerance.      Ashli Is progressing well towards her goals.   Pt prognosis is Fair.     Pt will continue to benefit from skilled outpatient physical therapy to address the deficits listed in the problem list box on initial evaluation, provide pt/family education and to maximize pt's level of independence in the home and community environment.     Pt's spiritual, cultural and educational needs considered and pt agreeable to plan of care and goals.     Anticipated barriers to physical therapy:  attendance to therapy, sedentary lifestyle, HEP compliance     Goals:   Short Term Goals:  5 weeks  1.Report decreased  pain < / =  2/10  to increase tolerance for therapeutic interventions   2. Increase AROM by 20 degrees for flexion and abduction    3. Increased strength by 1/3 MMT grade  to increase tolerance for ADL and work activities.  4. Pt to tolerate HEP to improve ROM and independence with ADL's     Long Term Goals: 10 weeks  2.Increase AROM to be symmetrical of non invovled side in a pain free manner   3.Increase strength to >/= 4/5  to increase tolerance for ADL and work activities.  4. Pt goal: be able to lift and carry pots and pans with no pain   5. Pt will have improved gcode of CJ (20-40% limited) on FOTO shoulder in order to demonstrate true functional improvement.    Plan     Continue with PT POC and progress as tolerated.     Dominick Cage, PTA

## 2023-04-20 ENCOUNTER — CLINICAL SUPPORT (OUTPATIENT)
Dept: REHABILITATION | Facility: HOSPITAL | Age: 63
End: 2023-04-20
Payer: MEDICAID

## 2023-04-20 DIAGNOSIS — M25.612 DECREASED RANGE OF MOTION OF LEFT SHOULDER: ICD-10-CM

## 2023-04-20 DIAGNOSIS — R29.898 WEAKNESS OF SHOULDER: ICD-10-CM

## 2023-04-20 DIAGNOSIS — R29.3 POSTURE IMBALANCE: Primary | ICD-10-CM

## 2023-04-20 PROCEDURE — 97110 THERAPEUTIC EXERCISES: CPT | Mod: PO | Performed by: PHYSICAL THERAPIST

## 2023-04-20 NOTE — PROGRESS NOTES
"  Physical Therapy Treatment Note     Name: Ashli Kumar  Clinic Number: [id number]    Therapy Diagnosis:   Encounter Diagnoses   Name Primary?    Posture imbalance Yes    Decreased range of motion of left shoulder     Weakness of shoulder          Physician: Anurag Lopez MD    Visit Date: 4/20/2023    Physician Orders: PT Eval and Treat   Medical Diagnosis from Referral: M19.012 (ICD-10-CM) - Osteoarthritis of left shoulder, unspecified osteoarthritis type   Evaluation Date: 3/14/2023  Authorization Period Expiration: 02/15/2024  Plan of Care Expiration: 12/31/2023  Visit #/Visits authorized: 8/20 (+eval)     Time In: 2:00 pm  Time Out: 2:55 pm  Total Billable Time: 55 minutes    Precautions: Standard,  pt reported being dx with Bi-polar dis order and depression, no longer seeing a therapist"    Subjective     Pt reports: has left upper trap tightness that sometimes causes headaches.   She was compliant with home exercise program.  Response to previous treatment: Eval  Functional change: Ongoing    Pain: /10  Location: L shoulder    Objective     Ashli received therapeutic exercises to develop strength, endurance, and posture for 55 minutes including:    - UBE - 4' fwd/4' bwd  - Wall slides flex L - 20x w 5" holds  - Serratus slides - 3 x 15  - Wand flex - 10x w 2# dowel; 3 x 10 w 3#  - Serratus punches - 2#, 3x12  - SL shoulder ER - 3 x 10 B w 1#  - Prone I's, Y's, T's  - TB rows GTB 3 x 15   - TB lat pull downs GTB 3 x 15     - TB ER RTB  3 x 15   - TB IR GTB 3 x 15  - Prone rows 10# 3x8  - wall clocks YTB 15x each arm  - standing scaption YTB 3x8  - bicep Curls 2# 3x8      Home Exercises Provided and Patient Education Provided     Education provided:   - importance of consistent performance of HEP    Written Home Exercises Provided: yes.  Exercises were reviewed and Ashli was able to demonstrate them prior to the end of the session.  Ashli demonstrated good  understanding of the education " provided.       Assessment     Mrs Cornelius performed all therex well with no increase in pain. Pt reports upper trapezius tightness overall on left side mainly. Pt will benefit from further flexibility along cervical spine and increase in RTC strengthening to stabilize shoulder during functional movements.     Ashli Is progressing well towards her goals.   Pt prognosis is Fair.     Pt will continue to benefit from skilled outpatient physical therapy to address the deficits listed in the problem list box on initial evaluation, provide pt/family education and to maximize pt's level of independence in the home and community environment.     Pt's spiritual, cultural and educational needs considered and pt agreeable to plan of care and goals.     Anticipated barriers to physical therapy:  attendance to therapy, sedentary lifestyle, HEP compliance     Goals:   Short Term Goals:  5 weeks  1.Report decreased  pain < / =  2/10  to increase tolerance for therapeutic interventions   2. Increase AROM by 20 degrees for flexion and abduction    3. Increased strength by 1/3 MMT grade  to increase tolerance for ADL and work activities.  4. Pt to tolerate HEP to improve ROM and independence with ADL's     Long Term Goals: 10 weeks  2.Increase AROM to be symmetrical of non invovled side in a pain free manner   3.Increase strength to >/= 4/5  to increase tolerance for ADL and work activities.  4. Pt goal: be able to lift and carry pots and pans with no pain   5. Pt will have improved gcode of CJ (20-40% limited) on FOTO shoulder in order to demonstrate true functional improvement.    Plan     Continue with PT POC and progress as tolerated.     Remi Carlin, PT

## 2023-04-21 ENCOUNTER — HOSPITAL ENCOUNTER (EMERGENCY)
Facility: HOSPITAL | Age: 63
Discharge: HOME | End: 2023-04-21
Attending: EMERGENCY MEDICINE
Payer: MEDICARE

## 2023-04-21 VITALS
OXYGEN SATURATION: 99 % | SYSTOLIC BLOOD PRESSURE: 142 MMHG | HEART RATE: 78 BPM | TEMPERATURE: 99 F | WEIGHT: 179 LBS | BODY MASS INDEX: 29.82 KG/M2 | HEIGHT: 65 IN | RESPIRATION RATE: 20 BRPM | DIASTOLIC BLOOD PRESSURE: 66 MMHG

## 2023-04-21 DIAGNOSIS — N39.0 URINARY TRACT INFECTION WITHOUT HEMATURIA, SITE UNSPECIFIED: Primary | ICD-10-CM

## 2023-04-21 LAB
ALBUMIN SERPL BCP-MCNC: 4.2 G/DL (ref 3.5–5.2)
ALP SERPL-CCNC: 44 U/L (ref 38–126)
ALT SERPL W/O P-5'-P-CCNC: 12 U/L (ref 10–44)
ANION GAP SERPL CALC-SCNC: 3 MMOL/L (ref 8–16)
AST SERPL-CCNC: 19 U/L (ref 15–46)
BASOPHILS # BLD AUTO: 0.03 K/UL (ref 0–0.2)
BASOPHILS NFR BLD: 0.4 % (ref 0–1.9)
BILIRUB SERPL-MCNC: 0.4 MG/DL (ref 0.1–1)
BILIRUB UR QL STRIP: NEGATIVE
CALCIUM SERPL-MCNC: 8.9 MG/DL (ref 8.7–10.5)
CHLORIDE SERPL-SCNC: 100 MMOL/L (ref 95–110)
CLARITY UR REFRACT.AUTO: CLEAR
CO2 SERPL-SCNC: 33 MMOL/L (ref 23–29)
COLOR UR AUTO: YELLOW
CREAT SERPL-MCNC: 0.91 MG/DL (ref 0.5–1.4)
DIFFERENTIAL METHOD: ABNORMAL
EOSINOPHIL # BLD AUTO: 0.1 K/UL (ref 0–0.5)
EOSINOPHIL NFR BLD: 0.8 % (ref 0–8)
ERYTHROCYTE [DISTWIDTH] IN BLOOD BY AUTOMATED COUNT: 13.1 % (ref 11.5–14.5)
EST. GFR  (NO RACE VARIABLE): >60 ML/MIN/1.73 M^2
GLUCOSE SERPL-MCNC: 90 MG/DL (ref 70–110)
GLUCOSE UR QL STRIP: NEGATIVE
HCT VFR BLD AUTO: 34.2 % (ref 37–48.5)
HGB BLD-MCNC: 11.1 G/DL (ref 12–16)
HGB UR QL STRIP: ABNORMAL
IMM GRANULOCYTES # BLD AUTO: 0.02 K/UL (ref 0–0.04)
IMM GRANULOCYTES NFR BLD AUTO: 0.3 % (ref 0–0.5)
KETONES UR QL STRIP: NEGATIVE
LEUKOCYTE ESTERASE UR QL STRIP: ABNORMAL
LIPASE SERPL-CCNC: 108 U/L (ref 23–300)
LYMPHOCYTES # BLD AUTO: 2.7 K/UL (ref 1–4.8)
LYMPHOCYTES NFR BLD: 37.5 % (ref 18–48)
MCH RBC QN AUTO: 29.4 PG (ref 27–31)
MCHC RBC AUTO-ENTMCNC: 32.5 G/DL (ref 32–36)
MCV RBC AUTO: 91 FL (ref 82–98)
MICROSCOPIC COMMENT: ABNORMAL
MONOCYTES # BLD AUTO: 0.7 K/UL (ref 0.3–1)
MONOCYTES NFR BLD: 9.2 % (ref 4–15)
NEUTROPHILS # BLD AUTO: 3.7 K/UL (ref 1.8–7.7)
NEUTROPHILS NFR BLD: 51.8 % (ref 38–73)
NITRITE UR QL STRIP: NEGATIVE
NRBC BLD-RTO: 0 /100 WBC
PH UR STRIP: 8.5 [PH] (ref 5–8)
PLATELET # BLD AUTO: 183 K/UL (ref 150–450)
PMV BLD AUTO: 12.1 FL (ref 9.2–12.9)
POTASSIUM SERPL-SCNC: 4.6 MMOL/L (ref 3.5–5.1)
PROT SERPL-MCNC: 7.6 G/DL (ref 6–8.4)
PROT UR QL STRIP: NEGATIVE
RBC # BLD AUTO: 3.77 M/UL (ref 4–5.4)
RBC #/AREA URNS AUTO: 1 /HPF (ref 0–4)
SODIUM SERPL-SCNC: 136 MMOL/L (ref 136–145)
SP GR UR STRIP: 1.01 (ref 1–1.03)
URN SPEC COLLECT METH UR: ABNORMAL
UROBILINOGEN UR STRIP-ACNC: NEGATIVE EU/DL
UUN UR-MCNC: 16 MG/DL (ref 7–17)
WBC # BLD AUTO: 7.1 K/UL (ref 3.9–12.7)
WBC #/AREA URNS AUTO: 12 /HPF (ref 0–5)

## 2023-04-21 PROCEDURE — 87086 URINE CULTURE/COLONY COUNT: CPT | Mod: ER | Performed by: NURSE PRACTITIONER

## 2023-04-21 PROCEDURE — 25000003 PHARM REV CODE 250: Mod: ER | Performed by: EMERGENCY MEDICINE

## 2023-04-21 PROCEDURE — 63600175 PHARM REV CODE 636 W HCPCS: Mod: ER | Performed by: EMERGENCY MEDICINE

## 2023-04-21 PROCEDURE — 96365 THER/PROPH/DIAG IV INF INIT: CPT | Mod: ER

## 2023-04-21 PROCEDURE — 81000 URINALYSIS NONAUTO W/SCOPE: CPT | Mod: ER | Performed by: NURSE PRACTITIONER

## 2023-04-21 PROCEDURE — 85025 COMPLETE CBC W/AUTO DIFF WBC: CPT | Mod: ER | Performed by: EMERGENCY MEDICINE

## 2023-04-21 PROCEDURE — 80053 COMPREHEN METABOLIC PANEL: CPT | Mod: ER | Performed by: EMERGENCY MEDICINE

## 2023-04-21 PROCEDURE — 83690 ASSAY OF LIPASE: CPT | Mod: ER | Performed by: EMERGENCY MEDICINE

## 2023-04-21 PROCEDURE — 99285 EMERGENCY DEPT VISIT HI MDM: CPT | Mod: 25,ER

## 2023-04-21 RX ORDER — AMOXICILLIN AND CLAVULANATE POTASSIUM 875; 125 MG/1; MG/1
1 TABLET, FILM COATED ORAL 2 TIMES DAILY
Qty: 14 TABLET | Refills: 0 | Status: SHIPPED | OUTPATIENT
Start: 2023-04-21 | End: 2023-05-01

## 2023-04-21 RX ORDER — DICYCLOMINE HYDROCHLORIDE 20 MG/1
20 TABLET ORAL 2 TIMES DAILY
Qty: 20 TABLET | Refills: 0 | Status: SHIPPED | OUTPATIENT
Start: 2023-04-21 | End: 2023-05-21

## 2023-04-21 RX ADMIN — CEFTRIAXONE SODIUM 1 G: 1 INJECTION, POWDER, FOR SOLUTION INTRAMUSCULAR; INTRAVENOUS at 03:04

## 2023-04-21 NOTE — FIRST PROVIDER EVALUATION
" Emergency Department TeleTriage Encounter Note      CHIEF COMPLAINT    Chief Complaint   Patient presents with    Back Pain     Left mid to lower back pain that started this morning. Pt also reports after eating spaghetti today, she has had a few bowel movements       VITAL SIGNS   Initial Vitals [04/21/23 1318]   BP Pulse Resp Temp SpO2   (!) 156/73 83 18 98.5 °F (36.9 °C) 98 %      MAP       --            ALLERGIES    Review of patient's allergies indicates:   Allergen Reactions    Codeine Hives    Sulfa (sulfonamide antibiotics) Hives    Benzoate analogues Itching       PROVIDER TRIAGE NOTE  This is a teletriage evaluation of a 62 y.o. female presenting to the ED complaining of low back pain starting today. States that she ate spaghetti and is now having multiple episodes of nonbloody diarrhea.  Denies abd pain and fever. No vomiting. States that she is also having "bladder problems" "like pressure" for the past couple of days.     Pt is well-appearing, no distress.  Will start with UA.     Initial orders will be placed and care will be transferred to an alternate provider when patient is roomed for a full evaluation. Any additional orders and the final disposition will be determined by that provider.         ORDERS  Labs Reviewed - No data to display    ED Orders (720h ago, onward)      None              Virtual Visit Note: The provider triage portion of this emergency department evaluation and documentation was performed via [employer], a HIPAA-compliant telemedicine application, in concert with a tele-presenter in the room. A face to face patient evaluation with one of my colleagues will occur once the patient is placed in an emergency department room.      DISCLAIMER: This note was prepared with [employer] voice recognition transcription software. Garbled syntax, mangled pronouns, and other bizarre constructions may be attributed to that software system.    "

## 2023-04-21 NOTE — ED PROVIDER NOTES
Encounter Date: 2023       History     Chief Complaint   Patient presents with    Back Pain     Left mid to lower back pain that started this morning. Pt also reports after eating spaghetti today, she has had a few bowel movements     62-year-old female presenting with abdominal pain that started after eating spaghetti earlier today.  Patient says she had increased frequency of bowel movements but was not diarrhea.  No vomiting.  Patient also complains of left mid/lower back pain that started yesterday.  No history of trauma.  No fever.  Patient denies pain with urination but there is some increased urinary frequency.    Review of patient's allergies indicates:   Allergen Reactions    Codeine Hives    Sulfa (sulfonamide antibiotics) Hives    Benzoate analogues Itching     Past Medical History:   Diagnosis Date    Anxiety     Arthritis     CHF (congestive heart failure)     Depression     GERD (gastroesophageal reflux disease)     High cholesterol     Hypertension      Past Surgical History:   Procedure Laterality Date    BREAST BIOPSY Left     h    BREAST SURGERY       SECTION      COLONOSCOPY N/A 2017    Procedure: COLONOSCOPY Miralax;  Surgeon: Buddy Butcher Jr., MD;  Location: [facility];  Service: Endoscopy;  Laterality: N/A;    COLONOSCOPY N/A 2022    Procedure: COLONOSCOPY;  Surgeon: TREV Kay MD;  Location: [facility];  Service: Endoscopy;  Laterality: N/A;    COLONOSCOPY N/A 2022    Procedure: COLONOSCOPY;  Surgeon: TREV Kay MD;  Location: [facility];  Service: Endoscopy;  Laterality: N/A;    FLEXIBLE SIGMOIDOSCOPY N/A 2022    Procedure: SIGMOIDOSCOPY, FLEXIBLE;  Surgeon: TREV Kay MD;  Location: [address];  Service: Colon and Rectal;  Laterality: N/A;    HYSTERECTOMY      KIDNEY SURGERY Right     done at [facility], reported as mass by patient    LAPAROSCOPIC SIGMOIDECTOMY Left 2022    Procedure: COLECTOMY, SIGMOID, LAPAROSCOPIC,  HAZEL story;  Surgeon: TREV Kay MD;  [facility];  Service: Colon and Rectal;  Laterality: Left;    LEFT HEART CATHETERIZATION Left 5/13/2019    Procedure: Left heart cath;  Surgeon: Gerhard Krishnan MD;  Location: [facility] CATH LAB;  Service: Cardiology;  Laterality: Left;    MOBILIZATION OF SPLENIC FLEXURE N/A 7/20/2022    Procedure: MOBILIZATION, SPLENIC FLEXURE;  Surgeon: TREV Kay MD;  [facility];  Service: Colon and Rectal;  Laterality: N/A;    OOPHORECTOMY      s-section       History reviewed. No pertinent family history.  Social History     Tobacco Use    Smoking status: Never    Smokeless tobacco: Never   Substance Use Topics    Alcohol use: Not Currently     Comment: 12-31-22    Drug use: No     Review of Systems   Constitutional:  Negative for fever.   Eyes:  Negative for pain.   Respiratory:  Negative for shortness of breath.    Cardiovascular:  Negative for chest pain.   Gastrointestinal:  Positive for abdominal pain. Negative for nausea and vomiting.   Genitourinary:  Positive for flank pain and frequency. Negative for difficulty urinating.   Musculoskeletal:  Negative for back pain and neck pain.   Neurological:  Negative for headaches.   Psychiatric/Behavioral:  Negative for confusion.      Physical Exam     Initial Vitals [04/21/23 1318]   BP Pulse Resp Temp SpO2   (!) 156/73 83 18 98.5 °F (36.9 °C) 98 %      MAP       --         Physical Exam    Nursing note and vitals reviewed.  HENT:   Head: Atraumatic.   Eyes: Conjunctivae and EOM are normal.   Neck:   Normal range of motion.  Cardiovascular:      Exam reveals no gallop and no friction rub.       No murmur heard.  Pulmonary/Chest: Breath sounds normal. No respiratory distress.   Abdominal: Abdomen is soft. She exhibits no distension. There is abdominal tenderness (Diffuse with lower > upper).   Musculoskeletal:      Cervical back: Normal range of motion.      Comments: Mild tenderness to the  left mid back     Neurological: She is alert and oriented to person, place, and time.   Psychiatric: She has a normal mood and affect.       ED Course   Procedures  Labs Reviewed   URINALYSIS, REFLEX TO URINE CULTURE - Abnormal; Notable for the following components:       Result Value    Occult Blood UA Trace (*)     Leukocytes, UA 3+ (*)     All other components within normal limits    Narrative:     Preferred Collection Type->Urine, Clean Catch  Specimen Source->Urine   CBC W/ AUTO DIFFERENTIAL - Abnormal; Notable for the following components:    RBC 3.77 (*)     Hemoglobin 11.1 (*)     Hematocrit 34.2 (*)     All other components within normal limits   COMPREHENSIVE METABOLIC PANEL - Abnormal; Notable for the following components:    CO2 33 (*)     Anion Gap 3 (*)     All other components within normal limits   URINALYSIS MICROSCOPIC - Abnormal; Notable for the following components:    WBC, UA 12 (*)     All other components within normal limits    Narrative:     Preferred Collection Type->Urine, Clean Catch  Specimen Source->Urine   CULTURE, URINE   LIPASE          Imaging Results              CT Abdomen Pelvis  Without Contrast (Final result)  Result time 04/21/23 15:10:43      Final result by Arnoldo Oconnor MD (04/21/23 15:10:43)                   Impression:      Mild mucosal thickening within the descending and proximal sigmoid colon. Mild focal colitis not entirely excluded. No evidence of perforation or abscess on this limited noncontrast study.    All CT scans at this facility use dose modulation, iterative reconstruction, and/or weight based dosing when appropriate to reduce radiation dose to as low as reasonable achievable.      Electronically signed by: Arnoldo Oconnor MD  Date:    04/21/2023  Time:    15:10               Narrative:    EXAMINATION:  CT ABDOMEN PELVIS WITHOUT CONTRAST    CLINICAL HISTORY:  Abdominal pain, acute, nonlocalized;    TECHNIQUE:  Low dose axial images, sagittal and coronal  reformations were obtained from the lung bases to the pubic symphysis.  Oral contrast was not administered.    COMPARISON:  05/29/2022    FINDINGS:  Heart: Normal size. No effusion.    Lung Bases: Clear.    Liver: Normal size and attenuation. No focal lesions.    Gallbladder: No calcified gallstones.    Bile Ducts: No dilatation.    Pancreas: No obvious mass. No peripancreatic fat stranding.    Spleen: Normal.    Adrenals: Normal.    Kidneys/Ureters: Postoperative changes of partial right nephrectomy are present.  The noncontrast appearance of the kidneys and collecting systems is otherwise unremarkable.  Retroaortic left renal vein.    Bladder: No wall thickening.    Reproductive organs: Normal.    GI Tract/Mesentery: Stomach and small bowel are unremarkable without evidence of obstruction.  Mild constipation within the large bowel.  Appendix is not seen.  Postoperative changes are seen within the sigmoid colon.  Mild mucosal thickening within the descending and proximal sigmoid colon.  Mild focal colitis not entirely excluded.  No evidence of perforation or abscess on this limited noncontrast study.  The remainder of the GI tract is unremarkable.  There is a trace amount of pelvic ascites.    Peritoneal Space: No ascites or free air.    Retroperitoneum: No significant adenopathy.    Abdominal wall: Normal.    Vasculature: No aneurysm.  Mild atherosclerotic disease.    Bones: No acute fracture.  Moderate facet arthropathy throughout the lumbar spine.  Mild diffuse osteopenia.  No suspicious lytic or sclerotic lesions.                                       Medications   cefTRIAXone (ROCEPHIN) 1 g in dextrose 5 % in water (D5W) 5 % 50 mL IVPB (MB+) (has no administration in time range)     Medical Decision Making:   Initial Assessment:   62-year-old female presenting with abdominal pain with increased stool today after eating spaghetti.  Also having left sided back pain since last night that is atraumatic.  Has some  urinary symptoms.  Unclear if these are related or independent.  Labs, urine and CT pending.           ED Course as of 04/21/23 1527   Fri Apr 21, 2023   1521 Urinalysis Microscopic(!)  Evidence of UTI [SN]   1521 Comprehensive Metabolic Panel(!) [SN]   1521 CBC Auto Differential(!) [SN]   1521 Lipase [SN]   1521 CT Abdomen Pelvis  Without Contrast  No ureteral stones.  Mild mucosal thickening of the distal colon which corresponds with patient's increased bowel movements.  Will give patient dose of ceftriaxone now and discharged home with oral antibiotics and PCP follow-up. [SN]      ED Course User Index  [SN] Ronnie Cagle MD                 Clinical Impression:   Final diagnoses:  [N39.0] Urinary tract infection without hematuria, site unspecified (Primary)        ED Disposition Condition    Discharge Stable          ED Prescriptions       Medication Sig Dispense Start Date End Date Auth. Provider    amoxicillin-clavulanate 875-125mg (AUGMENTIN) 875-125 mg per tablet Take 1 tablet by mouth 2 (two) times daily. for 10 days 14 tablet 4/21/2023 5/1/2023 Ronnie Cagle MD    dicyclomine (BENTYL) 20 mg tablet Take 1 tablet (20 mg total) by mouth 2 (two) times daily. 20 tablet 4/21/2023 5/21/2023 Ronnie Cagle MD          Follow-up Information       Follow up With Specialties Details Why Contact Info    Anurag Lopez MD Family Medicine Schedule an appointment as soon as possible for a visit in 3 days  [address], [facility] [address]  [facility] [address] [phone]               Ronnie Cagle MD  04/21/23 1527

## 2023-04-23 ENCOUNTER — HOSPITAL ENCOUNTER (EMERGENCY)
Facility: HOSPITAL | Age: 63
Discharge: HOME | End: 2023-04-23
Attending: FAMILY MEDICINE
Payer: MEDICAID

## 2023-04-23 VITALS
HEIGHT: 65 IN | TEMPERATURE: 98 F | RESPIRATION RATE: 16 BRPM | HEART RATE: 57 BPM | WEIGHT: 179 LBS | DIASTOLIC BLOOD PRESSURE: 74 MMHG | BODY MASS INDEX: 29.82 KG/M2 | SYSTOLIC BLOOD PRESSURE: 172 MMHG | OXYGEN SATURATION: 98 %

## 2023-04-23 DIAGNOSIS — R07.9 CHEST PAIN: ICD-10-CM

## 2023-04-23 DIAGNOSIS — R55 VASOVAGAL RESPONSE: Primary | ICD-10-CM

## 2023-04-23 LAB
ALBUMIN SERPL BCP-MCNC: 3.8 G/DL (ref 3.5–5.2)
ALP SERPL-CCNC: 43 U/L (ref 38–126)
ALT SERPL W/O P-5'-P-CCNC: 12 U/L (ref 10–44)
ANION GAP SERPL CALC-SCNC: 4 MMOL/L (ref 8–16)
AST SERPL-CCNC: 20 U/L (ref 15–46)
BACTERIA UR CULT: NORMAL
BACTERIA UR CULT: NORMAL
BASOPHILS # BLD AUTO: 0.03 K/UL (ref 0–0.2)
BASOPHILS NFR BLD: 0.5 % (ref 0–1.9)
BILIRUB SERPL-MCNC: 0.4 MG/DL (ref 0.1–1)
CALCIUM SERPL-MCNC: 8.8 MG/DL (ref 8.7–10.5)
CHLORIDE SERPL-SCNC: 102 MMOL/L (ref 95–110)
CO2 SERPL-SCNC: 31 MMOL/L (ref 23–29)
CREAT SERPL-MCNC: 0.71 MG/DL (ref 0.5–1.4)
DIFFERENTIAL METHOD: ABNORMAL
EOSINOPHIL # BLD AUTO: 0.1 K/UL (ref 0–0.5)
EOSINOPHIL NFR BLD: 1.7 % (ref 0–8)
ERYTHROCYTE [DISTWIDTH] IN BLOOD BY AUTOMATED COUNT: 13 % (ref 11.5–14.5)
EST. GFR  (NO RACE VARIABLE): >60 ML/MIN/1.73 M^2
GLUCOSE SERPL-MCNC: 102 MG/DL (ref 70–110)
HCT VFR BLD AUTO: 34.5 % (ref 37–48.5)
HGB BLD-MCNC: 11.1 G/DL (ref 12–16)
IMM GRANULOCYTES # BLD AUTO: 0.03 K/UL (ref 0–0.04)
IMM GRANULOCYTES NFR BLD AUTO: 0.5 % (ref 0–0.5)
LYMPHOCYTES # BLD AUTO: 2.4 K/UL (ref 1–4.8)
LYMPHOCYTES NFR BLD: 40.7 % (ref 18–48)
MCH RBC QN AUTO: 29.2 PG (ref 27–31)
MCHC RBC AUTO-ENTMCNC: 32.2 G/DL (ref 32–36)
MCV RBC AUTO: 91 FL (ref 82–98)
MONOCYTES # BLD AUTO: 0.5 K/UL (ref 0.3–1)
MONOCYTES NFR BLD: 8.8 % (ref 4–15)
NEUTROPHILS # BLD AUTO: 2.8 K/UL (ref 1.8–7.7)
NEUTROPHILS NFR BLD: 47.8 % (ref 38–73)
NRBC BLD-RTO: 0 /100 WBC
NT-PROBNP SERPL-MCNC: 129 PG/ML (ref 5–900)
PLATELET # BLD AUTO: 182 K/UL (ref 150–450)
PMV BLD AUTO: 11.7 FL (ref 9.2–12.9)
POTASSIUM SERPL-SCNC: 4.4 MMOL/L (ref 3.5–5.1)
PROT SERPL-MCNC: 7 G/DL (ref 6–8.4)
RBC # BLD AUTO: 3.8 M/UL (ref 4–5.4)
SODIUM SERPL-SCNC: 137 MMOL/L (ref 136–145)
TROPONIN I SERPL-MCNC: <0.012 NG/ML (ref 0.01–0.03)
TROPONIN I SERPL-MCNC: <0.012 NG/ML (ref 0.01–0.03)
UUN UR-MCNC: 13 MG/DL (ref 7–17)
WBC # BLD AUTO: 5.82 K/UL (ref 3.9–12.7)

## 2023-04-23 PROCEDURE — 93005 ELECTROCARDIOGRAM TRACING: CPT | Mod: ER

## 2023-04-23 PROCEDURE — 85025 COMPLETE CBC W/AUTO DIFF WBC: CPT | Mod: ER

## 2023-04-23 PROCEDURE — 93010 ELECTROCARDIOGRAM REPORT: CPT | Mod: ,,, | Performed by: INTERNAL MEDICINE

## 2023-04-23 PROCEDURE — 99900035 HC TECH TIME PER 15 MIN (STAT): Mod: ER

## 2023-04-23 PROCEDURE — 80053 COMPREHEN METABOLIC PANEL: CPT | Mod: ER

## 2023-04-23 PROCEDURE — 96360 HYDRATION IV INFUSION INIT: CPT | Mod: ER

## 2023-04-23 PROCEDURE — 25000003 PHARM REV CODE 250: Mod: ER

## 2023-04-23 PROCEDURE — 84484 ASSAY OF TROPONIN QUANT: CPT | Mod: 91,ER

## 2023-04-23 PROCEDURE — 83880 ASSAY OF NATRIURETIC PEPTIDE: CPT | Mod: ER

## 2023-04-23 PROCEDURE — 94760 N-INVAS EAR/PLS OXIMETRY 1: CPT | Mod: ER

## 2023-04-23 PROCEDURE — 99285 EMERGENCY DEPT VISIT HI MDM: CPT | Mod: 25,ER

## 2023-04-23 PROCEDURE — 93010 EKG 12-LEAD: ICD-10-PCS | Mod: ,,, | Performed by: INTERNAL MEDICINE

## 2023-04-23 RX ORDER — MAG HYDROX/ALUMINUM HYD/SIMETH 200-200-20
30 SUSPENSION, ORAL (FINAL DOSE FORM) ORAL ONCE
Status: COMPLETED | OUTPATIENT
Start: 2023-04-23 | End: 2023-04-23

## 2023-04-23 RX ORDER — LIDOCAINE HYDROCHLORIDE 20 MG/ML
15 SOLUTION OROPHARYNGEAL ONCE
Status: COMPLETED | OUTPATIENT
Start: 2023-04-23 | End: 2023-04-23

## 2023-04-23 RX ORDER — ASPIRIN 325 MG
325 TABLET ORAL
Status: COMPLETED | OUTPATIENT
Start: 2023-04-23 | End: 2023-04-23

## 2023-04-23 RX ADMIN — ALUMINUM HYDROXIDE, MAGNESIUM HYDROXIDE, AND SIMETHICONE 30 ML: 200; 200; 20 SUSPENSION ORAL at 03:04

## 2023-04-23 RX ADMIN — LIDOCAINE HYDROCHLORIDE 15 ML: 20 SOLUTION ORAL; TOPICAL at 03:04

## 2023-04-23 RX ADMIN — ASPIRIN 325 MG: 325 TABLET ORAL at 10:04

## 2023-04-23 RX ADMIN — SODIUM CHLORIDE 1000 ML: 0.9 INJECTION, SOLUTION INTRAVENOUS at 10:04

## 2023-04-23 NOTE — ED NOTES
Pt was at ER with her son. He had a simple procedure in ER room. Pt became woozy diaphoretic and started having left sided CP with no radiation.

## 2023-04-23 NOTE — ED PROVIDER NOTES
"Encounter Date: 2023       History     Chief Complaint   Patient presents with    Dizziness     PT was in the room with her son while he was getting a procedure done. PT started to feel dizzy and weak     62-year-old female with PMHx of anxiety, arthtitis, HTN, HLD, gerd, CHF, presents to the ED for lightheadedness.  Patient was in a room with her son, who was a patient getting a procedure done, when the patient began to feel "woozy" and lightheaded.  Patient was noted to be diaphoretic and taken to a stretcher where she started having dull chest tightness and mild shortness of breath. Patient's pressure noted to be 84/52 at that time.  Denies nausea, fever, chills.  No daily aspirin or blood thinners.     Patient seen for UTI few days ago.  Reports symptoms have improved since that visit.    The history is provided by the patient.   Review of patient's allergies indicates:   Allergen Reactions    Codeine Hives    Sulfa (sulfonamide antibiotics) Hives    Benzoate analogues Itching     Past Medical History:   Diagnosis Date    Anxiety     Arthritis     CHF (congestive heart failure)     Depression     GERD (gastroesophageal reflux disease)     High cholesterol     Hypertension      Past Surgical History:   Procedure Laterality Date    BREAST BIOPSY Left     h    BREAST SURGERY       SECTION      COLONOSCOPY N/A 2017    Procedure: COLONOSCOPY Miralax;  Surgeon: Buddy Butcher Jr., MD;  Location: [facility];  Service: Endoscopy;  Laterality: N/A;    COLONOSCOPY N/A 2022    Procedure: COLONOSCOPY;  Surgeon: TREV Kay MD;  Location: [facility] ENDO;  Service: Endoscopy;  Laterality: N/A;    COLONOSCOPY N/A 2022    Procedure: COLONOSCOPY;  Surgeon: TREV Kay MD;  Location: [facility];  Service: Endoscopy;  Laterality: N/A;    FLEXIBLE SIGMOIDOSCOPY N/A 2022    Procedure: SIGMOIDOSCOPY, FLEXIBLE;  Surgeon: TREV Kay MD;  Location: [address];  Service: " Colon and Rectal;  Laterality: N/A;    HYSTERECTOMY      KIDNEY SURGERY Right     done at [facility], reported as mass by patient    LAPAROSCOPIC SIGMOIDECTOMY Left 7/20/2022    Procedure: COLECTOMY, SIGMOID, LAPAROSCOPIC, ERAS low;  Surgeon: TREV Kay MD;  [facility];  Service: Colon and Rectal;  Laterality: Left;    LEFT HEART CATHETERIZATION Left 5/13/2019    Procedure: Left heart cath;  Surgeon: Gerhard Krishnan MD;  Location: [facility] CATH LAB;  Service: Cardiology;  Laterality: Left;    MOBILIZATION OF SPLENIC FLEXURE N/A 7/20/2022    Procedure: MOBILIZATION, SPLENIC FLEXURE;  Surgeon: TREV Kay MD;  [facility];  Service: Colon and Rectal;  Laterality: N/A;    OOPHORECTOMY      s-section       History reviewed. No pertinent family history.  Social History     Tobacco Use    Smoking status: Never    Smokeless tobacco: Never   Substance Use Topics    Alcohol use: Not Currently     Comment: 12-31-22    Drug use: No     Review of Systems   Constitutional:  Positive for diaphoresis. Negative for chills and fever.   HENT:  Negative for congestion.    Eyes:  Negative for redness and visual disturbance.   Respiratory:  Positive for chest tightness and shortness of breath.    Gastrointestinal:  Positive for abdominal pain. Negative for nausea and vomiting.   Genitourinary:  Negative for dysuria and hematuria.   Musculoskeletal:  Negative for neck pain.   Skin:  Negative for color change.   Neurological:  Positive for dizziness and light-headedness. Negative for speech difficulty, weakness and numbness.   Psychiatric/Behavioral:  Negative for agitation, behavioral problems and confusion.      Physical Exam     Initial Vitals [04/23/23 1007]   BP Pulse Resp Temp SpO2   (!) 84/43 (!) 54 20 97.9 °F (36.6 °C) 98 %      MAP       --         Physical Exam    Nursing note and vitals reviewed.  Constitutional: She appears well-developed and well-nourished.   HENT:   Head:  Normocephalic and atraumatic.   Right Ear: Hearing and tympanic membrane normal.   Left Ear: Hearing and tympanic membrane normal.   Nose: Nose normal.   Mouth/Throat: Oropharynx is clear and moist.   Eyes: EOM and lids are normal. Pupils are equal, round, and reactive to light.   Neck: Neck supple.   Normal range of motion.  Cardiovascular:  Normal rate, regular rhythm and normal heart sounds.           Pulmonary/Chest: Breath sounds normal. No respiratory distress. She has no wheezes. She has no rhonchi.   Abdominal: Abdomen is soft. There is no abdominal tenderness.   Musculoskeletal:         General: Normal range of motion.      Cervical back: Normal range of motion and neck supple. No rigidity.     Neurological: She is alert and oriented to person, place, and time. She has normal strength. No cranial nerve deficit or sensory deficit. Coordination and gait normal. GCS score is 15. GCS eye subscore is 4. GCS verbal subscore is 5. GCS motor subscore is 6.   Skin: Skin is warm and dry. No rash noted. There is pallor.   Psychiatric: She has a normal mood and affect. Her behavior is normal. Judgment and thought content normal.       ED Course   Procedures  Labs Reviewed   CBC W/ AUTO DIFFERENTIAL - Abnormal; Notable for the following components:       Result Value    RBC 3.80 (*)     Hemoglobin 11.1 (*)     Hematocrit 34.5 (*)     All other components within normal limits   COMPREHENSIVE METABOLIC PANEL - Abnormal; Notable for the following components:    CO2 31 (*)     Anion Gap 4 (*)     All other components within normal limits   TROPONIN I   NT-PRO NATRIURETIC PEPTIDE   TROPONIN I          Imaging Results              X-Ray Chest PA And Lateral (Final result)  Result time 04/23/23 10:52:39      Final result by Kerri Hudson MD (Timothy) (04/23/23 10:52:39)                   Impression:      Clear lungs.      Electronically signed by: Kerri Hudson MD  Date:    04/23/2023  Time:    10:52                Narrative:    EXAMINATION:  XR CHEST PA AND LATERAL    CLINICAL HISTORY  <Diagnosis>, Chest Pain;    COMPARISON:  01/09/2023.    FINDINGS:  Two views.    The heart size is normal.  The lung fields are clear.  No acute cardiopulmonary infiltrative.                                       Medications   sodium chloride 0.9% bolus 1,000 mL 1,000 mL (0 mLs Intravenous Stopped 4/23/23 1120)   aspirin tablet 325 mg (325 mg Oral Given 4/23/23 1027)   aluminum-magnesium hydroxide-simethicone 200-200-20 mg/5 mL suspension 30 mL (30 mLs Oral Given 4/23/23 1524)     And   LIDOcaine HCl 2% oral solution 15 mL (15 mLs Oral Given 4/23/23 1524)     Medical Decision Making:   Differential Diagnosis:   Vasovagal response, ACS, CVA, peripheral vertigo, central vertigo, lightheadedness/near syncope, ataxia, generalized weakness  ED Management:  Flushing resolved when patient laid down on stretcher.  Patient well-appearing, non-toxic. Presentation most consistent with vasovagal response.  Patient had an episode of stomach discomfort that was resolved with GI cocktail.  ED course otherwise unremarkable.  Do not suspect acute abdomen requiring emergent intervention or hospitalization.  Patient was seen for UTI few days ago reports symptoms have improved since that time.  Discussed all results with patient who is agreeable to treatment plan.  Follow up with PCP.  ED return precautions discussed.    Given the patient's low risk for MACE based on a carefully determined HEART score (<4) and overall clinical judgement; unremarkable cardiac monitoring and troponin levels drawn at the time of arrival and again >5 hours later have been used to effectively rule out ACS.  Additionally, I have no considerable suspicion for aortic dissection/aneurysm, esophageal perforation, pneumothorax or acute pulmonary complications based on the presentation, exam, lab results, and imaging.     Discussed patient with Dr. Jay who is in agreement with treatment  and plan           ED Course as of 04/23/23 1631   Sun Apr 23, 2023   1030 CBC auto differential(!)  No leukocytosis.  H&H at patient's baseline, stable. [CS]   1030 Troponin I #1  Negative [CS]   1030 Comprehensive metabolic panel(!)  Normal renal function.  Normal kidney function.  No significant electrolyte abnormalities. [CS]   1031 Patient ambulatory to chest x-ray at this time.  Stable gait. No lightheadedness.  Patient's appearance has improved, does not appear flushed. [CS]   1040 NT-Pro Natriuretic Peptide  Within normal limits. [CS]   1118 X-Ray Chest PA And Lateral  No acute abnormality. [CS]   1147 Patient feeling much better at this time.  No dizziness, lightheadedness, chest pain, shortness at breath.  Blood pressure has normalized.  Will obtain orthostatics. [CS]   1201 Discussed with Dr. Jay. Orthostatic vital signs WNL at this time. Patient most likely had vasovagal response while witnessing sons procedure.  We will repeat troponin at >4 hours [CS]   1455 Patient complaining of stomach upset.  Abdomen is soft and nontender.  We will give GI cocktail and reassess. [CS]   1541 GI cocktail resolved pain. Troponin #2 pending [CS]   1611 Troponin I: <0.012 [CS]      ED Course User Index  [CS] Shelby Loera PA-C                 Clinical Impression:   Final diagnoses:  [R07.9] Chest pain  [R55] Vasovagal response (Primary)        ED Disposition Condition    Discharge Stable          ED Prescriptions    None       Follow-up Information       Follow up With Specialties Details Why Contact Info    Anurag Lopez MD Family Medicine Schedule an appointment as soon as possible for a visit   [address], [address]  [place] [id number] [phone]               Shelby Loera PA-C  04/23/23 1630       Shelby Loera PA-C  04/23/23 1631

## 2023-04-23 NOTE — DISCHARGE INSTRUCTIONS
A vasovagal response happens when the body suddenly overreacts to a trigger. Your blood pressure and heart rate drop very quickly. This lowers how much blood flows to the brain. You may faint or have a short blackout.    Your blood work in the emergency room today looks great.  Your heart enzymes are not elevated.  Follow up with your primary care provider to discuss all labs and imaging that were performed today.  You may return to the ER at any time for any new or concerning symptoms, worsening condition, or failure to improve.    Our goal in the ER is to always give you outstanding care and exceptional service. You may receive a survey by mail or email in the next week about your experience in our ED. We would greatly appreciate you completing and returning the survey. Your feedback provides us with a way to recognize our staff who give very good care and it helps us learn how to improve when your experience was below our aspiration of excellence.     Sincerely,     Shelby Loera PA-C  Emergency Room Physician Assistant  [facility]

## 2023-04-27 ENCOUNTER — OFFICE VISIT (OUTPATIENT)
Dept: FAMILY MEDICINE | Facility: HOSPITAL | Age: 63
End: 2023-04-27
Payer: MEDICAID

## 2023-04-27 VITALS
HEIGHT: 65 IN | HEART RATE: 64 BPM | DIASTOLIC BLOOD PRESSURE: 69 MMHG | BODY MASS INDEX: 30.42 KG/M2 | WEIGHT: 182.56 LBS | SYSTOLIC BLOOD PRESSURE: 124 MMHG

## 2023-04-27 DIAGNOSIS — R19.7 DIARRHEA, UNSPECIFIED TYPE: ICD-10-CM

## 2023-04-27 DIAGNOSIS — N30.01 ACUTE CYSTITIS WITH HEMATURIA: Primary | ICD-10-CM

## 2023-04-27 PROCEDURE — 99214 OFFICE O/P EST MOD 30 MIN: CPT | Performed by: STUDENT IN AN ORGANIZED HEALTH CARE EDUCATION/TRAINING PROGRAM

## 2023-04-27 NOTE — PROGRESS NOTES
Progress Note  [facility]    Subjective:     Ashli Kumar is a 62 y.o. year old female wwho presents to clinic for ED follow-up.     Son was getting procedure done on his finger and had vasovagal response to seeing the procedure. Did not have any LOC. Doing well since the ED. No recurrent episodes of light headedness. This morning ate toast and had loose stool after breakfast with low back pain. Recent ED visit 4/21 diagnosed with UTI. Given augmentin. Ucx with multiple organisms. Has 2 pills left of the augmentin. Loose stool started prior to starting antibiotics. Seeing GI doc 5/12/23. Low back pain comes and goes. No identifiable triggers. Has some tenderness in the stomach. This has been occurring off an on for some time. CT abdomen/pelvis with mild colitis. Has not tried the bentyl yet. Just got a probiotic to try.     Patient Active Problem List    Diagnosis Date Noted    Posture imbalance 03/14/2023    Decreased range of motion of left shoulder 03/14/2023    Weakness of shoulder 03/14/2023    Mental health problem 01/02/2023    Diverticular stricture 07/20/2022    Segmental colitis associated with diverticulosis 06/14/2022    LLQ pain 05/29/2022    Rectal bleeding 05/27/2022    Bipolar affective disorder, currently depressed, moderate 05/27/2022    Chronic heart failure with preserved ejection fraction 05/13/2019    SOB (shortness of breath) 05/13/2019    Obesity due to excess calories 05/13/2019    Angina, class III 05/01/2019    Screening for colorectal cancer 05/12/2017        Review of patient's allergies indicates:   Allergen Reactions    Codeine Hives    Sulfa (sulfonamide antibiotics) Hives    Benzoate analogues Itching        Past Medical History:   Diagnosis Date    Anxiety     Arthritis     CHF (congestive heart failure)     Depression     GERD (gastroesophageal reflux disease)     High cholesterol     Hypertension       Past Surgical History:   Procedure Laterality Date     "BREAST BIOPSY Left     [facility]    BREAST SURGERY       SECTION      COLONOSCOPY N/A 2017    Procedure: COLONOSCOPY Miralax;  Surgeon: Buddy Butcher Jr., MD;  Location: [facility] ENDO;  Service: Endoscopy;  Laterality: N/A;    COLONOSCOPY N/A 2022    Procedure: COLONOSCOPY;  Surgeon: TREV Kay MD;  Location: [facility] ENDO;  Service: Endoscopy;  Laterality: N/A;    COLONOSCOPY N/A 2022    Procedure: COLONOSCOPY;  Surgeon: TREV Kay MD;  Location: [facility] ENDO;  Service: Endoscopy;  Laterality: N/A;    FLEXIBLE SIGMOIDOSCOPY N/A 2022    Procedure: SIGMOIDOSCOPY, FLEXIBLE;  Surgeon: TREV Kay MD;  [facility];  Service: Colon and Rectal;  Laterality: N/A;    HYSTERECTOMY      KIDNEY SURGERY Right     done at [facility], reported as mass by patient    LAPAROSCOPIC SIGMOIDECTOMY Left 2022    Procedure: COLECTOMY, SIGMOID, LAPAROSCOPIC, ERAS low;  Surgeon: TREV Kay MD;  [facility]: [facility];  Service: Colon and Rectal;  Laterality: Left;    LEFT HEART CATHETERIZATION Left 2019    Procedure: Left heart cath;  Surgeon: Gerhard Krishnan MD;  Location: [facility] CATH LAB;  Service: Cardiology;  Laterality: Left;    MOBILIZATION OF SPLENIC FLEXURE N/A 2022    Procedure: MOBILIZATION, SPLENIC FLEXURE;  Surgeon: TREV Kay MD;  [facility]: [facility];  Service: Colon and Rectal;  Laterality: N/A;    OOPHORECTOMY      s-section        No family history on file.   Social History     Tobacco Use    Smoking status: Never    Smokeless tobacco: Never   Substance Use Topics    Alcohol use: Not Currently     Comment: 22        Objective:     Vitals:    23 1041   BP: 124/69   Pulse: 64   Weight: 82.8 kg (182 lb 8.7 oz)   Height: 5' 5" (1.651 m)     BMI: 30.38    Gen: No apparent distress, well nourished and developed, appears stated age  CV: RRR, S1 and S2 present, no LE edema  Resp: CTAB, normal respiratory effort  : No " CVA tenderness  Abd: Soft, ND, mild diffuse TTP, no rebound or guarding    Assessment/Plan:     Ashli Kumar is a 62 y.o. year old female who presents to clinic for ED follow-up.     1. Acute cystitis with hematuria  Doing well. Should complete antibiotic course.     2. Diarrhea, unspecified type  Recommend starting probiotic and taking bentyl as prescribed. Should keep GI appointment in May. ED precautions provided.       Follow-up: with PCP for regular health maintenance     A total of 35 minutes was spent on patient care during this encounter which included chart review, examining the patient, formulating a treatment plan and documentation.     Medical decision making straight forward and not complex during this visit.     Case discussed with staff: Dr. Hanh Olivier,   [facility], PGY-3

## 2023-05-01 ENCOUNTER — CLINICAL SUPPORT (OUTPATIENT)
Dept: REHABILITATION | Facility: HOSPITAL | Age: 63
End: 2023-05-01
Payer: MEDICAID

## 2023-05-01 DIAGNOSIS — R29.898 WEAKNESS OF SHOULDER: ICD-10-CM

## 2023-05-01 DIAGNOSIS — R29.3 POSTURE IMBALANCE: Primary | ICD-10-CM

## 2023-05-01 DIAGNOSIS — M25.612 DECREASED RANGE OF MOTION OF LEFT SHOULDER: ICD-10-CM

## 2023-05-01 PROCEDURE — 97110 THERAPEUTIC EXERCISES: CPT | Mod: PO

## 2023-05-01 NOTE — PROGRESS NOTES
"  Physical Therapy Treatment Note     Name: Ashli Kumar  Clinic Number: [id number]    Therapy Diagnosis:   Encounter Diagnoses   Name Primary?    Posture imbalance Yes    Decreased range of motion of left shoulder     Weakness of shoulder          Physician: Anurag Lopez MD    Visit Date: 5/1/2023    Physician Orders: PT Eval and Treat   Medical Diagnosis from Referral: M19.012 (ICD-10-CM) - Osteoarthritis of left shoulder, unspecified osteoarthritis type   Evaluation Date: 3/14/2023  Authorization Period Expiration: 02/15/2024  Plan of Care Expiration: 12/31/2023  Visit #/Visits authorized: 9/20 (+eval)     Time In: 0902  Time Out: 1000  Total Billable Time: 55 minutes    Precautions: Standard,  pt reported being dx with Bi-polar dis order and depression, no longer seeing a therapist"    Subjective     Pt reports: mild pain in the left shoulder, especially after carrying a heavy object at home.   She was compliant with home exercise program.  Response to previous treatment: Eval  Functional change: Ongoing    Pain: /10  Location: L shoulder    Objective     Ashli received therapeutic exercises to develop strength, endurance, and posture for 55 minutes including:    - UBE - 4' fwd/4' bwd  - Pulleys flex/abd 2'/2'  - Wall slides flex L - 20x w 5" holds  - Serratus slides - 3 x 15  - Wand flex - 10x w 2# dowel; 3 x 10 w 3#  - Serratus punches - 2#, 3x12  - SL shoulder ER - 3 x 10 B w 1#  - Prone I 2# 3x10  - Prone Y 3x10  - Prone T 3x10 -- mildly painful  - seated scapular rows x30   - TB lat pull downs GTB 3 x 15     - TB ER RTB  3 x 15    - TB IR GTB 3 x 15  - Prone rows 10# 3x8  - wall clocks YTB 15x each arm  - standing scaption YTB 3x8  - bicep Curls 2# 3x8      Home Exercises Provided and Patient Education Provided     Education provided:   - importance of consistent performance of HEP    Written Home Exercises Provided: yes.  Exercises were reviewed and Ashli was able to demonstrate them prior " to the end of the session.  Ashli demonstrated good  understanding of the education provided.       Assessment     Mrs Ashli performed all therex well with no increase in pain. She has great difficulty accessing periscapular muscles and coordinating movements, particular with scapular rows, requiring moderate to max verbal and tactile cues. We discussed HEP and will provide new printout next visit.    Ashli Is progressing well towards her goals.   Pt prognosis is Fair.     Pt will continue to benefit from skilled outpatient physical therapy to address the deficits listed in the problem list box on initial evaluation, provide pt/family education and to maximize pt's level of independence in the home and community environment.     Pt's spiritual, cultural and educational needs considered and pt agreeable to plan of care and goals.     Anticipated barriers to physical therapy:  attendance to therapy, sedentary lifestyle, HEP compliance     Goals:   Short Term Goals:  5 weeks  1.Report decreased  pain < / =  2/10  to increase tolerance for therapeutic interventions   2. Increase AROM by 20 degrees for flexion and abduction    3. Increased strength by 1/3 MMT grade  to increase tolerance for ADL and work activities.  4. Pt to tolerate HEP to improve ROM and independence with ADL's     Long Term Goals: 10 weeks  2.Increase AROM to be symmetrical of non invovled side in a pain free manner   3.Increase strength to >/= 4/5  to increase tolerance for ADL and work activities.  4. Pt goal: be able to lift and carry pots and pans with no pain   5. Pt will have improved gcode of CJ (20-40% limited) on FOTO shoulder in order to demonstrate true functional improvement.    Plan     Continue with PT POC and progress as tolerated.     Phuong Gonsalez, PT

## 2023-05-03 ENCOUNTER — CLINICAL SUPPORT (OUTPATIENT)
Dept: REHABILITATION | Facility: HOSPITAL | Age: 63
End: 2023-05-03
Payer: MEDICAID

## 2023-05-03 DIAGNOSIS — M25.612 DECREASED RANGE OF MOTION OF LEFT SHOULDER: ICD-10-CM

## 2023-05-03 DIAGNOSIS — R29.3 POSTURE IMBALANCE: Primary | ICD-10-CM

## 2023-05-03 DIAGNOSIS — R29.898 WEAKNESS OF SHOULDER: ICD-10-CM

## 2023-05-03 PROCEDURE — 97110 THERAPEUTIC EXERCISES: CPT | Mod: PO,CQ

## 2023-05-03 NOTE — PROGRESS NOTES
"  Physical Therapy Treatment Note     Name: Ashli Kumar  Clinic Number: [id number]    Therapy Diagnosis:   Encounter Diagnoses   Name Primary?    Posture imbalance Yes    Decreased range of motion of left shoulder     Weakness of shoulder          Physician: Anurag Lopez MD    Visit Date: 5/3/2023    Physician Orders: PT Eval and Treat   Medical Diagnosis from Referral: M19.012 (ICD-10-CM) - Osteoarthritis of left shoulder, unspecified osteoarthritis type   Evaluation Date: 3/14/2023  Authorization Period Expiration: 02/15/2024  Plan of Care Expiration: 12/31/2023  Visit #/Visits authorized: 10/20 (+eval)     Time In: 0900  Time Out:   Total Billable Time:  minutes    Precautions: Standard,  pt reported being dx with Bi-polar dis order and depression, no longer seeing a therapist"    Subjective     Pt reports: she feels her L shoulder is getting better.   She was compliant with home exercise program.  Response to previous treatment: Eval  Functional change: Ongoing    Pain: /10  Location: L shoulder    Objective     Ashli received therapeutic exercises to develop strength, endurance, and posture for 55 minutes including:    - UBE - 4' fwd/4' bwd  - Pulleys flex/abd 2'/2'  - Wall slides flex L - 20x w 5" holds  - Serratus slides - 3 x 15  - Wand flex - 10x w 2# dowel; 3 x 10 w 3#  - Serratus punches - 2#, 3x12  - SL shoulder ER - 3 x 10 B w 1#  - Prone I 2# 3x10  - Prone Y 3x10  - Prone T 3x10 -- mildly painful  - seated scapular rows x30   - TB lat pull downs GTB 3 x 15     - TB ER RTB  3 x 15    - TB IR GTB 3 x 15  - Prone rows 5# 3 x 10  - wall clocks YTB 3x5 each arm  - standing scaption YTB 3x8  - bicep Curls 2# 3x8      Home Exercises Provided and Patient Education Provided     Education provided:   - importance of consistent performance of HEP    Written Home Exercises Provided: yes.  Exercises were reviewed and Ashli was able to demonstrate them prior to the end of the session.  Ashli " demonstrated good  understanding of the education provided.       Assessment     Pt tolerated progression to overall increased repetitions today targeting strength and endurance. Pt presented with increased fatigue with wall clocks requiring manual and tactile cueing. Will continue to monitor and progress pt as tolerated.     Ashli Is progressing well towards her goals.   Pt prognosis is Fair.     Pt will continue to benefit from skilled outpatient physical therapy to address the deficits listed in the problem list box on initial evaluation, provide pt/family education and to maximize pt's level of independence in the home and community environment.     Pt's spiritual, cultural and educational needs considered and pt agreeable to plan of care and goals.     Anticipated barriers to physical therapy:  attendance to therapy, sedentary lifestyle, HEP compliance     Goals:   Short Term Goals:  5 weeks  1.Report decreased  pain < / =  2/10  to increase tolerance for therapeutic interventions   2. Increase AROM by 20 degrees for flexion and abduction    3. Increased strength by 1/3 MMT grade  to increase tolerance for ADL and work activities.  4. Pt to tolerate HEP to improve ROM and independence with ADL's     Long Term Goals: 10 weeks  2.Increase AROM to be symmetrical of non invovled side in a pain free manner   3.Increase strength to >/= 4/5  to increase tolerance for ADL and work activities.  4. Pt goal: be able to lift and carry pots and pans with no pain   5. Pt will have improved gcode of CJ (20-40% limited) on FOTO shoulder in order to demonstrate true functional improvement.    Plan     Continue with PT POC and progress as tolerated.     Dominick Cage, AIMEE Cruz, ZUHAIR

## 2023-05-10 ENCOUNTER — HOSPITAL ENCOUNTER (EMERGENCY)
Facility: HOSPITAL | Age: 63
Discharge: HOME | End: 2023-05-10
Attending: EMERGENCY MEDICINE
Payer: MEDICAID

## 2023-05-10 ENCOUNTER — CLINICAL SUPPORT (OUTPATIENT)
Dept: REHABILITATION | Facility: HOSPITAL | Age: 63
End: 2023-05-10
Payer: MEDICAID

## 2023-05-10 VITALS
TEMPERATURE: 98 F | OXYGEN SATURATION: 98 % | DIASTOLIC BLOOD PRESSURE: 75 MMHG | HEART RATE: 75 BPM | BODY MASS INDEX: 29.82 KG/M2 | WEIGHT: 179 LBS | HEIGHT: 65 IN | SYSTOLIC BLOOD PRESSURE: 145 MMHG | RESPIRATION RATE: 18 BRPM

## 2023-05-10 DIAGNOSIS — M25.612 DECREASED RANGE OF MOTION OF LEFT SHOULDER: ICD-10-CM

## 2023-05-10 DIAGNOSIS — R29.898 WEAKNESS OF SHOULDER: ICD-10-CM

## 2023-05-10 DIAGNOSIS — K62.5 RECTAL BLEEDING: Primary | ICD-10-CM

## 2023-05-10 DIAGNOSIS — R29.3 POSTURE IMBALANCE: Primary | ICD-10-CM

## 2023-05-10 LAB
ALBUMIN SERPL BCP-MCNC: 4 G/DL (ref 3.5–5.2)
ALP SERPL-CCNC: 50 U/L (ref 38–126)
ALT SERPL W/O P-5'-P-CCNC: 15 U/L (ref 10–44)
ANION GAP SERPL CALC-SCNC: 8 MMOL/L (ref 8–16)
AST SERPL-CCNC: 23 U/L (ref 15–46)
BACTERIA #/AREA URNS AUTO: ABNORMAL /HPF
BASOPHILS # BLD AUTO: 0.03 K/UL (ref 0–0.2)
BASOPHILS NFR BLD: 0.5 % (ref 0–1.9)
BILIRUB SERPL-MCNC: 0.3 MG/DL (ref 0.1–1)
BILIRUB UR QL STRIP: NEGATIVE
CALCIUM SERPL-MCNC: 8.8 MG/DL (ref 8.7–10.5)
CHLORIDE SERPL-SCNC: 104 MMOL/L (ref 95–110)
CLARITY UR REFRACT.AUTO: CLEAR
CO2 SERPL-SCNC: 28 MMOL/L (ref 23–29)
COLOR UR AUTO: YELLOW
CREAT SERPL-MCNC: 0.92 MG/DL (ref 0.5–1.4)
DIFFERENTIAL METHOD: ABNORMAL
EOSINOPHIL # BLD AUTO: 0.1 K/UL (ref 0–0.5)
EOSINOPHIL NFR BLD: 1.8 % (ref 0–8)
ERYTHROCYTE [DISTWIDTH] IN BLOOD BY AUTOMATED COUNT: 12.9 % (ref 11.5–14.5)
EST. GFR  (NO RACE VARIABLE): >60 ML/MIN/1.73 M^2
GLUCOSE SERPL-MCNC: 97 MG/DL (ref 70–110)
GLUCOSE UR QL STRIP: NEGATIVE
HCT VFR BLD AUTO: 32.7 % (ref 37–48.5)
HGB BLD-MCNC: 10.8 G/DL (ref 12–16)
HGB UR QL STRIP: NEGATIVE
IMM GRANULOCYTES # BLD AUTO: 0.02 K/UL (ref 0–0.04)
IMM GRANULOCYTES NFR BLD AUTO: 0.3 % (ref 0–0.5)
INR PPP: 0.9 (ref 0.8–1.2)
KETONES UR QL STRIP: NEGATIVE
LEUKOCYTE ESTERASE UR QL STRIP: ABNORMAL
LYMPHOCYTES # BLD AUTO: 2.8 K/UL (ref 1–4.8)
LYMPHOCYTES NFR BLD: 45 % (ref 18–48)
MCH RBC QN AUTO: 29.8 PG (ref 27–31)
MCHC RBC AUTO-ENTMCNC: 33 G/DL (ref 32–36)
MCV RBC AUTO: 90 FL (ref 82–98)
MICROSCOPIC COMMENT: ABNORMAL
MONOCYTES # BLD AUTO: 0.7 K/UL (ref 0.3–1)
MONOCYTES NFR BLD: 10.6 % (ref 4–15)
NEUTROPHILS # BLD AUTO: 2.6 K/UL (ref 1.8–7.7)
NEUTROPHILS NFR BLD: 41.8 % (ref 38–73)
NITRITE UR QL STRIP: NEGATIVE
NRBC BLD-RTO: 0 /100 WBC
PH UR STRIP: 6 [PH] (ref 5–8)
PLATELET # BLD AUTO: 172 K/UL (ref 150–450)
PMV BLD AUTO: 11.7 FL (ref 9.2–12.9)
POTASSIUM SERPL-SCNC: 3.9 MMOL/L (ref 3.5–5.1)
PROT SERPL-MCNC: 7.1 G/DL (ref 6–8.4)
PROT UR QL STRIP: NEGATIVE
PROTHROMBIN TIME: 10.3 SEC (ref 9–12.5)
RBC # BLD AUTO: 3.62 M/UL (ref 4–5.4)
RBC #/AREA URNS AUTO: 1 /HPF (ref 0–4)
SODIUM SERPL-SCNC: 140 MMOL/L (ref 136–145)
SP GR UR STRIP: 1.02 (ref 1–1.03)
URN SPEC COLLECT METH UR: ABNORMAL
UROBILINOGEN UR STRIP-ACNC: NEGATIVE EU/DL
UUN UR-MCNC: 19 MG/DL (ref 7–17)
WBC # BLD AUTO: 6.15 K/UL (ref 3.9–12.7)
WBC #/AREA URNS AUTO: 11 /HPF (ref 0–5)
YEAST UR QL AUTO: ABNORMAL

## 2023-05-10 PROCEDURE — 85025 COMPLETE CBC W/AUTO DIFF WBC: CPT | Mod: ER | Performed by: EMERGENCY MEDICINE

## 2023-05-10 PROCEDURE — 99285 EMERGENCY DEPT VISIT HI MDM: CPT | Mod: 25,ER

## 2023-05-10 PROCEDURE — 87086 URINE CULTURE/COLONY COUNT: CPT | Mod: ER | Performed by: EMERGENCY MEDICINE

## 2023-05-10 PROCEDURE — 81000 URINALYSIS NONAUTO W/SCOPE: CPT | Mod: ER | Performed by: EMERGENCY MEDICINE

## 2023-05-10 PROCEDURE — 97110 THERAPEUTIC EXERCISES: CPT | Mod: PO,CQ

## 2023-05-10 PROCEDURE — 25500020 PHARM REV CODE 255: Mod: ER | Performed by: EMERGENCY MEDICINE

## 2023-05-10 PROCEDURE — 80053 COMPREHEN METABOLIC PANEL: CPT | Mod: ER | Performed by: EMERGENCY MEDICINE

## 2023-05-10 PROCEDURE — 85610 PROTHROMBIN TIME: CPT | Mod: ER | Performed by: EMERGENCY MEDICINE

## 2023-05-10 RX ADMIN — IOHEXOL 100 ML: 350 INJECTION, SOLUTION INTRAVENOUS at 08:05

## 2023-05-10 NOTE — PROGRESS NOTES
"  Physical Therapy Treatment Note     Name: Ashli Kumar  Clinic Number: [id number]    Therapy Diagnosis:   Encounter Diagnoses   Name Primary?    Posture imbalance Yes    Decreased range of motion of left shoulder     Weakness of shoulder          Physician: Anurag Lopez MD    Visit Date: 5/10/2023    Physician Orders: PT Eval and Treat   Medical Diagnosis from Referral: M19.012 (ICD-10-CM) - Osteoarthritis of left shoulder, unspecified osteoarthritis type   Evaluation Date: 3/14/2023  Authorization Period Expiration: 02/15/2024  Plan of Care Expiration: 12/31/2023  Visit #/Visits authorized: 10/20 (+eval)     Time In: 0900  Time Out: 0955  Total Billable Time: 55 minutes    Precautions: Standard,  pt reported being dx with Bi-polar dis order and depression, no longer seeing a therapist"    Subjective     Pt reports: her L shoulder is getting better.   She was compliant with home exercise program.  Response to previous treatment: good   Functional change: Ongoing    Pain: /10  Location: L shoulder    Objective     Ashli received therapeutic exercises to develop strength, endurance, and posture for 55 minutes including:    - UBE - 4' fwd/4' bwd  - Pulleys flex/abd 3'/3'  - Wall slides flex/abd L - 30 x w 5" holds  - Serratus slides - 3 x 15  - Wand flex - 3 x 15 x w 1# dowel; 3 x 10 w 3#  - Serratus punches - 2#, 3x15   - SL shoulder ER - 3 x 15 B w 1#  - Prone I 2# 3x10  - Prone Y 3x8  - Prone T 3x10 -- mildly painful  - seated scapular rows x 30   - TB lat pull downs GTB 3 x 15     - TB ER RTB  3 x 15    - TB IR GTB 3 x 15  - Prone rows 5# 3 x 10  - standing YTB rows 3 x 10 hold 5"   - wall clocks YTB 3x5 each arm  - standing scaption YTB 3x8  - bicep Curls 2# 3x10      Home Exercises Provided and Patient Education Provided     Education provided:   - importance of consistent performance of HEP    Written Home Exercises Provided: yes.  Exercises were reviewed and Ashli was able to demonstrate them " prior to the end of the session.  Ashli demonstrated good  understanding of the education provided.       Assessment     Pt presented with no pain in L shoulder to today's therapy session. Pt displayed moderate fatigue during supine serratus punches requiring continuous manual and verbal cues to correct form as well as throughout session to help with targeting the muscle being worked. Pt reported fatigue during standing exercises which required seated rest breaks. Will continue to monitor and progress pt as tolerated.     Ashli Is progressing well towards her goals.   Pt prognosis is Fair.     Pt will continue to benefit from skilled outpatient physical therapy to address the deficits listed in the problem list box on initial evaluation, provide pt/family education and to maximize pt's level of independence in the home and community environment.     Pt's spiritual, cultural and educational needs considered and pt agreeable to plan of care and goals.     Anticipated barriers to physical therapy:  attendance to therapy, sedentary lifestyle, HEP compliance     Goals:   Short Term Goals:  5 weeks  1.Report decreased  pain < / =  2/10  to increase tolerance for therapeutic interventions   2. Increase AROM by 20 degrees for flexion and abduction    3. Increased strength by 1/3 MMT grade  to increase tolerance for ADL and work activities.  4. Pt to tolerate HEP to improve ROM and independence with ADL's     Long Term Goals: 10 weeks  2.Increase AROM to be symmetrical of non invovled side in a pain free manner   3.Increase strength to >/= 4/5  to increase tolerance for ADL and work activities.  4. Pt goal: be able to lift and carry pots and pans with no pain   5. Pt will have improved gcode of CJ (20-40% limited) on FOTO shoulder in order to demonstrate true functional improvement.    Plan     Continue with PT POC and progress as tolerated.     Dominick Cage, AIMEE Cruz, ZUHAIR

## 2023-05-11 NOTE — DISCHARGE INSTRUCTIONS
Likely the blood you saw today was from your internal hemorrhoids.  Follow up with your Gastroenterologist.  If you have more than 2 more blood bowel movements or have a very large bloody bowel movement please return to the Emergency Department. Followup with your primary care physician. Take all your medications as prescribed. Return to the emergency department if you have increasing pain, chest pain, difficulty breathing,  nonstop vomiting, or any other concerns. Be sure to drink plenty of fluids to stay hydrated. Get plenty of rest. Please refer to additional educational material for further instructions.

## 2023-05-11 NOTE — ED PROVIDER NOTES
Chief Complaint: bloody bowel movement     History of Present Illness:    Ashli Kumar 62 y.o. with a  has a past medical history of Anxiety, Arthritis, CHF (congestive heart failure), Depression, GERD (gastroesophageal reflux disease), High cholesterol, and Hypertension. who presents to the emergency department today with a complaint of having a bloody bowel movement today.  Had a bowel movement and noticed bright red blood on the toilet paper.  Did not turn water pink or red.  She has a small amount of crampy lower abdominal pain that just started. No lightheadedness, no syncopal episodes. No nausea, vomiting, fevers. She had a colonscopy Dec 2022.         ROS    Constitutional: No fever, no chills.  ENT: No nasal drainage. No ear ache. No sore throat.  Cardiovascular: No chest pain, no palpitations.  Respiratory: No cough, no shortness of breath.  Genitourinary: No hematuria, dysuria, urgency.  Musculoskeletal: No back pain.   Neurological: No headache, no focal weakness.    Otherwise remaining ROS negative     The history is provided by the patient      Reviewed and verified by myself:   PMH/PSH/SOC/FH REVIEWED :    Past Medical History:   Diagnosis Date    Anxiety     Arthritis     CHF (congestive heart failure)     Depression     GERD (gastroesophageal reflux disease)     High cholesterol     Hypertension        Past Surgical History:   Procedure Laterality Date    BREAST BIOPSY Left     rph    BREAST SURGERY       SECTION      COLONOSCOPY N/A 2017    Procedure: COLONOSCOPY Miralax;  Surgeon: Buddy Butcher Jr., MD;  Location: [facility];  Service: Endoscopy;  Laterality: N/A;    COLONOSCOPY N/A 2022    Procedure: COLONOSCOPY;  Surgeon: TREV Kay MD;  Location: [facility];  Service: Endoscopy;  Laterality: N/A;    COLONOSCOPY N/A 2022    Procedure: COLONOSCOPY;  Surgeon: TREV Kay MD;  Location: [facility];  Service: Endoscopy;  Laterality: N/A;     FLEXIBLE SIGMOIDOSCOPY N/A 7/20/2022    Procedure: SIGMOIDOSCOPY, FLEXIBLE;  Surgeon: TREV Kay MD;  [facility];  Service: Colon and Rectal;  Laterality: N/A;    HYSTERECTOMY      KIDNEY SURGERY Right     done at [facility], reported as mass by patient    LAPAROSCOPIC SIGMOIDECTOMY Left 7/20/2022    Procedure: COLECTOMY, SIGMOID, LAPAROSCOPIC, ERAS low;  Surgeon: TREV Kay MD;  [facility];  Service: Colon and Rectal;  Laterality: Left;    LEFT HEART CATHETERIZATION Left 5/13/2019    Procedure: Left heart cath;  Surgeon: Gerhard Krishnan MD;  Location: [facility] C[facility];  Service: Cardiology;  Laterality: Left;    MOBILIZATION OF SPLENIC FLEXURE N/A 7/20/2022    Procedure: MOBILIZATION, SPLENIC FLEXURE;  Surgeon: TREV Kay MD;  [facility];  Service: Colon and Rectal;  Laterality: N/A;    OOPHORECTOMY      s-section         Social History     Socioeconomic History    Marital status:    Tobacco Use    Smoking status: Never    Smokeless tobacco: Never   Substance and Sexual Activity    Alcohol use: Not Currently     Comment: 12-31-22    Drug use: No    Sexual activity: Not Currently     Social Determinants of Health     Financial Resource Strain: Low Risk     Difficulty of Paying Living Expenses: Not hard at all   Food Insecurity: No Food Insecurity    Worried About Running Out of Food in the Last Year: Never true    Ran Out of Food in the Last Year: Never true   Transportation Needs: Unmet Transportation Needs    Lack of Transportation (Medical): Yes    Lack of Transportation (Non-Medical): Yes   Physical Activity: Inactive    Days of Exercise per Week: 0 days    Minutes of Exercise per Session: 0 min   Stress: Stress Concern Present    Feeling of Stress : Very much   Social Connections: Moderately Integrated    Frequency of Communication with Friends and Family: More than three times a week    Frequency of Social Gatherings with Friends  and Family: More than three times a week    Attends Worship Services: 1 to 4 times per year    Active Member of Clubs or Organizations: No    Attends Club or Organization Meetings: Never    Marital Status:    Housing Stability: High Risk    Unable to Pay for Housing in the Last Year: No    Number of Places Lived in the Last Year: 3    Unstable Housing in the Last Year: No       No family history on file.            ALLERGIES REVIEWED  Review of patient's allergies indicates:   Allergen Reactions    Codeine Hives    Sulfa (sulfonamide antibiotics) Hives    Benzoate analogues Itching       MEDICATIONS REVIEWED  Discharge Medication List as of 5/10/2023  9:38 PM        CONTINUE these medications which have NOT CHANGED    Details   diclofenac sodium (VOLTAREN) 1 % Gel Apply 2 g topically 4 (four) times daily., Starting Wed 2/15/2023, Normal      !! dicyclomine (BENTYL) 20 mg tablet Take 1 tablet (20 mg total) by mouth 3 (three) times daily as needed (abdominal pain)., Starting Fri 3/17/2023, Normal      !! dicyclomine (BENTYL) 20 mg tablet Take 1 tablet (20 mg total) by mouth 2 (two) times daily., Starting Fri 4/21/2023, Until Sun 5/21/2023, Normal      divalproex (DEPAKOTE) 500 MG TbEC Take 1 tablet (500 mg total) by mouth 2 (two) times a day., Starting Fri 1/6/2023, Until Sat 1/6/2024, Normal      FLUoxetine 20 MG capsule Take 1 capsule (20 mg total) by mouth once daily., Starting Sat 1/7/2023, Until Sun 1/7/2024, Normal      fluticasone propionate (FLONASE) 50 mcg/actuation nasal spray 2 sprays by Each Nostril route once daily., Starting Wed 6/8/2022, Historical Med      metoprolol tartrate (LOPRESSOR) 25 MG tablet Take 25 mg by mouth 2 (two) times daily., Starting Fri 5/13/2022, Historical Med      OLANZapine (ZYPREXA) 2.5 MG tablet Take 1 tablet (2.5 mg total) by mouth 2 (two) times a day., Starting Fri 1/6/2023, Until Sat 1/6/2024, Normal      omeprazole (PRILOSEC) 40 MG capsule Take 1 capsule (40 mg  total) by mouth once daily., Starting Fri 3/17/2023, Until Sat 3/16/2024, Normal      PREMARIN vaginal cream Place 0.5 g vaginally twice a week., Starting Wed 12/15/2021, Historical Med      spironolactone (ALDACTONE) 25 MG tablet Take 12.5 mg by mouth., Historical Med      loratadine (CLARITIN) 10 mg tablet Take 1 tablet (10 mg total) by mouth daily as needed for Allergies., Starting Tue 1/17/2023, Until Wed 1/17/2024 at 2359, Normal       !! - Potential duplicate medications found. Please discuss with provider.              VS reviewed    Nursing/Ancillary staff note reviewed.       Physical Exam     ED Triage Vitals [05/10/23 1821]   BP (!) 147/81   Pulse 82   Resp 19   Temp 98.2 °F (36.8 °C)   SpO2 96 %       Physical Exam    Constitutional: The patient is alert, has no immediate need for airway protection and no signs of toxicity. No acute distress. Lying in bed but able to sit up without difficulty.   Eyes: Pupils equal and round no pallor or injection. Extra ocular movements intact. No drainage.   ENT: Mucous membranes are moist. Oropharynx clear.   Neck: Neck is supple non-tender. No lymphadenopathy. No stridor.   Respiratory: There are no retractions, lungs are clear to auscultation. No wheezing, no crackles.   Cardiovascular: Regular rate and rhythm. No murmurs, rubs or gallops.  Chest/Breast: Chest wall nontender to palpation.   Gastrointestinal:  Abdomen is soft and non-tender to palpation in all quadrants, no masses, bowel sounds normal. No guarding, no rebound.  No pulsatile mass.   Neurological: Alert and oriented x 4. CN II-XII grossly intact. No focal weakness. Strength intact 5/5 bilaterally in upper and lower extremities.   Skin: Warm and dry, no rashes.  Musculoskeletal: Extremities are non-tender, non-swollen and have full range of motion. Back NTTP along the midline.           ED Course     Labs Reviewed   CBC W/ AUTO DIFFERENTIAL - Abnormal; Notable for the following components:       Result  Value    RBC 3.62 (*)     Hemoglobin 10.8 (*)     Hematocrit 32.7 (*)     All other components within normal limits   COMPREHENSIVE METABOLIC PANEL - Abnormal; Notable for the following components:    BUN 19 (*)     All other components within normal limits   URINALYSIS, REFLEX TO URINE CULTURE - Abnormal; Notable for the following components:    Leukocytes, UA Trace (*)     All other components within normal limits    Narrative:     Preferred Collection Type->Urine, Clean Catch  Specimen Source->Urine   URINALYSIS MICROSCOPIC - Abnormal; Notable for the following components:    WBC, UA 11 (*)     Bacteria Moderate (*)     Yeast, UA Occasional (*)     All other components within normal limits    Narrative:     Preferred Collection Type->Urine, Clean Catch  Specimen Source->Urine   CULTURE, URINE   PROTIME-INR       Imaging Results              CTA Abdomen and Pelvis (Final result)  Result time 05/10/23 21:01:13      Final result by Shade Summers MD (05/10/23 21:01:13)                   Impression:      No evidence for active extravasation.  No hemodynamically significant stenosis.    Postsurgical changes    Remaining findings as above    All CT scans   are performed using dose optimization techniques including the following: automated exposure control; adjustment of the mA and/or kV; use of iterative reconstruction technique.  Dose modulation was employed for ALARA by means of: Automated exposure control; adjustment of the mA and/or kV according to patient size (this includes techniques or standardized protocols for targeted exams where dose is matched to indication/reason for exam; i.e. extremities or head); and/or use of iterative reconstructive technique.      Electronically signed by: Shade Summers  Date:    05/10/2023  Time:    21:01               Narrative:    EXAMINATION:  CTA ABDOMEN AND PELVIS    CLINICAL HISTORY:  GI bleed;    TECHNIQUE:  Low dose axial images, sagittal and coronal reformations were  obtained from the lung bases to the pubic symphysis before and following the IV administration of 100 mL of Omnipaque 350 and the oral administration of 30 mL of Omnipaque 350..    COMPARISON:  None    FINDINGS:  Postsurgical changes in the rectosigmoid colon and distal colon within the pelvis.  Correlate to surgical history.  Fatty infiltration of liver.  Atherosclerotic changes noted.  Celiac SMA renal artery CAROLINE are patent.  No hemodynamically significant stenosis.  No evidence for active extravasation.  Is contour loss on the right lateral renal cortex may relate to postsurgical changes or congenital variation.  Correlate to clinical history.  No hydronephrosis.  Mild moderate amount of stool in colon.  Bladder is mildly minimally distended.  No free fluid.                                         ED Management:    Differential diagnosis included but not limited to : My differential includes but is not limited to diverticulitis, rectal tear, hemorrhoids, enteritis, colitis, anticoagulation toxicity, tumor, JACI, fissure.        Medical Decision Making    Initial: This is a 62 y.o. female  with  has a past medical history of Anxiety, Arthritis, CHF (congestive heart failure), Depression, GERD (gastroesophageal reflux disease), High cholesterol, and Hypertension. who comes in complaining of BRBPR after a BM. On examination vitals are stable. On physical exam abdomen is soft, NTTP.  Concerns at this time high for bleeding diverticuli, active extravasation, hemorrhoids     Orders to further evaluate included: CBC, CMP, PT INR, UA, CTA Abd/Pelvis         Problems Addressed:  Rectal bleeding: complicated acute illness or injury    Amount and/or Complexity of Data Reviewed  External Data Reviewed: radiology and notes.     Details: 4/27/23 seen by her PCP Dr Olivier for UTI    Colonoscopy in Dec 2022 showing internal hemmorhoid, diverticuli  Labs: ordered.     Details: CBC with baseline anemia, CMP normal, UA normal, PT  INR normal.  Radiology: ordered.     Details: Agree with radiology interpretation - no active extravasation    Risk  Prescription drug management.  Decision regarding hospitalization.        After consideration of the pts current medications, the decision is made to maintain the current medication regimen.      Pt received the following in the ED:   Medications   iohexoL (OMNIPAQUE 350) injection 100 mL (100 mLs Intravenous Given 5/10/23 2030)           MDM continued:     Ashli Kumar  presents to the emergency Department today with bright red blood per rectum.  Recent colonoscopy showed internal hemorrhoids and diverticuli.  She has not had any other episodes of bleeding. Labs do not show any significant change in HGB/HCT.  CTA without active extravasation.  At this time most likely source in her internal hemorrhoids given occurring with BM and not recurrent.  I will discharge her and have her follow up with her gastroenterologist.  I have discussed with her that should she had repeat episode that she should return to the ED.  She understands.  Discussed warning signs for return.  At this time no need for admission.  She is comfortable going home at this time.       The pt is comfortable with this plan and comfortable going home at this time. After taking into careful account the historical factors and physical exam findings of the patient's presentation today, in conjunction with the empirical and objective data obtained on ED workup, no acute emergent medical condition requiring admission has been identified. The patient appears to be low risk for an emergent medical condition and I feel it is safe and appropriate at this time for the patient to be discharged to follow-up as detailed in their discharge instructions for reevaluation and possible continued outpatient workup and management. Regardless, an unremarkable evaluation in the ED does not preclude the development or presence of a serious or life  threatening condition. As such, patient was instructed to return immediately for any worsening or change in current symptoms. Precautions for return discussed at length.  Discharge and follow-up instructions discussed with the patient who expressed understanding and willingness to comply with my recommendations.    Voice recognition software utilized in this note.              Impression      The encounter diagnosis was Rectal bleeding.                Discharge Medication List as of 5/10/2023  9:38 PM           Follow-up Information       Schedule an appointment as soon as possible for a visit  with DEVEN Kay MD.    Specialty: Colon and Rectal Surgery  Contact information:  [address] [place] [address] [address]  [facility] [address] [phone]               Schedule an appointment as soon as possible for a visit  with Anurag Lopez MD.    Specialty: Family Medicine  Contact information:  [address] [facility] [address]  [facility] [address] [phone]                                    Kellie Fritz MD  05/11/23 2168

## 2023-05-12 ENCOUNTER — OFFICE VISIT (OUTPATIENT)
Dept: GASTROENTEROLOGY | Facility: CLINIC | Age: 63
End: 2023-05-12
Payer: MEDICAID

## 2023-05-12 ENCOUNTER — TELEPHONE (OUTPATIENT)
Dept: SURGERY | Facility: CLINIC | Age: 63
End: 2023-05-12
Payer: MEDICAID

## 2023-05-12 VITALS
BODY MASS INDEX: 31.46 KG/M2 | DIASTOLIC BLOOD PRESSURE: 76 MMHG | HEIGHT: 65 IN | WEIGHT: 188.81 LBS | SYSTOLIC BLOOD PRESSURE: 132 MMHG

## 2023-05-12 DIAGNOSIS — K62.5 RECTAL BLEEDING: ICD-10-CM

## 2023-05-12 DIAGNOSIS — R10.84 GENERALIZED ABDOMINAL PAIN: ICD-10-CM

## 2023-05-12 DIAGNOSIS — R19.8 IRREGULAR BOWEL HABITS: ICD-10-CM

## 2023-05-12 DIAGNOSIS — K21.9 GASTROESOPHAGEAL REFLUX DISEASE, UNSPECIFIED WHETHER ESOPHAGITIS PRESENT: Primary | ICD-10-CM

## 2023-05-12 LAB — BACTERIA UR CULT: NO GROWTH

## 2023-05-12 PROCEDURE — 3044F PR MOST RECENT HEMOGLOBIN A1C LEVEL <7.0%: ICD-10-PCS | Mod: CPTII,,, | Performed by: NURSE PRACTITIONER

## 2023-05-12 PROCEDURE — 3008F BODY MASS INDEX DOCD: CPT | Mod: CPTII,,, | Performed by: NURSE PRACTITIONER

## 2023-05-12 PROCEDURE — 3075F PR MOST RECENT SYSTOLIC BLOOD PRESS GE 130-139MM HG: ICD-10-PCS | Mod: CPTII,,, | Performed by: NURSE PRACTITIONER

## 2023-05-12 PROCEDURE — 3078F PR MOST RECENT DIASTOLIC BLOOD PRESSURE < 80 MM HG: ICD-10-PCS | Mod: CPTII,,, | Performed by: NURSE PRACTITIONER

## 2023-05-12 PROCEDURE — 99999 PR PBB SHADOW E&M-EST. PATIENT-LVL IV: ICD-10-PCS | Mod: PBBFAC,,, | Performed by: NURSE PRACTITIONER

## 2023-05-12 PROCEDURE — 99214 OFFICE O/P EST MOD 30 MIN: CPT | Mod: S$PBB,,, | Performed by: NURSE PRACTITIONER

## 2023-05-12 PROCEDURE — 1159F PR MEDICATION LIST DOCUMENTED IN MEDICAL RECORD: ICD-10-PCS | Mod: CPTII,,, | Performed by: NURSE PRACTITIONER

## 2023-05-12 PROCEDURE — 99214 OFFICE O/P EST MOD 30 MIN: CPT | Mod: PBBFAC,PO | Performed by: NURSE PRACTITIONER

## 2023-05-12 PROCEDURE — 1160F PR REVIEW ALL MEDS BY PRESCRIBER/CLIN PHARMACIST DOCUMENTED: ICD-10-PCS | Mod: CPTII,,, | Performed by: NURSE PRACTITIONER

## 2023-05-12 PROCEDURE — 99214 PR OFFICE/OUTPT VISIT, EST, LEVL IV, 30-39 MIN: ICD-10-PCS | Mod: S$PBB,,, | Performed by: NURSE PRACTITIONER

## 2023-05-12 PROCEDURE — 99999 PR PBB SHADOW E&M-EST. PATIENT-LVL IV: CPT | Mod: PBBFAC,,, | Performed by: NURSE PRACTITIONER

## 2023-05-12 PROCEDURE — 1160F RVW MEDS BY RX/DR IN RCRD: CPT | Mod: CPTII,,, | Performed by: NURSE PRACTITIONER

## 2023-05-12 PROCEDURE — 3075F SYST BP GE 130 - 139MM HG: CPT | Mod: CPTII,,, | Performed by: NURSE PRACTITIONER

## 2023-05-12 PROCEDURE — 1159F MED LIST DOCD IN RCRD: CPT | Mod: CPTII,,, | Performed by: NURSE PRACTITIONER

## 2023-05-12 PROCEDURE — 3078F DIAST BP <80 MM HG: CPT | Mod: CPTII,,, | Performed by: NURSE PRACTITIONER

## 2023-05-12 PROCEDURE — 3008F PR BODY MASS INDEX (BMI) DOCUMENTED: ICD-10-PCS | Mod: CPTII,,, | Performed by: NURSE PRACTITIONER

## 2023-05-12 PROCEDURE — 3044F HG A1C LEVEL LT 7.0%: CPT | Mod: CPTII,,, | Performed by: NURSE PRACTITIONER

## 2023-05-12 RX ORDER — LATANOPROST 50 UG/ML
1 SOLUTION/ DROPS OPHTHALMIC NIGHTLY
COMMUNITY
Start: 2023-04-29

## 2023-05-12 RX ORDER — NITROFURANTOIN 25; 75 MG/1; MG/1
CAPSULE ORAL
COMMUNITY
Start: 2023-03-23 | End: 2023-06-29

## 2023-05-12 RX ORDER — CYCLOBENZAPRINE HCL 10 MG
10 TABLET ORAL DAILY
COMMUNITY
Start: 2023-03-23

## 2023-05-12 RX ORDER — PRAVASTATIN SODIUM 20 MG/1
20 TABLET ORAL DAILY
COMMUNITY

## 2023-05-12 NOTE — PATIENT INSTRUCTIONS
- Omeprazole 40 mg every morning on an empty stomach 30-45 minutes before food or any other medications  - Dicyclomine 20 mg at least twice daily  - Fiber supplement daily and Miralax once every other day        EGD Prep Instructions    [facility] [address] [place]  [address]    You are scheduled for an EGD with Dr. Cotton on 6/1/2023 at [facility].  You will enter through the [facility] entrance and check in at Same Day Surgery.    Nothing to eat or drink after midnight before the procedure.  You MAY brush your teeth.    You MAY take your blood pressure, heart, and seizure medication on the morning of the procedure, with a SIP of water.  Hold ALL other medications until after the procedure.    You must have someone with you to DRIVE YOU HOME since you will be receiving IV sedation for the procedure.    If you are on blood thinners THAT YOU HAVE BEEN INSTRUCTED TO HOLD BY YOUR DOCTOR FOR THIS PROCEDURE, then do NOT take this the morning of your EGD.  Do NOT stop these medications on your own, they must be approved to be held by your doctor.  Your EGD can NOT be done if you are on these medications.  Examples of blood thinners include: Coumadin, Aggrenox, Plavix, Pradaxa, Reapro, Pletal, Xarelto, Ticagrelor, Brilinta, Eliquis, and high dose aspirin (325 mg).  You do not have to stop baby aspirin 81 mg.    You will receive a call a few days before your EGD to tell you the time to arrive.  If you have not received a call by the day before your procedure, call the Pre-op Coordinator at [phone].

## 2023-05-12 NOTE — PROGRESS NOTES
Subjective:       Patient ID: Ashli Kumar is a 62 y.o. female.    Chief Complaint: Rectal Bleeding    63 y/o female with GERD, chronic abdominal pain, and irregular bowel habits presents to clinic for hospital follow up. Patient presented to ED 5/10 with rectal bleeding after BM. CTA abd (-). History of internal hemorrhoids on seen on colonoscopy 2022. Encouraged daily Miralax and fiber to regulate bowel pattern. Intermittent abdominal cramping relieved by dicyclomine prn. Heartburn and reflux after certain foods. Takes PPI prn. Denies dysphagia, nausea, vomiting, or weight loss.       Past Medical History:   Diagnosis Date    Anxiety     Arthritis     CHF (congestive heart failure)     Depression     GERD (gastroesophageal reflux disease)     High cholesterol     Hypertension        Past Surgical History:   Procedure Laterality Date    BREAST BIOPSY Left     h    BREAST SURGERY       SECTION      COLONOSCOPY N/A 2017    Procedure: COLONOSCOPY Miralax;  Surgeon: Buddy Butcher Jr., MD;  Location: [facility];  Service: Endoscopy;  Laterality: N/A;    COLONOSCOPY N/A 2022    Procedure: COLONOSCOPY;  Surgeon: TREV Kay MD;  Location: [facility];  Service: Endoscopy;  Laterality: N/A;    COLONOSCOPY N/A 2022    Procedure: COLONOSCOPY;  Surgeon: TREV Kay MD;  Location: [facility];  Service: Endoscopy;  Laterality: N/A;    FLEXIBLE SIGMOIDOSCOPY N/A 2022    Procedure: SIGMOIDOSCOPY, FLEXIBLE;  Surgeon: TREV Kay MD;  Location: [facility] OR [address] [place] [address];  Service: Colon and Rectal;  Laterality: N/A;    HYSTERECTOMY      KIDNEY SURGERY Right     done at [facility], reported as mass by patient    LAPAROSCOPIC SIGMOIDECTOMY Left 2022    Procedure: COLECTOMY, SIGMOID, LAPAROSCOPIC, ERAS low;  Surgeon: TREV Kay MD;  [facility];  Service: Colon and Rectal;  Laterality: Left;    LEFT HEART CATHETERIZATION Left 2019     Procedure: Left heart cath;  Surgeon: Gerhard Krishnan MD;  Location: [facility] CATH LAB;  Service: Cardiology;  Laterality: Left;    MOBILIZATION OF SPLENIC FLEXURE N/A 7/20/2022    Procedure: MOBILIZATION, SPLENIC FLEXURE;  Surgeon: TREV Kay MD;  Location: [facility] OR [address] [place] [address];  Service: Colon and Rectal;  Laterality: N/A;    OOPHORECTOMY      s-section         History reviewed. No pertinent family history.    Social History     Socioeconomic History    Marital status:    Tobacco Use    Smoking status: Never    Smokeless tobacco: Never   Substance and Sexual Activity    Alcohol use: Not Currently     Comment: 12-31-22    Drug use: No    Sexual activity: Not Currently     Social Determinants of Health     Financial Resource Strain: Low Risk     Difficulty of Paying Living Expenses: Not hard at all   Food Insecurity: No Food Insecurity    Worried About Running Out of Food in the Last Year: Never true    Ran Out of Food in the Last Year: Never true   Transportation Needs: Unmet Transportation Needs    Lack of Transportation (Medical): Yes    Lack of Transportation (Non-Medical): Yes   Physical Activity: Inactive    Days of Exercise per Week: 0 days    Minutes of Exercise per Session: 0 min   Stress: Stress Concern Present    Feeling of Stress : Very much   Social Connections: Moderately Integrated    Frequency of Communication with Friends and Family: More than three times a week    Frequency of Social Gatherings with Friends and Family: More than three times a week    Attends Anabaptism Services: 1 to 4 times per year    Active Member of Clubs or Organizations: No    Attends Club or Organization Meetings: Never    Marital Status:    Housing Stability: High Risk    Unable to Pay for Housing in the Last Year: No    Number of Places Lived in the Last Year: 3    Unstable Housing in the Last Year: No       Review of Systems   Constitutional:  Negative for appetite change and unexpected weight change.  "  HENT:  Negative for trouble swallowing.    Respiratory:  Negative for shortness of breath.    Cardiovascular:  Negative for chest pain.   Gastrointestinal:  Positive for abdominal pain, blood in stool, constipation, diarrhea and nausea.   Hematological:  Negative for adenopathy. Does not bruise/bleed easily.   Psychiatric/Behavioral:  Negative for dysphoric mood.        Objective:     Vitals:    05/12/23 0905   BP: 132/76   BP Location: Right arm   Patient Position: Sitting   BP Method: Medium (Manual)   Weight: 85.6 kg (188 lb 12.8 oz)   Height: 5' 5" (1.651 m)          Physical Exam  Constitutional:       General: She is not in acute distress.     Appearance: Normal appearance. She is not ill-appearing.   HENT:      Head: Normocephalic.   Eyes:      Conjunctiva/sclera: Conjunctivae normal.   Pulmonary:      Effort: Pulmonary effort is normal. No respiratory distress.   Abdominal:      General: Bowel sounds are normal.      Palpations: Abdomen is soft.      Tenderness: There is no abdominal tenderness.   Musculoskeletal:         General: Normal range of motion.      Cervical back: Normal range of motion.   Skin:     General: Skin is warm and dry.   Neurological:      Mental Status: She is alert and oriented to person, place, and time.   Psychiatric:         Mood and Affect: Mood normal.         Behavior: Behavior normal.             Assessment:         ICD-10-CM ICD-9-CM   1. Gastroesophageal reflux disease, unspecified whether esophagitis present  K21.9 530.81   2. Irregular bowel habits  R19.8 569.89   3. Rectal bleeding  K62.5 569.3   4. Generalized abdominal pain  R10.84 789.07       Plan:       Gastroesophageal reflux disease, unspecified whether esophagitis present    Irregular bowel habits    Rectal bleeding  -     Ambulatory referral/consult to Gastroenterology    Generalized abdominal pain    - Schedule EGD. Start PPI daily. Discussed elimination of dietary triggers (fatty foods, caffeine, chocolate, " spicy foods, food with high fat content, carbonated beverages, and peppermint. Miralax and fiber supplement daily. Dicyclomine prn. Follow up with colorectal for rectal bleeding likely 2/2 IH.     Follow up if symptoms worsen or fail to improve.     Patient's Medications   New Prescriptions    No medications on file   Previous Medications    CYCLOBENZAPRINE (FLEXERIL) 10 MG TABLET        DICLOFENAC SODIUM (VOLTAREN) 1 % GEL    Apply 2 g topically 4 (four) times daily.    DICYCLOMINE (BENTYL) 20 MG TABLET    Take 1 tablet (20 mg total) by mouth 3 (three) times daily as needed (abdominal pain).    DICYCLOMINE (BENTYL) 20 MG TABLET    Take 1 tablet (20 mg total) by mouth 2 (two) times daily.    DIVALPROEX (DEPAKOTE) 500 MG TBEC    Take 1 tablet (500 mg total) by mouth 2 (two) times a day.    FLUOXETINE 20 MG CAPSULE    Take 1 capsule (20 mg total) by mouth once daily.    FLUTICASONE PROPIONATE (FLONASE) 50 MCG/ACTUATION NASAL SPRAY    2 sprays by Each Nostril route once daily.    LATANOPROST 0.005 % OPHTHALMIC SOLUTION        LORATADINE (CLARITIN) 10 MG TABLET    Take 1 tablet (10 mg total) by mouth daily as needed for Allergies.    LORATADINE 10 MG CAP    Loratadine    METOPROLOL TARTRATE (LOPRESSOR) 25 MG TABLET    Take 25 mg by mouth 2 (two) times daily.    NITROFURANTOIN, MACROCRYSTAL-MONOHYDRATE, (MACROBID) 100 MG CAPSULE        OLANZAPINE (ZYPREXA) 2.5 MG TABLET    Take 1 tablet (2.5 mg total) by mouth 2 (two) times a day.    OMEPRAZOLE (PRILOSEC) 40 MG CAPSULE    Take 1 capsule (40 mg total) by mouth once daily.    PRAVASTATIN (PRAVACHOL) 20 MG TABLET    Pravastatin Sodium    PREMARIN VAGINAL CREAM    Place 0.5 g vaginally twice a week.    SPIRONOLACTONE (ALDACTONE) 25 MG TABLET    Take 12.5 mg by mouth.   Modified Medications    No medications on file   Discontinued Medications    No medications on file

## 2023-05-16 ENCOUNTER — CLINICAL SUPPORT (OUTPATIENT)
Dept: REHABILITATION | Facility: HOSPITAL | Age: 63
End: 2023-05-16
Payer: MEDICAID

## 2023-05-16 DIAGNOSIS — R29.3 POSTURE IMBALANCE: Primary | ICD-10-CM

## 2023-05-16 DIAGNOSIS — R29.898 WEAKNESS OF SHOULDER: ICD-10-CM

## 2023-05-16 DIAGNOSIS — M25.612 DECREASED RANGE OF MOTION OF LEFT SHOULDER: ICD-10-CM

## 2023-05-16 PROCEDURE — 97110 THERAPEUTIC EXERCISES: CPT | Mod: PO,CQ

## 2023-05-16 NOTE — PROGRESS NOTES
"  Physical Therapy Treatment Note     Name: Ashli Kumar  Clinic Number: [id number]    Therapy Diagnosis:   Encounter Diagnoses   Name Primary?    Posture imbalance Yes    Decreased range of motion of left shoulder     Weakness of shoulder            Physician: Anurag Lopez MD    Visit Date: 5/16/2023    Physician Orders: PT Eval and Treat   Medical Diagnosis from Referral: M19.012 (ICD-10-CM) - Osteoarthritis of left shoulder, unspecified osteoarthritis type   Evaluation Date: 3/14/2023  Authorization Period Expiration: 02/15/2024  Plan of Care Expiration: 12/31/2023  Visit #/Visits authorized: 12/20 (+eval)     Time In: 01:00 pm  Time Out:   Total Billable Time:  minutes    Precautions: Standard,  pt reported being dx with Bi-polar dis order and depression, no longer seeing a therapist"    Subjective     Pt reports: no new issues or concerns at this time.   She was compliant with home exercise program.  Response to previous treatment: good   Functional change: Ongoing    Pain: /10  Location: L shoulder    Objective     Ashli received therapeutic exercises to develop strength, endurance, and posture for 55 minutes including:    - UBE - 4' fwd/4' bwd  - Pulleys flex/abd 3'/3'  - Wall slides flex/abd L - 30 x w 5" holds  - Serratus slides - 3 x 15  - Wand flex - 3 x 15 x w 1# dowel; 3 x 10 w 3#  - Serratus punches - 2#, 3x15   - SL shoulder ER - 3 x 15 B w 1#  - Prone I 2# 3x10  - Prone Y 3x10  - Prone T 3x10 -- mildly painful  - seated scapular rows x 30   - TB lat pull downs GTB 3 x 15     - TB ER RTB  3 x 15    - TB IR GTB 3 x 15  - Prone rows 5# 3 x 10  - standing GTB rows 3 x 10 hold 5"   - wall clocks YTB 3x5 each arm  - standing scaption YTB 3x8  - bicep Curls 2# 3x10      Home Exercises Provided and Patient Education Provided     Education provided:   - importance of consistent performance of HEP    Written Home Exercises Provided: yes.  Exercises were reviewed and Ashli was able to demonstrate " them prior to the end of the session.  Ashli demonstrated good  understanding of the education provided.       Assessment     Pt with no pain pre or post treatment.  Pt was able to maintain appropriate exercise form throughout session.  Will continue to monitor and progress pt within her tolerance.      Ashli Is progressing well towards her goals.   Pt prognosis is Fair.     Pt will continue to benefit from skilled outpatient physical therapy to address the deficits listed in the problem list box on initial evaluation, provide pt/family education and to maximize pt's level of independence in the home and community environment.     Pt's spiritual, cultural and educational needs considered and pt agreeable to plan of care and goals.     Anticipated barriers to physical therapy:  attendance to therapy, sedentary lifestyle, HEP compliance     Goals:   Short Term Goals:  5 weeks  1.Report decreased  pain < / =  2/10  to increase tolerance for therapeutic interventions   2. Increase AROM by 20 degrees for flexion and abduction    3. Increased strength by 1/3 MMT grade  to increase tolerance for ADL and work activities.  4. Pt to tolerate HEP to improve ROM and independence with ADL's     Long Term Goals: 10 weeks  2.Increase AROM to be symmetrical of non invovled side in a pain free manner   3.Increase strength to >/= 4/5  to increase tolerance for ADL and work activities.  4. Pt goal: be able to lift and carry pots and pans with no pain   5. Pt will have improved gcode of CJ (20-40% limited) on FOTO shoulder in order to demonstrate true functional improvement.    Plan     Continue with PT POC and progress as tolerated.     Dominick Cage, PTA

## 2023-05-22 ENCOUNTER — CLINICAL SUPPORT (OUTPATIENT)
Dept: REHABILITATION | Facility: HOSPITAL | Age: 63
End: 2023-05-22
Payer: MEDICAID

## 2023-05-22 ENCOUNTER — TELEPHONE (OUTPATIENT)
Dept: GASTROENTEROLOGY | Facility: CLINIC | Age: 63
End: 2023-05-22
Payer: MEDICAID

## 2023-05-22 DIAGNOSIS — M25.612 DECREASED RANGE OF MOTION OF LEFT SHOULDER: ICD-10-CM

## 2023-05-22 DIAGNOSIS — R29.898 WEAKNESS OF SHOULDER: ICD-10-CM

## 2023-05-22 DIAGNOSIS — R29.3 POSTURE IMBALANCE: Primary | ICD-10-CM

## 2023-05-22 PROCEDURE — 97110 THERAPEUTIC EXERCISES: CPT | Mod: PO

## 2023-05-22 NOTE — TELEPHONE ENCOUNTER
----- Message from Kika Carr sent at 5/22/2023 12:54 PM CDT -----  Type:  Needs Medical Advice    Who Called: pt  Symptoms (please be specific): pt is requesting to be rescheduled from the 06/01/23 for a EGD     Would the patient rather a call back or a response via [facility]? call  Best Call Back Number: [phone]  Additional Information:

## 2023-05-22 NOTE — PROGRESS NOTES
"  Physical Therapy Treatment Note     Name: Ashli Kumar  Clinic Number: [id number]    Therapy Diagnosis:   Encounter Diagnoses   Name Primary?    Posture imbalance Yes    Decreased range of motion of left shoulder     Weakness of shoulder        Physician: Anurag Lopez MD    Visit Date: 5/22/2023    Physician Orders: PT Eval and Treat   Medical Diagnosis from Referral: M19.012 (ICD-10-CM) - Osteoarthritis of left shoulder, unspecified osteoarthritis type   Evaluation Date: 3/14/2023  Authorization Period Expiration: 02/15/2024  Plan of Care Expiration: 12/31/2023  Visit #/Visits authorized: 13/20 (+eval)     Time In: 9:00 am  Time Out: 9:55 am  Total Billable Time: 55 minutes     Precautions: Standard,  pt reported being dx with Bi-polar dis order and depression, no longer seeing a therapist"    Subjective     Pt reports: I am feeling okay today, without any real pain in my L shoulder, however I have some pain in my R shoulder. I am starting to have a problem with a feeling of increased pressure around my mid back, and the last time this happened I had a bladder infection. I am going back to my doctor this week.  She was compliant with home exercise program.  Response to previous treatment: felt okay  Functional change: Ongoing    Pain: 4/10  Location: R shoulder (pain usually in L shoulder)    Objective     Ashli received therapeutic exercises to develop strength, endurance, and posture for 55 minutes including:    - UBE - 4' fwd/4' bwd   - Pulleys flex/abd 3'/3'  - Wall slides flex/abd L - 30 x w 5" holds   - B shoulder ER - 3 x 10 w RTB  - Serratus slides - 3 x 15  - Wand flex - 3 x 15 w 3# dowel  - Serratus punches - 3#, 3x15  - SL shoulder ER - 3 x 15 B w 1#   - Prone I 1# 3x10   - Prone Y 1# 3x10   - Prone T 3x10 -- mildly painful   - seated scapular rows x 30   - TB lat pull downs GTB 3 x 15     - TB ER RTB  3 x 15    - TB IR GTB 3 x  - Prone rows 5# 3 x 10   - TB Lat pull downs - 3 x 10 GTB  - " "standing GTB rows 3 x 10 hold 5"   - wall clocks YTB 3x5 each arm  - standing scaption YTB 3x8  - bicep Curls 2# 3x10       Home Exercises Provided and Patient Education Provided     Education provided:   - importance of consistent performance of HEP    Written Home Exercises Provided: yes.  Exercises were reviewed and Ashli was able to demonstrate them prior to the end of the session.  Ashli demonstrated good  understanding of the education provided.       Assessment     Ashli presents to therapy with minimal to moderate levels of pain in her R shoulder today instead of her L shoulder. She expresses that she is uncertain if the pain provocation is as a result of sleeping position. She states that she feels significant improvement in her L shoulder. She tolerates today's session well without symptom provocation. She requires moderate VC and TC for proper performance of prescribed activities with appropriate body mechanics and for postural awareness in order to avoid compensatory movements.     Ashli Is progressing well towards her goals.   Pt prognosis is Fair.     Pt will continue to benefit from skilled outpatient physical therapy to address the deficits listed in the problem list box on initial evaluation, provide pt/family education and to maximize pt's level of independence in the home and community environment.     Pt's spiritual, cultural and educational needs considered and pt agreeable to plan of care and goals.     Anticipated barriers to physical therapy:  attendance to therapy, sedentary lifestyle, HEP compliance     Goals:   Short Term Goals:  5 weeks  1.Report decreased  pain < / =  2/10  to increase tolerance for therapeutic interventions   2. Increase AROM by 20 degrees for flexion and abduction    3. Increased strength by 1/3 MMT grade  to increase tolerance for ADL and work activities.  4. Pt to tolerate HEP to improve ROM and independence with ADL's     Long Term Goals: 10 " weeks  2.Increase AROM to be symmetrical of non invovled side in a pain free manner   3.Increase strength to >/= 4/5  to increase tolerance for ADL and work activities.  4. Pt goal: be able to lift and carry pots and pans with no pain   5. Pt will have improved gcode of CJ (20-40% limited) on FOTO shoulder in order to demonstrate true functional improvement.    Plan     Continue with PT POC and progress as tolerated.     Ghazal Caceres, PT

## 2023-05-24 ENCOUNTER — OFFICE VISIT (OUTPATIENT)
Dept: FAMILY MEDICINE | Facility: HOSPITAL | Age: 63
End: 2023-05-24
Payer: MEDICAID

## 2023-05-24 VITALS
HEART RATE: 68 BPM | HEIGHT: 65 IN | DIASTOLIC BLOOD PRESSURE: 78 MMHG | SYSTOLIC BLOOD PRESSURE: 144 MMHG | WEIGHT: 187.19 LBS | BODY MASS INDEX: 31.19 KG/M2

## 2023-05-24 DIAGNOSIS — K21.9 GASTROESOPHAGEAL REFLUX DISEASE, UNSPECIFIED WHETHER ESOPHAGITIS PRESENT: ICD-10-CM

## 2023-05-24 DIAGNOSIS — M19.90 OSTEOARTHRITIS, UNSPECIFIED OSTEOARTHRITIS TYPE, UNSPECIFIED SITE: ICD-10-CM

## 2023-05-24 DIAGNOSIS — N30.00 ACUTE CYSTITIS WITHOUT HEMATURIA: Primary | ICD-10-CM

## 2023-05-24 PROCEDURE — 99215 OFFICE O/P EST HI 40 MIN: CPT | Performed by: STUDENT IN AN ORGANIZED HEALTH CARE EDUCATION/TRAINING PROGRAM

## 2023-05-24 RX ORDER — OMEPRAZOLE 40 MG/1
40 CAPSULE, DELAYED RELEASE ORAL DAILY
Qty: 30 CAPSULE | Refills: 2 | Status: SHIPPED | OUTPATIENT
Start: 2023-05-24 | End: 2024-05-23

## 2023-05-26 ENCOUNTER — CLINICAL SUPPORT (OUTPATIENT)
Dept: REHABILITATION | Facility: HOSPITAL | Age: 63
End: 2023-05-26
Payer: MEDICAID

## 2023-05-26 DIAGNOSIS — M19.90 OSTEOARTHRITIS, UNSPECIFIED OSTEOARTHRITIS TYPE, UNSPECIFIED SITE: ICD-10-CM

## 2023-05-26 DIAGNOSIS — M25.612 DECREASED RANGE OF MOTION OF LEFT SHOULDER: ICD-10-CM

## 2023-05-26 DIAGNOSIS — R29.898 WEAKNESS OF SHOULDER: ICD-10-CM

## 2023-05-26 DIAGNOSIS — R29.3 POSTURE IMBALANCE: Primary | ICD-10-CM

## 2023-05-26 PROCEDURE — 97110 THERAPEUTIC EXERCISES: CPT | Mod: PO

## 2023-05-26 NOTE — PROGRESS NOTES
"  Physical Therapy Treatment Note     Name: Ashli Kumar  Clinic Number: [id number]    Therapy Diagnosis:   Encounter Diagnoses   Name Primary?    Osteoarthritis, unspecified osteoarthritis type, unspecified site     Posture imbalance Yes    Decreased range of motion of left shoulder     Weakness of shoulder        Physician: Anurag Lopez MD    Visit Date: 5/26/2023    Physician Orders: PT Eval and Treat   Medical Diagnosis from Referral: M19.012 (ICD-10-CM) - Osteoarthritis of left shoulder, unspecified osteoarthritis type   Evaluation Date: 3/14/2023  Authorization Period Expiration: 02/15/2024  Plan of Care Expiration: 12/31/2023  Visit #/Visits authorized: 14/20 (+eval)     Time In: 9:00 am  Time Out: 9:55 am  Total Billable Time: 55 minutes     Precautions: Standard,  pt reported being dx with Bi-polar dis order and depression, no longer seeing a therapist"    Subjective     Pt reports: I am feeling okay today, without any real pain in my L shoulder, however I have some pain in my R shoulder. I am starting to have a problem with a feeling of increased pressure around my mid back, and the last time this happened I had a bladder infection. I am going back to my doctor this week.  She was compliant with home exercise program.  Response to previous treatment: felt okay  Functional change: Ongoing    Pain: 4/10  Location: R shoulder (pain usually in L shoulder)    Objective     Observation:  LUE held across body, decrease trunk rotation and arm swing during ambulation      Posture: FHP, rounded shoulders     Knee Range of Motion:    Right Active (Passive) Left Active (Passive)   Flexion 118 (130) 125 (135)   Extension -16 (-9) -10 (0)        Active Range of Motion:   Shoulder Right Left   Flexion 151 (155) 150 (165)   Abduction 151 (157) 121 (154)   Fx reach ER  T1 T1   Fx reach IR L3 T12      Strength:  Shoulder Right Left   Flexion 4/5 4/5   Abduction 4-/5 4-/5   ER 4/5 4/5   IR 4/5 4-/5                 " "        Special Tests:        Rotator Cuff Tear      Left   Hawkin's Delfino   +    Drop Arm test   -   Resisted ER   +      Joint Mobility: UR        Palpation: LUE - TTP w mod to max pressure  Infraspinatus -increase in pain   Supraspinatus -increase in pain  Deltoid -increase in pain  Upper trap -increase in pain  Subscapularis -increase in pain     Sensation:   Intact      Flexibility:   Lat: R + ; L +              Pec Minor/major: R + ; L +       Ashli received therapeutic exercises to develop strength, endurance, and posture for 55 minutes including:    - UBE - 4' fwd/4' bwd   - Pulleys flex/abd 3'/3'  - Wall slides flex/abd L - 30 x w 5" holds   - B shoulder ER - 3 x 10 w RTB   - Serratus slides - 3 x 15  - Wand flex - 3 x 15 w 3# dowel   - Serratus punches - 3#, 3x15   - SL shoulder ER - 3 x 15 B w 1#   - Prone I 1# 3x10   - Prone Y 1# 3x10   - Prone T 3x10 -- mildly painful   - seated scapular rows x 30   - TB lat pull downs GTB 3 x 15     - TB ER RTB  3 x 15    - TB IR GTB 3 x  - Prone rows 5# 3 x 10   - TB Lat pull downs - 3 x 10 GTB   - standing GTB rows 3 x 10 hold 5"   - wall clocks YTB 3x5 each arm  - standing scaption YTB 3x8  - bicep Curls 2# 3x10       Home Exercises Provided and Patient Education Provided     Education provided:   - importance of consistent performance of HEP    Written Home Exercises Provided: yes.  Exercises were reviewed and Ashli was able to demonstrate them prior to the end of the session.  Ashli demonstrated good  understanding of the education provided.       Assessment     Ashli presents to therapy for a POC update at this visit, with reassement of her L shoulder as well as assessment of her knees. Pt explains that she has been having knee pain for a while. She displays significant improvement to the functionality of her L shoulder, with some continued strength and L shoulder ROM deficits. Additionally, pt states that she has chronic knee pain.      Ashli Is " progressing well towards her goals.   Pt prognosis is Fair.     Pt will continue to benefit from skilled outpatient physical therapy to address the deficits listed in the problem list box on initial evaluation, provide pt/family education and to maximize pt's level of independence in the home and community environment.     Pt's spiritual, cultural and educational needs considered and pt agreeable to plan of care and goals.     Anticipated barriers to physical therapy:  attendance to therapy, sedentary lifestyle, HEP compliance     Goals:   Short Term Goals:  5 weeks  1.Report decreased  pain < / =  2/10  to increase tolerance for therapeutic interventions. In progress, not met   2. Increase AROM by 20 degrees for flexion and abduction. Met   3. Increased strength by 1/3 MMT grade  to increase tolerance for ADL and work activities. Met  4. Pt to tolerate HEP to improve ROM and independence with ADL's. Met     Long Term Goals: 10 weeks  1.Increase AROM to be symmetrical of non invovled side in a pain free manner. In progress, not met   2.Increase strength to >/= 4/5  to increase tolerance for ADL and work activities. Met  3. Pt goal: be able to lift and carry pots and pans with no pain. Met  4. Pt will have improved gcode of CJ (20-40% limited) on FOTO shoulder in order to demonstrate true functional improvement. In progress, not met    Plan     Continue with PT POC and progress as tolerated.     Ghazal Caceres, PT

## 2023-05-29 ENCOUNTER — CLINICAL SUPPORT (OUTPATIENT)
Dept: REHABILITATION | Facility: HOSPITAL | Age: 63
End: 2023-05-29
Payer: MEDICAID

## 2023-05-29 DIAGNOSIS — M25.612 DECREASED RANGE OF MOTION OF LEFT SHOULDER: ICD-10-CM

## 2023-05-29 DIAGNOSIS — R29.898 WEAKNESS OF SHOULDER: ICD-10-CM

## 2023-05-29 DIAGNOSIS — R29.3 POSTURE IMBALANCE: Primary | ICD-10-CM

## 2023-05-29 PROCEDURE — 97110 THERAPEUTIC EXERCISES: CPT | Mod: PO

## 2023-05-29 NOTE — PLAN OF CARE
" [facility]  Physical Therapy Plan of Care Note     Name: Ashli Kumar  Clinic Number: [id number]    Therapy Diagnosis:   Encounter Diagnoses   Name Primary?    Osteoarthritis, unspecified osteoarthritis type, unspecified site     Posture imbalance Yes    Decreased range of motion of left shoulder     Weakness of shoulder      Physician: Anurag Lopez MD    Visit Date: 5/26/2023    Physician Orders: PT Eval and Treat   Medical Diagnosis from Referral: M19.012 (ICD-10-CM) - Osteoarthritis of left shoulder, unspecified osteoarthritis type   Evaluation Date: 3/14/2023  Authorization Period Expiration: 02/15/2024  Plan of Care Expiration: 6/26/2023  Visit #/Visits authorized: 14/20 (+eval)     Precautions: Standard,  pt reported being dx with Bi-polar dis order and depression, no longer seeing a therapist"  Functional Level Prior to Evaluation:  Independent    Subjective     Update:   Pt reports: I am feeling okay today, without any real pain in my L shoulder, however I have some pain in my R shoulder. I am starting to have a problem with a feeling of increased pressure around my mid back, and the last time this happened I had a bladder infection. I am going back to my doctor this week.  She was compliant with home exercise program.  Response to previous treatment: felt okay  Functional change: Ongoing    Pain: 4/10  Location: R shoulder (pain usually in L shoulder)    Objective      Update:   Observation:  LUE held across body, decrease trunk rotation and arm swing during ambulation      Posture: FHP, rounded shoulders     Knee Range of Motion:    Right Active (Passive) Left Active (Passive)   Flexion 118 (130) 125 (135)   Extension -16 (-9) -10 (0)        Active Range of Motion:   Shoulder Right Left   Flexion 151 (155) 150 (165)   Abduction 151 (157) 121 (154)   Fx reach ER  T1 T1   Fx reach IR L3 T12      Strength:  Shoulder Right Left   Flexion 4/5 4/5   Abduction 4-/5 4-/5 "   ER 4/5 4/5   IR 4/5 4-/5                         Special Tests:        Rotator Cuff Tear      Left   Hawkin's Delfino   +    Drop Arm test   -   Resisted ER   +      Joint Mobility: UR        Palpation: LUE - TTP w mod to max pressure  Infraspinatus -increase in pain   Supraspinatus -increase in pain  Deltoid -increase in pain  Upper trap -increase in pain  Subscapularis -increase in pain     Sensation:   Intact      Flexibility:   Lat: R + ; L +              Pec Minor/major: R + ; L +    Assessment     Update: Ashli presents to therapy for a POC update at this visit, with reassement of her L shoulder as well as assessment of her knees. Pt explains that she has been having knee pain for a while. She displays significant improvement to the functionality of her L shoulder, with some continued strength and L shoulder ROM deficits. Additionally, pt states that she has chronic knee pain.    Previous Short Term Goals Status:   4/4 In progress, not met  New Short Term Goals Status:   3/4 Met; 1/4 In progress, not met  Long Term Goal Status: continue per initial plan of care.  Reasons for Recertification of Therapy:   Continues to have STG and LTG unmet; continued shoulder deficits w strength and ROM    GOALS  Short Term Goals:  5 weeks  1.Report decreased  pain < / =  2/10  to increase tolerance for therapeutic interventions. In progress, not met   2. Increase AROM by 20 degrees for flexion and abduction. Met   3. Increased strength by 1/3 MMT grade  to increase tolerance for ADL and work activities. Met  4. Pt to tolerate HEP to improve ROM and independence with ADL's. Met     Long Term Goals: 10 weeks  1.Increase AROM to be symmetrical of non invovled side in a pain free manner. In progress, not met   2.Increase strength to >/= 4/5  to increase tolerance for ADL and work activities. Met  3. Pt goal: be able to lift and carry pots and pans with no pain. Met  4. Pt will have improved gcode of CJ (20-40% limited) on  FOTO shoulder in order to demonstrate true functional improvement. In progress, not met    Plan     Updated Certification Period: 5/26/2023 to 6/26/2023   Recommended Treatment Plan: 2 times per week for 4 weeks:  Gait Training, Manual Therapy, Moist Heat/ Ice, Neuromuscular Re-ed, Patient Education, Therapeutic Activities, and Therapeutic Exercise  Other Recommendations: None    Ghazal Caceres, PT

## 2023-05-29 NOTE — PROGRESS NOTES
"  Physical Therapy Treatment Note     Name: Ashli Kumar  Clinic Number: [id number]    Therapy Diagnosis:   Encounter Diagnoses   Name Primary?    Posture imbalance Yes    Decreased range of motion of left shoulder     Weakness of shoulder          Physician: Anurag Lopez MD    Visit Date: 5/29/2023    Physician Orders: PT Eval and Treat   Medical Diagnosis from Referral: M19.012 (ICD-10-CM) - Osteoarthritis of left shoulder, unspecified osteoarthritis type   Evaluation Date: 3/14/2023  Authorization Period Expiration: 02/15/2024  Plan of Care Expiration: 12/31/2023  Visit #/Visits authorized: 15/20 (+eval)     Time In: 1:00 pm  Time Out:2:00 pm  Total Billable Time: 60 minutes     Precautions: Standard,  pt reported being dx with Bi-polar dis order and depression, no longer seeing a therapist"    Subjective     Pt reports: I am feeling okay today, without any real pain in my L shoulder.     She was compliant with home exercise program.  Response to previous treatment: felt okay  Functional change: Ongoing    Pain: 3/10 (shoulder), 6-7/10 (knees)  Location: L shoulder and knees    Objective       Ashli received therapeutic exercises to develop strength, endurance, and posture for 60 minutes including:    - UBE - 4' fwd/4' bwd   - Pulleys flex/abd 3'/3'  - Wall slides flex/abd L - 30 x w 5" holds   - B shoulder ER - 3 x 10 w GTB   - Serratus slides - 3 x 15  - Wand flex - 3 x 15 w 4# dowel   - Serratus punches - 4#, 3x15   - Doorway str - 2 x 1'  - SL shoulder ER - 3 x 15 B w 1#   - Prone I 1# 3x10   - Prone Y 1# 3x10   - Prone T 3x10 -- mildly painful   - seated scapular rows x 30   - TB lat pull downs GTB 3 x 15     - TB ER RTB  3 x 15    - TB IR GTB 3 x  - Prone rows 5# 3 x 10 w 3" holds at end range  - TB Lat pull downs - 3 x 10 BTB   - standing GTB rows 3 x 10 hold 5"   - wall clocks YTB 3x5 each arm  - standing scaption YTB 3x8  - bicep Curls 2# 3x10       Home Exercises Provided and Patient " Education Provided     Education provided:   - importance of consistent performance of HEP    Written Home Exercises Provided: yes.  Exercises were reviewed and Ashli was able to demonstrate them prior to the end of the session.  Ashli demonstrated good  understanding of the education provided.       Assessment     Ashli presents to therapy with minimal pain in her L shoulder and moderate pain in her knees. She tolerates today's session well without symptom provocation with some progressions. She requires moderate VC and TC for proper performance of prescribed activities with appropriate body mechanics and for postural awareness in order to avoid compensatory movements.     Ashli Is progressing well towards her goals.   Pt prognosis is Fair.     Pt will continue to benefit from skilled outpatient physical therapy to address the deficits listed in the problem list box on initial evaluation, provide pt/family education and to maximize pt's level of independence in the home and community environment.     Pt's spiritual, cultural and educational needs considered and pt agreeable to plan of care and goals.     Anticipated barriers to physical therapy:  attendance to therapy, sedentary lifestyle, HEP compliance     Goals:   Short Term Goals:  5 weeks  1.Report decreased  pain < / =  2/10  to increase tolerance for therapeutic interventions   2. Increase AROM by 20 degrees for flexion and abduction    3. Increased strength by 1/3 MMT grade  to increase tolerance for ADL and work activities.  4. Pt to tolerate HEP to improve ROM and independence with ADL's     Long Term Goals: 10 weeks  2.Increase AROM to be symmetrical of non invovled side in a pain free manner   3.Increase strength to >/= 4/5  to increase tolerance for ADL and work activities.  4. Pt goal: be able to lift and carry pots and pans with no pain   5. Pt will have improved gcode of CJ (20-40% limited) on FOTO shoulder in order to demonstrate true  functional improvement.    Plan     Continue with PT POC and progress as tolerated.     Ghazal Caceres, PT

## 2023-06-01 NOTE — PROGRESS NOTES
[facility]  History & Physical    SUBJECTIVE:     History of Present Illness:  62 y.o. female who  has a past medical history of Anxiety, Arthritis, CHF (congestive heart failure), Depression, GERD (gastroesophageal reflux disease), High cholesterol, and Hypertension. presents to clinic today for back/abdominal pain. Pt says these sx's have been presetn for several weeks now and states they are similar to previous episodes with UTI. She denies any change in urinary frequency as well as no hematuria or dysuria. She denies any fevers or chills and has had no other sx's. She is also complaining of BL knee pain and reports previously being told she has arthritis but states the pain has progressed and is taking OTC medications more frequently than before. She denies previous injection use or PT.      Allergies:  Review of patient's allergies indicates:   Allergen Reactions    Codeine Hives    Sulfa (sulfonamide antibiotics) Hives    Benzoate analogues Itching       Home Medications:  Current Outpatient Medications on File Prior to Visit   Medication Sig    cyclobenzaprine (FLEXERIL) 10 MG tablet     diclofenac sodium (VOLTAREN) 1 % Gel Apply 2 g topically 4 (four) times daily.    dicyclomine (BENTYL) 20 mg tablet Take 1 tablet (20 mg total) by mouth 3 (three) times daily as needed (abdominal pain).    divalproex (DEPAKOTE) 500 MG TbEC Take 1 tablet (500 mg total) by mouth 2 (two) times a day.    FLUoxetine 20 MG capsule Take 1 capsule (20 mg total) by mouth once daily.    fluticasone propionate (FLONASE) 50 mcg/actuation nasal spray 2 sprays by Each Nostril route once daily.    latanoprost 0.005 % ophthalmic solution     loratadine (CLARITIN) 10 mg tablet Take 1 tablet (10 mg total) by mouth daily as needed for Allergies.    metoprolol tartrate (LOPRESSOR) 25 MG tablet Take 25 mg by mouth 2 (two) times daily.    nitrofurantoin, macrocrystal-monohydrate, (MACROBID) 100 MG capsule     OLANZapine (ZYPREXA) 2.5 MG  tablet Take 1 tablet (2.5 mg total) by mouth 2 (two) times a day.    pravastatin (PRAVACHOL) 20 MG tablet Pravastatin Sodium    spironolactone (ALDACTONE) 25 MG tablet Take 12.5 mg by mouth.    loratadine 10 mg Cap Loratadine    PREMARIN vaginal cream Place 0.5 g vaginally twice a week.    [DISCONTINUED] mesalamine (APRISO) 0.375 gram Cp24 Take 4 capsules (1.5 g total) by mouth once daily. (Patient not taking: No sig reported)     No current facility-administered medications on file prior to visit.       Past Medical History:   Diagnosis Date    Anxiety     Arthritis     CHF (congestive heart failure)     Depression     GERD (gastroesophageal reflux disease)     High cholesterol     Hypertension      Past Surgical History:   Procedure Laterality Date    BREAST BIOPSY Left     [facility]    BREAST SURGERY       SECTION      COLONOSCOPY N/A 2017    Procedure: COLONOSCOPY Miralax;  Surgeon: Buddy Butcher Jr., MD;  Location: [facility];  Service: Endoscopy;  Laterality: N/A;    COLONOSCOPY N/A 2022    Procedure: COLONOSCOPY;  Surgeon: TREV Kay MD;  Location: [facility] ENDO;  Service: Endoscopy;  Laterality: N/A;    COLONOSCOPY N/A 2022    Procedure: COLONOSCOPY;  Surgeon: TREV Kay MD;  Location: [facility];  Service: Endoscopy;  Laterality: N/A;    FLEXIBLE SIGMOIDOSCOPY N/A 2022    Procedure: SIGMOIDOSCOPY, FLEXIBLE;  Surgeon: TREV Kay MD;  Location: [facility] OR [address] [place] [address];  Service: Colon and Rectal;  Laterality: N/A;    HYSTERECTOMY      KIDNEY SURGERY Right     done at [facility], reported as mass by patient    LAPAROSCOPIC SIGMOIDECTOMY Left 2022    Procedure: COLECTOMY, SIGMOID, LAPAROSCOPIC, ERAS low;  Surgeon: TREV Kay MD;  Location: [facility] OR [address] [place] [address];  Service: Colon and Rectal;  Laterality: Left;    LEFT HEART CATHETERIZATION Left 2019    Procedure: Left heart cath;  Surgeon: Gerhard Krishnan MD;  Location: [facility] CATH LAB;  Service:  Cardiology;  Laterality: Left;    MOBILIZATION OF SPLENIC FLEXURE N/A 7/20/2022    Procedure: MOBILIZATION, SPLENIC FLEXURE;  Surgeon: TREV Kay MD;  Location: [facility] OR [address] [place] [address];  Service: Colon and Rectal;  Laterality: N/A;    OOPHORECTOMY      s-section       No family history on file.  Social History     Tobacco Use    Smoking status: Never    Smokeless tobacco: Never   Substance Use Topics    Alcohol use: Not Currently     Comment: 12-31-22    Drug use: No        Review of Systems   Constitutional:  Negative for chills, fever and malaise/fatigue.   HENT:  Negative for congestion, sinus pain and sore throat.    Eyes:  Negative for photophobia.   Respiratory:  Negative for cough, sputum production and shortness of breath.    Cardiovascular:  Negative for chest pain, palpitations and leg swelling.   Gastrointestinal:  Negative for abdominal pain, constipation, diarrhea, nausea and vomiting.   Genitourinary:  Negative for dysuria and hematuria.   Musculoskeletal:  Negative for myalgias.   Skin:  Negative for rash.   Neurological:  Negative for sensory change, weakness and headaches.      OBJECTIVE:     Vital Signs:  Pulse: 68 (05/24/23 1117)  BP: (!) 144/78 (05/24/23 1117)    Physical Exam  Vitals reviewed.   Constitutional:       General: She is not in acute distress.  HENT:      Head: Normocephalic and atraumatic.      Right Ear: External ear normal.      Left Ear: External ear normal.      Nose: Nose normal.   Eyes:      Conjunctiva/sclera: Conjunctivae normal.      Pupils: Pupils are equal, round, and reactive to light.   Cardiovascular:      Rate and Rhythm: Normal rate and regular rhythm.      Pulses: Normal pulses.      Heart sounds: Normal heart sounds. No murmur heard.    No friction rub. No gallop.   Pulmonary:      Effort: Pulmonary effort is normal.      Breath sounds: Normal breath sounds. No wheezing, rhonchi or rales.   Abdominal:      General: Bowel sounds are normal.      Palpations: There  is no mass.      Tenderness: There is no abdominal tenderness.   Musculoskeletal:         General: No swelling or tenderness. Normal range of motion.      Cervical back: Normal range of motion.   Lymphadenopathy:      Cervical: No cervical adenopathy.   Skin:     General: Skin is warm and dry.      Findings: No rash.   Neurological:      General: No focal deficit present.      Mental Status: She is alert.       Laboratory:  Hemoglobin A1C   Date Value Ref Range Status   01/03/2023 5.4 4.0 - 5.6 % Final     Comment:     ADA Screening Guidelines:  5.7-6.4%  Consistent with prediabetes  >or=6.5%  Consistent with diabetes    High levels of fetal hemoglobin interfere with the HbA1C  assay. Heterozygous hemoglobin variants (HbS, HgC, etc)do  not significantly interfere with this assay.   However, presence of multiple variants may affect accuracy.     05/28/2022 6.3 (H) 4.0 - 5.6 % Final     Comment:     ADA Screening Guidelines:  5.7-6.4%  Consistent with prediabetes  >or=6.5%  Consistent with diabetes    High levels of fetal hemoglobin interfere with the HbA1C  assay. Heterozygous hemoglobin variants (HbS, HgC, etc)do  not significantly interfere with this assay.   However, presence of multiple variants may affect accuracy.         A/P:  Ashli was seen today for back/abdominal pain as well as OA. Evaluated possible UTI w/ UA which was neg for nitrites, wbc, and bacteria. Discussed OA and rec outpt PT therapy which pt is in agreement with. Pt not interested in further treatment at this time with injection therapy but encouraged nsaid use. Pt to follow up in 3 mo or sooner if sx's do not improve or worsen.     Diagnoses and all orders for this visit:    Acute cystitis without hematuria  -     POCT URINE DIPSTICK WITHOUT MICROSCOPE    Osteoarthritis, unspecified osteoarthritis type, unspecified site  -     Cancel: Ambulatory referral/consult to Physical/Occupational Therapy; Future  -     Ambulatory referral/consult to  Physical/Occupational Therapy; Future    Gastroesophageal reflux disease, unspecified whether esophagitis present  -     omeprazole (PRILOSEC) 40 MG capsule; Take 1 capsule (40 mg total) by mouth once daily.      Anurag Lopez MD  [facility], PGY-2  06/01/2023

## 2023-06-01 NOTE — PROGRESS NOTES
I assume primary medical responsibility for this patient. I have reviewed the history, physical, and assessement & treatment plan with the resident and agree that the care is reasonable and necessary. This service has been performed by a resident without the presence of a teaching physician under the primary care exception. If necessary, an addendum of additional findings or evaluation beyond the resident documentation will be noted below.     Niru Veras MD

## 2023-06-02 ENCOUNTER — TELEPHONE (OUTPATIENT)
Dept: GASTROENTEROLOGY | Facility: CLINIC | Age: 63
End: 2023-06-02
Payer: MEDICAID

## 2023-06-02 NOTE — TELEPHONE ENCOUNTER
----- Message from [place] sent at 6/2/2023 11:43 AM CDT -----  Type:  procedure     Who Called:pt   Does the patient know what this is regarding?:schedule procedure   Would the patient rather a call back or a response via [employer]? Call   Best Call Back Number:[phone]  Additional Information:

## 2023-06-02 NOTE — TELEPHONE ENCOUNTER
----- Message from [place] sent at 6/2/2023 11:43 AM CDT -----  Type:  procedure     Who Called:pt   Does the patient know what this is regarding?:schedule procedure   Would the patient rather a call back or a response via [employer]? Call   Best Call Back Number:[phone]  Additional Information:

## 2023-06-02 NOTE — TELEPHONE ENCOUNTER
Patient called to reschedule EGD.       EGD Prep Instructions    [facility]  [address] in [facility] Office [phone]  Endoscopy Lab [phone]    You are scheduled for an EGD with Dr. Cotton on 7/3/2023 at [facility].  You will enter through the [facility] Entrance and check in at Same Day Surgery.    Nothing to eat or drink after midnight before the procedure.  You MAY brush your teeth.    You MAY take your blood pressure, heart, and seizure medication on the morning of the procedure, with a SIP of water.  Hold ALL other medications until after the procedure.    If you are on blood thinners THAT YOU HAVE BEEN INSTRUCTED TO HOLD BY YOUR DOCTOR FOR THIS PROCEDURE, then do NOT take this the morning of your EGD.  Do NOT stop these medications on your own, they must be approved to be held by your doctor.  Your EGD can NOT be done if you are on these medications.  Examples of blood thinners include: Coumadin, Aggrenox, Plavix, Pradaxa, Reapro, Pletal, Xarelto, Ticagrelor, Brilinta, Eliquis, and high dose aspirin (325 mg).  You do not have to stop baby aspirin 81 mg.    You will receive a call 2-3 days before your EGD to tell you the time to arrive.  If you have not received a call by the day before your procedure, call the Endoscopy Lab at [phone].

## 2023-06-05 ENCOUNTER — CLINICAL SUPPORT (OUTPATIENT)
Dept: REHABILITATION | Facility: HOSPITAL | Age: 63
End: 2023-06-05
Payer: MEDICAID

## 2023-06-05 DIAGNOSIS — R29.898 WEAKNESS OF SHOULDER: ICD-10-CM

## 2023-06-05 DIAGNOSIS — M25.612 DECREASED RANGE OF MOTION OF LEFT SHOULDER: ICD-10-CM

## 2023-06-05 DIAGNOSIS — R29.3 POSTURE IMBALANCE: Primary | ICD-10-CM

## 2023-06-05 PROCEDURE — 97110 THERAPEUTIC EXERCISES: CPT | Mod: PO,CQ

## 2023-06-05 NOTE — PROGRESS NOTES
"  Physical Therapy Treatment Note     Name: Ashli Kumar  Clinic Number: [id number]    Therapy Diagnosis:   Encounter Diagnoses   Name Primary?    Posture imbalance Yes    Decreased range of motion of left shoulder     Weakness of shoulder          Physician: Anurag Lopez MD    Visit Date: 6/5/2023    Physician Orders: PT Eval and Treat   Medical Diagnosis from Referral: M19.012 (ICD-10-CM) - Osteoarthritis of left shoulder, unspecified osteoarthritis type   Evaluation Date: 3/14/2023  Authorization Period Expiration: 02/15/2024  Plan of Care Expiration: 12/31/2023  Visit #/Visits authorized: 16/20 (+eval)     Time In: 1:00 pm  Time Out: 1:45 pm  Total Billable Time: 45 min    Precautions: Standard,  pt reported being dx with Bi-polar dis order and depression, no longer seeing a therapist"    Subjective     Pt reports: her R shoulder has been bothering her more than her L   She was compliant with home exercise program.  Response to previous treatment: felt okay  Functional change: Ongoing    Pain: 3/10 (shoulder), 6-7/10 (knees)  Location: L shoulder and knees    Objective       Ashli received therapeutic exercises to develop strength, endurance, and posture for 60 minutes including:    - UBE - 4' fwd/4' bwd   - Pulleys flex/abd 3'/3'  - Wall slides flex/abd L - 30 x w 5" holds   - B shoulder ER - 3 x 10 w GTB   - Serratus slides - 3 x 15  - Wand flex - 3 x 15 w 4# dowel   - Serratus punches - 4#, 3x15   - Doorway str - 2 x 1'  - SL shoulder ER - 3 x 15 B w 1#   - Prone I 1# 3x10   - Prone Y 1# 3x10   - Prone T 3x10 1#  - seated scapular rows x 30   - TB lat pull downs GTB 3 x 15     - TB ER RTB  3 x 15    - TB IR GTB 3 x   - Prone rows 5# 3 x 10 w 3" holds at end range  - TB Lat pull downs - 3 x 10 BTB   - standing BTB rows 3 x 10 hold 5"   - wall clocks YTB 3x5 each arm  - standing scaption YTB 3x8  - bicep Curls 2# 3x10       Home Exercises Provided and Patient Education Provided     Education " provided:   - importance of consistent performance of HEP    Written Home Exercises Provided: yes.  Exercises were reviewed and Ashli was able to demonstrate them prior to the end of the session.  Ashli demonstrated good  understanding of the education provided.       Assessment     Pt with no pain post treatment session.  Pt continues to respond well to PT POC at this time.  Will continue to monitor and progress pt within her tolerance.      Ashli Is progressing well towards her goals.   Pt prognosis is Fair.     Pt will continue to benefit from skilled outpatient physical therapy to address the deficits listed in the problem list box on initial evaluation, provide pt/family education and to maximize pt's level of independence in the home and community environment.     Pt's spiritual, cultural and educational needs considered and pt agreeable to plan of care and goals.     Anticipated barriers to physical therapy:  attendance to therapy, sedentary lifestyle, HEP compliance     Goals:   Short Term Goals:  5 weeks  1.Report decreased  pain < / =  2/10  to increase tolerance for therapeutic interventions   2. Increase AROM by 20 degrees for flexion and abduction    3. Increased strength by 1/3 MMT grade  to increase tolerance for ADL and work activities.  4. Pt to tolerate HEP to improve ROM and independence with ADL's     Long Term Goals: 10 weeks  2.Increase AROM to be symmetrical of non invovled side in a pain free manner   3.Increase strength to >/= 4/5  to increase tolerance for ADL and work activities.  4. Pt goal: be able to lift and carry pots and pans with no pain   5. Pt will have improved gcode of CJ (20-40% limited) on FOTO shoulder in order to demonstrate true functional improvement.    Plan     Continue with PT POC and progress as tolerated.     Dominick Cage, PTA

## 2023-06-08 ENCOUNTER — OFFICE VISIT (OUTPATIENT)
Dept: SURGERY | Facility: CLINIC | Age: 63
End: 2023-06-08
Payer: MEDICARE

## 2023-06-08 VITALS
WEIGHT: 189.06 LBS | BODY MASS INDEX: 31.5 KG/M2 | HEART RATE: 78 BPM | DIASTOLIC BLOOD PRESSURE: 87 MMHG | SYSTOLIC BLOOD PRESSURE: 132 MMHG | HEIGHT: 65 IN

## 2023-06-08 DIAGNOSIS — K92.1 HEMATOCHEZIA: Primary | ICD-10-CM

## 2023-06-08 PROCEDURE — 3075F SYST BP GE 130 - 139MM HG: CPT | Mod: CPTII,S$GLB,, | Performed by: COLON & RECTAL SURGERY

## 2023-06-08 PROCEDURE — 3079F DIAST BP 80-89 MM HG: CPT | Mod: CPTII,S$GLB,, | Performed by: COLON & RECTAL SURGERY

## 2023-06-08 PROCEDURE — 99999 PR PBB SHADOW E&M-EST. PATIENT-LVL IV: CPT | Mod: PBBFAC,,, | Performed by: COLON & RECTAL SURGERY

## 2023-06-08 PROCEDURE — 3008F PR BODY MASS INDEX (BMI) DOCUMENTED: ICD-10-PCS | Mod: CPTII,S$GLB,, | Performed by: COLON & RECTAL SURGERY

## 2023-06-08 PROCEDURE — 99213 OFFICE O/P EST LOW 20 MIN: CPT | Mod: S$GLB,,, | Performed by: COLON & RECTAL SURGERY

## 2023-06-08 PROCEDURE — 1160F PR REVIEW ALL MEDS BY PRESCRIBER/CLIN PHARMACIST DOCUMENTED: ICD-10-PCS | Mod: CPTII,S$GLB,, | Performed by: COLON & RECTAL SURGERY

## 2023-06-08 PROCEDURE — 3044F PR MOST RECENT HEMOGLOBIN A1C LEVEL <7.0%: ICD-10-PCS | Mod: CPTII,S$GLB,, | Performed by: COLON & RECTAL SURGERY

## 2023-06-08 PROCEDURE — 1159F PR MEDICATION LIST DOCUMENTED IN MEDICAL RECORD: ICD-10-PCS | Mod: CPTII,S$GLB,, | Performed by: COLON & RECTAL SURGERY

## 2023-06-08 PROCEDURE — 3008F BODY MASS INDEX DOCD: CPT | Mod: CPTII,S$GLB,, | Performed by: COLON & RECTAL SURGERY

## 2023-06-08 PROCEDURE — 3044F HG A1C LEVEL LT 7.0%: CPT | Mod: CPTII,S$GLB,, | Performed by: COLON & RECTAL SURGERY

## 2023-06-08 PROCEDURE — 1159F MED LIST DOCD IN RCRD: CPT | Mod: CPTII,S$GLB,, | Performed by: COLON & RECTAL SURGERY

## 2023-06-08 PROCEDURE — 3075F PR MOST RECENT SYSTOLIC BLOOD PRESS GE 130-139MM HG: ICD-10-PCS | Mod: CPTII,S$GLB,, | Performed by: COLON & RECTAL SURGERY

## 2023-06-08 PROCEDURE — 99213 PR OFFICE/OUTPT VISIT, EST, LEVL III, 20-29 MIN: ICD-10-PCS | Mod: S$GLB,,, | Performed by: COLON & RECTAL SURGERY

## 2023-06-08 PROCEDURE — 99999 PR PBB SHADOW E&M-EST. PATIENT-LVL IV: ICD-10-PCS | Mod: PBBFAC,,, | Performed by: COLON & RECTAL SURGERY

## 2023-06-08 PROCEDURE — 1160F RVW MEDS BY RX/DR IN RCRD: CPT | Mod: CPTII,S$GLB,, | Performed by: COLON & RECTAL SURGERY

## 2023-06-08 PROCEDURE — 3079F PR MOST RECENT DIASTOLIC BLOOD PRESSURE 80-89 MM HG: ICD-10-PCS | Mod: CPTII,S$GLB,, | Performed by: COLON & RECTAL SURGERY

## 2023-06-08 NOTE — PROGRESS NOTES
CRS Office Follow Up Visit    SUBJECTIVE:     Chief Complaint:  Hematochezia    History:   7/20/22:  1. Laparoscopic extended left colectomy   2. Laparoscopic mobilization of the splenic flexure  3. Small-bowel resection  4. Flexible sigmoidoscopy.       Indication: diverticular stricture.      Pathology:   A.   DESCENDING AND SIGMOID COLON, COLECTOMY:          -Benign colonic parenchyma with diverticulosis and focal stricture.   B.   PROXIMAL AND DISTAL ANASTOMOTIC DONUTS, COLECTOMY:          -Benign colonic mucosa, consistent with anastomotic donuts.   C.   ILEUM, SEGMENTAL RESECTION:          -Benign small intestinal mucosa with serosal adhesions, focal   submucosal abscess and changes consistent with anastomotic site.     Last colonoscopy:  12/9/22:  Impression:            - Hemorrhoids found on perianal exam.                          - Patent end-to-end colo-colonic anastomosis, characterized by healthy appearing mucosa.                          - Diverticulosis in the entire examined colon.                          - The examined portion of the ileum was normal.                          - The examination was otherwise normal on direct and retroflexion views.                          - No specimens collected.     Current Status:   8/4/2022:  Doing very well.  Having 2 bowel movements per day.  Diarrhea improving.  Abdominal pain resolved.  No issues with her incision.  No fevers.  9/1/22:  Continues to do well.  N1 2 bowel movements per day.  No issues with fecal incontinence.  Intermittent passage of mucus with flatus.  6/8/23: presents for episode of hematochezia on 05/10/2022.  CTA at that time was negative.  No prior similar episodes.  She continues to have diarrhea with 3-4 loose BM per day.      Review of Systems:  Review of Systems   Constitutional:  Negative for chills, diaphoresis, fever, malaise/fatigue and weight loss.   HENT:  Negative for congestion.    Respiratory:  Negative for shortness of  "breath.    Cardiovascular:  Negative for chest pain and leg swelling.   Gastrointestinal:  Negative for abdominal pain, blood in stool, constipation, nausea and vomiting.   Genitourinary:  Negative for dysuria.   Musculoskeletal:  Negative for back pain and myalgias.   Skin:  Negative for rash.   Neurological:  Negative for dizziness and weakness.   Endo/Heme/Allergies:  Does not bruise/bleed easily.   Psychiatric/Behavioral:  Negative for depression.      OBJECTIVE:     Vital Signs (Most Recent)  /87 (BP Location: Left arm, Patient Position: Sitting, BP Method: Small (Automatic))   Pulse 78   Ht 5' 5" (1.651 m)   Wt 85.7 kg (189 lb 0.7 oz)   BMI 31.46 kg/m²     Physical Exam:  General: White female in no distress   Respiratory: respirations are even and unlabored  Cardiac: regular rate  Abdomen:  Incisions healing well.  Pfannenstiel incision healing well  Extremities: Warm dry and intact  Anorectal:  Deferred    Imaging:  CT angiography 05/10/2022 personally reviewed and demonstrates healthy-appearing colorectal anastomosis without evidence of extravasation    Labs:  H&H 11 and 33.  Normal renal function.  Albumin 4.     ASSESSMENT/PLAN:     Diagnoses and all orders for this visit:    Hematochezia        62 y.o. female post op from lap left colectomy and small bowel resection for diverticular disease on 7/20/22.  Follow-up colonoscopy on 12/09/2022 was otherwise normal.    She presents today after an isolated episode of hematochezia.  No prior similar episodes.  This is likely related to persistent diverticular disease.  Since her last scope was 6 months ago, no need to repeat scope.  It was bright red blood mixed with stool so unlikely an upper source.  Reassurance provided.  Recommended continuing the fiber as well as taking probiotics.  If no improvement, would switch to Imodium.  If she has any recurrence hematochezia, I have asked her to reach out to me so I can set up a repeat upper and lower " jose angel.    TREV Kay MD, FACS  Staff Surgeon  Colon & Rectal Surgery

## 2023-06-08 NOTE — PATIENT INSTRUCTIONS
For the diarrhea:    1) start with taking a probiotic (Culturelle) daily to help introduce good bacteria into the colon    2) limit fried or greasy food.     3) if no improvement, start taking Imodium (loperamide) twice per day to help decrease the diarrhea.  If you get constipated, stop the Imodium.      4) call me if there is no improvement.     TREV Kay MD, FACS, FASCRS  Staff Surgeon  Colon & Rectal Surgery

## 2023-06-13 ENCOUNTER — CLINICAL SUPPORT (OUTPATIENT)
Dept: REHABILITATION | Facility: HOSPITAL | Age: 63
End: 2023-06-13
Payer: MEDICAID

## 2023-06-13 DIAGNOSIS — R29.3 POSTURE IMBALANCE: Primary | ICD-10-CM

## 2023-06-13 DIAGNOSIS — M25.612 DECREASED RANGE OF MOTION OF LEFT SHOULDER: ICD-10-CM

## 2023-06-13 DIAGNOSIS — R29.898 WEAKNESS OF SHOULDER: ICD-10-CM

## 2023-06-13 PROCEDURE — 97110 THERAPEUTIC EXERCISES: CPT | Mod: PO,CQ

## 2023-06-13 NOTE — PROGRESS NOTES
"  Physical Therapy Treatment Note     Name: Ashli Kumar  Clinic Number: [id number]    Therapy Diagnosis:   Encounter Diagnoses   Name Primary?    Posture imbalance Yes    Decreased range of motion of left shoulder     Weakness of shoulder          Physician: Anurag Lopez MD    Visit Date: 6/13/2023    Physician Orders: PT Eval and Treat   Medical Diagnosis from Referral: M19.012 (ICD-10-CM) - Osteoarthritis of left shoulder, unspecified osteoarthritis type   Evaluation Date: 3/14/2023  Authorization Period Expiration: 02/15/2024  Plan of Care Expiration: 12/31/2023  Visit #/Visits authorized: 17/20 (+eval)     Time In: 1:00 pm  Time Out: 1:53 pm   Total Billable Time:  53 min    Precautions: Standard,  pt reported being dx with Bi-polar dis order and depression, no longer seeing a therapist"    Subjective     Pt reports: no pain in shoulder, but pain in both knees.   She was compliant with home exercise program.  Response to previous treatment: felt okay  Functional change: Ongoing    Pain: 0/10 (shoulder), 6-7/10 (knees)  Location: knees    Objective       Ashli received therapeutic exercises to develop strength, endurance, and posture for 51 minutes including:    - UBE - 4' fwd/4' bwd   - Pulleys flex/abd 3'/3'  - Wall slides flex/abd L - 30 x w 5" holds   - B shoulder ER - 3 x 10 w GTB   - Serratus slides - 3 x 15  - Wand flex - 3 x 15 w 4# dowel   - Serratus punches - 4#, 3x15   - Doorway str - 1 x 1'  - SL shoulder ER - 3 x 15 B w 1#   - Prone I 1# 3x10   - Prone Y 1# 3x10   - Prone T 3x10 1#  - seated scapular rows x 30   - TB lat pull downs GTB 3 x 15     - TB ER RTB  3 x 15    - TB IR GTB 3 x 15  - Prone rows 5# 3 x 10 w 3" holds at end range  - TB Lat pull downs - 3 x 10 BTB   - standing BTB rows 3 x 10 hold 5"   - wall clocks YTB 3x5 each arm  - standing scaption YTB 3x8  - bicep Curls 2# 3x10   -LAQ 2' B       Home Exercises Provided and Patient Education Provided     Education provided:   - " importance of consistent performance of HEP    Written Home Exercises Provided: yes.  Exercises were reviewed and Ashli was able to demonstrate them prior to the end of the session.  Ashli demonstrated good  understanding of the education provided.       Assessment     Pt Tolerated treatment well with focus on L UE ROM and strengthening. Pt needing 1 sitting rest break due to weakness in legs, performed LAQ while taking break. Treatment ended early 2/2 to pt reporting dizziness, sat pt down until dizziness subsided and she was able to walk out of clinic. Will continue to progress as pt tolerates.     Ashli Is progressing well towards her goals.   Pt prognosis is Fair.     Pt will continue to benefit from skilled outpatient physical therapy to address the deficits listed in the problem list box on initial evaluation, provide pt/family education and to maximize pt's level of independence in the home and community environment.     Pt's spiritual, cultural and educational needs considered and pt agreeable to plan of care and goals.     Anticipated barriers to physical therapy:  attendance to therapy, sedentary lifestyle, HEP compliance     Goals:   Short Term Goals:  5 weeks  1.Report decreased  pain < / =  2/10  to increase tolerance for therapeutic interventions   2. Increase AROM by 20 degrees for flexion and abduction    3. Increased strength by 1/3 MMT grade  to increase tolerance for ADL and work activities.  4. Pt to tolerate HEP to improve ROM and independence with ADL's     Long Term Goals: 10 weeks  2.Increase AROM to be symmetrical of non invovled side in a pain free manner   3.Increase strength to >/= 4/5  to increase tolerance for ADL and work activities.  4. Pt goal: be able to lift and carry pots and pans with no pain   5. Pt will have improved gcode of CJ (20-40% limited) on FOTO shoulder in order to demonstrate true functional improvement.    Plan     Continue with PT POC and progress as  tolerated.     Dominick Cage, PTA

## 2023-06-19 ENCOUNTER — OFFICE VISIT (OUTPATIENT)
Dept: FAMILY MEDICINE | Facility: HOSPITAL | Age: 63
End: 2023-06-19
Payer: MEDICARE

## 2023-06-19 VITALS
WEIGHT: 190.25 LBS | DIASTOLIC BLOOD PRESSURE: 73 MMHG | HEART RATE: 69 BPM | BODY MASS INDEX: 31.7 KG/M2 | SYSTOLIC BLOOD PRESSURE: 126 MMHG | HEIGHT: 65 IN

## 2023-06-19 DIAGNOSIS — M19.90 OSTEOARTHRITIS, UNSPECIFIED OSTEOARTHRITIS TYPE, UNSPECIFIED SITE: Primary | ICD-10-CM

## 2023-06-19 DIAGNOSIS — R32 INCONTINENCE IN FEMALE: ICD-10-CM

## 2023-06-19 PROCEDURE — 99215 OFFICE O/P EST HI 40 MIN: CPT | Performed by: STUDENT IN AN ORGANIZED HEALTH CARE EDUCATION/TRAINING PROGRAM

## 2023-06-19 NOTE — PROGRESS NOTES
[facility] Family Medicine  History & Physical    SUBJECTIVE:     Chief Complaint:   Chief Complaint   Patient presents with    Referral       History of Present Illness:  62 y.o. female who  has a past medical history of Anxiety, Arthritis, CHF (congestive heart failure), Depression, GERD (gastroesophageal reflux disease), High cholesterol, and Hypertension. presents to clinic today for Bilateral knee OA. Patient recently seen for OA and referred to PT. Patient has not started therapy yet. Patient would like to discuss OA and any other therapy options.      Allergies:  Review of patient's allergies indicates:   Allergen Reactions    Codeine Hives    Sulfa (sulfonamide antibiotics) Hives    Benzoate analogues Itching       Home Medications:  Current Outpatient Medications on File Prior to Visit   Medication Sig    diclofenac sodium (VOLTAREN) 1 % Gel Apply 2 g topically 4 (four) times daily.    dicyclomine (BENTYL) 20 mg tablet Take 1 tablet (20 mg total) by mouth 3 (three) times daily as needed (abdominal pain).    divalproex (DEPAKOTE) 500 MG TbEC Take 1 tablet (500 mg total) by mouth 2 (two) times a day.    FLUoxetine 20 MG capsule Take 1 capsule (20 mg total) by mouth once daily.    fluticasone propionate (FLONASE) 50 mcg/actuation nasal spray 2 sprays by Each Nostril route once daily.    Lactobac no.41/Bifidobact no.7 (PROBIOTIC-10 ORAL) Take by mouth.    latanoprost 0.005 % ophthalmic solution     loratadine (CLARITIN) 10 mg tablet Take 1 tablet (10 mg total) by mouth daily as needed for Allergies.    metoprolol tartrate (LOPRESSOR) 25 MG tablet Take 25 mg by mouth 2 (two) times daily.    OLANZapine (ZYPREXA) 2.5 MG tablet Take 1 tablet (2.5 mg total) by mouth 2 (two) times a day.    omeprazole (PRILOSEC) 40 MG capsule Take 1 capsule (40 mg total) by mouth once daily.    pravastatin (PRAVACHOL) 20 MG tablet Pravastatin Sodium    PREMARIN vaginal cream Place 0.5 g vaginally twice a week.    spironolactone (ALDACTONE)  25 MG tablet Take 12.5 mg by mouth.    cyclobenzaprine (FLEXERIL) 10 MG tablet     loratadine 10 mg Cap Loratadine    nitrofurantoin, macrocrystal-monohydrate, (MACROBID) 100 MG capsule     [DISCONTINUED] mesalamine (APRISO) 0.375 gram Cp24 Take 4 capsules (1.5 g total) by mouth once daily. (Patient not taking: No sig reported)     No current facility-administered medications on file prior to visit.       Past Medical History:   Diagnosis Date    Anxiety     Arthritis     CHF (congestive heart failure)     Depression     GERD (gastroesophageal reflux disease)     High cholesterol     Hypertension      Past Surgical History:   Procedure Laterality Date    BREAST BIOPSY Left     [facility]    BREAST SURGERY       SECTION      COLONOSCOPY N/A 2017    Procedure: COLONOSCOPY Miralax;  Surgeon: Buddy Butcher Jr., MD;  Location: [facility] ENDO;  Service: Endoscopy;  Laterality: N/A;    COLONOSCOPY N/A 2022    Procedure: COLONOSCOPY;  Surgeon: TREV Kay MD;  Location: [facility] ENDO;  Service: Endoscopy;  Laterality: N/A;    COLONOSCOPY N/A 2022    Procedure: COLONOSCOPY;  Surgeon: TREV Kay MD;  Location: [facility] ENDO;  Service: Endoscopy;  Laterality: N/A;    FLEXIBLE SIGMOIDOSCOPY N/A 2022    Procedure: SIGMOIDOSCOPY, FLEXIBLE;  Surgeon: TREV Kay MD;  Location: [facility] OR [address] [place] [address];  Service: Colon and Rectal;  Laterality: N/A;    HYSTERECTOMY      KIDNEY SURGERY Right     done at [facility], reported as mass by patient    LAPAROSCOPIC SIGMOIDECTOMY Left 2022    Procedure: COLECTOMY, SIGMOID, LAPAROSCOPIC, ERAS low;  Surgeon: TREV Kay MD;  Location: [facility] OR [address] [place] [address];  Service: Colon and Rectal;  Laterality: Left;    LEFT HEART CATHETERIZATION Left 2019    Procedure: Left heart cath;  Surgeon: Gerhard Krishnan MD;  Location: [facility] CATH LAB;  Service: Cardiology;  Laterality: Left;    MOBILIZATION OF SPLENIC FLEXURE N/A 2022    Procedure: MOBILIZATION,  SPLENIC FLEXURE;  Surgeon: TREV Kay MD;  Location: [facility] OR [address] [place] [address];  Service: Colon and Rectal;  Laterality: N/A;    OOPHORECTOMY      s-section       No family history on file.  Social History     Tobacco Use    Smoking status: Never    Smokeless tobacco: Never   Substance Use Topics    Alcohol use: Not Currently     Comment: 12-31-22    Drug use: No        Review of Systems   Constitutional:  Negative for fever and weight loss.   HENT:  Negative for hearing loss and sore throat.    Eyes:  Negative for blurred vision.   Respiratory:  Negative for cough, shortness of breath and wheezing.    Cardiovascular:  Negative for chest pain and leg swelling.   Gastrointestinal:  Negative for heartburn, nausea and vomiting.   Genitourinary:  Negative for dysuria.   Musculoskeletal:  Positive for back pain and joint pain. Negative for myalgias.   Neurological:  Negative for dizziness, weakness and headaches.      OBJECTIVE:     Vital Signs:  Pulse: 69 (06/19/23 0914)  BP: 126/73 (06/19/23 0914)  Body mass index is 31.66 kg/m².  Physical Exam  Constitutional:       Appearance: She is obese.   HENT:      Right Ear: External ear normal.      Left Ear: External ear normal.      Nose: Nose normal.      Mouth/Throat:      Mouth: Mucous membranes are moist.   Eyes:      Extraocular Movements: Extraocular movements intact.      Pupils: Pupils are equal, round, and reactive to light.   Cardiovascular:      Rate and Rhythm: Normal rate.   Pulmonary:      Effort: Pulmonary effort is normal.   Abdominal:      General: Abdomen is flat.      Palpations: Abdomen is soft.   Musculoskeletal:         General: Swelling and tenderness present. Normal range of motion.      Comments: Some tenderness and swelling over lateral left knee at joint line.    Skin:     General: Skin is warm.   Neurological:      Mental Status: She is alert.       Laboratory:  Hemoglobin A1C   Date Value Ref Range Status   01/03/2023 5.4 4.0 - 5.6 % Final      Comment:     ADA Screening Guidelines:  5.7-6.4%  Consistent with prediabetes  >or=6.5%  Consistent with diabetes    High levels of fetal hemoglobin interfere with the HbA1C  assay. Heterozygous hemoglobin variants (HbS, HgC, etc)do  not significantly interfere with this assay.   However, presence of multiple variants may affect accuracy.     05/28/2022 6.3 (H) 4.0 - 5.6 % Final     Comment:     ADA Screening Guidelines:  5.7-6.4%  Consistent with prediabetes  >or=6.5%  Consistent with diabetes    High levels of fetal hemoglobin interfere with the HbA1C  assay. Heterozygous hemoglobin variants (HbS, HgC, etc)do  not significantly interfere with this assay.   However, presence of multiple variants may affect accuracy.         A/P:  Ashli was seen today for Knee pain. Recommend PT for knee. Patient is interested in knee injections. Recommended booking a new appointment for that procedure. Patient also reports worsening issues with incontinence, with both sneezing and at night. Patient is both s/p vaginal birth and hysterectomy. Patient as multiple factors for incontinence from muscle weakness. Will refer to pelvic pt.    Diagnoses and all orders for this visit:    Osteoarthritis, unspecified osteoarthritis type, unspecified site  -     Ambulatory referral/consult to Physical/Occupational Therapy; Future  -     Ambulatory referral/consult to Orthopedics; Future    Incontinence in female  -     Ambulatory referral/consult to Pelvic Medicine; Future        No follow-ups on file.      Miki Moya MD  [facility] Family Medicine PGY-2  06/20/2023

## 2023-06-20 ENCOUNTER — CLINICAL SUPPORT (OUTPATIENT)
Dept: REHABILITATION | Facility: HOSPITAL | Age: 63
End: 2023-06-20
Attending: STUDENT IN AN ORGANIZED HEALTH CARE EDUCATION/TRAINING PROGRAM
Payer: MEDICARE

## 2023-06-20 DIAGNOSIS — M19.90 OSTEOARTHRITIS, UNSPECIFIED OSTEOARTHRITIS TYPE, UNSPECIFIED SITE: ICD-10-CM

## 2023-06-20 DIAGNOSIS — M25.612 DECREASED RANGE OF MOTION OF LEFT SHOULDER: ICD-10-CM

## 2023-06-20 DIAGNOSIS — R29.898 WEAKNESS OF SHOULDER: ICD-10-CM

## 2023-06-20 DIAGNOSIS — R29.3 POSTURE IMBALANCE: Primary | ICD-10-CM

## 2023-06-20 PROCEDURE — 97110 THERAPEUTIC EXERCISES: CPT | Mod: PO,CQ

## 2023-06-20 NOTE — PROGRESS NOTES
"  Physical Therapy Treatment Note     Name: Ashli Kumar  Clinic Number: [id number]    Therapy Diagnosis:   Encounter Diagnoses   Name Primary?    Osteoarthritis, unspecified osteoarthritis type, unspecified site     Posture imbalance Yes    Decreased range of motion of left shoulder     Weakness of shoulder          Physician: Anurag Lopez MD    Visit Date: 6/20/2023    Physician Orders: PT Eval and Treat   Medical Diagnosis from Referral: M19.012 (ICD-10-CM) - Osteoarthritis of left shoulder, unspecified osteoarthritis type   Evaluation Date: 3/14/2023  Authorization Period Expiration: 02/15/2024  Plan of Care Expiration: 12/31/2023  Visit #/Visits authorized: 18/20 (+eval)     Time In: 1100 am  Time Out: 1157 am   Total Billable Time:  57 min    Precautions: Standard,  pt reported being dx with Bi-polar dis order and depression, no longer seeing a therapist"    Subjective     Pt reports: "fair"  She was compliant with home exercise program.  Response to previous treatment: felt okay  Functional change: Ongoing    Pain: 0/10 (shoulder), 6-7/10 (knees)  Location: knees    Objective       Ashli received therapeutic exercises to develop strength, endurance, and posture for 51 minutes including:    - UBE - 4' fwd/4' bwd   - Pulleys flex/abd 3'/3'  - Wall slides flex/abd L - 30 x w 5" holds   - B shoulder ER - 3 x 10 w GTB   - Serratus slides - 3 x 15  - Wand flex - 3 x 15 w 4# dowel   - Serratus punches - 4#, 3x15   - Doorway str - 1 x 1'  - SL shoulder ER - 3 x 15 B w 1#   - Prone I 1# 3x10   - Prone Y 1# 3x10   - Prone T 3x10 1#  - seated scapular rows x 30   - TB lat pull downs GTB 3 x 15     - TB ER RTB  3 x 15    - TB IR GTB 3 x 15  - Prone rows 5# 3 x 10 w 3" holds at end range  - TB Lat pull downs - 3 x 10 BTB   - standing BTB rows 3 x 10 hold 5"   - wall clocks YTB 3x5 each arm  - standing scaption YTB 3x8  - bicep Curls 2# 3x10   -LAQ 2' B       Home Exercises Provided and Patient Education " Provided     Education provided:   - importance of consistent performance of HEP    Written Home Exercises Provided: yes.  Exercises were reviewed and Ashli was able to demonstrate them prior to the end of the session.  Ashli demonstrated good  understanding of the education provided.       Assessment     Ashli tolerated treatment well with focus on UE strength and endurance. Pt reported feeling weakness in knees, moved to treatments on mat. Ashli had no other reports of pain or discomfort, will continue to progress Ashli as she tolerates.     Ashli Is progressing well towards her goals.   Pt prognosis is Fair.     Pt will continue to benefit from skilled outpatient physical therapy to address the deficits listed in the problem list box on initial evaluation, provide pt/family education and to maximize pt's level of independence in the home and community environment.     Pt's spiritual, cultural and educational needs considered and pt agreeable to plan of care and goals.     Anticipated barriers to physical therapy:  attendance to therapy, sedentary lifestyle, HEP compliance     Goals:   Short Term Goals:  5 weeks  1.Report decreased  pain < / =  2/10  to increase tolerance for therapeutic interventions   2. Increase AROM by 20 degrees for flexion and abduction    3. Increased strength by 1/3 MMT grade  to increase tolerance for ADL and work activities.  4. Pt to tolerate HEP to improve ROM and independence with ADL's     Long Term Goals: 10 weeks  2.Increase AROM to be symmetrical of non invovled side in a pain free manner   3.Increase strength to >/= 4/5  to increase tolerance for ADL and work activities.  4. Pt goal: be able to lift and carry pots and pans with no pain   5. Pt will have improved gcode of CJ (20-40% limited) on FOTO shoulder in order to demonstrate true functional improvement.    Plan     Continue with PT POC and progress as tolerated.     Dominick Cage, PTA      Arnoldo Arreaga,  SPTA

## 2023-06-22 ENCOUNTER — CLINICAL SUPPORT (OUTPATIENT)
Dept: REHABILITATION | Facility: HOSPITAL | Age: 63
End: 2023-06-22
Payer: MEDICAID

## 2023-06-22 DIAGNOSIS — R29.3 POSTURE IMBALANCE: Primary | ICD-10-CM

## 2023-06-22 DIAGNOSIS — M25.661 DECREASED RANGE OF MOTION OF BOTH KNEES: ICD-10-CM

## 2023-06-22 DIAGNOSIS — R29.898 WEAKNESS OF SHOULDER: ICD-10-CM

## 2023-06-22 DIAGNOSIS — M25.662 DECREASED RANGE OF MOTION OF BOTH KNEES: ICD-10-CM

## 2023-06-22 DIAGNOSIS — R29.898 IMPAIRED STRENGTH OF LOWER EXTREMITY: ICD-10-CM

## 2023-06-22 DIAGNOSIS — M25.612 DECREASED RANGE OF MOTION OF LEFT SHOULDER: ICD-10-CM

## 2023-06-22 PROCEDURE — 97110 THERAPEUTIC EXERCISES: CPT | Mod: PO

## 2023-06-22 PROCEDURE — 97530 THERAPEUTIC ACTIVITIES: CPT | Mod: PO

## 2023-06-22 PROCEDURE — 97140 MANUAL THERAPY 1/> REGIONS: CPT | Mod: PO

## 2023-06-22 NOTE — PLAN OF CARE
[facility]  PT Discharge Note    Name: Ashli Kumar  Clinic Number: [id number]    Therapy Diagnosis:   Encounter Diagnoses   Name Primary?    Posture imbalance Yes    Decreased range of motion of left shoulder     Weakness of shoulder      Physician: Anurag Lopez MD    Physician Orders: PT Eval and Treat   Medical Diagnosis from Referral: M19.012 (ICD-10-CM) - Osteoarthritis of left shoulder, unspecified osteoarthritis type   Evaluation Date: 3/14/2023    Date of Last visit: 6/22/2023  Total Visits Received: 21    ASSESSMENT      Ashli presents to therapy stating that she believes that the discomfort experienced in her shoulder will always be intermittent. She displays some fluctuation in FOTO Shoulder Survey indicative of the accuracy in her experience. She has made significant improvements to her L shoulder strength and ROM since starting therapy. She has reached her maximum rehab potential at this time.     Discharge reason: Patient has reached the maximum rehab potential for the present time    Discharge FOTO Score: 72%    Goals:   Short Term Goals:  5 weeks  1.Report decreased  pain < / =  2/10  to increase tolerance for therapeutic interventions Not met  2. Increase AROM by 20 degrees for flexion and abduction  Met  3. Increased strength by 1/3 MMT grade  to increase tolerance for ADL and work activities. Met  4. Pt to tolerate HEP to improve ROM and independence with ADL's. Met     Long Term Goals: 10 weeks  2.Increase AROM to be symmetrical of non invovled side in a pain free manner. Not met   3.Increase strength to >/= 4/5  to increase tolerance for ADL and work activities. Partially met; improved strength and improved tolerance of ADLs  4. Pt goal: be able to lift and carry pots and pans with no pain Partially met; some discomfort with overhead lifting  5. Pt will have improved gcode of CJ (20-40% limited) on FOTO shoulder in order to demonstrate true functional  improvement. Met    PLAN   This patient is discharged from Physical Therapy      Ghazal Caceres, PT

## 2023-06-22 NOTE — PROGRESS NOTES
"  Physical Therapy Treatment Note     Name: Ashli Kumar  Clinic Number: [id number]    Therapy Diagnosis:   Encounter Diagnoses   Name Primary?    Posture imbalance Yes    Decreased range of motion of left shoulder     Weakness of shoulder     Decreased range of motion of both knees     Impaired strength of lower extremity        Physician: Anurag Lopez MD    Visit Date: 6/22/2023    Physician Orders: PT Eval and Treat   Medical Diagnosis from Referral: M19.012 (ICD-10-CM) - Osteoarthritis of left shoulder, unspecified osteoarthritis type   Evaluation Date: 3/14/2023  Authorization Period Expiration: 02/15/2024  Plan of Care Expiration: 12/31/2023  Visit #/Visits authorized: 19/20 (+eval)     Time In: 9:00 am  Time Out: 10:00 am   Total Billable Time:  60 min    Precautions: Standard,  pt reported being dx with Bi-polar dis order and depression, no longer seeing a therapist"    Subjective     Pt reports: I had so much pain in my knees last night that I could barely sleep. I have been having problems with my knees for years, and the doctor suggested possibly getting surgery (TKA) but I do not want to do this.  She was compliant with home exercise program.  Response to previous treatment: felt okay  Functional change: Ongoing    Pain:  Current 7/10, worst 10/10, best 7/10   Location: B knees  Description: Tight  Aggravating Factors: Sitting (prolonged), Walking, Morning, Night, and Getting out of bed/chair  Easing Factors: pain medication and muscle cream    Patients goals: feel better with less pain    Objective     Observation:  Ashli presents to therapy with significant edema formation in B knees L>R and pain that reaches maximal levels frequently, with pt having been unable to sleep the night prior to this evaluation due to severe knee pain.        Knee Range of Motion:     Right Active (Passive) Left Active (Passive)   Flexion 118 (130) 125 (135)   Extension -16 (-9) -10 (0)      Lower Extremity " Strength:  Right LE  Left LE    Knee extension: 4+/5 Knee extension: 4-/5   Knee flexion: 4/5 Knee flexion: 4-/5   Hip flexion: 3+/5 Hip flexion: 3/5   Hip extension:  2/5 Hip extension: 3-/5   Hip abduction: 3-/5 Hip abduction: 4-/5   Hip adduction: NT Hip adduction NT   Ankle dorsiflexion: 4+/5 Ankle dorsiflexion: 5/5   Ankle plantarflexion: 3+/5 Ankle plantarflexion: 3/5         Joint Mobility: hypomobility AP femur and tibia B;  Patellar hypomobility med/lat glides L knee; hypomobility with inf/sup glides B    Flexibility:    Ely's test: R = 135 degrees ; L = 140 degrees    Reassessment for discharge L shoulder:      Active Range of Motion:   Shoulder Right Left   Flexion 151 (155) 150 (165)   Abduction 151 (157) 121 (154)   Fx reach ER  T1 T1   Fx reach IR L3 T12      Strength:  Shoulder Right Left   Flexion 4/5 4/5   Abduction 4-/5 4-/5   ER 4/5 4/5   IR 4/5 4-/5                         Special Tests:        Rotator Cuff Tear      Left   Hawkin's Delfino   +    Drop Arm test   -   Resisted ER   +      Joint Mobility: UR        Palpation: LUE - TTP w mod to max pressure  Infraspinatus -increase in pain   Supraspinatus -increase in pain  Deltoid -increase in pain  Upper trap -increase in pain  Subscapularis -increase in pain     Sensation:   Intact      Flexibility:   Lat: R + ; L +              Pec Minor/major: R + ; L +    Ashli received therapeutic exercises to develop strength, endurance, and posture for 40 minutes including: *re assessment billed as therex    Bike - 5' at L4  Glute sets   Bridges   Clams   SL Clams  Hip ADD  QS  SLR  LAQ  Standing Hip 3 way    manual therapy techniques: Joint mobilizations, Myofacial release, and Soft tissue Mobilization were applied to the: B knees for 10 minutes, including:  Patellar mobs all directions  AP Femur GrI-II mobs  AP Tibia GrI-II mobs    neuromuscular re-education activities to improve: Balance, Kinesthetic, and Proprioception for 0 minutes. The following  activities were included:  SLS  Tandem stance    therapeutic activities to improve functional performance for 10  minutes, including:  Instructions on updated HEP  Mini squats  Step ups  Step downs    Home Exercises Provided and Patient Education Provided     Education provided:   - importance of consistent performance of HEP    Written Home Exercises Provided: yes.  Exercises were reviewed and Ashli was able to demonstrate them prior to the end of the session.  Ashli demonstrated good  understanding of the education provided.       Assessment     Ashli tolerated treatment well with focus on UE strength and endurance. Pt reported feeling weakness in knees, moved to treatments on mat. Ashli had no other reports of pain or discomfort, will continue to progress Ashli as she tolerates.     Ashli Is progressing well towards her goals.   Pt prognosis is Fair.     Pt will continue to benefit from skilled outpatient physical therapy to address the deficits listed in the problem list box on initial evaluation, provide pt/family education and to maximize pt's level of independence in the home and community environment.     Pt's spiritual, cultural and educational needs considered and pt agreeable to plan of care and goals.     Anticipated barriers to physical therapy:  attendance to therapy, sedentary lifestyle, HEP compliance     Goals:   Short Term Goals: (4 weeks)  Patient will be compliant with HEP to promote the independent management of current diagnosis. In progress, not met  Patient will increase strength of B hip abduction to at least 4-/5. In progress, not met  Patient will increase active B knee extension to < or = 5 degrees to improve functional mobility during gait. In progress, not met  Patient will report a decrease in B knee pain to 7/10 at the worst during ADL's. In progress, not met    Long Term Goals: (8 weeks)  Patient will increase strength of B hip musculature to 4+/5 to improve stability  while ambulating over uneven surfaces. In progress, not met  Patient will increase R knee flexion to > or = 120 degress to improve transferring leg into and out of vehicle. In progress, not met  Patient will report a decrease in complaints of R knee pain to 5/10 at the worst during ADL's. In progress, not met  Patient will improve FOTO limitation status from 40% to 60% placing the patient in the 40% impaired, limited, or restricted category indicating increased functional mobility. In progress, not met    Plan     Continue with PT POC and progress as tolerated.     Ghazal Caceres, PT

## 2023-06-22 NOTE — PLAN OF CARE
"[facility]  Physical Therapy Plan of Care Note     Name: Ashli Kumar  Clinic Number: [id number]    Therapy Diagnosis:   Encounter Diagnoses   Name Primary?    Posture imbalance Yes    Decreased range of motion of left shoulder     Weakness of shoulder     Decreased range of motion of both knees     Impaired strength of lower extremity      Physician: Anurag Lopez MD    Visit Date: 6/22/2023    Physician Orders: Eval and Treat   Medical Diagnosis from Referral: M19.90 (ICD-10-CM) - Osteoarthritis, unspecified osteoarthritis type, unspecified site  Evaluation Date: 6/22/2023  Authorization Period Expiration: 06/18/2024   Plan of Care Expiration: 8/17/2023  Progress Note Due: 7/22/2023   Visit # / Visits authorized: 1/ 1  FOTO: 0/5    Precautions: Standard,  pt reported being dx with Bi-polar dis order and depression, no longer seeing a therapist"  Functional Level Prior to Evaluation:  Independent (significant pain in B knees, most noted Nighttime as well as with ambulation and prolonged sitting)    Subjective     Update:   Pt reports: I had so much pain in my knees last night that I could barely sleep. I have been having problems with my knees for years, and the doctor suggested possibly getting surgery (TKA) but I do not want to do this.  She was compliant with home exercise program.  Response to previous treatment: felt okay  Functional change: Ongoing    Pain:  Current 7/10, worst 10/10, best 7/10   Location: B knees  Description: Tight  Aggravating Factors: Sitting (prolonged), Walking, Morning, Night, and Getting out of bed/chair  Easing Factors: pain medication and muscle cream    Patients goals: feel better with less pain    Objective      Update:   Observation:  Ashli presents to therapy with significant edema formation in B knees L>R and pain that reaches maximal levels frequently, with pt having been unable to sleep the night prior to this evaluation due to severe " knee pain.        Knee Range of Motion:     Right Active (Passive) Left Active (Passive)   Flexion 118 (130) 125 (135)   Extension -16 (-9) -10 (0)      Lower Extremity Strength:  Right LE  Left LE    Knee extension: 4+/5 Knee extension: 4-/5   Knee flexion: 4/5 Knee flexion: 4-/5   Hip flexion: 3+/5 Hip flexion: 3/5   Hip extension:  2/5 Hip extension: 3-/5   Hip abduction: 3-/5 Hip abduction: 4-/5   Hip adduction: NT Hip adduction NT   Ankle dorsiflexion: 4+/5 Ankle dorsiflexion: 5/5   Ankle plantarflexion: 3+/5 Ankle plantarflexion: 3/5         Joint Mobility: hypomobility AP femur and tibia B;  Patellar hypomobility med/lat glides L knee; hypomobility with inf/sup glides B    Flexibility:    Ely's test: R = 135 degrees ; L = 140 degrees    Assessment     Update: Pt is discharged for L shoulder at this time due to having reached maximum rehab potential and focus of therapy is switched to B knees to address significant pain, with B knee ROM deficits and decreased BLE strength. She would benefit from therapy for her knees to avoid falls and improve daily function with her ADLs.    Previous Short Term Goals Status:   not relevant to current issue (discharged from shoulder rehab and currently focused on B knees)  New Short Term Goals Status:   In progress, not met (new knee goals)  Long Term Goal Status: continue per initial plan of care.  Reasons for Recertification of Therapy:   Focus of therapy switched to B knees    GOALS  Short Term Goals: (4 weeks)  Patient will be compliant with HEP to promote the independent management of current diagnosis. In progress, not met  Patient will increase strength of B hip abduction to at least 4-/5. In progress, not met  Patient will increase active B knee extension to < or = 5 degrees to improve functional mobility during gait. In progress, not met  Patient will report a decrease in B knee pain to 7/10 at the worst during ADL's. In progress, not met    Long Term Goals: (8  weeks)  Patient will increase strength of B hip musculature to 4+/5 to improve stability while ambulating over uneven surfaces. In progress, not met  Patient will increase R knee flexion to > or = 120 degress to improve transferring leg into and out of vehicle. In progress, not met  Patient will report a decrease in complaints of R knee pain to 5/10 at the worst during ADL's. In progress, not met  Patient will improve FOTO limitation status from 40% to 60% placing the patient in the 40% impaired, limited, or restricted category indicating increased functional mobility. In progress, not met    Plan     Updated Certification Period: 6/22/2023 to 8/17/2023   Recommended Treatment Plan: 2 times per week for 8 weeks:  Gait Training, Manual Therapy, Moist Heat/ Ice, Neuromuscular Re-ed, Patient Education, Therapeutic Activities, and Therapeutic Exercise  Other Recommendations: None    Ghazal Caceres, PT

## 2023-06-27 ENCOUNTER — CLINICAL SUPPORT (OUTPATIENT)
Dept: REHABILITATION | Facility: HOSPITAL | Age: 63
End: 2023-06-27
Payer: MEDICAID

## 2023-06-27 ENCOUNTER — CLINICAL SUPPORT (OUTPATIENT)
Dept: UROGYNECOLOGY | Facility: CLINIC | Age: 63
End: 2023-06-27
Payer: MEDICAID

## 2023-06-27 DIAGNOSIS — M25.661 DECREASED RANGE OF MOTION OF BOTH KNEES: Primary | ICD-10-CM

## 2023-06-27 DIAGNOSIS — M25.662 DECREASED RANGE OF MOTION OF BOTH KNEES: Primary | ICD-10-CM

## 2023-06-27 DIAGNOSIS — R29.898 IMPAIRED STRENGTH OF LOWER EXTREMITY: ICD-10-CM

## 2023-06-27 DIAGNOSIS — R32 INCONTINENCE IN FEMALE: ICD-10-CM

## 2023-06-27 PROCEDURE — 97530 THERAPEUTIC ACTIVITIES: CPT | Mod: PO

## 2023-06-27 PROCEDURE — 97110 THERAPEUTIC EXERCISES: CPT | Mod: PO

## 2023-06-27 NOTE — PROGRESS NOTES
Established Patient - Audio Only Telehealth Visit     The patient location is: home  The chief complaint leading to consultation is: UUI  Visit type: Virtual visit with audio only (telephone)  Total time spent with patient: 8 min    Ashli Kumar complains of frequency, urgency, UUI, and FI for years. Denies CONCEPCION.     Referring Provider:  Miki Moya MD  Pelvic Health Screening:     Do you...   Usually experience frequent urination? yes  Usually leak urine with a feeling of urgency or strong sensation of needing to go to the bathroom? yes  Usually leak urine with coughing, sneezing, laughing or exercise or exertion? no  Usually experience small amounts or drops of urine leakage? yes    Have a bulge or something falling out that you can see or feel in your vaginal area or rectum? no  Have to push on the vagina or around the rectum to have complete a bowel movement? no  Usually push up on the bulge in the vaginal area with your fingers to start or complete urination? no    Usually lose stool beyond your control or have trouble with stool passing? yes  Experience a strong sense of urgency and must rush to the bathroom to have a bowel movement? no    Patient identifies UUI/ABL as most bothersome.    Background:  Relevant History:  OB:  Hysterectomy  C/S  GI/:  R kidney surgery for reported mass  Colectomy  GERD  Other:  CHF    HTN       Past Medical History:   Diagnosis Date    Anxiety     Arthritis     CHF (congestive heart failure)     Depression     GERD (gastroesophageal reflux disease)     High cholesterol     Hypertension       OB History    Para Term  AB Living   2 2 2 0 0 0   SAB IAB Ectopic Multiple Live Births   0 0 0 0 0        Previous Treatment:  None per patient    Patient educated on pelvic health, including normal and abnormal bladder function  normal and abnormal bowel function and directed to the pelvic health website and "[employer]" resources for additional information. Appt  scheduled and confirmed.      PHN Choices OAB Formulary:     Tier 2: ($10/30 days or 30/90 days or $0 at preferred mail pharm): Oxybutynin Cl, Oxybutynin ER      Tier 3: ($45/30 days or $135/90 days) Myrbetriq, Solifenacin

## 2023-06-27 NOTE — PROGRESS NOTES
"  Physical Therapy Treatment Note     Name: Ashli Kumar  Clinic Number: [id number]    Therapy Diagnosis:   Encounter Diagnoses   Name Primary?    Decreased range of motion of both knees Yes    Impaired strength of lower extremity          Physician: Miki Moya MD    Visit Date: 6/27/2023    Physician Orders: PT Eval and Treat   Medical Diagnosis from Referral: M19.012 (ICD-10-CM) - Osteoarthritis of left shoulder, unspecified osteoarthritis type   Evaluation Date: 3/14/2023  Authorization Period Expiration: 02/15/2024  Plan of Care Expiration: 12/31/2023  Visit #/Visits authorized: 19/20 (+eval)     Time In: 3:00 pm  Time Out: 3:48 pm   Total Billable Time:  48 min    Precautions: Standard,  pt reported being dx with Bi-polar dis order and depression, no longer seeing a therapist"    Subjective     Pt reports: I had so much pain in my knees last night that I could barely sleep. I have been having problems with my knees for years, and the doctor suggested possibly getting surgery (TKA) but I do not want to do this.  She was compliant with home exercise program.  Response to previous treatment: felt okay  Functional change: Ongoing    Pain:  Current 7/10, worst 10/10, best 7/10   Location: B knees  Description: Tight  Aggravating Factors: Sitting (prolonged), Walking, Morning, Night, and Getting out of bed/chair  Easing Factors: pain medication and muscle cream    Patients goals: feel better with less pain    Objective     Observation:  Ashli presents to therapy with significant edema formation in B knees L>R and pain that reaches maximal levels frequently, with pt having been unable to sleep the night prior to this evaluation due to severe knee pain.        Knee Range of Motion:     Right Active (Passive) Left Active (Passive)   Flexion 118 (130) 125 (135)   Extension -16 (-9) -10 (0)      Lower Extremity Strength:  Right LE  Left LE    Knee extension: 4+/5 Knee extension: 4-/5   Knee flexion: 4/5 " "Knee flexion: 4-/5   Hip flexion: 3+/5 Hip flexion: 3/5   Hip extension:  2/5 Hip extension: 3-/5   Hip abduction: 3-/5 Hip abduction: 4-/5   Hip adduction: NT Hip adduction NT   Ankle dorsiflexion: 4+/5 Ankle dorsiflexion: 5/5   Ankle plantarflexion: 3+/5 Ankle plantarflexion: 3/5         Joint Mobility: hypomobility AP femur and tibia B;  Patellar hypomobility med/lat glides L knee; hypomobility with inf/sup glides B    Flexibility:    Ely's test: R = 135 degrees ; L = 140 degrees    Reassessment for discharge L shoulder:      Active Range of Motion:   Shoulder Right Left   Flexion 151 (155) 150 (165)   Abduction 151 (157) 121 (154)   Fx reach ER  T1 T1   Fx reach IR L3 T12      Strength:  Shoulder Right Left   Flexion 4/5 4/5   Abduction 4-/5 4-/5   ER 4/5 4/5   IR 4/5 4-/5                         Special Tests:        Rotator Cuff Tear      Left   Hawkin's Delfino   +    Drop Arm test   -   Resisted ER   +      Joint Mobility: UR        Palpation: LUE - TTP w mod to max pressure  Infraspinatus -increase in pain   Supraspinatus -increase in pain  Deltoid -increase in pain  Upper trap -increase in pain  Subscapularis -increase in pain     Sensation:   Intact      Flexibility:   Lat: R + ; L +              Pec Minor/major: R + ; L +    Ashli received therapeutic exercises to develop strength, endurance, and posture for 40 minutes including: *re assessment billed as therex    NuStep - 5' at L4  Glute sets   Bridges 3 x 10  Clams 3 x 10 w GTB  SL Clams - GTB 2' ea  Hip ADD - 3' w 10" holds  QS 2' w 5" holds B  SLR 2' ea  LAQ  Standing Hip 3 way    manual therapy techniques: Joint mobilizations, Myofacial release, and Soft tissue Mobilization were applied to the: B knees for 0 minutes, including:  Patellar mobs all directions   AP Femur GrI-II mobs   AP Tibia GrI-II mobs     neuromuscular re-education activities to improve: Balance, Kinesthetic, and Proprioception for 0 minutes. The following activities were " included:  SLS  Tandem stance    therapeutic activities to improve functional performance for 8  minutes, including:  Instructions on updated HEP  Mini squats - 3 x 10  Step ups  Step downs    Home Exercises Provided and Patient Education Provided     Education provided:   - importance of consistent performance of HEP    Written Home Exercises Provided: yes.  Exercises were reviewed and Ashli was able to demonstrate them prior to the end of the session.  Ashli demonstrated good  understanding of the education provided.       Assessment     Ashli presents to therapy with no pain in either knee. She tolerates today's session well without symptom provocation, stating that she is feeling better and has not been experiencing knee pain for the first time in a while since beginning therapy on her knees. She expresses that she has not been completing her HEP, but plans to begin to perform her HEP this week. She tolerate treatment well with changed focus to increasing B knee ROM and strengthening of BLE. She displays increased challenge with QS, SLR, and mini squats, however states that she does not have any increased pain provocation at the completion of this session.    Ashli Is progressing well towards her goals.   Pt prognosis is Fair.     Pt will continue to benefit from skilled outpatient physical therapy to address the deficits listed in the problem list box on initial evaluation, provide pt/family education and to maximize pt's level of independence in the home and community environment.     Pt's spiritual, cultural and educational needs considered and pt agreeable to plan of care and goals.     Anticipated barriers to physical therapy:  attendance to therapy, sedentary lifestyle, HEP compliance     Goals:   Short Term Goals: (4 weeks)  Patient will be compliant with HEP to promote the independent management of current diagnosis. In progress, not met  Patient will increase strength of B hip abduction to at  least 4-/5. In progress, not met  Patient will increase active B knee extension to < or = 5 degrees to improve functional mobility during gait. In progress, not met  Patient will report a decrease in B knee pain to 7/10 at the worst during ADL's. In progress, not met    Long Term Goals: (8 weeks)  Patient will increase strength of B hip musculature to 4+/5 to improve stability while ambulating over uneven surfaces. In progress, not met  Patient will increase R knee flexion to > or = 120 degress to improve transferring leg into and out of vehicle. In progress, not met  Patient will report a decrease in complaints of R knee pain to 5/10 at the worst during ADL's. In progress, not met  Patient will improve FOTO limitation status from 40% to 60% placing the patient in the 40% impaired, limited, or restricted category indicating increased functional mobility. In progress, not met    Plan     Continue with PT POC and progress as tolerated.     Ghazal Caceres, PT

## 2023-06-29 ENCOUNTER — HOSPITAL ENCOUNTER (EMERGENCY)
Facility: HOSPITAL | Age: 63
Discharge: HOME | End: 2023-06-29
Attending: FAMILY MEDICINE
Payer: MEDICARE

## 2023-06-29 VITALS
TEMPERATURE: 99 F | BODY MASS INDEX: 32.06 KG/M2 | WEIGHT: 192.44 LBS | DIASTOLIC BLOOD PRESSURE: 66 MMHG | HEIGHT: 65 IN | SYSTOLIC BLOOD PRESSURE: 152 MMHG | HEART RATE: 73 BPM | RESPIRATION RATE: 19 BRPM | OXYGEN SATURATION: 96 %

## 2023-06-29 DIAGNOSIS — J30.2 SEASONAL ALLERGIES: Primary | ICD-10-CM

## 2023-06-29 PROCEDURE — 96372 THER/PROPH/DIAG INJ SC/IM: CPT | Performed by: FAMILY MEDICINE

## 2023-06-29 PROCEDURE — 99284 EMERGENCY DEPT VISIT MOD MDM: CPT | Mod: ER

## 2023-06-29 PROCEDURE — 63600175 PHARM REV CODE 636 W HCPCS: Mod: ER | Performed by: FAMILY MEDICINE

## 2023-06-29 RX ORDER — DEXAMETHASONE SODIUM PHOSPHATE 4 MG/ML
4 INJECTION, SOLUTION INTRA-ARTICULAR; INTRALESIONAL; INTRAMUSCULAR; INTRAVENOUS; SOFT TISSUE
Status: COMPLETED | OUTPATIENT
Start: 2023-06-29 | End: 2023-06-29

## 2023-06-29 RX ADMIN — DEXAMETHASONE SODIUM PHOSPHATE 4 MG: 4 INJECTION, SOLUTION INTRA-ARTICULAR; INTRALESIONAL; INTRAMUSCULAR; INTRAVENOUS; SOFT TISSUE at 09:06

## 2023-06-29 NOTE — ED PROVIDER NOTES
Encounter Date: 2023       History     Chief Complaint   Patient presents with    Cough     Pt C/O productive cough with nasal congestion, sore throat X 2 days.      63-year-old patient presents to ED with minimal cough and nasal congestion.  Mild sore throat.  For last 2 days.  Patient has history of allergies and ran out of her Claritin.  No fever.  No shortness of breath.    The history is provided by the patient.   Review of patient's allergies indicates:   Allergen Reactions    Codeine Hives    Sulfa (sulfonamide antibiotics) Hives    Benzoate analogues Itching     Past Medical History:   Diagnosis Date    Anxiety     Arthritis     CHF (congestive heart failure)     Depression     GERD (gastroesophageal reflux disease)     High cholesterol     Hypertension      Past Surgical History:   Procedure Laterality Date    BREAST BIOPSY Left     h    BREAST SURGERY       SECTION      COLONOSCOPY N/A 2017    Procedure: COLONOSCOPY Miralax;  Surgeon: Buddy Butcher Jr., MD;  Location: [facility];  Service: Endoscopy;  Laterality: N/A;    COLONOSCOPY N/A 2022    Procedure: COLONOSCOPY;  Surgeon: TREV Kay MD;  Location: [facility];  Service: Endoscopy;  Laterality: N/A;    COLONOSCOPY N/A 2022    Procedure: COLONOSCOPY;  Surgeon: TREV Kay MD;  Location: [facility];  Service: Endoscopy;  Laterality: N/A;    FLEXIBLE SIGMOIDOSCOPY N/A 2022    Procedure: SIGMOIDOSCOPY, FLEXIBLE;  Surgeon: TREV Kay MD;  Location: [facility] OR [address] [place] [address];  Service: Colon and Rectal;  Laterality: N/A;    HYSTERECTOMY      KIDNEY SURGERY Right     done at [facility], reported as mass by patient    LAPAROSCOPIC SIGMOIDECTOMY Left 2022    Procedure: COLECTOMY, SIGMOID, LAPAROSCOPIC, ERAS low;  Surgeon: TREV Kay MD;  Location: [facility] OR [address] [place] [address];  Service: Colon and Rectal;  Laterality: Left;    LEFT HEART CATHETERIZATION Left 2019    Procedure: Left heart cath;  Surgeon:  Gerhard Krishnan MD;  Location: [facility] [place] [facility] CATH LAB;  Service: Cardiology;  Laterality: Left;    MOBILIZATION OF SPLENIC FLEXURE N/A 7/20/2022    Procedure: MOBILIZATION, SPLENIC FLEXURE;  Surgeon: TREV Kay MD;  Location: [facility] OR [address] [place] [address];  Service: Colon and Rectal;  Laterality: N/A;    OOPHORECTOMY      s-section       History reviewed. No pertinent family history.  Social History     Tobacco Use    Smoking status: Never    Smokeless tobacco: Never   Substance Use Topics    Alcohol use: Not Currently     Comment: 12-31-22    Drug use: No     Review of Systems   Constitutional:  Negative for fever.   HENT:  Positive for postnasal drip and rhinorrhea.    All other systems reviewed and are negative.    Physical Exam     Initial Vitals [06/29/23 0907]   BP Pulse Resp Temp SpO2   (!) 152/66 73 19 98.5 °F (36.9 °C) 96 %      MAP       --         Physical Exam    Nursing note and vitals reviewed.  Constitutional: Vital signs are normal. She appears well-developed and well-nourished. She is active. No distress.   HENT:   Head: Normocephalic.   Nose: Nose normal.   Mouth/Throat: Oropharynx is clear and moist and mucous membranes are normal.   Eyes: Conjunctivae, EOM and lids are normal.   Neck: Neck supple.   Normal range of motion.  Cardiovascular:  Normal rate, regular rhythm, S1 normal, S2 normal and normal heart sounds.           Pulmonary/Chest: Breath sounds normal. No respiratory distress. She has no wheezes. She has no rales.   Abdominal: Abdomen is soft. Bowel sounds are normal.   Musculoskeletal:      Right upper arm: Normal.      Left upper arm: Normal.      Cervical back: Normal range of motion and neck supple.      Right lower leg: Normal.      Left lower leg: Normal.     Neurological: She is alert and oriented to person, place, and time. She has normal strength. GCS score is 15. GCS eye subscore is 4. GCS verbal subscore is 5. GCS motor subscore is 6.   Skin: Skin is warm. Capillary refill takes  less than 2 seconds.   Psychiatric: She has a normal mood and affect. Her speech is normal and behavior is normal. Thought content normal. Cognition and memory are normal.       ED Course   Procedures  Labs Reviewed - No data to display       Imaging Results    None          Medications   dexAMETHasone injection 4 mg (has no administration in time range)     Medical Decision Making:   Initial Assessment:   Nasal congestion, postnasal drip and minimal cough with sore throat.  No fever.  Normal lung examination.  Normal throat examination.  Differential Diagnosis:   URI, allergies, bronchitis,  ED Management:  Patient is given Decadron in ED.  Advised to continue her Claritin.  Symptoms most likely allergic with no signs or symptoms of infection.  Follow up PCP/ED with any worsening of symptoms.                        Clinical Impression:   Final diagnoses:  [J30.2] Seasonal allergies (Primary)        ED Disposition Condition    Discharge Stable          ED Prescriptions    None       Follow-up Information       Follow up With Specialties Details Why Contact Info    Anurag Lopez MD Family Medicine   [address], [facility] [address]  [place] [address]  [phone]               Maximo Jay MD  06/29/23 3089

## 2023-07-09 ENCOUNTER — HOSPITAL ENCOUNTER (EMERGENCY)
Facility: HOSPITAL | Age: 63
Discharge: HOME | End: 2023-07-09
Attending: EMERGENCY MEDICINE
Payer: MEDICARE

## 2023-07-09 VITALS
WEIGHT: 185 LBS | HEART RATE: 99 BPM | HEIGHT: 64 IN | DIASTOLIC BLOOD PRESSURE: 71 MMHG | OXYGEN SATURATION: 100 % | RESPIRATION RATE: 18 BRPM | TEMPERATURE: 99 F | BODY MASS INDEX: 31.58 KG/M2 | SYSTOLIC BLOOD PRESSURE: 127 MMHG

## 2023-07-09 DIAGNOSIS — M25.562 ACUTE PAIN OF LEFT KNEE: Primary | ICD-10-CM

## 2023-07-09 PROCEDURE — 99283 EMERGENCY DEPT VISIT LOW MDM: CPT | Mod: ER

## 2023-07-09 RX ORDER — DICLOFENAC SODIUM 25 MG/1
25 TABLET, DELAYED RELEASE ORAL 3 TIMES DAILY PRN
Qty: 15 TABLET | Refills: 0 | Status: SHIPPED | OUTPATIENT
Start: 2023-07-09 | End: 2024-02-15

## 2023-07-09 NOTE — ED NOTES
LOC: The patient is awake, alert and aware of environment with an appropriate affect, the patient is oriented for age and speaking appropriately.      Psych: Patient is calm and cooperative with good eye contact.     APPEARANCE: Patient is clean and non toxic appearing     NEUROLOGIC:  [place], Follows commands without difficulty. Speech is clear. No neuro deficits observed.     HEENT: Denies HEENT complaint or injury, moist mucus membranes      RESPIRATORY: Airway is open and patent, respirations are spontaneous; patient has a normal effort and rate. Bilateral breath sounds are clear. Pink nailbeds.      CARDIAC: Patient has a normal rate and rhythm, no peripheral edema noted, capillary refill < 2 seconds. PULSES are symmetrical in all extremities     GI/ : Soft and non tender to palpation, no distention noted. Vomited x 1 in triage.      MUSCULOSKELETAL:  Swelling noted to left knee, slight limp with ambulation and pt c/o tenderness to left knee.      SKIN: The skin is warm, dry and intact. Patient has normal skin turgor and moist mucus membranes, no rashes or lesions. No Breakdown noted.

## 2023-07-09 NOTE — ED PROVIDER NOTES
"Encounter Date: 2023       History     Chief Complaint   Patient presents with    Joint Swelling     C/O left knee pain and swelling x 2-3 days.  States "popping" sensation with ambulation.  Denies any falls or known trauma.      Presents for evaluation of nontraumatic left knee pain x 2-3 days. Reports hx of bilateral arthritis. No fever. No wound.     The history is provided by the patient and medical records.   Review of patient's allergies indicates:   Allergen Reactions    Codeine Hives    Sulfa (sulfonamide antibiotics) Hives    Benzoate analogues Itching     Past Medical History:   Diagnosis Date    Allergies     Anxiety     Arthritis     CHF (congestive heart failure)     Depression     Diverticular disease of large intestine without perforation or abscess     GERD (gastroesophageal reflux disease)     High cholesterol     History of fainting     Hypertension     Shortness of breath     states sometimes gets short winded    Unspecified chronic bronchitis     not chronic episode this year-    Unspecified glaucoma      Past Surgical History:   Procedure Laterality Date    BILATERAL TUBAL LIGATION      BREAST BIOPSY Left     h    BREAST SURGERY       SECTION      COLONOSCOPY N/A 2017    Procedure: COLONOSCOPY Miralax;  Surgeon: Buddy Butcher Jr., MD;  Location: [facility];  Service: Endoscopy;  Laterality: N/A;    COLONOSCOPY N/A 2022    Procedure: COLONOSCOPY;  Surgeon: TREV Kay MD;  Location: [facility];  Service: Endoscopy;  Laterality: N/A;    COLONOSCOPY N/A 2022    Procedure: COLONOSCOPY;  Surgeon: TREV Kay MD;  Location: [facility];  Service: Endoscopy;  Laterality: N/A;    ESOPHAGOGASTRODUODENOSCOPY N/A 7/3/2023    Procedure: EGD (ESOPHAGOGASTRODUODENOSCOPY);  Surgeon: Mary Cotton MD;  Location: [facility];  Service: Endoscopy;  Laterality: N/A;    EYE SURGERY Bilateral     cataract removal    FLEXIBLE SIGMOIDOSCOPY N/A 2022    " Procedure: SIGMOIDOSCOPY, FLEXIBLE;  Surgeon: TREV Kay MD;  [facility];  Service: Colon and Rectal;  Laterality: N/A;    HYSTERECTOMY      KIDNEY SURGERY Right     done at [facility], reported as mass by patient    LAPAROSCOPIC SIGMOIDECTOMY Left 07/20/2022    Procedure: COLECTOMY, SIGMOID, LAPAROSCOPIC, ERAS low;  Surgeon: TREV Kay MD;  [facility];  Service: Colon and Rectal;  Laterality: Left;    LEFT HEART CATHETERIZATION Left 05/13/2019    Procedure: Left heart cath;  Surgeon: Gerhard Krishnan MD;  Location: [facility] CATH LAB;  Service: Cardiology;  Laterality: Left;    MOBILIZATION OF SPLENIC FLEXURE N/A 07/20/2022    Procedure: MOBILIZATION, SPLENIC FLEXURE;  Surgeon: TREV Kay MD;  [facility];  Service: Colon and Rectal;  Laterality: N/A;    OOPHORECTOMY      s-section       History reviewed. No pertinent family history.  Social History     Tobacco Use    Smoking status: Never    Smokeless tobacco: Never   Substance Use Topics    Alcohol use: Not Currently     Comment: 12-31-22    Drug use: No     Review of Systems   Constitutional:  Negative for chills and fever.   Respiratory:  Negative for cough and shortness of breath.    Musculoskeletal:  Positive for arthralgias.   All other systems reviewed and are negative.    Physical Exam     Initial Vitals [07/09/23 0934]   BP Pulse Resp Temp SpO2   (!) 125/59 66 18 98.2 °F (36.8 °C) 97 %      MAP       --         Physical Exam    Nursing note and vitals reviewed.  Constitutional: She appears well-developed and well-nourished. She is not diaphoretic. No distress.   HENT:   Head: Normocephalic and atraumatic.   Eyes: Conjunctivae and EOM are normal.   Neck: Neck supple.   Normal range of motion.  Cardiovascular:  Normal rate and regular rhythm.           Pulmonary/Chest: No respiratory distress.   Musculoskeletal:         General: No edema.      Cervical back: Normal range of motion and  neck supple.      Left knee: No effusion, erythema, lacerations or bony tenderness. Decreased range of motion. Tenderness present over the patellar tendon. No LCL laxity, MCL laxity, ACL laxity or PCL laxity.Normal pulse.     Neurological: She is alert. GCS score is 15. GCS eye subscore is 4. GCS verbal subscore is 5. GCS motor subscore is 6.   Skin: Capillary refill takes less than 2 seconds.   Psychiatric: She has a normal mood and affect. Her behavior is normal.       ED Course   Procedures  Labs Reviewed - No data to display       Imaging Results    None          Medications - No data to display  Medical Decision Making:   ED Management:  Nontraumatic left knee pain. No sign of septic arthritis.   RICE/nsaids                        Clinical Impression:   Final diagnoses:  [M25.562] Acute pain of left knee (Primary)        ED Disposition Condition    Discharge Stable          ED Prescriptions       Medication Sig Dispense Start Date End Date Auth. Provider    diclofenac (VOLTAREN) 25 MG TbEC Take 1 tablet (25 mg total) by mouth 3 (three) times daily as needed (pain). 15 tablet 7/9/2023 -- Rupesh Braga MD          Follow-up Information       Follow up With Specialties Details Why Contact Info    Anurag Lopez MD Family Medicine   [address], [facility] [address]  [facility] [address] [phone]               Rupesh Braga MD  07/09/23 9333

## 2023-07-09 NOTE — DISCHARGE INSTRUCTIONS
Thank you for letting me care for you today! It was nice meeting you, and I hope you feel better soon. Please come back to [facility] for all of your future medical needs.     Our goal in the emergency department is to always give you outstanding care and exceptional service. You may receive a survey by mail or e-mail in the next week regarding your experience in our ED. We would greatly appreciate you completing and returning the survey. Your feedback provides us with a way to recognize our staff who give very good care and it helps us learn how to improve when your experience was below our aspiration of excellence.      Sincerely,    Rupesh Braga MD

## 2023-07-09 NOTE — ED NOTES
Pt presents to ED with C/O L knee pain and swelling with onset wed x4 days ago. Pt denies any falls, injury or trauma to the area. She states the pain is 10/10 at this time. Pt denies taking any medication for the pain. She states HX of artharitis to bilat knees.

## 2023-08-01 ENCOUNTER — TELEPHONE (OUTPATIENT)
Dept: GASTROENTEROLOGY | Facility: CLINIC | Age: 63
End: 2023-08-01
Payer: MEDICAID

## 2023-08-01 NOTE — TELEPHONE ENCOUNTER
Contacted patient to go over test results. Informed patient to continue taking PPI. Patient stated verbal understanding.           ----- Message from Mary Cotton MD sent at 7/14/2023  8:08 AM CDT -----  Benign fundic gland polyp which is not of concern.  HP (-), cont PPI, RTC as needed

## 2023-08-01 NOTE — TELEPHONE ENCOUNTER
Tried contacting patient to go over EGD results. Patient's phone is currently not in service.             ----- Message from Mary Cotton MD sent at 7/14/2023  8:08 AM CDT -----  Benign fundic gland polyp which is not of concern.  HP (-), cont PPI, RTC as needed

## 2023-08-29 ENCOUNTER — TELEPHONE (OUTPATIENT)
Dept: SURGERY | Facility: CLINIC | Age: 63
End: 2023-08-29
Payer: MEDICAID

## 2023-08-29 ENCOUNTER — TELEPHONE (OUTPATIENT)
Dept: UROLOGY | Facility: CLINIC | Age: 63
End: 2023-08-29
Payer: MEDICAID

## 2023-08-29 NOTE — TELEPHONE ENCOUNTER
Unable to LVM to reschedule patient to a non- multi-d spot. No MyChart set up. Will try again later this afternoon.      ----- Message from Leah Llanes RN sent at 8/29/2023 11:26 AM CDT -----  Regarding: RE: Clinic Wed  Oh ok! Thank you so much :)  ----- Message -----  From: Eloise Srinivasan RN  Sent: 8/29/2023  11:11 AM CDT  To: Leah Llanes RN  Subject: RE: Clinic Wed Hi, I am! I'll call her once clinic finishes up.    ----- Message -----  From: Leah Llanes RN  Sent: 8/29/2023   9:39 AM CDT  To: Eloise Srinivasan RN  Subject: RE: Clinic Wed Hi Eloise!  Are you checking into this?  Just wanted to make sure.  Let me know if you all need anything.  I am one of the nurses for [facility] helping with messages.    Thank you,    Leah  ----- Message -----  From: Livia Marsh MD  Sent: 8/28/2023   2:36 PM CDT  To: Jasmin Paulson MD; Eloise Srinivasan RN; #  Subject: Clinic Wed Hi Eloise-    This patient is scheduled for Multi-D on Wednesday, but she has had surgery with Dr Kay (who just saw her 2 months ago).    Can you guys check with her on why she is seeing me?  In general, if she already has a CRS provider she shouldn't see me (unless they refer her to me).    Thanks!  Dasia

## 2023-10-03 ENCOUNTER — TELEPHONE (OUTPATIENT)
Dept: SURGERY | Facility: CLINIC | Age: 63
End: 2023-10-03
Payer: MEDICAID

## 2023-10-03 NOTE — TELEPHONE ENCOUNTER
Spoke with patient regarding area of bleeding/drainage from old incision area. Patient states that the drainage occurs and is tender at old incision site. Appointment scheduled for evaluation. Details confirmed verbally over phone.

## 2023-10-03 NOTE — TELEPHONE ENCOUNTER
----- Message from Tiana Feliciano sent at 10/3/2023  3:41 PM CDT -----  Regarding: Soar Stomach  Contact: Pt @ [phone]  Pt is calling to  discuss stomach pain with nurse. Asking for a call back     Phone conversation with patient.  Please make appointment for her for next Wednesday for repeat injection.

## 2023-10-11 NOTE — PROGRESS NOTES
[facility] Office Follow Up Visit    SUBJECTIVE:     Chief Complaint:  Hematochezia    History:   7/20/22:  1. Laparoscopic extended left colectomy   2. Laparoscopic mobilization of the splenic flexure  3. Small-bowel resection  4. Flexible sigmoidoscopy.       Indication: diverticular stricture.      Pathology:   A.   DESCENDING AND SIGMOID COLON, COLECTOMY:          -Benign colonic parenchyma with diverticulosis and focal stricture.   B.   PROXIMAL AND DISTAL ANASTOMOTIC DONUTS, COLECTOMY:          -Benign colonic mucosa, consistent with anastomotic donuts.   C.   ILEUM, SEGMENTAL RESECTION:          -Benign small intestinal mucosa with serosal adhesions, focal   submucosal abscess and changes consistent with anastomotic site.     Last colonoscopy:  12/9/22:  Impression:            - Hemorrhoids found on perianal exam.                          - Patent end-to-end colo-colonic anastomosis, characterized by healthy appearing mucosa.                          - Diverticulosis in the entire examined colon.                          - The examined portion of the ileum was normal.                          - The examination was otherwise normal on direct and retroflexion views.                          - No specimens collected.     Current Status:   8/4/2022:  Doing very well.  Having 2 bowel movements per day.  Diarrhea improving.  Abdominal pain resolved.  No issues with her incision.  No fevers.  9/1/22:  Continues to do well.  Having 1- 2 bowel movements per day.  No issues with fecal incontinence.  Intermittent passage of mucus with flatus.  6/8/23: presents for episode of hematochezia on 05/10/2022.  CTA at that time was negative.  No prior similar episodes.  She continues to have diarrhea with 3-4 loose BM per day.     - underwent normal upper GI July 2023.  10/12/23:  Presents for evaluation for pain and drainage from her Pfannenstiel incision.  She has lost weight recently.  Her bowel movements remain abnormal.  She is  "having 3-4 bowel movements per day.  No fiber supplementation.    Review of Systems:  Review of Systems   Constitutional:  Negative for chills, diaphoresis, fever, malaise/fatigue and weight loss.   HENT:  Negative for congestion.    Respiratory:  Negative for shortness of breath.    Cardiovascular:  Negative for chest pain and leg swelling.   Gastrointestinal:  Negative for abdominal pain, blood in stool, constipation, nausea and vomiting.   Genitourinary:  Negative for dysuria.   Musculoskeletal:  Negative for back pain and myalgias.   Skin:  Negative for rash.   Neurological:  Negative for dizziness and weakness.   Endo/Heme/Allergies:  Does not bruise/bleed easily.   Psychiatric/Behavioral:  Negative for depression.        OBJECTIVE:     Vital Signs (Most Recent)  /78 (BP Location: Left arm, Patient Position: Sitting, BP Method: Small (Automatic))   Ht 5' 4" (1.626 m)   Wt 82.6 kg (181 lb 15.8 oz)   BMI 31.24 kg/m²     Physical Exam:  General: White female in no distress   Respiratory: respirations are even and unlabored  Cardiac: regular rate  Abdomen:  Incisions healing well.  Pfannenstiel incision healing well.  Small sebaceous cyst on the left lateral aspect that was drained in clinic today.  Extremities: Warm dry and intact  Anorectal:  Deferred    Imaging:  CT angiography 05/10/2022 personally reviewed and demonstrates healthy-appearing colorectal anastomosis without evidence of extravasation    Labs:  H&H 11 and 33.  Normal renal function.  Albumin 4.     ASSESSMENT/PLAN:     Diagnoses and all orders for this visit:    Incisional pain          63 y.o. female post op from lap left colectomy and small bowel resection for diverticular disease on 7/20/22.  Follow-up colonoscopy on 12/09/2022 was otherwise normal.    - Diarrhea:  Recommended starting FiberCon once to twice per day in order to assist with stool bulking.  - Wound rash:  This is likely related to her abdominal pannus.  Recommended gold " Bond powder.    She can follow up with me with any future issues.    She presents today after an isolated episode of hematochezia.  No prior similar episodes.  This is likely related to persistent diverticular disease.  Since her last scope was 6 months ago, no need to repeat scope.  It was bright red blood mixed with stool so unlikely an upper source.  Reassurance provided.  Recommended continuing the fiber as well as taking probiotics.  If no improvement, would switch to Imodium.  If she has any recurrence hematochezia, I have asked her to reach out to me so I can set up a repeat upper and lower scope.    TREV Kay MD, FACS  Staff Surgeon  Colon & Rectal Surgery

## 2023-10-12 ENCOUNTER — OFFICE VISIT (OUTPATIENT)
Dept: SURGERY | Facility: CLINIC | Age: 63
End: 2023-10-12
Payer: MEDICARE

## 2023-10-12 VITALS
SYSTOLIC BLOOD PRESSURE: 132 MMHG | DIASTOLIC BLOOD PRESSURE: 78 MMHG | BODY MASS INDEX: 31.07 KG/M2 | WEIGHT: 182 LBS | HEIGHT: 64 IN

## 2023-10-12 DIAGNOSIS — L76.82 INCISIONAL PAIN: Primary | ICD-10-CM

## 2023-10-12 PROCEDURE — 99213 OFFICE O/P EST LOW 20 MIN: CPT | Mod: S$GLB,,, | Performed by: COLON & RECTAL SURGERY

## 2023-10-12 PROCEDURE — 3288F FALL RISK ASSESSMENT DOCD: CPT | Mod: CPTII,S$GLB,, | Performed by: COLON & RECTAL SURGERY

## 2023-10-12 PROCEDURE — 1101F PR PT FALLS ASSESS DOC 0-1 FALLS W/OUT INJ PAST YR: ICD-10-PCS | Mod: CPTII,S$GLB,, | Performed by: COLON & RECTAL SURGERY

## 2023-10-12 PROCEDURE — 99999 PR PBB SHADOW E&M-EST. PATIENT-LVL III: ICD-10-PCS | Mod: PBBFAC,,, | Performed by: COLON & RECTAL SURGERY

## 2023-10-12 PROCEDURE — 3044F HG A1C LEVEL LT 7.0%: CPT | Mod: CPTII,S$GLB,, | Performed by: COLON & RECTAL SURGERY

## 2023-10-12 PROCEDURE — 99213 PR OFFICE/OUTPT VISIT, EST, LEVL III, 20-29 MIN: ICD-10-PCS | Mod: S$GLB,,, | Performed by: COLON & RECTAL SURGERY

## 2023-10-12 PROCEDURE — 3044F PR MOST RECENT HEMOGLOBIN A1C LEVEL <7.0%: ICD-10-PCS | Mod: CPTII,S$GLB,, | Performed by: COLON & RECTAL SURGERY

## 2023-10-12 PROCEDURE — 3075F PR MOST RECENT SYSTOLIC BLOOD PRESS GE 130-139MM HG: ICD-10-PCS | Mod: CPTII,S$GLB,, | Performed by: COLON & RECTAL SURGERY

## 2023-10-12 PROCEDURE — 1159F MED LIST DOCD IN RCRD: CPT | Mod: CPTII,S$GLB,, | Performed by: COLON & RECTAL SURGERY

## 2023-10-12 PROCEDURE — 1101F PT FALLS ASSESS-DOCD LE1/YR: CPT | Mod: CPTII,S$GLB,, | Performed by: COLON & RECTAL SURGERY

## 2023-10-12 PROCEDURE — 3078F PR MOST RECENT DIASTOLIC BLOOD PRESSURE < 80 MM HG: ICD-10-PCS | Mod: CPTII,S$GLB,, | Performed by: COLON & RECTAL SURGERY

## 2023-10-12 PROCEDURE — 3078F DIAST BP <80 MM HG: CPT | Mod: CPTII,S$GLB,, | Performed by: COLON & RECTAL SURGERY

## 2023-10-12 PROCEDURE — 3075F SYST BP GE 130 - 139MM HG: CPT | Mod: CPTII,S$GLB,, | Performed by: COLON & RECTAL SURGERY

## 2023-10-12 PROCEDURE — 99999 PR PBB SHADOW E&M-EST. PATIENT-LVL III: CPT | Mod: PBBFAC,,, | Performed by: COLON & RECTAL SURGERY

## 2023-10-12 PROCEDURE — 3288F PR FALLS RISK ASSESSMENT DOCUMENTED: ICD-10-PCS | Mod: CPTII,S$GLB,, | Performed by: COLON & RECTAL SURGERY

## 2023-10-12 PROCEDURE — 1160F RVW MEDS BY RX/DR IN RCRD: CPT | Mod: CPTII,S$GLB,, | Performed by: COLON & RECTAL SURGERY

## 2023-10-12 PROCEDURE — 1160F PR REVIEW ALL MEDS BY PRESCRIBER/CLIN PHARMACIST DOCUMENTED: ICD-10-PCS | Mod: CPTII,S$GLB,, | Performed by: COLON & RECTAL SURGERY

## 2023-10-12 PROCEDURE — 3008F BODY MASS INDEX DOCD: CPT | Mod: CPTII,S$GLB,, | Performed by: COLON & RECTAL SURGERY

## 2023-10-12 PROCEDURE — 1159F PR MEDICATION LIST DOCUMENTED IN MEDICAL RECORD: ICD-10-PCS | Mod: CPTII,S$GLB,, | Performed by: COLON & RECTAL SURGERY

## 2023-10-12 PROCEDURE — 3008F PR BODY MASS INDEX (BMI) DOCUMENTED: ICD-10-PCS | Mod: CPTII,S$GLB,, | Performed by: COLON & RECTAL SURGERY

## 2023-11-08 ENCOUNTER — OFFICE VISIT (OUTPATIENT)
Dept: FAMILY MEDICINE | Facility: HOSPITAL | Age: 63
End: 2023-11-08
Payer: MEDICAID

## 2023-11-08 VITALS
DIASTOLIC BLOOD PRESSURE: 70 MMHG | WEIGHT: 181 LBS | SYSTOLIC BLOOD PRESSURE: 124 MMHG | HEART RATE: 61 BPM | BODY MASS INDEX: 30.9 KG/M2 | HEIGHT: 64 IN

## 2023-11-08 DIAGNOSIS — R21 RASH: ICD-10-CM

## 2023-11-08 DIAGNOSIS — Z13.820 SCREENING FOR OSTEOPOROSIS: ICD-10-CM

## 2023-11-08 DIAGNOSIS — N39.46 MIXED INCONTINENCE URGE AND STRESS: Primary | ICD-10-CM

## 2023-11-08 DIAGNOSIS — Z12.31 SCREENING MAMMOGRAM FOR BREAST CANCER: ICD-10-CM

## 2023-11-08 DIAGNOSIS — Z78.0 ASYMPTOMATIC MENOPAUSAL STATE: ICD-10-CM

## 2023-11-08 PROBLEM — R06.02 SOB (SHORTNESS OF BREATH): Status: RESOLVED | Noted: 2019-05-13 | Resolved: 2023-11-08

## 2023-11-08 PROCEDURE — 99214 OFFICE O/P EST MOD 30 MIN: CPT

## 2023-11-08 NOTE — PROGRESS NOTES
Progress Note  [facility]    Subjective:     Ms. Cornelius is a 63 y.o. female who presents to the clinic today for rash in pelvic area and Mixed incontinence.     #Mixed incontinence  -She reports worsening incontinence at night and sneezing. She requires depends and is often unable to make it to the bathroom. Saw UroGyn for virtual intake but no showed appt. Did not go to pelvic floor therapy after she was referred by Dr. Moya last visit.     Endorses increased urinary frequency, inability to void completely. Denies burning with urination or discharge. Also reports a rash in pelvic region mainly in the left crease that started within the past week. She associates the rash with increased sweating and wearing depends. Has used gold bond in the past for the rash with improvement. Reports starting to use a new soap with fragrance prior to the rash.     Also complaining of a sore throat that started today associated with a cough. She reports Denies fevers, chills, myalgias. Would like flu shot but will defer until feeling better.     Active Problem List with Overview Notes    Diagnosis Date Noted    Decreased range of motion of both knees 06/22/2023    Impaired strength of lower extremity 06/22/2023    Osteoarthritis 06/20/2023    Gastroesophageal reflux disease 05/12/2023    Irregular bowel habits 05/12/2023    Posture imbalance 03/14/2023    Decreased range of motion of left shoulder 03/14/2023    Weakness of shoulder 03/14/2023    Mental health problem 01/02/2023    Diverticular stricture 07/20/2022    Segmental colitis associated with diverticulosis 06/14/2022    LLQ pain 05/29/2022    Rectal bleeding 05/27/2022    Bipolar affective disorder, currently depressed, moderate 05/27/2022    Chronic heart failure with preserved ejection fraction 05/13/2019    Obesity due to excess calories 05/13/2019    Angina, class III 05/01/2019    Screening for colorectal cancer 05/12/2017        Review of Systems   All  "other systems reviewed and are negative.        Objective:   /70   Pulse 61   Ht 5' 4" (1.626 m)   Wt 82.1 kg (181 lb)   BMI 31.07 kg/m²      Physical Exam  Vitals and nursing note reviewed. Exam conducted with a chaperone present.   Constitutional:       Appearance: Normal appearance. She is obese.   HENT:      Head: Normocephalic and atraumatic.      Nose: No congestion.      Mouth/Throat:      Pharynx: No oropharyngeal exudate or posterior oropharyngeal erythema.   Eyes:      Extraocular Movements: Extraocular movements intact.      Pupils: Pupils are equal, round, and reactive to light.   Cardiovascular:      Rate and Rhythm: Normal rate and regular rhythm.      Pulses: Normal pulses.      Heart sounds: Normal heart sounds.   Pulmonary:      Effort: Pulmonary effort is normal.      Breath sounds: Normal breath sounds.   Skin:     Comments: Mild erythema to lower inguinal crease.    Neurological:      Mental Status: She is alert and oriented to person, place, and time.          Assessment:   63 y.o. with        1. Mixed incontinence urge and stress    2. Screening mammogram for breast cancer    3. Screening for osteoporosis    4. Asymptomatic menopausal state    5. Rash         Plan:   Ashli was seen today for rash, urinary frequency and sore throat.    Diagnoses and all orders for this visit:    Mixed incontinence urge and stress        - POCT UA with 2+ leukocytes, trace protein, blood. Negative nitrates. Will send for culture. Will message referral coordinator about rescheduling with urogyn       Screening mammogram for breast cancer  -     Mammo Digital Screening Bilat; Future    Screening for osteoporosis  -     DXA Bone Density Axial Skeleton 1 or more sites; Future    Asymptomatic menopausal state  -     DXA Bone Density Axial Skeleton 1 or more sites; Future    Rash  - likely incontinence associated dermatitis from urine leakage. Advised to use gold bond daily and keep self clean and dry "     Sore throat  -likely viral illness, will defer flu shot until feeling better    Follow up in 3 months     Dav Vargas DO  [facility], PGY-2  1:46 PM, 11/09/2023    *This note was dictated using the M*Modal Fluency Direct word recognition program. There are word rescognition mistakes that are occasionally missed on review. \    The following information is provided to all patients.  This information is to help you find resources for any of the problems found today that may be affecting your health:                Living healthy guide: www.[facility].[place].[facility]       Understanding Diabetes: www.diabetes.org       Eating healthy: www.cdc.gov/healthyweight      CDC home safety checklist: www.cdc.gov/steadi/patient.html      Agency on Aging: www.goea.[place].[facility]       Alcoholics anonymous (AA): www.aa.org      Physical Activity: www.mike.nih.gov/yr8cwvi       Tobacco use: www.quitwithusla.org

## 2023-11-10 ENCOUNTER — TELEPHONE (OUTPATIENT)
Dept: UROGYNECOLOGY | Facility: CLINIC | Age: 63
End: 2023-11-10
Payer: MEDICARE

## 2023-12-04 ENCOUNTER — HOSPITAL ENCOUNTER (OUTPATIENT)
Dept: RADIOLOGY | Facility: HOSPITAL | Age: 63
Discharge: HOME | End: 2023-12-04
Payer: MEDICARE

## 2023-12-04 DIAGNOSIS — Z78.0 ASYMPTOMATIC MENOPAUSAL STATE: ICD-10-CM

## 2023-12-04 DIAGNOSIS — Z13.820 SCREENING FOR OSTEOPOROSIS: ICD-10-CM

## 2023-12-04 DIAGNOSIS — Z12.31 SCREENING MAMMOGRAM FOR BREAST CANCER: ICD-10-CM

## 2023-12-04 PROCEDURE — 77067 SCR MAMMO BI INCL CAD: CPT | Mod: TC

## 2023-12-04 PROCEDURE — 77067 MAMMO DIGITAL SCREENING BILAT WITH TOMO: ICD-10-PCS | Mod: 26,,, | Performed by: RADIOLOGY

## 2023-12-04 PROCEDURE — 77067 SCR MAMMO BI INCL CAD: CPT | Mod: 26,,, | Performed by: RADIOLOGY

## 2023-12-04 PROCEDURE — 77080 DXA BONE DENSITY AXIAL: CPT | Mod: TC

## 2023-12-04 PROCEDURE — 77063 BREAST TOMOSYNTHESIS BI: CPT | Mod: 26,,, | Performed by: RADIOLOGY

## 2023-12-04 PROCEDURE — 77080 DXA BONE DENSITY AXIAL: CPT | Mod: 26,,, | Performed by: RADIOLOGY

## 2023-12-04 PROCEDURE — 77080 DXA BONE DENSITY AXIAL SKELETON 1 OR MORE SITES: ICD-10-PCS | Mod: 26,,, | Performed by: RADIOLOGY

## 2023-12-04 PROCEDURE — 77063 MAMMO DIGITAL SCREENING BILAT WITH TOMO: ICD-10-PCS | Mod: 26,,, | Performed by: RADIOLOGY

## 2024-01-27 ENCOUNTER — HOSPITAL ENCOUNTER (EMERGENCY)
Facility: HOSPITAL | Age: 64
Discharge: HOME | End: 2024-01-27
Attending: FAMILY MEDICINE
Payer: MEDICARE

## 2024-01-27 VITALS
RESPIRATION RATE: 20 BRPM | TEMPERATURE: 99 F | OXYGEN SATURATION: 96 % | BODY MASS INDEX: 27.46 KG/M2 | HEART RATE: 74 BPM | WEIGHT: 160 LBS | DIASTOLIC BLOOD PRESSURE: 67 MMHG | SYSTOLIC BLOOD PRESSURE: 160 MMHG

## 2024-01-27 DIAGNOSIS — R10.9 FLANK PAIN: Primary | ICD-10-CM

## 2024-01-27 DIAGNOSIS — N39.0 URINARY TRACT INFECTION WITHOUT HEMATURIA, SITE UNSPECIFIED: ICD-10-CM

## 2024-01-27 LAB
BILIRUB UR QL STRIP: ABNORMAL
CLARITY UR REFRACT.AUTO: ABNORMAL
COLOR UR AUTO: YELLOW
GLUCOSE UR QL STRIP: NEGATIVE
HGB UR QL STRIP: NEGATIVE
KETONES UR QL STRIP: NEGATIVE
LEUKOCYTE ESTERASE UR QL STRIP: ABNORMAL
MICROSCOPIC COMMENT: ABNORMAL
NITRITE UR QL STRIP: NEGATIVE
PH UR STRIP: 6 [PH] (ref 5–8)
PROT UR QL STRIP: NEGATIVE
SP GR UR STRIP: >=1.03 (ref 1–1.03)
URN SPEC COLLECT METH UR: ABNORMAL
UROBILINOGEN UR STRIP-ACNC: NEGATIVE EU/DL
WBC #/AREA URNS AUTO: 6 /HPF (ref 0–5)

## 2024-01-27 PROCEDURE — 99284 EMERGENCY DEPT VISIT MOD MDM: CPT | Mod: ER

## 2024-01-27 PROCEDURE — 25000003 PHARM REV CODE 250: Mod: ER | Performed by: FAMILY MEDICINE

## 2024-01-27 PROCEDURE — 81000 URINALYSIS NONAUTO W/SCOPE: CPT | Mod: ER | Performed by: FAMILY MEDICINE

## 2024-01-27 RX ORDER — CEPHALEXIN 500 MG/1
500 CAPSULE ORAL 4 TIMES DAILY
Qty: 20 CAPSULE | Refills: 0 | Status: SHIPPED | OUTPATIENT
Start: 2024-01-27 | End: 2024-02-01

## 2024-01-27 RX ORDER — METHOCARBAMOL 500 MG/1
500 TABLET, FILM COATED ORAL 3 TIMES DAILY
Qty: 30 TABLET | Refills: 0 | Status: SHIPPED | OUTPATIENT
Start: 2024-01-27 | End: 2024-01-27

## 2024-01-27 RX ORDER — METHOCARBAMOL 500 MG/1
500 TABLET, FILM COATED ORAL 3 TIMES DAILY
Qty: 30 TABLET | Refills: 0 | Status: SHIPPED | OUTPATIENT
Start: 2024-01-27 | End: 2024-02-06

## 2024-01-27 RX ORDER — CEPHALEXIN 500 MG/1
500 CAPSULE ORAL 4 TIMES DAILY
Qty: 20 CAPSULE | Refills: 0 | Status: SHIPPED | OUTPATIENT
Start: 2024-01-27 | End: 2024-01-27

## 2024-01-27 RX ORDER — METHOCARBAMOL 500 MG/1
500 TABLET, FILM COATED ORAL
Status: COMPLETED | OUTPATIENT
Start: 2024-01-27 | End: 2024-01-27

## 2024-01-27 RX ORDER — CEPHALEXIN 500 MG/1
500 CAPSULE ORAL
Status: COMPLETED | OUTPATIENT
Start: 2024-01-27 | End: 2024-01-27

## 2024-01-27 RX ADMIN — CEPHALEXIN 500 MG: 500 CAPSULE ORAL at 01:01

## 2024-01-27 RX ADMIN — METHOCARBAMOL 500 MG: 500 TABLET ORAL at 01:01

## 2024-01-27 NOTE — ED PROVIDER NOTES
Encounter Date: 2024       History     Chief Complaint   Patient presents with    Flank Pain     PT presents to the ED with C/O left sided flank pain x 3 days. +dysuria, foul odor to urine a,d urinary frequency. Afebrile.      63-year-old female complains of left flank pain.  Started about 3 days ago.  No fever.  No chills or rigors.  No dysuria.  No tingling numbness weakness in lower limbs.  No spinal pain.    The history is provided by the patient.     Review of patient's allergies indicates:   Allergen Reactions    Codeine Hives    Sulfa (sulfonamide antibiotics) Hives    Benzoate analogues Itching     Past Medical History:   Diagnosis Date    Allergies     Anxiety     Arthritis     CHF (congestive heart failure)     Depression     Diverticular disease of large intestine without perforation or abscess     GERD (gastroesophageal reflux disease)     High cholesterol     History of fainting     Hypertension     Shortness of breath     states sometimes gets short winded    SOB (shortness of breath) 2019    Unspecified chronic bronchitis     not chronic episode this year-    Unspecified glaucoma      Past Surgical History:   Procedure Laterality Date    BILATERAL TUBAL LIGATION      BREAST BIOPSY Left     rph    BREAST SURGERY       SECTION      COLONOSCOPY N/A 2017    Procedure: COLONOSCOPY Miralax;  Surgeon: Buddy Butcher Jr., MD;  Location: [facility];  Service: Endoscopy;  Laterality: N/A;    COLONOSCOPY N/A 2022    Procedure: COLONOSCOPY;  Surgeon: TREV Kay MD;  Location: [facility];  Service: Endoscopy;  Laterality: N/A;    COLONOSCOPY N/A 2022    Procedure: COLONOSCOPY;  Surgeon: TREV Kay MD;  Location: [facility];  Service: Endoscopy;  Laterality: N/A;    ESOPHAGOGASTRODUODENOSCOPY N/A 7/3/2023    Procedure: EGD (ESOPHAGOGASTRODUODENOSCOPY);  Surgeon: Mary Cotton MD;  Location: [facility];  Service: Endoscopy;  Laterality: N/A;    EYE  SURGERY Bilateral     cataract removal    FLEXIBLE SIGMOIDOSCOPY N/A 07/20/2022    Procedure: SIGMOIDOSCOPY, FLEXIBLE;  Surgeon: TREV Kay MD;  Location: [facility] OR [address] [place] [address];  Service: Colon and Rectal;  Laterality: N/A;    HYSTERECTOMY      KIDNEY SURGERY Right     done at [facility], reported as mass by patient    LAPAROSCOPIC SIGMOIDECTOMY Left 07/20/2022    Procedure: COLECTOMY, SIGMOID, LAPAROSCOPIC, ERAS low;  Surgeon: TREV Kay MD;  [facility];  Service: Colon and Rectal;  Laterality: Left;    LEFT HEART CATHETERIZATION Left 05/13/2019    Procedure: Left heart cath;  Surgeon: Gerhard Krishnan MD;  Location: [facility] CATH LAB;  Service: Cardiology;  Laterality: Left;    MOBILIZATION OF SPLENIC FLEXURE N/A 07/20/2022    Procedure: MOBILIZATION, SPLENIC FLEXURE;  Surgeon: TREV Kay MD;  [facility];  Service: Colon and Rectal;  Laterality: N/A;    OOPHORECTOMY      s-section       No family history on file.  Social History     Tobacco Use    Smoking status: Never    Smokeless tobacco: Never   Substance Use Topics    Alcohol use: Not Currently     Comment: 12-31-22    Drug use: No     Review of Systems   Constitutional:  Negative for fever.   HENT:  Negative for sore throat.    Respiratory:  Negative for shortness of breath.    Cardiovascular:  Negative for chest pain.   Gastrointestinal:  Negative for nausea.   Genitourinary:  Positive for flank pain. Negative for dysuria.   Musculoskeletal:  Negative for back pain.   Skin:  Negative for rash.   Neurological:  Negative for weakness.   Hematological:  Does not bruise/bleed easily.   All other systems reviewed and are negative.      Physical Exam     Initial Vitals [01/27/24 1256]   BP Pulse Resp Temp SpO2   (!) 160/67 74 20 98.7 °F (37.1 °C) 96 %      MAP       --         Physical Exam    Nursing note and vitals reviewed.  Constitutional: Vital signs are normal. She appears well-developed and well-nourished.  She is active. No distress.   HENT:   Head: Normocephalic.   Nose: Nose normal.   Mouth/Throat: Oropharynx is clear and moist and mucous membranes are normal.   Eyes: Conjunctivae, EOM and lids are normal.   Neck: Neck supple.   Normal range of motion.  Cardiovascular:  Normal rate, regular rhythm, S1 normal, S2 normal and normal heart sounds.           Pulmonary/Chest: Breath sounds normal. No respiratory distress. She has no wheezes. She has no rhonchi. She has no rales. She exhibits no tenderness.   Abdominal: Abdomen is soft. Bowel sounds are normal. She exhibits no distension. There is no abdominal tenderness. There is no rebound and no guarding.   Musculoskeletal:      Right upper arm: Normal.      Left upper arm: Normal.      Cervical back: Normal range of motion and neck supple.        Back:       Right lower leg: Normal.      Left lower leg: Normal.     Neurological: She is alert and oriented to person, place, and time. She has normal strength. GCS score is 15. GCS eye subscore is 4. GCS verbal subscore is 5. GCS motor subscore is 6.   Skin: Skin is warm. Capillary refill takes less than 2 seconds.   Psychiatric: She has a normal mood and affect. Her speech is normal and behavior is normal. Thought content normal. Cognition and memory are normal.         ED Course   Procedures  Labs Reviewed   URINALYSIS, REFLEX TO URINE CULTURE - Abnormal; Notable for the following components:       Result Value    Appearance, UA Hazy (*)     Specific Gravity, UA >=1.030 (*)     Bilirubin (UA) 1+ (*)     Leukocytes, UA Trace (*)     All other components within normal limits    Narrative:     Preferred Collection Type->Urine, Clean Catch  Specimen Source->Urine   URINALYSIS MICROSCOPIC - Abnormal; Notable for the following components:    WBC, UA 6 (*)     All other components within normal limits    Narrative:     Preferred Collection Type->Urine, Clean Catch  Specimen Source->Urine          Imaging Results    None           Medications   cephALEXin capsule 500 mg (has no administration in time range)   methocarbamoL tablet 500 mg (has no administration in time range)     Medical Decision Making  Flank pain.  No hematuria.  No fever.  No dysuria.    Differential diagnosis include not limited to-musculoskeletal pain, UTI, pyelonephritis, muscle strain, kidney stone.    Urinalysis with 60 WBC.  Trace leukocytes consider UTI and will treat.  Negative for occult blood.  Patient also given prescription for Robaxin for muscle strain.  Follow up PCP/ED with any worsening symptoms.    Amount and/or Complexity of Data Reviewed  Labs: ordered. Decision-making details documented in ED Course.    Risk  Prescription drug management.                                      Clinical Impression:  Final diagnoses:  [R10.9] Flank pain (Primary)  [N39.0] Urinary tract infection without hematuria, site unspecified          ED Disposition Condition    Discharge Stable          ED Prescriptions       Medication Sig Dispense Start Date End Date Auth. Provider    methocarbamoL (ROBAXIN) 500 MG Tab Take 1 tablet (500 mg total) by mouth 3 (three) times daily. for 10 days 30 tablet 1/27/2024 2/6/2024 Maximo Jay MD    cephALEXin (KEFLEX) 500 MG capsule Take 1 capsule (500 mg total) by mouth 4 (four) times daily. for 5 days 20 capsule 1/27/2024 2/1/2024 Maximo Jay MD          Follow-up Information       Follow up With Specialties Details Why Contact Info    Anurag Lopez MD Family Medicine Schedule an appointment as soon as possible for a visit in 3 days For  re-check [address], [address]  [place] [address] [phone]               Maximo Jay MD  01/27/24 7601

## 2024-02-03 ENCOUNTER — HOSPITAL ENCOUNTER (EMERGENCY)
Facility: HOSPITAL | Age: 64
Discharge: HOME | End: 2024-02-03
Attending: EMERGENCY MEDICINE
Payer: MEDICARE

## 2024-02-03 VITALS
DIASTOLIC BLOOD PRESSURE: 68 MMHG | WEIGHT: 179.38 LBS | HEIGHT: 65 IN | HEART RATE: 77 BPM | SYSTOLIC BLOOD PRESSURE: 126 MMHG | TEMPERATURE: 98 F | BODY MASS INDEX: 29.89 KG/M2 | OXYGEN SATURATION: 97 % | RESPIRATION RATE: 18 BRPM

## 2024-02-03 DIAGNOSIS — B34.9 VIRAL SYNDROME: Primary | ICD-10-CM

## 2024-02-03 DIAGNOSIS — R10.9 LEFT FLANK PAIN: ICD-10-CM

## 2024-02-03 LAB
ALBUMIN SERPL BCP-MCNC: 4.2 G/DL (ref 3.5–5.2)
ALP SERPL-CCNC: 26 U/L (ref 38–126)
ALT SERPL W/O P-5'-P-CCNC: 14 U/L (ref 10–44)
ANION GAP SERPL CALC-SCNC: 6 MMOL/L (ref 8–16)
AST SERPL-CCNC: 33 U/L (ref 15–46)
BASOPHILS # BLD AUTO: 0.03 K/UL (ref 0–0.2)
BASOPHILS NFR BLD: 0.5 % (ref 0–1.9)
BILIRUB SERPL-MCNC: 0.8 MG/DL (ref 0.1–1)
BILIRUB UR QL STRIP: NEGATIVE
CALCIUM SERPL-MCNC: 9.1 MG/DL (ref 8.7–10.5)
CHLORIDE SERPL-SCNC: 103 MMOL/L (ref 95–110)
CLARITY UR REFRACT.AUTO: CLEAR
CO2 SERPL-SCNC: 28 MMOL/L (ref 23–29)
COLOR UR AUTO: YELLOW
CREAT SERPL-MCNC: 0.62 MG/DL (ref 0.5–1.4)
DIFFERENTIAL METHOD BLD: ABNORMAL
EOSINOPHIL # BLD AUTO: 0.1 K/UL (ref 0–0.5)
EOSINOPHIL NFR BLD: 1.6 % (ref 0–8)
ERYTHROCYTE [DISTWIDTH] IN BLOOD BY AUTOMATED COUNT: 12.6 % (ref 11.5–14.5)
EST. GFR  (NO RACE VARIABLE): >60 ML/MIN/1.73 M^2
GLUCOSE SERPL-MCNC: 101 MG/DL (ref 70–110)
GLUCOSE UR QL STRIP: NEGATIVE
HCT VFR BLD AUTO: 37.9 % (ref 37–48.5)
HGB BLD-MCNC: 12.5 G/DL (ref 12–16)
HGB UR QL STRIP: NEGATIVE
IMM GRANULOCYTES # BLD AUTO: 0.01 K/UL (ref 0–0.04)
IMM GRANULOCYTES NFR BLD AUTO: 0.2 % (ref 0–0.5)
INFLUENZA A, MOLECULAR: NEGATIVE
INFLUENZA B, MOLECULAR: NEGATIVE
KETONES UR QL STRIP: NEGATIVE
LEUKOCYTE ESTERASE UR QL STRIP: NEGATIVE
LIPASE SERPL-CCNC: 112 U/L (ref 23–300)
LYMPHOCYTES # BLD AUTO: 2.5 K/UL (ref 1–4.8)
LYMPHOCYTES NFR BLD: 44.8 % (ref 18–48)
MCH RBC QN AUTO: 29.9 PG (ref 27–31)
MCHC RBC AUTO-ENTMCNC: 33 G/DL (ref 32–36)
MCV RBC AUTO: 91 FL (ref 82–98)
MONOCYTES # BLD AUTO: 0.5 K/UL (ref 0.3–1)
MONOCYTES NFR BLD: 8.2 % (ref 4–15)
NEUTROPHILS # BLD AUTO: 2.4 K/UL (ref 1.8–7.7)
NEUTROPHILS NFR BLD: 44.7 % (ref 38–73)
NITRITE UR QL STRIP: NEGATIVE
NRBC BLD-RTO: 0 /100 WBC
PH UR STRIP: 7 [PH] (ref 5–8)
PLATELET # BLD AUTO: 115 K/UL (ref 150–450)
PLATELET BLD QL SMEAR: ABNORMAL
PMV BLD AUTO: 12.6 FL (ref 9.2–12.9)
POTASSIUM SERPL-SCNC: 4.8 MMOL/L (ref 3.5–5.1)
PROT SERPL-MCNC: 7.4 G/DL (ref 6–8.4)
PROT UR QL STRIP: NEGATIVE
RBC # BLD AUTO: 4.18 M/UL (ref 4–5.4)
SARS-COV-2 RDRP RESP QL NAA+PROBE: NEGATIVE
SODIUM SERPL-SCNC: 137 MMOL/L (ref 136–145)
SP GR UR STRIP: 1.02 (ref 1–1.03)
SPECIMEN SOURCE: NORMAL
URN SPEC COLLECT METH UR: NORMAL
UROBILINOGEN UR STRIP-ACNC: NEGATIVE EU/DL
UUN UR-MCNC: 23 MG/DL (ref 7–17)
WBC # BLD AUTO: 5.47 K/UL (ref 3.9–12.7)

## 2024-02-03 PROCEDURE — U0002 COVID-19 LAB TEST NON-CDC: HCPCS | Mod: ER | Performed by: EMERGENCY MEDICINE

## 2024-02-03 PROCEDURE — 87502 INFLUENZA DNA AMP PROBE: CPT | Mod: ER | Performed by: EMERGENCY MEDICINE

## 2024-02-03 PROCEDURE — 99285 EMERGENCY DEPT VISIT HI MDM: CPT | Mod: 25,ER

## 2024-02-03 PROCEDURE — 85025 COMPLETE CBC W/AUTO DIFF WBC: CPT | Mod: ER | Performed by: EMERGENCY MEDICINE

## 2024-02-03 PROCEDURE — 25000003 PHARM REV CODE 250: Mod: ER | Performed by: EMERGENCY MEDICINE

## 2024-02-03 PROCEDURE — 96361 HYDRATE IV INFUSION ADD-ON: CPT | Mod: ER

## 2024-02-03 PROCEDURE — 81003 URINALYSIS AUTO W/O SCOPE: CPT | Mod: ER | Performed by: EMERGENCY MEDICINE

## 2024-02-03 PROCEDURE — 80053 COMPREHEN METABOLIC PANEL: CPT | Mod: ER | Performed by: EMERGENCY MEDICINE

## 2024-02-03 PROCEDURE — 96374 THER/PROPH/DIAG INJ IV PUSH: CPT | Mod: ER

## 2024-02-03 PROCEDURE — 83690 ASSAY OF LIPASE: CPT | Mod: ER | Performed by: EMERGENCY MEDICINE

## 2024-02-03 PROCEDURE — 63600175 PHARM REV CODE 636 W HCPCS: Mod: ER | Performed by: EMERGENCY MEDICINE

## 2024-02-03 RX ORDER — KETOROLAC TROMETHAMINE 30 MG/ML
15 INJECTION, SOLUTION INTRAMUSCULAR; INTRAVENOUS
Status: COMPLETED | OUTPATIENT
Start: 2024-02-03 | End: 2024-02-03

## 2024-02-03 RX ORDER — SODIUM CHLORIDE 9 MG/ML
500 INJECTION, SOLUTION INTRAVENOUS
Status: COMPLETED | OUTPATIENT
Start: 2024-02-03 | End: 2024-02-03

## 2024-02-03 RX ORDER — MELOXICAM 7.5 MG/1
7.5 TABLET ORAL DAILY
Qty: 14 TABLET | Refills: 0 | Status: ON HOLD | OUTPATIENT
Start: 2024-02-03 | End: 2024-02-16

## 2024-02-03 RX ADMIN — SODIUM CHLORIDE 500 ML: 9 INJECTION, SOLUTION INTRAVENOUS at 09:02

## 2024-02-03 RX ADMIN — KETOROLAC TROMETHAMINE 15 MG: 30 INJECTION, SOLUTION INTRAMUSCULAR; INTRAVENOUS at 09:02

## 2024-02-03 NOTE — ED PROVIDER NOTES
Encounter Date: 2/3/2024       History     Chief Complaint   Patient presents with    Back Pain     PT states she came with back pains last week and she still has it. Pt states she was told the back pain has to do with her back muscle.     Diarrhea     Pt states she has diarrhea yesterday pt denies any yesterday.      63-year-old female with a history of CHF, diverticulosis, GERD, hypertension, chronic bronchitis presents with diarrhea, congestion, headache and cough for the past few days.  She also reports a week of left-sided back pain.  No history of trauma.  Pain is nonradiating.  Patient was seen here a week ago for the back pain and was treated with muscle relaxants but this not helping.  No loss of control of bowels or bladder.  No leg weakness.  No fever.      Review of patient's allergies indicates:   Allergen Reactions    Codeine Hives    Sulfa (sulfonamide antibiotics) Hives    Benzoate analogues Itching     Past Medical History:   Diagnosis Date    Allergies     Anxiety     Arthritis     CHF (congestive heart failure)     Depression     Diverticular disease of large intestine without perforation or abscess     GERD (gastroesophageal reflux disease)     High cholesterol     History of fainting     Hypertension     Shortness of breath     states sometimes gets short winded    SOB (shortness of breath) 2019    Unspecified chronic bronchitis     not chronic episode this year-    Unspecified glaucoma      Past Surgical History:   Procedure Laterality Date    BILATERAL TUBAL LIGATION      BREAST BIOPSY Left     h    BREAST SURGERY       SECTION      COLONOSCOPY N/A 2017    Procedure: COLONOSCOPY Miralax;  Surgeon: Buddy Butcher Jr., MD;  Location: [facility] ENDO;  Service: Endoscopy;  Laterality: N/A;    COLONOSCOPY N/A 2022    Procedure: COLONOSCOPY;  Surgeon: TREV Kay MD;  Location: [facility] ENDO;  Service: Endoscopy;  Laterality: N/A;    COLONOSCOPY N/A 2022     Procedure: COLONOSCOPY;  Surgeon: TREV Kay MD;  Location: [facility] ENDO;  Service: Endoscopy;  Laterality: N/A;    ESOPHAGOGASTRODUODENOSCOPY N/A 7/3/2023    Procedure: EGD (ESOPHAGOGASTRODUODENOSCOPY);  Surgeon: Mary Cotton MD;  Location: [facility] ENDO;  Service: Endoscopy;  Laterality: N/A;    EYE SURGERY Bilateral     cataract removal    FLEXIBLE SIGMOIDOSCOPY N/A 07/20/2022    Procedure: SIGMOIDOSCOPY, FLEXIBLE;  Surgeon: TREV Kay MD;  Location: [facility] OR [address] [place] [address];  Service: Colon and Rectal;  Laterality: N/A;    HYSTERECTOMY      KIDNEY SURGERY Right     done at [facility], reported as mass by patient    LAPAROSCOPIC SIGMOIDECTOMY Left 07/20/2022    Procedure: COLECTOMY, SIGMOID, LAPAROSCOPIC, ERAS low;  Surgeon: TREV Kay MD;  [facility];  Service: Colon and Rectal;  Laterality: Left;    LEFT HEART CATHETERIZATION Left 05/13/2019    Procedure: Left heart cath;  Surgeon: Gerhard Krishnan MD;  Location: [facility] CATH LAB;  Service: Cardiology;  Laterality: Left;    MOBILIZATION OF SPLENIC FLEXURE N/A 07/20/2022    Procedure: MOBILIZATION, SPLENIC FLEXURE;  Surgeon: TREV Kay MD;  [facility];  Service: Colon and Rectal;  Laterality: N/A;    OOPHORECTOMY      s-section       No family history on file.  Social History     Tobacco Use    Smoking status: Never    Smokeless tobacco: Never   Substance Use Topics    Alcohol use: Not Currently     Comment: 12-31-22    Drug use: No     Review of Systems   Constitutional:  Negative for fever.   HENT:  Positive for congestion and rhinorrhea.    Eyes:  Negative for pain.   Respiratory:  Positive for cough. Negative for shortness of breath.    Cardiovascular:  Negative for chest pain.   Gastrointestinal:  Negative for abdominal pain, nausea and vomiting.   Genitourinary:  Positive for flank pain. Negative for difficulty urinating.   Musculoskeletal:  Positive for back pain. Negative for neck pain.    Neurological:  Positive for headaches.   Psychiatric/Behavioral:  Negative for confusion.        Physical Exam     Initial Vitals [02/03/24 0806]   BP Pulse Resp Temp SpO2   126/68 77 18 97.9 °F (36.6 °C) 97 %      MAP       --         Physical Exam    Nursing note and vitals reviewed.  HENT:   Head: Atraumatic.   Eyes: Conjunctivae and EOM are normal.   Neck:   Normal range of motion.  Cardiovascular:      Exam reveals no gallop and no friction rub.       No murmur heard.  Pulmonary/Chest: Breath sounds normal. No respiratory distress.   Abdominal: Abdomen is soft. There is abdominal tenderness (Mild left lower quadrant tenderness).   Musculoskeletal:      Cervical back: Normal range of motion.      Comments: Tenderness to palpation to the left mid back     Neurological: She is alert and oriented to person, place, and time. She has normal strength. No cranial nerve deficit.   Psychiatric: She has a normal mood and affect.         ED Course   Procedures  Labs Reviewed   CBC W/ AUTO DIFFERENTIAL - Abnormal; Notable for the following components:       Result Value    Platelets 115 (*)     Platelet Estimate Clumped (*)     All other components within normal limits   COMPREHENSIVE METABOLIC PANEL - Abnormal; Notable for the following components:    BUN 23 (*)     Alkaline Phosphatase 26 (*)     Anion Gap 6 (*)     All other components within normal limits   INFLUENZA A & B BY MOLECULAR   SARS-COV-2 RNA AMPLIFICATION, QUAL    Narrative:     Is the patient symptomatic?->Yes   URINALYSIS, REFLEX TO URINE CULTURE    Narrative:     Preferred Collection Type->Urine, Clean Catch  Specimen Source->Urine   LIPASE   LIPASE          Imaging Results              CT Abdomen Pelvis  Without Contrast (Final result)  Result time 02/03/24 09:31:51      Final result by Buddy John MD (02/03/24 09:31:51)                   Impression:      No acute findings.    All CT scans at this facility are performed  using dose modulation  techniques as appropriate to performed exam including the following:  automated exposure control; adjustment of mA and/or kV according to the patients size (this includes techniques or standardized protocols for targeted exams where dose is matched to indication/reason for exam: i.e. extremities or head);  iterative reconstruction technique.      Electronically signed by: Buddy John MD  Date:    02/03/2024  Time:    09:31               Narrative:    EXAMINATION:  CT ABDOMEN PELVIS WITHOUT CONTRAST    CLINICAL HISTORY:  Flank pain, kidney stone suspected;    TECHNIQUE:  Axial images obtained of the abdomen and pelvis without IV contrast and without oral contrast.  Sagittal and coronal reformats obtained.    COMPARISON:  Abdomen and pelvis CT 05/10/2023    FINDINGS:  ABDOMEN:    - Lung bases: Lungs bases are clear.    - Liver: No contour deformities.    - Gallbladder: No calcified gallstones.    - Bile Ducts: No evidence of intra or extra hepatic biliary ductal dilation.    - Spleen: No contour deformities.    - Kidneys: No hydronephrosis.  No calculi.  Partial right nephrectomy.    - Adrenals: Normal adrenals.    - Pancreas: No contour deformities.    - Bowel: The bowel is nonobstructed and without surrounding inflammatory changes.  Normal appendix.  Postsurgical changes rectosigmoid.  Additional surgical clips around the cecum which is inferiorly positioned within the right pelvis.    - Retroperitoneum:  Unremarkable.    - Vascular: No abdominal aortic aneurysm. Mild scattered atherosclerosis.    - Abdominal wall:  Unremarkable.    PELVIS:    - Bladder: Normal.    - : Hysterectomy.  No adnexal masses.    BONES:  No acute osseous abnormality and no significant arthritic changes.                                       Medications   0.9%  NaCl infusion (500 mLs Intravenous New Bag 2/3/24 0906)   ketorolac injection 15 mg (15 mg Intravenous Given 2/3/24 0905)     Medical Decision Making  63-year-old female presenting  with flu-like symptoms for the past few days.  Also having ongoing back pain.  Vital signs unremarkable.  To be in no distress.  She does have some tenderness to the left flank and left lower quadrant.  Plan for labs, swabs and CT.    Amount and/or Complexity of Data Reviewed  Labs: ordered. Decision-making details documented in ED Course.  Radiology: ordered.    Risk  Prescription drug management.      Additional MDM:   Differential Diagnosis:   Differential diagnosis includes but not limited to COVID, influenza, pyelonephritis, ureteral colic, diverticulitis amongst others             ED Course as of 02/03/24 0946   Sat Feb 03, 2024   0850 Urinalysis, Reflex to Urine Culture Urine, Clean Catch [SN]   0850 SARS-CoV-2 RNA, Amplification, Qual: Negative [SN]   0856 Comprehensive Metabolic Panel(!) [SN]   0856 CBC Auto Differential(!) [SN]   0903 Influenza A & B by Molecular [SN]   0916 Lipase Result: 112 [SN]   0941 CT abdomen and pelvis without acute findings.  Patient reports improvement in pain after Toradol.  Etiology of patient's back pain not clear at this time.  Low suspicion for life-threatening illness.  Will treat patient's symptoms with strict return instructions given [SN]      ED Course User Index  [SN] Ronnie Cagle MD                           Clinical Impression:  Final diagnoses:  [B34.9] Viral syndrome (Primary)  [R10.9] Left flank pain          ED Disposition Condition    Discharge Stable          ED Prescriptions       Medication Sig Dispense Start Date End Date Auth. Provider    meloxicam (MOBIC) 7.5 MG tablet Take 1 tablet (7.5 mg total) by mouth once daily. for 14 days 14 tablet 2/3/2024 2/17/2024 Ronnie Cagle MD          Follow-up Information       Follow up With Specialties Details Why Contact Info    Anurag Lopez MD Family Medicine Schedule an appointment as soon as possible for a visit in 2 days  [address], [address] [address] [phone]                Ronnie Cagle MD  02/03/24 0946

## 2024-02-03 NOTE — ED NOTES
PT presents to the ED with C/O low back pain that is worse on the left side x 1 week. Denies dysuria. Denies hematuria. PT reports diarrhea on yesterday. Last episode of diarrhea yesterday evening. Afebrile. VSS.

## 2024-02-04 NOTE — PROGRESS NOTES
[facility]  [address] [address]    Ashli Kumar  [id number]  1960    Consulting Physician: Self, Aaareferral     Primary M.D.: Anurag Lopez MD    Chief Complaint   Patient presents with    Urinary Incontinence       HPI:     1)  UI:  (+) CONCEPCION = (+) UUI  X 3years.  (+) pads:3-4/day, usually moderate wetness.  Daytime frequency: Q 1 hours during the day.  Nocturia: Yes: 3-4/night. Does not limit fluids  (--) dysuria,  (--) hematuria,  (+) frequent UTIs--reported-- but no cultures done.  (+) complete bladder emptying. Does have post void urgency occasionally.  Reports occasionally post void dribbling    2)  POP:  Absent. Symptoms:(--)  .  (--) vaginal bleeding. (--) vaginal discharge. (+) sexually active.  (--) dyspareunia.   (+)  Vaginal dryness.  (--) vaginal estrogen use. Was prescribed, but has not been using    3)  BM:  (--) constipation/straining.  (--) chronic diarrhea. (--) hematochezia.  (--) fecal incontinence.  (+) fecal smearing/urgency.  (--) complete evacuation.  Takes immodium when she has loose stool    Past Medical History  Past Medical History:   Diagnosis Date    Allergies     Anxiety     Arthritis     CHF (congestive heart failure)     Depression     Diverticular disease of large intestine without perforation or abscess     GERD (gastroesophageal reflux disease)     High cholesterol     History of fainting     Hypertension     Shortness of breath     states sometimes gets short winded    SOB (shortness of breath) 2019    Unspecified chronic bronchitis     not chronic episode this year-    Unspecified glaucoma         Past Surgical History  Past Surgical History:   Procedure Laterality Date    BILATERAL TUBAL LIGATION      BREAST BIOPSY Left     rph    BREAST SURGERY       SECTION      COLONOSCOPY N/A 2017    Procedure: COLONOSCOPY Miralax;  Surgeon: Buddy Butcher Jr., MD;  Location: [facility]  ENDO;  Service: Endoscopy;  Laterality: N/A;    COLONOSCOPY N/A 2022    Procedure: COLONOSCOPY;  Surgeon: TREV Kay MD;  Location: [facility] ENDO;  Service: Endoscopy;  Laterality: N/A;    COLONOSCOPY N/A 2022    Procedure: COLONOSCOPY;  Surgeon: TREV Kay MD;  Location: [facility] ENDO;  Service: Endoscopy;  Laterality: N/A;    ESOPHAGOGASTRODUODENOSCOPY N/A 7/3/2023    Procedure: EGD (ESOPHAGOGASTRODUODENOSCOPY);  Surgeon: Mary Cotton MD;  Location: [facility] ENDO;  Service: Endoscopy;  Laterality: N/A;    EYE SURGERY Bilateral     cataract removal    FLEXIBLE SIGMOIDOSCOPY N/A 2022    Procedure: SIGMOIDOSCOPY, FLEXIBLE;  Surgeon: TREV Kay MD;  Location: [facility] OR [facility] FLR;  Service: Colon and Rectal;  Laterality: N/A;    HYSTERECTOMY      KIDNEY SURGERY Right     done at [facility], reported as mass by patient    LAPAROSCOPIC SIGMOIDECTOMY Left 2022    Procedure: COLECTOMY, SIGMOID, LAPAROSCOPIC, ERAS low;  Surgeon: TREV Kay MD;  Location: [facility] OR [facility] FLR;  Service: Colon and Rectal;  Laterality: Left;    LEFT HEART CATHETERIZATION Left 2019    Procedure: Left heart cath;  Surgeon: Gerhard Krishnan MD;  Location: [facility] CATH LAB;  Service: Cardiology;  Laterality: Left;    MOBILIZATION OF SPLENIC FLEXURE N/A 2022    Procedure: MOBILIZATION, SPLENIC FLEXURE;  Surgeon: TREV Kay MD;  Location: [facility] OR [facility] FLR;  Service: Colon and Rectal;  Laterality: N/A;    OOPHORECTOMY      s-section      Lap btl  Separate open oopherectomy  Lap colectomy    Hysterectomy: Yes - .  Indication: pelvic pain.    Type: xlap  Cervix present: No  Ovaries present: No  Other procedures at time of hysterectomy:  oopherectomy    Past Ob History     x 1  C/s x 1.    Largest infant weight: 7# 8.6 lbs  unknown FAVD. yes episiotomy.      Gynecologic History  LMP: No LMP recorded. Patient has had a hysterectomy.  Age of menarche: 14  Age of menopause:  40  Menstrual history: metrorrhagia  Pap test: post hysterectomy.  History of abnormal paps: No.  History of STIs:  something treatable in the past-- no records located  Mammogram: Date of last: 12/2023.  Result: Normal  Colonoscopy: Date of last: 12/2022   Result:  - Hemorrhoids found on perianal exam.                          - Patent end-to-end colo-colonic anastomosis, characterized by healthy appearing mucosa.                          - Diverticulosis in the entire examined colon. .  Repeat due:  10 years.    DEXA:  Date of last: 12/2023. result:  normal.      Family History  History reviewed. No pertinent family history.   Colon CA: No  Breast CA: No  GYN CA: No   CA: No    Social History  Social History     Tobacco Use   Smoking Status Never   Smokeless Tobacco Never   .    Social History     Substance and Sexual Activity   Alcohol Use Not Currently    Comment: 12-31-22   .    Social History     Substance and Sexual Activity   Drug Use No   .      Allergies  Review of patient's allergies indicates:   Allergen Reactions    Codeine Hives    Sulfa (sulfonamide antibiotics) Hives    Benzoate analogues Itching       Medications  Current Outpatient Medications on File Prior to Visit   Medication Sig Dispense Refill    divalproex (DEPAKOTE) 500 MG TbEC Take 1 tablet (500 mg total) by mouth 2 (two) times a day. 60 tablet 2    FLUoxetine 20 MG capsule Take 1 capsule (20 mg total) by mouth once daily. 30 capsule 2    fluticasone propionate (FLONASE) 50 mcg/actuation nasal spray 2 sprays by Each Nostril route daily as needed.      Lactobac no.41/Bifidobact no.7 (PROBIOTIC-10 ORAL) Take 1 capsule by mouth once daily.      latanoprost 0.005 % ophthalmic solution Place 1 drop into both eyes every evening.      meloxicam (MOBIC) 7.5 MG tablet Take 1 tablet (7.5 mg total) by mouth once daily. for 14 days 14 tablet 0    methocarbamoL (ROBAXIN) 500 MG Tab Take 1 tablet (500 mg total) by mouth 3 (three) times daily. for 10  "days 30 tablet 0    metoprolol tartrate (LOPRESSOR) 25 MG tablet Take 25 mg by mouth 2 (two) times daily.      psyllium 0.52 gram capsule Take 0.52 g by mouth once daily.      [DISCONTINUED] PREMARIN vaginal cream Place 0.5 g vaginally twice a week.      cyclobenzaprine (FLEXERIL) 10 MG tablet Take 10 mg by mouth once daily.      diclofenac (VOLTAREN) 25 MG TbEC Take 1 tablet (25 mg total) by mouth 3 (three) times daily as needed (pain). (Patient not taking: Reported on 2/5/2024) 15 tablet 0    dicyclomine (BENTYL) 20 mg tablet Take 1 tablet (20 mg total) by mouth 3 (three) times daily as needed (abdominal pain). (Patient not taking: Reported on 2/5/2024) 60 tablet 2    OLANZapine (ZYPREXA) 2.5 MG tablet Take 1 tablet (2.5 mg total) by mouth 2 (two) times a day. 60 tablet 2    omeprazole (PRILOSEC) 40 MG capsule Take 1 capsule (40 mg total) by mouth once daily. (Patient not taking: Reported on 2/5/2024) 30 capsule 2    pravastatin (PRAVACHOL) 20 MG tablet Take 20 mg by mouth once daily.      spironolactone (ALDACTONE) 25 MG tablet Take 12.5 mg by mouth once daily.       No current facility-administered medications on file prior to visit.       Review of Systems A 14 point ROS was reviewed with pertinent positives as noted above in the history of present illness.      Constitutional: negative  Eyes: negative  Endocrine: negative  Gastrointestinal: negative  Cardiovascular: negative  Respiratory: negative  Allergic/Immunologic: negative  Integumentary: negative  Psychiatric: negative  Musculoskeletal: negative   Ear/Nose/Throat: negative  Neurologic: negative  Genitourinary: SEE HPI  Hematologic/Lymphatic: negative   Breast: negative    Urogynecologic Exam  /64 (BP Location: Right arm, Patient Position: Sitting, BP Method: Large (Automatic))   Pulse 60   Ht 5' 5" (1.651 m)   Wt 81.8 kg (180 lb 5.4 oz)   BMI 30.01 kg/m²     GENERAL APPEARANCE:  The patient is well-developed, well-nourished.   Neck:  Supple " with no thyromegaly, no carotid bruits.  Heart:  Regular rate and rhythm, no murmurs, rubs or gallops.  Lungs:  Clear.  No CVA tenderness.  Abdomen:  Soft, nontender, nondistended, no hepatosplenomegaly.  Incisions:  healed pfannenstiel     PELVIC:    External genitalia:  Normal Bartholins, Skenes and labia bilaterally.    Urethra:  No caruncle, diverticulum or masses.  (+) hypermobility.    Vagina:  Atrophy (+) , no bladder masses or tender, no discharge.    Cervix:  absent  Uterus: uterus absent  Adnexa: Not palpable.    POP-Q:  Aa -2; Ba -2; C -8; Ap -3; Bp -3; D n.a.  Genital hiatus 3.5, perineal body 2 total vaginal length 9.      NEUROLOGIC:  Cranial nerves 2 through 12 intact.  Strength 5/5.  DTRs 2+ lower extremities.  S2 through 4 normal.  Sacral reflexes intact.    EXT: CORTEZ, 2+ pulses bilaterally, no C/C/E    COUGH STRESS TEST:  negative  KEGEL: 1 /5--valsalva first    RECTAL:    External:  Normal, (+) hemorrhoids, (--) dovetailing.   Internal:   (--) tenderness, (--) masses, Normal resting tone, Abnormal - weakened active tone.    PVR: 10 mL    Impression    1. Mixed incontinence urge and stress    2. Vaginal atrophy    3. Pelvic floor weakness    4. Dyssynergia    5. Fecal urgency    6. Midline cystocele        Initial Plan  The patient was counseled regarding these issues. The patient was given a summary sheet containing each of these issues with possible options for evaluation and management. When appropriate, we also reviewed computer-generated diagrams specific to their diagnoses..  All questions were addressed to the patient's satisfaction.     1)  Mixed urinary incontinence, urge > stress:    --Empty bladder every 3 hours.  Empty well: wait a minute, lean forward on toilet.    --Avoid dietary irritants (see sheet).  Keep diary x 3-5 days to determine your irritants.  -  [facility]: (p) [phone]. (f) [phone]. Established patients call:  [phone].  --URGE: consider  anticholinergic.   Takes 2-4 weeks to see if will have effect.  For dry mouth: get sour, sugar free lozenge or gum.    --STRESS:  Pessary vs. Sling.   --urine culture today    2)stage 1 midline cystocele  --asymptomatic  --pvr 10 mL  --continue to monitor    3)Constipation:  --hydrate well  -- Start daily fiber.  Take 1 tsp of fiber powder (psyllium or other sugar-free powder).  Mix in 8 oz of water.  Take x 3-5 days.  Then, increase fiber by 1 tsp every 3-5 days until stool is easy to pass.  Stop and continue at that dose.   Do not exceed 6 tsps/day.  May also use over the counter stool softener 1-2 x/day.  AVOID laxatives.    4)--Nocturia (nighttime urination): stop fluids 2 hours before bed.  If have leg swelling:  Elevate feet above chest x 1 hour before bed to get excess fluid off.  Can also use support hose (knee highs).       5)vaginal atrophy  --start vaginal estrogen cream  --use dime size amount in vagina-- insert to your second knuckle and rub around just inside vaginal opening nightly x 2 weeks then twice weekly    6)RTC 4 months for follow up    I spent a total of 45 minutes on the day of the visit.  This includes face to face time and non-face to face time preparing to see the patient (eg, review of tests), obtaining and/or reviewing separately obtained history, documenting clinical information in the electronic or other health record, independently interpreting results and communicating results to the patient/family/caregiver, or care coordinator.     Thank you for requesting consultation of your patient.  I look forward to participating in their care.    Flakita Polanco  Female Pelvic Medicine and Reconstructive Surgery  [facility] [place], [place]

## 2024-02-05 ENCOUNTER — OFFICE VISIT (OUTPATIENT)
Dept: UROGYNECOLOGY | Facility: CLINIC | Age: 64
End: 2024-02-05
Payer: MEDICARE

## 2024-02-05 VITALS
SYSTOLIC BLOOD PRESSURE: 134 MMHG | DIASTOLIC BLOOD PRESSURE: 64 MMHG | HEIGHT: 65 IN | BODY MASS INDEX: 30.04 KG/M2 | WEIGHT: 180.31 LBS | HEART RATE: 60 BPM

## 2024-02-05 DIAGNOSIS — N81.11 MIDLINE CYSTOCELE: ICD-10-CM

## 2024-02-05 DIAGNOSIS — N95.2 VAGINAL ATROPHY: ICD-10-CM

## 2024-02-05 DIAGNOSIS — N81.89 PELVIC FLOOR WEAKNESS: ICD-10-CM

## 2024-02-05 DIAGNOSIS — N39.46 MIXED INCONTINENCE URGE AND STRESS: Primary | ICD-10-CM

## 2024-02-05 DIAGNOSIS — R15.2 FECAL URGENCY: ICD-10-CM

## 2024-02-05 DIAGNOSIS — R27.8 DYSSYNERGIA: ICD-10-CM

## 2024-02-05 PROCEDURE — 51701 INSERT BLADDER CATHETER: CPT | Mod: S$GLB,,, | Performed by: NURSE PRACTITIONER

## 2024-02-05 PROCEDURE — 87086 URINE CULTURE/COLONY COUNT: CPT | Performed by: NURSE PRACTITIONER

## 2024-02-05 PROCEDURE — 99215 OFFICE O/P EST HI 40 MIN: CPT | Mod: PBBFAC | Performed by: NURSE PRACTITIONER

## 2024-02-05 PROCEDURE — 51701 INSERT BLADDER CATHETER: CPT | Mod: PBBFAC | Performed by: NURSE PRACTITIONER

## 2024-02-05 PROCEDURE — 99999 PR PBB SHADOW E&M-EST. PATIENT-LVL V: CPT | Mod: PBBFAC,,, | Performed by: NURSE PRACTITIONER

## 2024-02-05 PROCEDURE — 99204 OFFICE O/P NEW MOD 45 MIN: CPT | Mod: 25,S$GLB,, | Performed by: NURSE PRACTITIONER

## 2024-02-05 RX ORDER — BROMPHENIRAMINE MALEATE, DEXTROMETHORPHAN HBR, PHENYLEPHRINE HCL, DIPHENHYDRAMINE HCL, PHENYLEPHRINE HCL 0.52G
0.52 KIT ORAL DAILY
COMMUNITY

## 2024-02-05 RX ORDER — ESTRADIOL 0.1 MG/G
1 CREAM VAGINAL DAILY
Qty: 42.5 G | Refills: 3 | Status: SHIPPED | OUTPATIENT
Start: 2024-02-05

## 2024-02-05 RX ORDER — ESTRADIOL 0.1 MG/G
1 CREAM VAGINAL DAILY
Qty: 42.5 G | Refills: 3 | Status: SHIPPED | OUTPATIENT
Start: 2024-02-05 | End: 2024-02-05 | Stop reason: SDUPTHER

## 2024-02-05 NOTE — PATIENT INSTRUCTIONS
1)  Mixed urinary incontinence, urge > stress:    --Empty bladder every 3 hours.  Empty well: wait a minute, lean forward on toilet.    --Avoid dietary irritants (see sheet).  Keep diary x 3-5 days to determine your irritants.  -  [facility]: (p) [phone]. (f) [phone]. Established patients call:  [phone].  --URGE: consider anticholinergic.   Takes 2-4 weeks to see if will have effect.  For dry mouth: get sour, sugar free lozenge or gum.    --STRESS:  Pessary vs. Sling.   --urine culture today    2)stage 1 midline cystocele  --asymptomatic  --pvr 10 mL  --continue to monitor    3)Constipation:  --hydrate well  -- Start daily fiber.  Take 1 tsp of fiber powder (psyllium or other sugar-free powder).  Mix in 8 oz of water.  Take x 3-5 days.  Then, increase fiber by 1 tsp every 3-5 days until stool is easy to pass.  Stop and continue at that dose.   Do not exceed 6 tsps/day.  May also use over the counter stool softener 1-2 x/day.  AVOID laxatives.    4)--Nocturia (nighttime urination): stop fluids 2 hours before bed.  If have leg swelling:  Elevate feet above chest x 1 hour before bed to get excess fluid off.  Can also use support hose (knee highs).       5)vaginal atrophy  --start vaginal estrogen cream  --use dime size amount in vagina-- insert to your second knuckle and rub around just inside vaginal opening nightly x 2 weeks then twice weekly    6)RTC 4 months for follow up      Bladder Irritants  Certain foods and drinks have been associated with worsening symptoms of urinary frequency, urgency, urge incontinence, or  bladder pain. If you suffer from any of these conditions, you may wish to try eliminating one or more of these foods from your  diet and see if your symptoms improve. If bladder symptoms are related to dietary factors, strict adherence to a diet that  eliminates the food should bring marked relief in 10 days. Once you are feeling better, you can begin to add foods back  into  your diet, one at a time. If symptoms return, you will be able to identify the irritant. As you add foods back to your diet it is  very important that you drink significant amounts of water.  Low-acid fruit substitutions include apricots, papaya, pears and watermelon. Coffee drinkers can drink Kava or other lowacid  instant drinks. Tea drinkers can substitute non-citrus herbal and sun brewed teas. Calcium carbonate co-buffered with  calcium ascorbate can be substituted for Vitamin C. Prelief is a dietary supplement that works as an acid blocker for the  bladder.  Where to get more information:   Overcoming Bladder Disorders by Cara Love and Pavan Nino, 1990   You Dont Have to Live with Cystitis! By Cady Siegel, 1988  List of Common Bladder Irritants*  Alcoholic beverages  Apples and apple juice  Cantaloupe  Carbonated beverages  Chili and spicy foods  Chocolate  Citrus fruit  Coffee (including decaffeinated)  Cranberries and cranberry juice  Grapes  Guava  Milk Products: milk, cheese, cottage cheese, yogurt, ice cream  Peaches  Pineapple  Plums  Strawberries  Sugar especially artificial sweeteners, saccharin, aspartame, corn sweeteners, honey, fructose, sucrose, lactose  Tea  Tomatoes and tomato juice  Vitamin B complex  Vinegar  *Most people are not sensitive to ALL of these products; your goal is to find the foods that make YOUR  symptoms worse

## 2024-02-06 ENCOUNTER — TELEPHONE (OUTPATIENT)
Dept: UROGYNECOLOGY | Facility: CLINIC | Age: 64
End: 2024-02-06
Payer: MEDICARE

## 2024-02-06 LAB — BACTERIA UR CULT: NO GROWTH

## 2024-02-06 NOTE — TELEPHONE ENCOUNTER
Kwasi  Pt stated after having some light stomach cramping, she had diarrhea with blood in it on yesterday.

## 2024-02-06 NOTE — TELEPHONE ENCOUNTER
----- Message from Rosenda Howard sent at 2/6/2024 11:52 AM CST -----  Name of Who is Calling:HAO STANLEY [[id number]]                   What is the request in detail: PT wanted to let you know she's having some bleeding and a few problems please assist                   Can the clinic reply by [facility]: No                   What Number to Call Back if not in [facility]: [phone]

## 2024-02-07 NOTE — TELEPHONE ENCOUNTER
Patient had scant blood after rectal exam.  Denies any further bleeding today.  Will continue to monitor.  She will let me know if it occurs again.  Flakita Polanco, SUHAP-BC

## 2024-02-15 ENCOUNTER — HOSPITAL ENCOUNTER (OUTPATIENT)
Facility: HOSPITAL | Age: 64
Discharge: HOME | End: 2024-02-16
Attending: EMERGENCY MEDICINE | Admitting: SURGERY
Payer: MEDICARE

## 2024-02-15 DIAGNOSIS — K56.600 PARTIAL SMALL BOWEL OBSTRUCTION: ICD-10-CM

## 2024-02-15 DIAGNOSIS — R10.30 LOWER ABDOMINAL PAIN: Primary | ICD-10-CM

## 2024-02-15 LAB
ALBUMIN SERPL BCP-MCNC: 4.3 G/DL (ref 3.5–5.2)
ALP SERPL-CCNC: 48 U/L (ref 38–126)
ALT SERPL W/O P-5'-P-CCNC: 15 U/L (ref 10–44)
ANION GAP SERPL CALC-SCNC: 10 MMOL/L (ref 8–16)
AST SERPL-CCNC: 23 U/L (ref 15–46)
BASOPHILS # BLD AUTO: 0.02 K/UL (ref 0–0.2)
BASOPHILS NFR BLD: 0.2 % (ref 0–1.9)
BILIRUB SERPL-MCNC: 0.4 MG/DL (ref 0.1–1)
BILIRUB UR QL STRIP: NEGATIVE
CALCIUM SERPL-MCNC: 9.1 MG/DL (ref 8.7–10.5)
CHLORIDE SERPL-SCNC: 99 MMOL/L (ref 95–110)
CLARITY UR REFRACT.AUTO: CLEAR
CO2 SERPL-SCNC: 28 MMOL/L (ref 23–29)
COLOR UR AUTO: YELLOW
CREAT SERPL-MCNC: 0.68 MG/DL (ref 0.5–1.4)
DIFFERENTIAL METHOD BLD: ABNORMAL
EOSINOPHIL # BLD AUTO: 0 K/UL (ref 0–0.5)
EOSINOPHIL NFR BLD: 0.2 % (ref 0–8)
ERYTHROCYTE [DISTWIDTH] IN BLOOD BY AUTOMATED COUNT: 12.6 % (ref 11.5–14.5)
EST. GFR  (NO RACE VARIABLE): >60 ML/MIN/1.73 M^2
GLUCOSE SERPL-MCNC: 127 MG/DL (ref 70–110)
GLUCOSE UR QL STRIP: NEGATIVE
HCT VFR BLD AUTO: 39.9 % (ref 37–48.5)
HGB BLD-MCNC: 13 G/DL (ref 12–16)
HGB UR QL STRIP: NEGATIVE
IMM GRANULOCYTES # BLD AUTO: 0.03 K/UL (ref 0–0.04)
IMM GRANULOCYTES NFR BLD AUTO: 0.3 % (ref 0–0.5)
KETONES UR QL STRIP: NEGATIVE
LEUKOCYTE ESTERASE UR QL STRIP: NEGATIVE
LIPASE SERPL-CCNC: 123 U/L (ref 23–300)
LYMPHOCYTES # BLD AUTO: 1.8 K/UL (ref 1–4.8)
LYMPHOCYTES NFR BLD: 19.7 % (ref 18–48)
MAGNESIUM SERPL-MCNC: 1.8 MG/DL (ref 1.6–2.6)
MCH RBC QN AUTO: 29.5 PG (ref 27–31)
MCHC RBC AUTO-ENTMCNC: 32.6 G/DL (ref 32–36)
MCV RBC AUTO: 91 FL (ref 82–98)
MONOCYTES # BLD AUTO: 0.5 K/UL (ref 0.3–1)
MONOCYTES NFR BLD: 5.4 % (ref 4–15)
NEUTROPHILS # BLD AUTO: 6.6 K/UL (ref 1.8–7.7)
NEUTROPHILS NFR BLD: 74.2 % (ref 38–73)
NITRITE UR QL STRIP: NEGATIVE
NRBC BLD-RTO: 0 /100 WBC
PH UR STRIP: 8.5 [PH] (ref 5–8)
PLATELET # BLD AUTO: 163 K/UL (ref 150–450)
PMV BLD AUTO: 12.7 FL (ref 9.2–12.9)
POTASSIUM SERPL-SCNC: 4.3 MMOL/L (ref 3.5–5.1)
PROT SERPL-MCNC: 7.7 G/DL (ref 6–8.4)
PROT UR QL STRIP: NEGATIVE
RBC # BLD AUTO: 4.4 M/UL (ref 4–5.4)
SODIUM SERPL-SCNC: 137 MMOL/L (ref 136–145)
SP GR UR STRIP: 1.01 (ref 1–1.03)
URN SPEC COLLECT METH UR: NORMAL
UROBILINOGEN UR STRIP-ACNC: 1 EU/DL
UUN UR-MCNC: 25 MG/DL (ref 7–17)
WBC # BLD AUTO: 8.94 K/UL (ref 3.9–12.7)

## 2024-02-15 PROCEDURE — 96361 HYDRATE IV INFUSION ADD-ON: CPT | Mod: ER

## 2024-02-15 PROCEDURE — 96374 THER/PROPH/DIAG INJ IV PUSH: CPT | Mod: ER

## 2024-02-15 PROCEDURE — 85025 COMPLETE CBC W/AUTO DIFF WBC: CPT | Mod: ER | Performed by: EMERGENCY MEDICINE

## 2024-02-15 PROCEDURE — 63600175 PHARM REV CODE 636 W HCPCS: Mod: ER | Performed by: EMERGENCY MEDICINE

## 2024-02-15 PROCEDURE — 25000003 PHARM REV CODE 250: Mod: ER | Performed by: EMERGENCY MEDICINE

## 2024-02-15 PROCEDURE — 80053 COMPREHEN METABOLIC PANEL: CPT | Mod: ER | Performed by: EMERGENCY MEDICINE

## 2024-02-15 PROCEDURE — 83690 ASSAY OF LIPASE: CPT | Mod: ER | Performed by: EMERGENCY MEDICINE

## 2024-02-15 PROCEDURE — G0378 HOSPITAL OBSERVATION PER HR: HCPCS | Mod: ER

## 2024-02-15 PROCEDURE — 81003 URINALYSIS AUTO W/O SCOPE: CPT | Mod: ER | Performed by: EMERGENCY MEDICINE

## 2024-02-15 PROCEDURE — 11000001 HC ACUTE MED/SURG PRIVATE ROOM

## 2024-02-15 PROCEDURE — 83735 ASSAY OF MAGNESIUM: CPT | Mod: ER | Performed by: EMERGENCY MEDICINE

## 2024-02-15 PROCEDURE — 25500020 PHARM REV CODE 255: Mod: ER | Performed by: EMERGENCY MEDICINE

## 2024-02-15 PROCEDURE — 99285 EMERGENCY DEPT VISIT HI MDM: CPT | Mod: 25,ER

## 2024-02-15 RX ORDER — KETOROLAC TROMETHAMINE 30 MG/ML
15 INJECTION, SOLUTION INTRAMUSCULAR; INTRAVENOUS
Status: COMPLETED | OUTPATIENT
Start: 2024-02-15 | End: 2024-02-15

## 2024-02-15 RX ORDER — PROCHLORPERAZINE EDISYLATE 5 MG/ML
5 INJECTION INTRAMUSCULAR; INTRAVENOUS ONCE
Status: COMPLETED | OUTPATIENT
Start: 2024-02-16 | End: 2024-02-15

## 2024-02-15 RX ORDER — METOPROLOL TARTRATE 25 MG/1
25 TABLET, FILM COATED ORAL
Status: DISCONTINUED | OUTPATIENT
Start: 2024-02-16 | End: 2024-02-15

## 2024-02-15 RX ORDER — ACETAMINOPHEN 500 MG
1000 TABLET ORAL
Status: COMPLETED | OUTPATIENT
Start: 2024-02-15 | End: 2024-02-15

## 2024-02-15 RX ADMIN — PROCHLORPERAZINE EDISYLATE 5 MG: 5 INJECTION INTRAMUSCULAR; INTRAVENOUS at 11:02

## 2024-02-15 RX ADMIN — ACETAMINOPHEN 1000 MG: 500 TABLET ORAL at 03:02

## 2024-02-15 RX ADMIN — IOHEXOL 100 ML: 350 INJECTION, SOLUTION INTRAVENOUS at 05:02

## 2024-02-15 RX ADMIN — SODIUM CHLORIDE, SODIUM LACTATE, POTASSIUM CHLORIDE, AND CALCIUM CHLORIDE 500 ML: .6; .31; .03; .02 INJECTION, SOLUTION INTRAVENOUS at 03:02

## 2024-02-15 RX ADMIN — KETOROLAC TROMETHAMINE 15 MG: 30 INJECTION, SOLUTION INTRAMUSCULAR; INTRAVENOUS at 03:02

## 2024-02-15 NOTE — ED PROVIDER NOTES
Encounter Date: 2/15/2024       History     Chief Complaint   Patient presents with    Abdominal Cramping     Abd cramping that began at 1130hrs today. Normal bowel movements today with complaint for weakness.   Patient denies urinary symptoms.      64 yo F w PMHx including left colectomy and small bowel resection for diverticular disease on 22 presents with complaint of abdominal pain.  Patient says that around 11:30 a.m. today she had onset of diffuse abdominal discomfort that she describes as cramping, worse in the lower abdomen.  She complains of associated nausea, no vomiting or diarrhea.  Reports having normal bowel movements this morning prior to onset of the pain.  Denies dysuria or hematuria.  Says that the pain she was having currently feels different than the abdominal pain she was having when she was last seen in the ED 2 weeks ago.    The history is provided by the patient and the spouse.     Review of patient's allergies indicates:   Allergen Reactions    Codeine Hives    Sulfa (sulfonamide antibiotics) Hives    Benzoate analogues Itching     Past Medical History:   Diagnosis Date    Allergies     Anxiety     Arthritis     CHF (congestive heart failure)     Depression     Diverticular disease of large intestine without perforation or abscess     GERD (gastroesophageal reflux disease)     High cholesterol     History of fainting     Hypertension     Shortness of breath     states sometimes gets short winded    SOB (shortness of breath) 2019    Unspecified chronic bronchitis     not chronic episode this year-    Unspecified glaucoma      Past Surgical History:   Procedure Laterality Date    BILATERAL TUBAL LIGATION      BREAST BIOPSY Left     rph    BREAST SURGERY       SECTION      COLONOSCOPY N/A 2017    Procedure: COLONOSCOPY Miralax;  Surgeon: Buddy Butcher Jr., MD;  Location: [facility];  Service: Endoscopy;  Laterality: N/A;    COLONOSCOPY N/A 2022     Procedure: COLONOSCOPY;  Surgeon: TREV Kay MD;  Location: [facility] beds at the [facility] ENDO;  Service: Endoscopy;  Laterality: N/A;    COLONOSCOPY N/A 12/29/2022    Procedure: COLONOSCOPY;  Surgeon: TREV Kay MD;  Location: [facility] 234 beds at the [facility] ENDO;  Service: Endoscopy;  Laterality: N/A;    ESOPHAGOGASTRODUODENOSCOPY N/A 7/3/2023    Procedure: EGD (ESOPHAGOGASTRODUODENOSCOPY);  Surgeon: Mary Cotton MD;  Location: [facility] 234 beds at the [facility] ENDO;  Service: Endoscopy;  Laterality: N/A;    EYE SURGERY Bilateral     cataract removal    FLEXIBLE SIGMOIDOSCOPY N/A 07/20/2022    Procedure: SIGMOIDOSCOPY, FLEXIBLE;  Surgeon: TREV Kay MD;  Location: [facility] OR 2ND FLR;  Service: Colon and Rectal;  Laterality: N/A;    HYSTERECTOMY      KIDNEY SURGERY Right     done at [facility], reported as mass by patient    LAPAROSCOPIC SIGMOIDECTOMY Left 07/20/2022    Procedure: COLECTOMY, SIGMOID, LAPAROSCOPIC, ERAS low;  Surgeon: TREV Kay MD;  Location: [facility] OR 2ND FLR;  Service: Colon and Rectal;  Laterality: Left;    LEFT HEART CATHETERIZATION Left 05/13/2019    Procedure: Left heart cath;  Surgeon: Gerhard Krishnan MD;  Location: [facility] 234 beds at the [facility] CATH LAB;  Service: Cardiology;  Laterality: Left;    MOBILIZATION OF SPLENIC FLEXURE N/A 07/20/2022    Procedure: MOBILIZATION, SPLENIC FLEXURE;  Surgeon: TREV Kay MD;  Location: [facility] OR 2ND FLR;  Service: Colon and Rectal;  Laterality: N/A;    OOPHORECTOMY      s-section       No family history on file.  Social History     Tobacco Use    Smoking status: Never    Smokeless tobacco: Never   Substance Use Topics    Alcohol use: Not Currently     Comment: 12-31-22    Drug use: No     Review of Systems    Physical Exam     Initial Vitals [02/15/24 1509]   BP Pulse Resp Temp SpO2   124/60 60 20 97.8 °F (36.6 °C) 100 %      MAP       --         Physical Exam    Nursing note and vitals reviewed.  Constitutional: She appears well-developed. She is not diaphoretic. No distress.   HENT:   Head: Normocephalic  and atraumatic.   Eyes: EOM are normal. Pupils are equal, round, and reactive to light.   Neck: Neck supple.   Normal range of motion.  Cardiovascular:  Normal rate.           Pulmonary/Chest: No respiratory distress.   Abdominal: Abdomen is soft. She exhibits no distension. There is abdominal tenderness (mild diffuse lower).   Musculoskeletal:         General: Normal range of motion.      Cervical back: Normal range of motion and neck supple.     Neurological: She is alert and oriented to person, place, and time.   Skin: Skin is warm and dry.   Psychiatric: She has a normal mood and affect.         ED Course   Procedures  Labs Reviewed   COMPREHENSIVE METABOLIC PANEL - Abnormal; Notable for the following components:       Result Value    Glucose 127 (*)     BUN 25 (*)     All other components within normal limits   CBC W/ AUTO DIFFERENTIAL - Abnormal; Notable for the following components:    Gran % 74.2 (*)     All other components within normal limits   MAGNESIUM   LIPASE   URINALYSIS, REFLEX TO URINE CULTURE          Imaging Results              CT Abdomen Pelvis With IV Contrast NO Oral Contrast (In process)                      Medications   acetaminophen tablet 1,000 mg (1,000 mg Oral Given 2/15/24 1545)   ketorolac injection 15 mg (15 mg Intravenous Given 2/15/24 1545)   lactated ringers bolus 500 mL (0 mLs Intravenous Stopped 2/15/24 1720)     Medical Decision Making  63-year-old female with past medical history as above presents with complaint of abdominal pain.  Upon arrival she was afebrile, stable vital signs. Ddx includes but not limited to appendicitis, cholecystitis, cholangitis, pancreatitis, hepatitis, abdominal aortic aneurysm, diabetic ketoacidosis, bowel obstruction, intra-abdominal perforation, intra-abdominal infection vs. abscess, nephrolithiasis, pyelonephritis, urinary tract infection, electrolyte and/or metabolic abnormality, testicular/ovarian torsion, shingles.  No acute lab  abnormalities.  Recent CT abdomen pelvis reviewed, did not show acute pathology.  Shared decision-making conversation with patient, she would like to proceed with repeat CT scan given that she feels her symptoms today are different.  Signed out to oncoming provider pending CT abdomen pelvis, urinalysis, and reassessment to determine dispo.       Amount and/or Complexity of Data Reviewed  Independent Historian: spouse     Details: At bedside   External Data Reviewed: notes.     Details: Seen 10/12/23 by colorectal surgery for surgical f/u   Labs: ordered. Decision-making details documented in ED Course.  Radiology: ordered.    Risk  OTC drugs.  Prescription drug management.               ED Course as of 02/15/24 1731   Thu Feb 15, 2024   1515 2/3/24 CT A/P Impression: No acute findings.   [AT]   1704 WBC: 8.94  Normal  [AT]   1704 Hemoglobin: 13.0  Normal  [AT]   1727 Lipase Result: 123  Normal  [AT]   1728 Creatinine: 0.68  Normal  [AT]   1729 On reassessment, patient's symptoms are unchanged, she would like to proceed with CT.  [AT]      ED Course User Index  [AT] Chantell Ahumada MD                           Clinical Impression:  Final diagnoses:  [R10.30] Lower abdominal pain (Primary)                 Chantell Ahumada MD  02/15/24 1732

## 2024-02-15 NOTE — Clinical Note
Diagnosis: Partial small bowel obstruction [802853]   Future Attending Provider: ZURDO VIVEROS [[id number]]   Is the patient being sent to ED Observation?: No   Special Needs:: No Special Needs [1]  
no

## 2024-02-16 VITALS
DIASTOLIC BLOOD PRESSURE: 54 MMHG | SYSTOLIC BLOOD PRESSURE: 110 MMHG | WEIGHT: 173.06 LBS | BODY MASS INDEX: 28.83 KG/M2 | TEMPERATURE: 98 F | RESPIRATION RATE: 18 BRPM | HEIGHT: 65 IN | HEART RATE: 66 BPM | OXYGEN SATURATION: 94 %

## 2024-02-16 PROBLEM — K56.600 PARTIAL SMALL BOWEL OBSTRUCTION: Status: ACTIVE | Noted: 2024-02-16

## 2024-02-16 PROBLEM — R10.30 LOWER ABDOMINAL PAIN: Status: ACTIVE | Noted: 2022-05-29

## 2024-02-16 PROBLEM — K56.600 PARTIAL SMALL BOWEL OBSTRUCTION: Status: RESOLVED | Noted: 2024-02-16 | Resolved: 2024-02-16

## 2024-02-16 PROCEDURE — 63600175 PHARM REV CODE 636 W HCPCS: Performed by: SURGERY

## 2024-02-16 PROCEDURE — 25500020 PHARM REV CODE 255: Performed by: SURGERY

## 2024-02-16 PROCEDURE — G0378 HOSPITAL OBSERVATION PER HR: HCPCS | Mod: ER

## 2024-02-16 PROCEDURE — 96375 TX/PRO/DX INJ NEW DRUG ADDON: CPT

## 2024-02-16 PROCEDURE — G0378 HOSPITAL OBSERVATION PER HR: HCPCS

## 2024-02-16 PROCEDURE — 25000003 PHARM REV CODE 250: Performed by: SURGERY

## 2024-02-16 PROCEDURE — 96361 HYDRATE IV INFUSION ADD-ON: CPT

## 2024-02-16 PROCEDURE — 63600175 PHARM REV CODE 636 W HCPCS: Mod: ER | Performed by: EMERGENCY MEDICINE

## 2024-02-16 PROCEDURE — 99223 1ST HOSP IP/OBS HIGH 75: CPT | Mod: ,,, | Performed by: SURGERY

## 2024-02-16 RX ORDER — SODIUM CHLORIDE 9 MG/ML
INJECTION, SOLUTION INTRAVENOUS CONTINUOUS
Status: DISCONTINUED | OUTPATIENT
Start: 2024-02-16 | End: 2024-02-16

## 2024-02-16 RX ORDER — KETOROLAC TROMETHAMINE 30 MG/ML
15 INJECTION, SOLUTION INTRAMUSCULAR; INTRAVENOUS EVERY 6 HOURS PRN
Status: DISCONTINUED | OUTPATIENT
Start: 2024-02-16 | End: 2024-02-16 | Stop reason: HOSPADM

## 2024-02-16 RX ORDER — ONDANSETRON HYDROCHLORIDE 2 MG/ML
4 INJECTION, SOLUTION INTRAVENOUS EVERY 6 HOURS PRN
Status: DISCONTINUED | OUTPATIENT
Start: 2024-02-16 | End: 2024-02-16 | Stop reason: HOSPADM

## 2024-02-16 RX ADMIN — ONDANSETRON 4 MG: 2 INJECTION INTRAMUSCULAR; INTRAVENOUS at 05:02

## 2024-02-16 RX ADMIN — SODIUM CHLORIDE: 9 INJECTION, SOLUTION INTRAVENOUS at 05:02

## 2024-02-16 RX ADMIN — DIATRIZOATE MEGLUMINE AND DIATRIZOATE SODIUM 100 ML: 660; 100 LIQUID ORAL; RECTAL at 09:02

## 2024-02-16 NOTE — PLAN OF CARE
SOCIAL WORK DISCHARGE PLANNING ASSESSMENT    SW completed discharge planning assessment with pt. Pt was easily engaged and education on the role of  was provided. Pt reported she lives with her  Raj ([phone]) and son. Pt stated she has good support from family and friends and advised Raj and her sister Stephanie ([phone]) provide assistance as needed. Pt stated she has no DME and is not current with  services. Raj drives pt to doctor appointments and Raj will provide transportation home following discharge. Pt was encouraged to call with any questions or concerns. Pt verbalized understanding.     Future Appointments   Date Time Provider Department Center   3/7/2024 10:15 AM Divina Paez, PT [facility]   6/6/2024  1:00 PM Flakita Polanco, NP [facility]      Patient Active Problem List   Diagnosis    Screening for colorectal cancer    Angina, class III    Chronic heart failure with preserved ejection fraction    Obesity due to excess calories    Rectal bleeding    Bipolar affective disorder, currently depressed, moderate    LLQ pain    Segmental colitis associated with diverticulosis    Diverticular stricture    Mental health problem    Posture imbalance    Decreased range of motion of left shoulder    Weakness of shoulder    Gastroesophageal reflux disease    Irregular bowel habits    Osteoarthritis    Decreased range of motion of both knees    Impaired strength of lower extremity    Partial small bowel obstruction      02/16/24 1252   Discharge Planning   Assessment Type Discharge Planning Brief Assessment   Resource/Environmental Concerns none   Support Systems Family members;Spouse/significant other  (pt's  Raj ([phone]) and pt's sister Stephanie ([phone]))   Equipment Currently Used at Home none   Current Living Arrangements home   Patient/Family Anticipates Transition to home with family   Patient/Family  Anticipated Services at Transition none   DME Needed Upon Discharge    (TBD)   Discharge Plan A Home with family   Discharge Plan B Home Health

## 2024-02-16 NOTE — PLAN OF CARE
D/c orders noted, no DME, no HH.     Pt's  Raj will provide transportation home following discharge.     SW requested follow up appointment with Dr. Jean Baptiste. Someone will e reaching out to pt for when that is scheduled.     Pt is cleared to go from CM standpoint.     Future Appointments   Date Time Provider Department Center   3/7/2024 10:15 AM Divina Paez, PT [facility]   6/6/2024  1:00 PM Flakita Polanco NP [facility]         02/16/24 1350   Final Note   Assessment Type Final Discharge Note   Anticipated Discharge Disposition Home   Post-Acute Status   Discharge Delays None known at this time

## 2024-02-16 NOTE — PLAN OF CARE
02/16/24 0302   Admission   Initial VN Admission Questions Complete   Communication Issues? None   Shift   Virtual Nurse - Rounding Complete   Virtual Nurse - Patient Verbalized Approval Of Camera Use;VN Rounding   Type of Frequent Check   Type Patient Rounds;Telemetry Monitoring   Safety/Activity   Patient Rounds ID band on;bed in low position;bed wheels locked;call light in patient/parent reach;clutter free environment maintained;visualized patient;placement of personal items at bedside   Safety Promotion/Fall Prevention assistive device/personal item within reach;bed alarm set;Fall Risk reviewed with patient/family;medications reviewed;side rails raised x 2;instructed to call staff for mobility   Positioning   Body Position supine   Head of Bed (HOB) Positioning HOB elevated   Pain/Comfort/Sleep   Preferred Pain Scale number (Numeric Rating Pain Scale)   Pain Rating (0-10): Rest 0     Admission questions completed. Introduced patient to VIP model, patient verbalized understanding. Educated patient on fall prevention protocol, updated on plan of care. Opportunity given for pt's questions. All questions answered. Denies needs at this time

## 2024-02-16 NOTE — PROGRESS NOTES
Contrast advanced into colon on initial xray  Will give regular diet  Ok to DC this afternoon if tolerates diet

## 2024-02-16 NOTE — H&P
[facility]  INPATIENT HP    REASON FOR CONSULT/ADMISSION: ?PSBO    HPI: Ashli Kumar is a 63 y.o. female seen [facility] for abdominal pain and N/V.  Pt has hx of colon and SB resection for diverticulitis.  CT shows possible SBO.  Pt had normal BM yesterday.  She was seen last week for viral illness which included diarrhea.  She states she feels much better and the pain and distension have resolved.  She has had multiple BM since being transferred although she did also have one episode of emesis.     I have reviewed the patient's chart including prior progress notes, procedures and testing.     ROS:   Review of Systems   All other systems reviewed and are negative.      PROBLEM LIST:  Patient Active Problem List   Diagnosis    Screening for colorectal cancer    Angina, class III    Chronic heart failure with preserved ejection fraction    Obesity due to excess calories    Rectal bleeding    Bipolar affective disorder, currently depressed, moderate    LLQ pain    Segmental colitis associated with diverticulosis    Diverticular stricture    Mental health problem    Posture imbalance    Decreased range of motion of left shoulder    Weakness of shoulder    Gastroesophageal reflux disease    Irregular bowel habits    Osteoarthritis    Decreased range of motion of both knees    Impaired strength of lower extremity         HISTORY  Past Medical History:   Diagnosis Date    Allergies     Anxiety     Arthritis     CHF (congestive heart failure)     Depression     Diverticular disease of large intestine without perforation or abscess     GERD (gastroesophageal reflux disease)     High cholesterol     History of fainting     Hypertension     Shortness of breath     states sometimes gets short winded    SOB (shortness of breath) 05/13/2019    Unspecified chronic bronchitis     not chronic episode this year-2023    Unspecified glaucoma        Past Surgical History:   Procedure Laterality Date    BILATERAL  TUBAL LIGATION      BREAST BIOPSY Left     [facility]    BREAST SURGERY       SECTION      COLONOSCOPY N/A 2017    Procedure: COLONOSCOPY Miralax;  Surgeon: Buddy Butcher Jr., MD;  Location: [facility] ENDO;  Service: Endoscopy;  Laterality: N/A;    COLONOSCOPY N/A 2022    Procedure: COLONOSCOPY;  Surgeon: TREV Kay MD;  Location: [facility] ENDO;  Service: Endoscopy;  Laterality: N/A;    COLONOSCOPY N/A 2022    Procedure: COLONOSCOPY;  Surgeon: TREV Kay MD;  Location: [facility];  Service: Endoscopy;  Laterality: N/A;    ESOPHAGOGASTRODUODENOSCOPY N/A 7/3/2023    Procedure: EGD (ESOPHAGOGASTRODUODENOSCOPY);  Surgeon: Mary Cotton MD;  Location: [facility];  Service: Endoscopy;  Laterality: N/A;    EYE SURGERY Bilateral     cataract removal    FLEXIBLE SIGMOIDOSCOPY N/A 2022    Procedure: SIGMOIDOSCOPY, FLEXIBLE;  Surgeon: TREV Kay MD;  [facility];  Service: Colon and Rectal;  Laterality: N/A;    HYSTERECTOMY      KIDNEY SURGERY Right     done at [facility], reported as mass by patient    LAPAROSCOPIC SIGMOIDECTOMY Left 2022    Procedure: COLECTOMY, SIGMOID, LAPAROSCOPIC, ERAS low;  Surgeon: TREV Kay MD;  [facility];  Service: Colon and Rectal;  Laterality: Left;    LEFT HEART CATHETERIZATION Left 2019    Procedure: Left heart cath;  Surgeon: Gerhard Krishnan MD;  Location: [facility] CATH LAB;  Service: Cardiology;  Laterality: Left;    MOBILIZATION OF SPLENIC FLEXURE N/A 2022    Procedure: MOBILIZATION, SPLENIC FLEXURE;  Surgeon: TREV Kay MD;  Location: [facility] OR 2ND FLR;  Service: Colon and Rectal;  Laterality: N/A;    OOPHORECTOMY      s-section         Social History     Tobacco Use    Smoking status: Never    Smokeless tobacco: Never   Substance Use Topics    Alcohol use: Not Currently     Comment: 22    Drug use: No       No family history on file.      MEDS:  No current  "facility-administered medications on file prior to encounter.     Current Outpatient Medications on File Prior to Encounter   Medication Sig Dispense Refill    divalproex (DEPAKOTE) 500 MG TbEC Take 1 tablet (500 mg total) by mouth 2 (two) times a day. 60 tablet 2    estradioL (ESTRACE) 0.01 % (0.1 mg/gram) vaginal cream Place 1 g vaginally once daily. 42.5 g 3    FLUoxetine 20 MG capsule Take 1 capsule (20 mg total) by mouth once daily. 30 capsule 2    pravastatin (PRAVACHOL) 20 MG tablet Take 20 mg by mouth once daily.      cyclobenzaprine (FLEXERIL) 10 MG tablet Take 10 mg by mouth once daily. Pt states, "I need a refill"      fluticasone propionate (FLONASE) 50 mcg/actuation nasal spray 2 sprays by Each Nostril route daily as needed.      Lactobac no.41/Bifidobact no.7 (PROBIOTIC-10 ORAL) Take 1 capsule by mouth once daily.      latanoprost 0.005 % ophthalmic solution Place 1 drop into both eyes every evening.      metoprolol tartrate (LOPRESSOR) 25 MG tablet Take 25 mg by mouth 2 (two) times daily.      omeprazole (PRILOSEC) 40 MG capsule Take 1 capsule (40 mg total) by mouth once daily. (Patient not taking: Reported on 2/5/2024) 30 capsule 2    psyllium 0.52 gram capsule Take 0.52 g by mouth once daily.      [DISCONTINUED] meloxicam (MOBIC) 7.5 MG tablet Take 1 tablet (7.5 mg total) by mouth once daily. for 14 days 14 tablet 0    [DISCONTINUED] OLANZapine (ZYPREXA) 2.5 MG tablet Take 1 tablet (2.5 mg total) by mouth 2 (two) times a day. 60 tablet 2    [DISCONTINUED] spironolactone (ALDACTONE) 25 MG tablet Take 12.5 mg by mouth once daily.         ALLERGIES:  Review of patient's allergies indicates:   Allergen Reactions    Codeine Hives    Sulfa (sulfonamide antibiotics) Hives    Benzoate analogues Itching         VITALS:  Temp:  [97.8 °F (36.6 °C)-98.2 °F (36.8 °C)] 98.2 °F (36.8 °C)  Pulse:  [58-68] 68  Resp:  [18-20] 18  SpO2:  [95 %-100 %] 97 %  BP: (124-176)/(60-78) 143/64    I/O last 3 completed " shifts:  In: 35.9 [I.V.:35.9]  Out: -       PHYSICAL EXAM:  Physical Exam  Constitutional:       General: She is not in acute distress.     Appearance: Normal appearance.   HENT:      Head: Normocephalic and atraumatic.   Pulmonary:      Effort: Pulmonary effort is normal.   Abdominal:      General: There is no distension.      Palpations: Abdomen is soft.      Tenderness: There is no abdominal tenderness. There is no guarding or rebound.   Skin:     General: Skin is warm and dry.   Neurological:      Mental Status: Mental status is at baseline.   Psychiatric:         Mood and Affect: Mood normal.         Behavior: Behavior normal.           LABS:  Lab Results   Component Value Date    WBC 8.94 02/15/2024    RBC 4.40 02/15/2024    HGB 13.0 02/15/2024    HCT 39.9 02/15/2024     02/15/2024     Lab Results   Component Value Date     (H) 02/15/2024     02/15/2024    K 4.3 02/15/2024    CL 99 02/15/2024    CO2 28 02/15/2024    BUN 25 (H) 02/15/2024    CREATININE 0.68 02/15/2024    CALCIUM 9.1 02/15/2024     Lab Results   Component Value Date    ALT 15 02/15/2024    AST 23 02/15/2024    ALKPHOS 48 02/15/2024    BILITOT 0.4 02/15/2024     Lab Results   Component Value Date    MG 1.8 02/15/2024    PHOS 3.7 05/30/2022       STUDIES:  CT images and reports were personally reviewed.        ASSESSMENT & PLAN:  63 y.o. female , highly doubt SBO given her symptoms and CT.  Will plan for gastrograffin challenge today.      Ayo Jean Baptiste M.D., F.A.C.S.  [facility] and General Surgery  [facility] [place]

## 2024-02-16 NOTE — PROVIDER PROGRESS NOTES - EMERGENCY DEPT.
Encounter Date: 2/15/2024    ED Physician Progress Notes            Received sign-out from previous team plan was follow-up CT scan reassess patient.  Patient is still complaining of symptoms low well appearing.  CT obtained and reviewed, partial small bowel obstruction noted.  Patient was hemodynamically stable.  Will transfer to [place] under general surgery Services for observation.  Patient understood agreed with plan.

## 2024-02-16 NOTE — ED NOTES
Patient report received from RAMÍREZ Marion. Patient stable and ready for transport to [facility]. Will continue to monitor patient.

## 2024-02-16 NOTE — PROGRESS NOTES
Virtual Nurse:Discharge orders noted; additional clinical references attached.  and pharmacy tech notified.  Patient's discharge instruction packet given by bedside RN.    Cued into patient's room.  Permission received per patient to turn camera to view patient.  Introduced as VN that will be instructing on discharge instructions.   at bedside.  Educated patient on reason for admission; medications to hold, continue, and start, appointment to follow-up with doctor, and when to return to ED. Teach back method used. Verbalized understanding  Number given for 24/7 Nurse Line. Opportunity given for questions and questions answered.  Bedside nurse updated. Transport to [facility] requested.        02/16/24 1451   Shift   Virtual Nurse - Patient Verbalized Approval Of Camera Use;VN Rounding   Type of Frequent Check   Type Patient Rounds   Safety/Activity   Patient Rounds visualized patient   Activity Assistance Provided independent

## 2024-02-20 NOTE — DISCHARGE SUMMARY
[facility]  Discharge Summary  General Surgery      Admit Date: 2/15/2024    Discharge Date: 2/16/2024  3:00 PM    Attending Physician/Discharge Provider: Ayo Jean Baptiste M.D.    Reason for Admission: ?PSBO    Procedures Performed: * No surgery found *    Hospital Course:  Patient is a 63-year old female who presented with N/V concern for possible PSBO.  She easily passed a contrast challenge and was discharged.     Final Diagnoses:   Principal Problem: Lower abdominal pain       Discharged Condition: Good    Disposition: Home or Self Care    Follow Up/Patient Instructions: Follow up with Dr. Jean Baptiste PRN.    Medications:  Reconciled Home Medications:      Medication List        CONTINUE taking these medications      cyclobenzaprine 10 MG tablet  Commonly known as: FLEXERIL  Take 10 mg by mouth once daily.     divalproex 500 MG Tbec  Commonly known as: DEPAKOTE  Take 1 tablet (500 mg total) by mouth 2 (two) times a day.     estradioL 0.01 % (0.1 mg/gram) vaginal cream  Commonly known as: ESTRACE  Place 1 g vaginally once daily.     FLUoxetine 20 MG capsule  Take 1 capsule (20 mg total) by mouth once daily.     fluticasone propionate 50 mcg/actuation nasal spray  Commonly known as: FLONASE  2 sprays by Each Nostril route daily as needed.     latanoprost 0.005 % ophthalmic solution  Place 1 drop into both eyes every evening.     metoprolol tartrate 25 MG tablet  Commonly known as: LOPRESSOR  Take 25 mg by mouth 2 (two) times daily.     omeprazole 40 MG capsule  Commonly known as: PRILOSEC  Take 1 capsule (40 mg total) by mouth once daily.     pravastatin 20 MG tablet  Commonly known as: PRAVACHOL  Take 20 mg by mouth once daily.     PROBIOTIC-10 ORAL  Take 1 capsule by mouth once daily.     psyllium 0.52 gram capsule  Take 0.52 g by mouth once daily.            STOP taking these medications      diclofenac 25 MG Tbec  Commonly known as: VOLTAREN     dicyclomine 20 mg tablet  Commonly known as: BENTYL             Discharge Procedure Orders   Diet Adult Regular     Activity as tolerated      Follow-up Information       Ayo Jean Baptiste MD Follow up today.    Specialty: General Surgery  Why: As needed  Contact information:  [address]  [address]  [facility] [address] [phone]                                 Ayo Jean Baptiste M.D., F.A.C.S.  [facility] and General Surgery  [facility] [place]

## 2024-03-21 ENCOUNTER — LAB VISIT (OUTPATIENT)
Dept: LAB | Facility: HOSPITAL | Age: 64
End: 2024-03-21
Attending: INTERNAL MEDICINE
Payer: MEDICARE

## 2024-03-21 DIAGNOSIS — E55.9 VITAMIN D DEFICIENCY: ICD-10-CM

## 2024-03-21 DIAGNOSIS — I50.9 CHF (CONGESTIVE HEART FAILURE): ICD-10-CM

## 2024-03-21 DIAGNOSIS — I10 HTN (HYPERTENSION): Primary | ICD-10-CM

## 2024-03-21 LAB
ALBUMIN SERPL BCP-MCNC: 4.4 G/DL (ref 3.5–5.2)
ALP SERPL-CCNC: 50 U/L (ref 38–126)
ALT SERPL W/O P-5'-P-CCNC: 14 U/L (ref 10–44)
ANION GAP SERPL CALC-SCNC: 10 MMOL/L (ref 8–16)
AST SERPL-CCNC: 21 U/L (ref 15–46)
BASOPHILS # BLD AUTO: 0.03 K/UL (ref 0–0.2)
BASOPHILS NFR BLD: 0.5 % (ref 0–1.9)
BILIRUB SERPL-MCNC: 0.7 MG/DL (ref 0.1–1)
CALCIUM SERPL-MCNC: 9.6 MG/DL (ref 8.7–10.5)
CHLORIDE SERPL-SCNC: 101 MMOL/L (ref 95–110)
CHOLEST SERPL-MCNC: 233 MG/DL (ref 120–199)
CHOLEST/HDLC SERPL: 3.3 {RATIO} (ref 2–5)
CO2 SERPL-SCNC: 30 MMOL/L (ref 23–29)
CREAT SERPL-MCNC: 0.7 MG/DL (ref 0.5–1.4)
DIFFERENTIAL METHOD BLD: ABNORMAL
EOSINOPHIL # BLD AUTO: 0 K/UL (ref 0–0.5)
EOSINOPHIL NFR BLD: 0.5 % (ref 0–8)
ERYTHROCYTE [DISTWIDTH] IN BLOOD BY AUTOMATED COUNT: 11.9 % (ref 11.5–14.5)
EST. GFR  (NO RACE VARIABLE): >60 ML/MIN/1.73 M^2
ESTIMATED AVG GLUCOSE: 114 MG/DL (ref 68–131)
GLUCOSE SERPL-MCNC: 97 MG/DL (ref 70–110)
HBA1C MFR BLD: 5.6 % (ref 4–5.6)
HCT VFR BLD AUTO: 39.5 % (ref 37–48.5)
HDLC SERPL-MCNC: 70 MG/DL (ref 40–75)
HDLC SERPL: 30 % (ref 20–50)
HGB BLD-MCNC: 13 G/DL (ref 12–16)
IMM GRANULOCYTES # BLD AUTO: 0.01 K/UL (ref 0–0.04)
IMM GRANULOCYTES NFR BLD AUTO: 0.2 % (ref 0–0.5)
LDLC SERPL CALC-MCNC: 146.4 MG/DL (ref 63–159)
LYMPHOCYTES # BLD AUTO: 2.7 K/UL (ref 1–4.8)
LYMPHOCYTES NFR BLD: 49.7 % (ref 18–48)
MCH RBC QN AUTO: 30.2 PG (ref 27–31)
MCHC RBC AUTO-ENTMCNC: 32.9 G/DL (ref 32–36)
MCV RBC AUTO: 92 FL (ref 82–98)
MONOCYTES # BLD AUTO: 0.3 K/UL (ref 0.3–1)
MONOCYTES NFR BLD: 6 % (ref 4–15)
NEUTROPHILS # BLD AUTO: 2.4 K/UL (ref 1.8–7.7)
NEUTROPHILS NFR BLD: 43.1 % (ref 38–73)
NONHDLC SERPL-MCNC: 163 MG/DL
NRBC BLD-RTO: 0 /100 WBC
PLATELET # BLD AUTO: 191 K/UL (ref 150–450)
PMV BLD AUTO: 12.7 FL (ref 9.2–12.9)
POTASSIUM SERPL-SCNC: 5.1 MMOL/L (ref 3.5–5.1)
PROT SERPL-MCNC: 7.4 G/DL (ref 6–8.4)
RBC # BLD AUTO: 4.31 M/UL (ref 4–5.4)
SODIUM SERPL-SCNC: 141 MMOL/L (ref 136–145)
T4 SERPL-MCNC: 6.6 UG/DL (ref 4.5–11.5)
TRIGL SERPL-MCNC: 83 MG/DL (ref 30–150)
TSH SERPL DL<=0.005 MIU/L-ACNC: 1.9 UIU/ML (ref 0.4–4)
UUN UR-MCNC: 19 MG/DL (ref 7–17)
WBC # BLD AUTO: 5.51 K/UL (ref 3.9–12.7)

## 2024-03-21 PROCEDURE — 84436 ASSAY OF TOTAL THYROXINE: CPT | Performed by: INTERNAL MEDICINE

## 2024-03-21 PROCEDURE — 84443 ASSAY THYROID STIM HORMONE: CPT | Mod: PN | Performed by: INTERNAL MEDICINE

## 2024-03-21 PROCEDURE — 82306 VITAMIN D 25 HYDROXY: CPT | Mod: PN | Performed by: INTERNAL MEDICINE

## 2024-03-21 PROCEDURE — 80061 LIPID PANEL: CPT | Performed by: INTERNAL MEDICINE

## 2024-03-21 PROCEDURE — 80053 COMPREHEN METABOLIC PANEL: CPT | Mod: PN | Performed by: INTERNAL MEDICINE

## 2024-03-21 PROCEDURE — 83036 HEMOGLOBIN GLYCOSYLATED A1C: CPT | Performed by: INTERNAL MEDICINE

## 2024-03-21 PROCEDURE — 85025 COMPLETE CBC W/AUTO DIFF WBC: CPT | Mod: PN | Performed by: INTERNAL MEDICINE

## 2024-03-22 LAB — 25(OH)D3+25(OH)D2 SERPL-MCNC: 41 NG/ML (ref 30–96)

## 2024-04-11 ENCOUNTER — OFFICE VISIT (OUTPATIENT)
Dept: FAMILY MEDICINE | Facility: HOSPITAL | Age: 64
End: 2024-04-11
Payer: MEDICARE

## 2024-04-11 DIAGNOSIS — J30.9 ALLERGIC RHINITIS, UNSPECIFIED SEASONALITY, UNSPECIFIED TRIGGER: Primary | ICD-10-CM

## 2024-04-11 DIAGNOSIS — E78.5 HYPERLIPIDEMIA, UNSPECIFIED HYPERLIPIDEMIA TYPE: ICD-10-CM

## 2024-04-11 DIAGNOSIS — K21.9 GASTROESOPHAGEAL REFLUX DISEASE, UNSPECIFIED WHETHER ESOPHAGITIS PRESENT: ICD-10-CM

## 2024-04-11 DIAGNOSIS — F31.32 BIPOLAR AFFECTIVE DISORDER, CURRENTLY DEPRESSED, MODERATE: ICD-10-CM

## 2024-04-11 DIAGNOSIS — Z11.4 SCREENING FOR HIV (HUMAN IMMUNODEFICIENCY VIRUS): ICD-10-CM

## 2024-04-11 DIAGNOSIS — I50.32 CHRONIC HEART FAILURE WITH PRESERVED EJECTION FRACTION: ICD-10-CM

## 2024-04-11 DIAGNOSIS — Z23 ENCOUNTER FOR IMMUNIZATION: ICD-10-CM

## 2024-04-11 PROCEDURE — 99214 OFFICE O/P EST MOD 30 MIN: CPT | Performed by: STUDENT IN AN ORGANIZED HEALTH CARE EDUCATION/TRAINING PROGRAM

## 2024-04-11 RX ORDER — FLUOXETINE HYDROCHLORIDE 20 MG/1
20 CAPSULE ORAL DAILY
Qty: 30 CAPSULE | Refills: 2 | Status: SHIPPED | OUTPATIENT
Start: 2024-04-11

## 2024-04-11 RX ORDER — MINERAL OIL
180 ENEMA (ML) RECTAL DAILY
Qty: 30 TABLET | Refills: 11 | Status: SHIPPED | OUTPATIENT
Start: 2024-04-11 | End: 2025-04-11

## 2024-04-11 RX ORDER — PRAVASTATIN SODIUM 40 MG/1
TABLET ORAL
COMMUNITY
End: 2024-04-11

## 2024-04-11 RX ORDER — METOPROLOL TARTRATE 50 MG/1
TABLET ORAL
COMMUNITY

## 2024-04-11 RX ORDER — FAMOTIDINE 20 MG/1
20 TABLET, FILM COATED ORAL 2 TIMES DAILY PRN
Qty: 60 TABLET | Refills: 11 | Status: SHIPPED | OUTPATIENT
Start: 2024-04-11 | End: 2025-04-11

## 2024-04-11 RX ORDER — DIVALPROEX SODIUM 500 MG/1
500 TABLET, DELAYED RELEASE ORAL 2 TIMES DAILY
Qty: 60 TABLET | Refills: 2 | Status: SHIPPED | OUTPATIENT
Start: 2024-04-11

## 2024-04-11 RX ORDER — PRAVASTATIN SODIUM 20 MG/1
20 TABLET ORAL DAILY
Qty: 30 TABLET | Refills: 11 | Status: SHIPPED | OUTPATIENT
Start: 2024-04-11

## 2024-04-11 NOTE — PROGRESS NOTES
Progress Note  [facility]    Subjective:     Ashli Kumar is a 63 y.o. year old female with PMHx of Bipolar, HFpEF, and GERD who presents to clinic for:    CC: Headaches and acid reflux    Headaches:  - Patient reports frequent headaches.  - Possible relationship to sinus issues and allergies.  - Patient has a history of allergies and has been prescribed Flonase, which they use sporadically.  - Reports some associated rhinitis and congestion.    Acid Reflux:  - Patient experiences burning sensation in the stomach, especially after eating.  - Patient has had acid reflux in the past and has been prescribed omeprazole (Prilosec) but has not been taking the medication.  - Patient agrees to try a different proton pump inhibitor (PPI), Pepcid.    Bipolar Disorder and Depression:  - Patient has diagnoses of bipolar disorder and depression and is managed by a psychiatry.  - Management includes Depakote and fluoxetine, requesting refill, as she is about to run out of meds.  - Patient is scheduled to see the psychiatrist again on May 1st.    Heart Failure:  - Patient has a diagnosis of heart failure w/ preserved EF.  - Patient sees a cardiologist, Dr. Gerhard Krishnan, every six months (not in chart).  - The most recent visit was in March, and the patient reported that everything was okay.  - The patient is not currently taking a diuretic and does not have swelling of the feet, but reports shortness of breath, which is chronic/mild in nature.  - Taking beta blocker for management.    HLD:  - LDL >140.  - The patient has been prescribed Pravastatin for cholesterol management but has since discontinued the medication.    Menopausal Symptoms:  - Patient has been experiencing menopausal symptoms for approximately three years.  - Patient is using over-the-counter Black Cohosh Root for hot flashes with approval from her cardiologist.  - Amenable to try CBT to help manage reactions to menopausal  symptoms.        Patient Active Problem List    Diagnosis Date Noted    Decreased range of motion of both knees 2023    Impaired strength of lower extremity 2023    Osteoarthritis 2023    Gastroesophageal reflux disease 2023    Irregular bowel habits 2023    Posture imbalance 2023    Decreased range of motion of left shoulder 2023    Weakness of shoulder 2023    Mental health problem 2023    Diverticular stricture 2022    Segmental colitis associated with diverticulosis 2022    Lower abdominal pain 2022    Rectal bleeding 2022    Bipolar affective disorder, currently depressed, moderate 2022    Chronic heart failure with preserved ejection fraction 2019    Obesity due to excess calories 2019    Angina, class III 2019    Screening for colorectal cancer 2017        (Not in a hospital admission)    Review of patient's allergies indicates:   Allergen Reactions    Codeine Hives    Sulfa (sulfonamide antibiotics) Hives    Benzoate analogues Itching        Past Medical History:   Diagnosis Date    Allergies     Anxiety     Arthritis     CHF (congestive heart failure)     Depression     Diverticular disease of large intestine without perforation or abscess     GERD (gastroesophageal reflux disease)     High cholesterol     History of fainting     Hypertension     Shortness of breath     states sometimes gets short winded    SOB (shortness of breath) 2019    Unspecified chronic bronchitis     not chronic episode this year-    Unspecified glaucoma       Past Surgical History:   Procedure Laterality Date    BILATERAL TUBAL LIGATION      BREAST BIOPSY Left     rph    BREAST SURGERY       SECTION      COLONOSCOPY N/A 2017    Procedure: COLONOSCOPY Miralax;  Surgeon: Buddy Butcher Jr., MD;  Location: [facility];  Service: Endoscopy;  Laterality: N/A;    COLONOSCOPY N/A 2022    Procedure:  "COLONOSCOPY;  Surgeon: TREV Kay MD;  Location: [facility] ENDO;  Service: Endoscopy;  Laterality: N/A;    COLONOSCOPY N/A 12/29/2022    Procedure: COLONOSCOPY;  Surgeon: TREV Kay MD;  Location: [facility] ENDO;  Service: Endoscopy;  Laterality: N/A;    ESOPHAGOGASTRODUODENOSCOPY N/A 7/3/2023    Procedure: EGD (ESOPHAGOGASTRODUODENOSCOPY);  Surgeon: Mary Cotton MD;  Location: [facility] ENDO;  Service: Endoscopy;  Laterality: N/A;    EYE SURGERY Bilateral     cataract removal    FLEXIBLE SIGMOIDOSCOPY N/A 07/20/2022    Procedure: SIGMOIDOSCOPY, FLEXIBLE;  Surgeon: TREV Kay MD;  Location: [facility] OR 2ND FLR;  Service: Colon and Rectal;  Laterality: N/A;    HYSTERECTOMY      KIDNEY SURGERY Right     done at [facility], reported as mass by patient    LAPAROSCOPIC SIGMOIDECTOMY Left 07/20/2022    Procedure: COLECTOMY, SIGMOID, LAPAROSCOPIC, ERAS low;  Surgeon: TREV Kay MD;  Location: [facility] OR 2ND FLR;  Service: Colon and Rectal;  Laterality: Left;    LEFT HEART CATHETERIZATION Left 05/13/2019    Procedure: Left heart cath;  Surgeon: Gerhard Krishnan MD;  Location: [facility] CATH LAB;  Service: Cardiology;  Laterality: Left;    MOBILIZATION OF SPLENIC FLEXURE N/A 07/20/2022    Procedure: MOBILIZATION, SPLENIC FLEXURE;  Surgeon: TREV aKy MD;  Location: [facility] OR 2ND FLR;  Service: Colon and Rectal;  Laterality: N/A;    OOPHORECTOMY      s-section        No family history on file.   Social History     Tobacco Use    Smoking status: Never    Smokeless tobacco: Never   Substance Use Topics    Alcohol use: Not Currently     Comment: 12-31-22      Review of Systems   Constitutional:        As per HPI, otherwise negative        Objective:     Vitals:    04/11/24 1043 04/11/24 1100   BP: (!) 143/74 126/67   Pulse: 61    Weight: 81.3 kg (179 lb 3.7 oz)    Height: 5' 5" (1.651 m)      Estimated body mass index is 29.83 kg/m² as calculated from the following:    Height as of this encounter: 5' " "5" (1.651 m).    Weight as of this encounter: 81.3 kg (179 lb 3.7 oz).     Physical Exam  Constitutional:       Appearance: Normal appearance. She is normal weight.   HENT:      Head: Normocephalic and atraumatic.      Right Ear: External ear normal.      Left Ear: External ear normal.      Mouth/Throat:      Mouth: Mucous membranes are moist.      Pharynx: Oropharynx is clear.   Eyes:      Extraocular Movements: Extraocular movements intact.      Conjunctiva/sclera: Conjunctivae normal.      Pupils: Pupils are equal, round, and reactive to light.   Cardiovascular:      Rate and Rhythm: Normal rate and regular rhythm.      Pulses: Normal pulses.      Heart sounds: Normal heart sounds.   Pulmonary:      Effort: Pulmonary effort is normal.      Breath sounds: Normal breath sounds.   Abdominal:      General: Abdomen is flat. Bowel sounds are normal.      Tenderness: There is no abdominal tenderness. There is no guarding.   Musculoskeletal:      Cervical back: Normal range of motion.      Right lower leg: No edema.      Left lower leg: No edema.   Neurological:      Mental Status: She is alert and oriented to person, place, and time. Mental status is at baseline.   Psychiatric:         Mood and Affect: Mood normal.         Behavior: Behavior normal.         Thought Content: Thought content normal.         Assessment/Plan:     Allergic rhinitis, unspecified seasonality, unspecified trigger  -     fexofenadine (ALLEGRA) 180 MG tablet; Take 1 tablet (180 mg total) by mouth once daily.  Dispense: 30 tablet; Refill: 11  - Assessment: The patient has a history of allergies and frequent headaches, possibly related to sinus issues. She reports associated rhinitis and congestion. These symptoms, along with her sporadic use of Flonase, support the diagnosis of allergic rhinitis.  - Plan:    - Continue fexofenadine (Allegra) 180 mg tablet, 1 tablet by mouth once daily.    - The patient is advised to use Flonase daily to manage her " symptoms effectively.    - Regular follow-ups are recommended to monitor the patient's condition and adjust the treatment plan as necessary.    Gastroesophageal reflux disease, unspecified whether esophagitis present  -     famotidine (PEPCID) 20 MG tablet; Take 1 tablet (20 mg total) by mouth 2 (two) times daily as needed for Heartburn.  Dispense: 60 tablet; Refill: 11  - Assessment: The patient experiences a burning sensation in her stomach, particularly after eating, which is a common symptom of GERD. She was previously prescribed omeprazole but was not taking it regularly.  - Plan:    - Start famotidine (Pepcid) 20 mg tablet, 1 tablet by mouth twice daily as needed for heartburn.    - The patient is advised to take her GERD medication regularly and to avoid trigger foods.    - Regular follow-ups are recommended to monitor the patient's condition and adjust the treatment plan as necessary.    Bipolar affective disorder, currently depressed, moderate  -     divalproex (DEPAKOTE) 500 MG TbEC; Take 1 tablet (500 mg total) by mouth 2 (two) times a day.  Dispense: 60 tablet; Refill: 2  -     FLUoxetine 20 MG capsule; Take 1 capsule (20 mg total) by mouth once daily.  Dispense: 30 capsule; Refill: 2  - Assessment: The patient has been diagnosed with bipolar disorder and depression, and is managed by a psychiatrist. She is prescribed Depakote and fluoxetine, which she is about to run out of, suggesting she takes these medications regularly.  - Plan:    - Continue divalproex (Depakote) 500 mg TbEC, 1 tablet by mouth twice daily.    - Continue fluoxetine 20 mg capsule, 1 capsule by mouth once daily.    - Regular follow-ups with her psychiatrist are recommended to manage her mental health effectively.    Hyperlipidemia, unspecified hyperlipidemia type  -     pravastatin (PRAVACHOL) 20 MG tablet; Take 1 tablet (20 mg total) by mouth once daily.  Dispense: 30 tablet; Refill: 11  - Assessment: The patient has high LDL levels  and was previously prescribed Pravastatin for cholesterol management, but she discontinued the medication.  - Plan:    - Restart pravastatin (Pravachol) 20 mg tablet, 1 tablet by mouth once daily.    - The patient is advised to take her statin regularly to manage her cholesterol levels effectively.    - Regular follow-ups are recommended to monitor her cholesterol levels and adjust the treatment plan as necessary.    Chronic heart failure with preserved ejection fraction  - Assessment: The patient has a diagnosis of heart failure with preserved ejection fraction and is under the care of a cardiologist. She reports mild, chronic shortness of breath but no swelling of the feet, suggesting her condition is currently stable.  - Plan:    - Continue current management plan under the guidance of her cardiologist.    - Encourage regular physical activity as tolerated and a heart-healthy diet.    - Regular follow-ups with her cardiologist are recommended to monitor her heart function.    Encounter for immunization  - Assessment: The patient was given a slip to get flu, pneumonia, and tetanus vaccines at the \[facility]"" pharmacy.  - Plan:    - The patient should receive the recommended vaccines as soon as possible.    - Regular immunizations as per the recommended schedule are encouraged to protect the patient from preventable diseases.    Screening for HIV (human immunodeficiency virus)  -     HIV 1/2 Ag/Ab (4th Gen); Future; Expected date: 04/11/2024  - Assessment: Routine screening.  - Plan:    - Proceed with HIV 1/2 Ag/Ab (4th Gen) screening. Expected date: 04/11/2024.    - Regular screenings are encouraged as part of the patient's preventive healthcare.      Patient instructed to receive or provide proof of all deficient vaccines in chart - patient verbalized understanding      In regards to A&P, patient verbalized understanding and agreeable to plan      Follow-up: 2 months regular f/u      Case discussed with staff:   [place]      This note was partially created using [employer] Voice Recognition software. Typographical and content errors may occur with this process. While efforts are made to detect and correct such errors, in some cases errors will persist. For this reason, wording in this document should be considered in the proper context and not strictly verbatim.    The following information is provided to all patients.  This information is to help you find resources for any of the problems found today that may be affecting your health:                Living healthy guide: www.[facility].[place].[facility]       Understanding Diabetes: www.diabetes.org       Eating healthy: www.cdc.gov/healthyweight      CDC home safety checklist: www.cdc.gov/steadi/patient.html      Agency on Aging: www.goea.[place].[facility]       Alcoholics anonymous (AA): www.aa.org      Physical Activity: www.mike.nih.gov/sa4ycwn       Tobacco use: www.quitwithusla.org        Scott Spence MD  [facility], PGY-3  04/12/2024

## 2024-04-12 VITALS
BODY MASS INDEX: 29.87 KG/M2 | WEIGHT: 179.25 LBS | DIASTOLIC BLOOD PRESSURE: 67 MMHG | SYSTOLIC BLOOD PRESSURE: 126 MMHG | HEART RATE: 61 BPM | HEIGHT: 65 IN

## 2024-04-12 PROBLEM — J30.9 ALLERGIC RHINITIS: Status: ACTIVE | Noted: 2024-04-12

## 2024-04-12 PROBLEM — E78.5 HYPERLIPIDEMIA: Status: ACTIVE | Noted: 2024-04-12

## 2024-05-25 ENCOUNTER — HOSPITAL ENCOUNTER (EMERGENCY)
Facility: HOSPITAL | Age: 64
Discharge: HOME | End: 2024-05-25
Attending: FAMILY MEDICINE
Payer: MEDICARE

## 2024-05-25 VITALS
RESPIRATION RATE: 19 BRPM | TEMPERATURE: 98 F | HEIGHT: 65 IN | DIASTOLIC BLOOD PRESSURE: 81 MMHG | SYSTOLIC BLOOD PRESSURE: 171 MMHG | OXYGEN SATURATION: 96 % | BODY MASS INDEX: 28.82 KG/M2 | WEIGHT: 173 LBS | HEART RATE: 82 BPM

## 2024-05-25 DIAGNOSIS — J20.9 ACUTE BRONCHITIS, UNSPECIFIED ORGANISM: Primary | ICD-10-CM

## 2024-05-25 LAB
INFLUENZA A, MOLECULAR: NEGATIVE
INFLUENZA B, MOLECULAR: NEGATIVE
POCT GLUCOSE: 91 MG/DL (ref 70–110)
SARS-COV-2 RDRP RESP QL NAA+PROBE: NEGATIVE
SPECIMEN SOURCE: NORMAL

## 2024-05-25 PROCEDURE — 87502 INFLUENZA DNA AMP PROBE: CPT | Mod: ER | Performed by: FAMILY MEDICINE

## 2024-05-25 PROCEDURE — 63600175 PHARM REV CODE 636 W HCPCS: Mod: ER | Performed by: FAMILY MEDICINE

## 2024-05-25 PROCEDURE — 99284 EMERGENCY DEPT VISIT MOD MDM: CPT | Mod: 25,ER

## 2024-05-25 PROCEDURE — U0002 COVID-19 LAB TEST NON-CDC: HCPCS | Mod: ER | Performed by: FAMILY MEDICINE

## 2024-05-25 PROCEDURE — 96372 THER/PROPH/DIAG INJ SC/IM: CPT | Performed by: FAMILY MEDICINE

## 2024-05-25 PROCEDURE — 82962 GLUCOSE BLOOD TEST: CPT | Mod: ER

## 2024-05-25 RX ORDER — DEXAMETHASONE SODIUM PHOSPHATE 4 MG/ML
4 INJECTION, SOLUTION INTRA-ARTICULAR; INTRALESIONAL; INTRAMUSCULAR; INTRAVENOUS; SOFT TISSUE
Status: COMPLETED | OUTPATIENT
Start: 2024-05-25 | End: 2024-05-25

## 2024-05-25 RX ADMIN — DEXAMETHASONE SODIUM PHOSPHATE 4 MG: 4 INJECTION, SOLUTION INTRA-ARTICULAR; INTRALESIONAL; INTRAMUSCULAR; INTRAVENOUS; SOFT TISSUE at 09:05

## 2024-05-25 NOTE — ED PROVIDER NOTES
Encounter Date: 2024       History     Chief Complaint   Patient presents with    Cough     Pt C/O productive cough, nasal congestion and weakness X 1 week. NADN      63-year-old female complains of nasal congestion, runny nose, cough, fullness in ears and dizziness for last 2-3 days.  No fever, chills or rigors.  Cough nonproductive.  Denies shortness of breath.  No chest pain.  No headache, nausea or vomiting.  No visual changes.    The history is provided by the patient.     Review of patient's allergies indicates:   Allergen Reactions    Codeine Hives    Sulfa (sulfonamide antibiotics) Hives    Benzoate analogues Itching     Past Medical History:   Diagnosis Date    Allergies     Anxiety     Arthritis     CHF (congestive heart failure)     Depression     Diverticular disease of large intestine without perforation or abscess     GERD (gastroesophageal reflux disease)     High cholesterol     History of fainting     Hypertension     Shortness of breath     states sometimes gets short winded    SOB (shortness of breath) 2019    Unspecified chronic bronchitis     not chronic episode this year-    Unspecified glaucoma      Past Surgical History:   Procedure Laterality Date    BILATERAL TUBAL LIGATION      BREAST BIOPSY Left     rph    BREAST SURGERY       SECTION      COLONOSCOPY N/A 2017    Procedure: COLONOSCOPY Miralax;  Surgeon: Buddy Butcher Jr., MD;  Location: [facility];  Service: Endoscopy;  Laterality: N/A;    COLONOSCOPY N/A 2022    Procedure: COLONOSCOPY;  Surgeon: TREV Kay MD;  Location: [facility];  Service: Endoscopy;  Laterality: N/A;    COLONOSCOPY N/A 2022    Procedure: COLONOSCOPY;  Surgeon: TREV Kay MD;  Location: [facility];  Service: Endoscopy;  Laterality: N/A;    ESOPHAGOGASTRODUODENOSCOPY N/A 7/3/2023    Procedure: EGD (ESOPHAGOGASTRODUODENOSCOPY);  Surgeon: Mary Cotton MD;  Location: [facility];  Service: Endoscopy;   Laterality: N/A;    EYE SURGERY Bilateral     cataract removal    FLEXIBLE SIGMOIDOSCOPY N/A 07/20/2022    Procedure: SIGMOIDOSCOPY, FLEXIBLE;  Surgeon: TREV Kay MD;  [facility];  Service: Colon and Rectal;  Laterality: N/A;    HYSTERECTOMY      KIDNEY SURGERY Right     done at [facility], reported as mass by patient    LAPAROSCOPIC SIGMOIDECTOMY Left 07/20/2022    Procedure: COLECTOMY, SIGMOID, LAPAROSCOPIC, ERAS low;  Surgeon: TREV Kay MD;  [facility];  Service: Colon and Rectal;  Laterality: Left;    LEFT HEART CATHETERIZATION Left 05/13/2019    Procedure: Left heart cath;  Surgeon: Gerhard Krishnan MD;  Location: [facility] CATH LAB;  Service: Cardiology;  Laterality: Left;    MOBILIZATION OF SPLENIC FLEXURE N/A 07/20/2022    Procedure: MOBILIZATION, SPLENIC FLEXURE;  Surgeon: TREV Kay MD;  [facility];  Service: Colon and Rectal;  Laterality: N/A;    OOPHORECTOMY      s-section       No family history on file.  Social History     Tobacco Use    Smoking status: Never    Smokeless tobacco: Never   Substance Use Topics    Alcohol use: Not Currently     Comment: 12-31-22    Drug use: No     Review of Systems   Constitutional:  Negative for activity change, appetite change and chills.   HENT:  Positive for congestion and rhinorrhea. Negative for ear pain.    Eyes:  Negative for pain and discharge.   Respiratory:  Positive for cough. Negative for shortness of breath and wheezing.    Gastrointestinal:  Negative for abdominal pain, nausea and vomiting.   All other systems reviewed and are negative.      Physical Exam     Initial Vitals [05/25/24 0811]   BP Pulse Resp Temp SpO2   (!) 171/81 82 19 97.8 °F (36.6 °C) 96 %      MAP       --         Physical Exam    Nursing note and vitals reviewed.  Constitutional: She appears well-developed and well-nourished.   HENT:   Head: Normocephalic.   Right Ear: Tympanic membrane is not injected.   Left Ear:  Tympanic membrane is not injected.   Fluid behind tympanic membrane.   Eyes: EOM are normal. Pupils are equal, round, and reactive to light.   Cardiovascular:  Normal rate, regular rhythm, normal heart sounds and intact distal pulses.           Pulmonary/Chest: Breath sounds normal. No respiratory distress. She has no wheezes. She has no rhonchi. She has no rales. She exhibits no tenderness.   Abdominal: Abdomen is soft. Bowel sounds are normal. She exhibits no distension. There is no abdominal tenderness.   Musculoskeletal:         General: Normal range of motion.     Neurological: She is oriented to person, place, and time. She has normal strength. GCS score is 15. GCS eye subscore is 4. GCS verbal subscore is 5. GCS motor subscore is 6.   Skin: Skin is warm. Capillary refill takes less than 2 seconds.         ED Course   Procedures  Labs Reviewed   INFLUENZA A & B BY MOLECULAR   SARS-COV-2 RNA AMPLIFICATION, QUAL   POCT GLUCOSE   POCT GLUCOSE MONITORING CONTINUOUS          Imaging Results    None          Medications   dexAMETHasone injection 4 mg (has no administration in time range)     Medical Decision Making  Differential diagnosis include and not limited to URI, bronchitis, rhinitis, otitis, pharyngitis, allergies.    Minimal fluid behind ear with no redness.  Mild nasal congestion.  Lungs bilateral good air entry and expansion.  No wheezing no hypoxia  Patient has history of allergies and takes Allegra.  Influenza and COVID negative, POCT blood sugar 91.  Symptoms of congestion and bronchitis.  Advised to continue Allegra.  Patient is given Decadron in ED. follow up PCP/ED with any worsening symptoms.      Amount and/or Complexity of Data Reviewed  Labs: ordered. Decision-making details documented in ED Course.     Details: Influenza and COVID negative, blood sugar 91.                                      Clinical Impression:  Final diagnoses:  [J20.9] Acute bronchitis, unspecified organism (Primary)           ED Disposition Condition    Discharge Stable          ED Prescriptions    None       Follow-up Information       Follow up With Specialties Details Why Contact Info    Anurag Lopez MD Family Medicine Schedule an appointment as soon as possible for a visit in 1 week For  re-check [address] [id number] [phone]               Maximo Jay MD  05/25/24 0984

## 2024-06-04 ENCOUNTER — HOSPITAL ENCOUNTER (EMERGENCY)
Facility: HOSPITAL | Age: 64
Discharge: HOME | End: 2024-06-04
Attending: EMERGENCY MEDICINE
Payer: MEDICARE

## 2024-06-04 VITALS
OXYGEN SATURATION: 99 % | SYSTOLIC BLOOD PRESSURE: 144 MMHG | HEART RATE: 84 BPM | BODY MASS INDEX: 28.82 KG/M2 | HEIGHT: 65 IN | DIASTOLIC BLOOD PRESSURE: 74 MMHG | RESPIRATION RATE: 19 BRPM | WEIGHT: 173 LBS | TEMPERATURE: 98 F

## 2024-06-04 DIAGNOSIS — R51.9 FACIAL PAIN: Primary | ICD-10-CM

## 2024-06-04 DIAGNOSIS — S02.2XXA CLOSED FRACTURE OF NASAL BONE, INITIAL ENCOUNTER: ICD-10-CM
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
## 2024-06-04 DIAGNOSIS — S09.90XA INJURY OF HEAD, INITIAL ENCOUNTER: ICD-10-CM

## 2024-06-04 LAB
ANION GAP SERPL CALC-SCNC: 11 MMOL/L (ref 8–16)
BASOPHILS # BLD AUTO: 0.03 K/UL (ref 0–0.2)
BASOPHILS NFR BLD: 0.5 % (ref 0–1.9)
CALCIUM SERPL-MCNC: 9.2 MG/DL (ref 8.7–10.5)
CHLORIDE SERPL-SCNC: 101 MMOL/L (ref 95–110)
CO2 SERPL-SCNC: 28 MMOL/L (ref 23–29)
CREAT SERPL-MCNC: 0.68 MG/DL (ref 0.5–1.4)
DIFFERENTIAL METHOD BLD: ABNORMAL
EOSINOPHIL # BLD AUTO: 0 K/UL (ref 0–0.5)
EOSINOPHIL NFR BLD: 0.5 % (ref 0–8)
ERYTHROCYTE [DISTWIDTH] IN BLOOD BY AUTOMATED COUNT: 12.2 % (ref 11.5–14.5)
EST. GFR  (NO RACE VARIABLE): >60 ML/MIN/1.73 M^2
GLUCOSE SERPL-MCNC: 100 MG/DL (ref 70–110)
HCT VFR BLD AUTO: 41.6 % (ref 37–48.5)
HGB BLD-MCNC: 13.7 G/DL (ref 12–16)
IMM GRANULOCYTES # BLD AUTO: 0.04 K/UL (ref 0–0.04)
IMM GRANULOCYTES NFR BLD AUTO: 0.7 % (ref 0–0.5)
LYMPHOCYTES # BLD AUTO: 2.9 K/UL (ref 1–4.8)
LYMPHOCYTES NFR BLD: 50.1 % (ref 18–48)
MCH RBC QN AUTO: 30 PG (ref 27–31)
MCHC RBC AUTO-ENTMCNC: 32.9 G/DL (ref 32–36)
MCV RBC AUTO: 91 FL (ref 82–98)
MONOCYTES # BLD AUTO: 0.5 K/UL (ref 0.3–1)
MONOCYTES NFR BLD: 9.3 % (ref 4–15)
NEUTROPHILS # BLD AUTO: 2.2 K/UL (ref 1.8–7.7)
NEUTROPHILS NFR BLD: 38.9 % (ref 38–73)
NRBC BLD-RTO: 0 /100 WBC
PLATELET # BLD AUTO: 115 K/UL (ref 150–450)
PMV BLD AUTO: 13 FL (ref 9.2–12.9)
POTASSIUM SERPL-SCNC: 4.1 MMOL/L (ref 3.5–5.1)
RBC # BLD AUTO: 4.57 M/UL (ref 4–5.4)
SODIUM SERPL-SCNC: 140 MMOL/L (ref 136–145)
UUN UR-MCNC: 14 MG/DL (ref 7–17)
WBC # BLD AUTO: 5.71 K/UL (ref 3.9–12.7)

## 2024-06-04 PROCEDURE — 85025 COMPLETE CBC W/AUTO DIFF WBC: CPT | Mod: ER | Performed by: EMERGENCY MEDICINE

## 2024-06-04 PROCEDURE — 99285 EMERGENCY DEPT VISIT HI MDM: CPT | Mod: 25,ER

## 2024-06-04 PROCEDURE — 80048 BASIC METABOLIC PNL TOTAL CA: CPT | Mod: ER | Performed by: EMERGENCY MEDICINE

## 2024-06-04 NOTE — DISCHARGE INSTRUCTIONS
You have a nasal fracture.  Follow up with ENT.  Place an ice pack or a bag of frozen peas wrapped in a towel over the painful part  For 10-15 minutes at a time. Never put ice right on the skin. Prop your head on pillows when sleeping, lying down, or resting. This will help with swelling. Do not blow your nose. Avoid sniffing. Gently pat or dab away any drainage with a clean cloth. Open your mouth if you feel like you are going to sneeze.    Refer to the additional material provided for further precautions.

## 2024-06-04 NOTE — ED PROVIDER NOTES
Chief Complaint: woke up on the floor with a bloody nose    History of Present Illness:    Ashli Kumar 63 y.o. with a  has a past medical history of Allergies, Anxiety, Arthritis, CHF (congestive heart failure), Depression, Diverticular disease of large intestine without perforation or abscess, GERD (gastroesophageal reflux disease), High cholesterol, History of fainting, Hypertension, Shortness of breath, SOB (shortness of breath) (2019), Unspecified chronic bronchitis, and Unspecified glaucoma. who presents to the emergency department today with a complaint of waking up on the floor.  Pt was sleeping on the couch and woke up on the floor.  She had a bloody nose.  She does not know how she fell to the floor, just woke up there.  There is nothing around where she fell that she could have hit herself on other than the floor.  She is not on a blood thinner.  Her bloody nose has resolved. She has pain to the left side of her face by eye and nose. She denies ETOH use, she is not on a blood thinner.         ROS  Otherwise remaining ROS negative     The history is provided by the patient      Reviewed and verified by myself:   PMH/PSH/SOC/FH REVIEWED :    Past Medical History:   Diagnosis Date    Allergies     Anxiety     Arthritis     CHF (congestive heart failure)     Depression     Diverticular disease of large intestine without perforation or abscess     GERD (gastroesophageal reflux disease)     High cholesterol     History of fainting     Hypertension     Shortness of breath     states sometimes gets short winded    SOB (shortness of breath) 2019    Unspecified chronic bronchitis     not chronic episode this year-    Unspecified glaucoma        Past Surgical History:   Procedure Laterality Date    BILATERAL TUBAL LIGATION      BREAST BIOPSY Left     h    BREAST SURGERY       SECTION      COLONOSCOPY N/A 2017    Procedure: COLONOSCOPY Miralax;  Surgeon: Buddy Butcher  MD Heavenly;  Location: [facility] ENDO;  Service: Endoscopy;  Laterality: N/A;    COLONOSCOPY N/A 06/30/2022    Procedure: COLONOSCOPY;  Surgeon: TREV Kay MD;  Location: [facility] ENDO;  Service: Endoscopy;  Laterality: N/A;    COLONOSCOPY N/A 12/29/2022    Procedure: COLONOSCOPY;  Surgeon: TREV Kay MD;  Location: [facility] ENDO;  Service: Endoscopy;  Laterality: N/A;    ESOPHAGOGASTRODUODENOSCOPY N/A 7/3/2023    Procedure: EGD (ESOPHAGOGASTRODUODENOSCOPY);  Surgeon: Mary Cotton MD;  Location: [facility] ENDO;  Service: Endoscopy;  Laterality: N/A;    EYE SURGERY Bilateral     cataract removal    FLEXIBLE SIGMOIDOSCOPY N/A 07/20/2022    Procedure: SIGMOIDOSCOPY, FLEXIBLE;  Surgeon: TREV Kay MD;  Location: [facility] OR 2ND FLR;  Service: Colon and Rectal;  Laterality: N/A;    HYSTERECTOMY      KIDNEY SURGERY Right     done at [facility], reported as mass by patient    LAPAROSCOPIC SIGMOIDECTOMY Left 07/20/2022    Procedure: COLECTOMY, SIGMOID, LAPAROSCOPIC, ERAS low;  Surgeon: TREV Kay MD;  Location: [facility] OR 2ND FLR;  Service: Colon and Rectal;  Laterality: Left;    LEFT HEART CATHETERIZATION Left 05/13/2019    Procedure: Left heart cath;  Surgeon: Gerhard Krishnan MD;  Location: [facility] CATH LAB;  Service: Cardiology;  Laterality: Left;    MOBILIZATION OF SPLENIC FLEXURE N/A 07/20/2022    Procedure: MOBILIZATION, SPLENIC FLEXURE;  Surgeon: TREV Kay MD;  Location: [facility] OR 2ND FLR;  Service: Colon and Rectal;  Laterality: N/A;    OOPHORECTOMY      s-section         Social History     Socioeconomic History    Marital status:    Tobacco Use    Smoking status: Never    Smokeless tobacco: Never   Substance and Sexual Activity    Alcohol use: Not Currently     Comment: 12-31-22    Drug use: No    Sexual activity: Yes     Partners: Male     Social Determinants of Health     Financial Resource Strain: Low Risk  (1/4/2023)    Overall Financial Resource Strain (CARDIA)     Difficulty  of Paying Living Expenses: Not hard at all   Food Insecurity: No Food Insecurity (1/4/2023)    Hunger Vital Sign     Worried About Running Out of Food in the Last Year: Never true     Ran Out of Food in the Last Year: Never true   Transportation Needs: Unmet Transportation Needs (1/4/2023)    PRAPARE - Transportation     Lack of Transportation (Medical): Yes     Lack of Transportation (Non-Medical): Yes   Physical Activity: Inactive (1/4/2023)    Exercise Vital Sign     Days of Exercise per Week: 0 days     Minutes of Exercise per Session: 0 min   Stress: Stress Concern Present (1/4/2023)    [employer] - Occupational Stress Questionnaire     Feeling of Stress : Very much   Housing Stability: High Risk (1/4/2023)    Housing Stability Vital Sign     Unable to Pay for Housing in the Last Year: No     Number of Places Lived in the Last Year: 3     Unstable Housing in the Last Year: No       No family history on file.      ALLERGIES REVIEWED  Review of patient's allergies indicates:   Allergen Reactions    Codeine Hives    Sulfa (sulfonamide antibiotics) Hives    Benzoate analogues Itching       MEDICATIONS REVIEWED  Medication List with Changes/Refills   Current Medications    CYCLOBENZAPRINE (FLEXERIL) 10 MG TABLET    Take 10 mg by mouth once daily.    DIVALPROEX (DEPAKOTE) 500 MG TBEC    Take 1 tablet (500 mg total) by mouth 2 (two) times a day.    ESTRADIOL (ESTRACE) 0.01 % (0.1 MG/GRAM) VAGINAL CREAM    Place 1 g vaginally once daily.    FAMOTIDINE (PEPCID) 20 MG TABLET    Take 1 tablet (20 mg total) by mouth 2 (two) times daily as needed for Heartburn.    FEXOFENADINE (ALLEGRA) 180 MG TABLET    Take 1 tablet (180 mg total) by mouth once daily.    FLUOXETINE 20 MG CAPSULE    Take 1 capsule (20 mg total) by mouth once daily.    FLUTICASONE PROPIONATE (FLONASE) 50 MCG/ACTUATION NASAL SPRAY    2 sprays by Each Nostril route daily as needed.    LACTOBAC NO.41/BIFIDOBACT NO.7 (PROBIOTIC-10  ORAL)    Take 1 capsule by mouth once daily.    LATANOPROST 0.005 % OPHTHALMIC SOLUTION    Place 1 drop into both eyes every evening.    METOPROLOL TARTRATE (LOPRESSOR) 25 MG TABLET    Take 25 mg by mouth 2 (two) times daily.    METOPROLOL TARTRATE (LOPRESSOR) 50 MG TABLET        PNEUMOC 20-SHELLEY CONJ-DIP CR,PF, (PREVNAR-20, PF,) 0.5 ML SYRG INJECTION    Inject into the muscle.    PRAVASTATIN (PRAVACHOL) 20 MG TABLET    Take 1 tablet (20 mg total) by mouth once daily.    PSYLLIUM 0.52 GRAM CAPSULE    Take 0.52 g by mouth once daily.           VS reviewed    Nursing/Ancillary staff note reviewed.       Physical Exam     ED Triage Vitals [06/04/24 0625]   BP (!) 145/79   Pulse 78   Resp 19   Temp 97.9 °F (36.6 °C)   SpO2 97 %       Physical Exam  Vitals and nursing note reviewed.   Constitutional:       General: She is not in acute distress.     Appearance: She is well-developed.   HENT:      Head: Normocephalic.      Jaw: No trismus, tenderness or pain on movement.        Right Ear: External ear normal.      Left Ear: External ear normal.      Nose: Nasal tenderness present.      Right Nostril: No septal hematoma.      Left Nostril: No septal hematoma.      Mouth/Throat:      Mouth: Mucous membranes are moist.   Eyes:      General: No scleral icterus.        Right eye: No discharge.         Left eye: No discharge.      Conjunctiva/sclera: Conjunctivae normal.   Cardiovascular:      Rate and Rhythm: Normal rate.   Pulmonary:      Effort: Pulmonary effort is normal. No respiratory distress.   Abdominal:      General: There is no distension.   Musculoskeletal:         General: Normal range of motion.      Cervical back: Normal range of motion and neck supple.      Comments: Gait normal.    Skin:     General: Skin is warm and dry.   Neurological:      Mental Status: She is alert and oriented to person, place, and time.      Cranial Nerves: No cranial nerve deficit.      Motor: No abnormal muscle tone.               ED  Course         ED Course as of 06/04/24 0849   Tue Jun 04, 2024   0806  CBC unremarkable H&H appropriate, BMP unremarkable [JA]      ED Course User Index  [JA] Kellie Fritz MD                   Medical Decision Making  Problems Addressed:  Closed fracture of nasal bone, initial encounter: complicated acute illness or injury  Facial pain: complicated acute illness or injury  Fall, initial encounter: complicated acute illness or injury  Injury of head, initial encounter: complicated acute illness or injury    Amount and/or Complexity of Data Reviewed  External Data Reviewed: notes.     Details: Pt was seen by her PCP 4/11/24 for followup   Labs: ordered. Decision-making details documented in ED Course.  Radiology: ordered and independent interpretation performed.     Details:   CT scan of the head shows no intracranial hemorrhage   CT scan maxillofacial shows nasal fracture    Risk  OTC drugs.  Prescription drug management.        ED Management:  Differential diagnosis included but not limited to : Scalp contusion, concussion, laceration, skull/facial fracture, countercoup injury, intracranial hemorrhage such as subdural hematoma, subarachnoid hemorrhage or epidural hematoma, cerebral contusion, soft tissue injury, paraspinal or spinal injury      Orders I ordered to further evaluate included:    Orders Placed This Encounter   Procedures    CT Head Without Contrast    CT Maxillofacial Without Contrast    CBC auto differential    Basic metabolic panel    Insert Saline lock IV         Comorbidity impacting this encounter includes:  has a past medical history of Allergies, Anxiety, Arthritis, CHF (congestive heart failure), Depression, Diverticular disease of large intestine without perforation or abscess, GERD (gastroesophageal reflux disease), High cholesterol, History of fainting, Hypertension, Shortness of breath, SOB (shortness of breath) (05/13/2019), Unspecified chronic bronchitis, and Unspecified  glaucoma.      [facility] continued:     Ashli Kumar  presents to the emergency Department today with an acute, complicated problem with  of the fall with facial pain.  She was significant amount of ecchymosis, slight swelling to the nasal bridge.  She has a hematoma to the left  superior orbital rim.  She has no orbital entrapment.  She was no septal hematoma.  A CT scan of the head although she has a GCS 15 was ordered given her injury above the clavicles.  CT scan of maxillofacial ordered given her physical exam.  Her workup today shows that she has bilateral nasal fractures.  She was no septal hematoma.  Otherwise unremarkable scans.  Fortunately no intracranial hemorrhage.   Her hemoglobin hematocrit is appropriate.  Labs are unremarkable. I have discussed the findings with the patient she is relieved.  She will follow up with ENT.  We discussed nasal fracture precautions and they were given in written form.  The patient was comfortable going home at this time.  She can use ibuprofen and Tylenol as needed for pain.         The pt is comfortable with this plan and comfortable going home at this time. After taking into careful account the historical factors and physical exam findings of the patient's presentation today, in conjunction with the empirical and objective data obtained on ED workup, no acute emergent medical condition requiring admission has been identified. The patient appears to be low risk for an emergent medical condition and I feel it is safe and appropriate at this time for the patient to be discharged to follow-up as detailed in their discharge instructions for reevaluation and possible continued outpatient workup and management. Regardless, an unremarkable evaluation in the ED does not preclude the development or presence of a serious or life threatening condition. As such, patient was instructed to return immediately for any worsening or change in current symptoms. Precautions for return  discussed at length.  Discharge and follow-up instructions discussed with the patient who expressed understanding and willingness to comply with my recommendations.    Voice recognition software utilized in this note.         Impression      The primary encounter diagnosis was Facial pain. Diagnoses of Fall, initial encounter, Injury of head, initial encounter, and Closed fracture of nasal bone, initial encounter were also pertinent to this visit.        New Prescriptions    No medications on file        Follow-up Information       Schedule an appointment as soon as possible for a visit  with [facility].    Specialty: Otolaryngology  Contact information:  [address] [facility] [facility] [id number] [phone]             Schedule an appointment as soon as possible for a visit  with [facility].    Specialty: Otolaryngology  Contact information:  [address] [facility] [address]  [facility] [address] [phone]  Additional information:  [address]                                       Kellie Fritz MD  06/04/24 0859

## 2024-06-11 ENCOUNTER — OFFICE VISIT (OUTPATIENT)
Dept: FAMILY MEDICINE | Facility: HOSPITAL | Age: 64
End: 2024-06-11
Payer: MEDICARE

## 2024-06-11 VITALS
SYSTOLIC BLOOD PRESSURE: 121 MMHG | HEIGHT: 65 IN | WEIGHT: 169.75 LBS | BODY MASS INDEX: 28.28 KG/M2 | DIASTOLIC BLOOD PRESSURE: 73 MMHG | HEART RATE: 67 BPM

## 2024-06-11 DIAGNOSIS — S06.0XAS CONCUSSION WITH UNKNOWN LOSS OF CONSCIOUSNESS STATUS, SEQUELA: ICD-10-CM

## 2024-06-11 DIAGNOSIS — S02.2XXS CLOSED FRACTURE OF NASAL BONE, SEQUELA: Primary | ICD-10-CM

## 2024-06-11 PROBLEM — M20.40 HAMMER TOE: Status: ACTIVE | Noted: 2024-06-11

## 2024-06-11 PROBLEM — M79.673 HEEL PAIN: Status: ACTIVE | Noted: 2024-06-11

## 2024-06-11 PROCEDURE — 99214 OFFICE O/P EST MOD 30 MIN: CPT

## 2024-06-11 RX ORDER — MINERAL OIL
180 ENEMA (ML) RECTAL DAILY PRN
COMMUNITY
Start: 2024-04-11

## 2024-06-11 NOTE — PROGRESS NOTES
[facility]    Subjective:     Ashli Kumar is a 63 y.o. year old female with PMHx of Bipolar, HFpEF, and GERD who presents to clinic for:     Nose fracture:  Patient reported to the ED after falling on the floor from the sofa   She was found to have bilateral nasal fracture on CT  Ibuprofen helps with pain and states swelling is improving  (+): LH (drinks cold water and rests and this helps) ,    No trouble breathing through nose, no nosebleeds  Patient is still slightly tender on nasal bridge    Headaches:  Worse since the fall, feels it is different from her usual sinus headaches  Sensitive to light  Feels pressure on forehead   Headaches occurred daily, advil helps with them    Patient Active Problem List    Diagnosis Date Noted    Allergic rhinitis 04/12/2024    Hyperlipidemia 04/12/2024    Decreased range of motion of both knees 06/22/2023    Impaired strength of lower extremity 06/22/2023    Osteoarthritis 06/20/2023    Gastroesophageal reflux disease 05/12/2023    Irregular bowel habits 05/12/2023    Posture imbalance 03/14/2023    Decreased range of motion of left shoulder 03/14/2023    Weakness of shoulder 03/14/2023    Mental health problem 01/02/2023    Diverticular stricture 07/20/2022    Segmental colitis associated with diverticulosis 06/14/2022    Lower abdominal pain 05/29/2022    Rectal bleeding 05/27/2022    Bipolar affective disorder, currently depressed, moderate 05/27/2022    Chronic heart failure with preserved ejection fraction 05/13/2019    Obesity due to excess calories 05/13/2019    Angina, class III 05/01/2019    Screening for colorectal cancer 05/12/2017        Review of patient's allergies indicates:   Allergen Reactions    Codeine Hives    Sulfa (sulfonamide antibiotics) Hives    Benzoate analogues Itching        Past Medical History:   Diagnosis Date    Allergies     Anxiety     Arthritis     CHF (congestive heart failure)     Depression     Diverticular disease of  large intestine without perforation or abscess     GERD (gastroesophageal reflux disease)     High cholesterol     History of fainting     Hypertension     Shortness of breath     states sometimes gets short winded    SOB (shortness of breath) 2019    Unspecified chronic bronchitis     not chronic episode this year-    Unspecified glaucoma       Past Surgical History:   Procedure Laterality Date    BILATERAL TUBAL LIGATION      BREAST BIOPSY Left     h    BREAST SURGERY       SECTION      COLONOSCOPY N/A 2017    Procedure: COLONOSCOPY Miralax;  Surgeon: Buddy Butcher Jr., MD;  Location: [facility] ENDO;  Service: Endoscopy;  Laterality: N/A;    COLONOSCOPY N/A 2022    Procedure: COLONOSCOPY;  Surgeon: TREV Kay MD;  Location: [facility] ENDO;  Service: Endoscopy;  Laterality: N/A;    COLONOSCOPY N/A 2022    Procedure: COLONOSCOPY;  Surgeon: TREV Kay MD;  Location: [facility];  Service: Endoscopy;  Laterality: N/A;    ESOPHAGOGASTRODUODENOSCOPY N/A 7/3/2023    Procedure: EGD (ESOPHAGOGASTRODUODENOSCOPY);  Surgeon: Mary Cotton MD;  Location: [facility];  Service: Endoscopy;  Laterality: N/A;    EYE SURGERY Bilateral     cataract removal    FLEXIBLE SIGMOIDOSCOPY N/A 2022    Procedure: SIGMOIDOSCOPY, FLEXIBLE;  Surgeon: TREV Kay MD;  Location: [facility] OR [address] [place] [address];  Service: Colon and Rectal;  Laterality: N/A;    HYSTERECTOMY      KIDNEY SURGERY Right     done at [facility], reported as mass by patient    LAPAROSCOPIC SIGMOIDECTOMY Left 2022    Procedure: COLECTOMY, SIGMOID, LAPAROSCOPIC, ERAS low;  Surgeon: TREV Kay MD;  Location: [facility] OR [address] [place] [address];  Service: Colon and Rectal;  Laterality: Left;    LEFT HEART CATHETERIZATION Left 2019    Procedure: Left heart cath;  Surgeon: Gerhard Krishnan MD;  Location: [facility] CATH LAB;  Service: Cardiology;  Laterality: Left;    MOBILIZATION OF SPLENIC FLEXURE N/A 2022    Procedure:  MOBILIZATION, SPLENIC FLEXURE;  Surgeon: TREV Kay MD;  Location: [facility] OR [address] [place] [address];  Service: Colon and Rectal;  Laterality: N/A;    OOPHORECTOMY      s-section        No family history on file.   Social History     Tobacco Use    Smoking status: Never    Smokeless tobacco: Never   Substance Use Topics    Alcohol use: Not Currently     Comment: 12-31-22      Review of Systems   Constitutional:  Negative for chills and fever.   HENT:  Negative for nosebleeds.    Eyes:  Negative for blurred vision.   Respiratory:  Negative for shortness of breath.    Cardiovascular:  Negative for chest pain.   Gastrointestinal:  Negative for nausea and vomiting.     Objective:     There were no vitals filed for this visit.  Physical Exam  Constitutional:       General: She is not in acute distress.     Appearance: She is not toxic-appearing.   HENT:      Head: Normocephalic.      Comments: Bruising underneath bilateral eyes      Right Ear: External ear normal.      Left Ear: External ear normal.      Nose: Nose normal.   Eyes:      Extraocular Movements: Extraocular movements intact.   Cardiovascular:      Heart sounds: Normal heart sounds.   Pulmonary:      Effort: Pulmonary effort is normal.      Breath sounds: Normal breath sounds.   Neurological:      General: No focal deficit present.      Mental Status: She is alert and oriented to person, place, and time.      Cranial Nerves: No cranial nerve deficit.      Sensory: No sensory deficit.      Motor: No weakness.      Coordination: Coordination normal.   Psychiatric:         Mood and Affect: Mood normal.       Assessment/Plan:     Ashli Kumar is a 63 y.o. year old female who presents to clinic for     1. Closed fracture of nasal bone, sequela  Patient with nasal fracture after falling from sofa and landing on the floor.  Overall fracture appears improving with patient reported improved swelling and pain controlled with Advil.  No acutely concerning signs seen  on examination and neurological examination unremarkable.  Plan:  We will refer to ENT for further evaluation  Patient given return precautions and ED precautions should her symptoms worsen    - Ambulatory referral/consult to ENT; Future    2. Concussion with unknown loss of consciousness status, sequela  Patient's symptoms seem most consistent with acute concussion following her fall.  Neurological examination unremarkable.  Advise patient to have brain rest and we will re-evaluate in future.      ________________________  Chadd Cuevsa Jr, MD  [facility] PGY-1      This note was partially created using [employer] Voice Recognition software. Typographical and content errors may occur with this process. While efforts are made to detect and correct such errors, in some cases errors will persist. For this reason, wording in this document should be considered in the proper context and not strictly verbatim.

## 2024-06-18 NOTE — PROGRESS NOTES
I assume primary medical responsibility for this patient. I have reviewed the case history, findings, diagnosis and treatment plan with the resident and agree that the care is reasonable and necessary. This service has been performed by a resident without the presence of a teaching physician under the primary care exception.  See below addendum for my evaluation and additional findings:    Reviewed CT of face and head with bilateral nasal bone fractures and no intracranial findings.  Agree her Sx are c/w concussion and recommend close follow-up.

## 2024-06-27 ENCOUNTER — OFFICE VISIT (OUTPATIENT)
Dept: FAMILY MEDICINE | Facility: HOSPITAL | Age: 64
End: 2024-06-27
Payer: MEDICARE

## 2024-06-27 VITALS — BODY MASS INDEX: 27.56 KG/M2 | HEIGHT: 65 IN | WEIGHT: 165.38 LBS

## 2024-06-27 DIAGNOSIS — R10.2 PELVIC PAIN: Primary | ICD-10-CM

## 2024-06-27 PROCEDURE — 99213 OFFICE O/P EST LOW 20 MIN: CPT

## 2024-06-27 NOTE — PROGRESS NOTES
[facility]    Subjective:     Ashli Kumar is a 63 y.o. year old female with PMHx of  Bipolar, HFpEF, and GERD  who presents to clinic for     Pelvic pain:  - Patient endorses discoloring of urine (dark yellow)   - Started couple days ago  - pressure in pelvis  - no dyuria or hematuria   - still sexually active with , no new partners  - aching, intermittent pain  - drinks about 1.5L water per day      Patient Active Problem List    Diagnosis Date Noted    Hammer toe 06/11/2024    Heel pain 06/11/2024    Allergic rhinitis 04/12/2024    Hyperlipidemia 04/12/2024    Decreased range of motion of both knees 06/22/2023    Impaired strength of lower extremity 06/22/2023    Osteoarthritis 06/20/2023    Gastroesophageal reflux disease 05/12/2023    Irregular bowel habits 05/12/2023    Posture imbalance 03/14/2023    Decreased range of motion of left shoulder 03/14/2023    Weakness of shoulder 03/14/2023    Mental health problem 01/02/2023    Diverticular stricture 07/20/2022    Segmental colitis associated with diverticulosis 06/14/2022    Lower abdominal pain 05/29/2022    Rectal bleeding 05/27/2022    Bipolar affective disorder, currently depressed, moderate 05/27/2022    Chronic heart failure with preserved ejection fraction 05/13/2019    Obesity due to excess calories 05/13/2019    Angina, class III 05/01/2019    Screening for colorectal cancer 05/12/2017        Review of patient's allergies indicates:   Allergen Reactions    Codeine Hives    Sulfa (sulfonamide antibiotics) Hives    Benzoate analogues Itching        Past Medical History:   Diagnosis Date    Allergies     Anxiety     Arthritis     CHF (congestive heart failure)     Depression     Diverticular disease of large intestine without perforation or abscess     GERD (gastroesophageal reflux disease)     High cholesterol     History of fainting     Hypertension     Shortness of breath     states sometimes gets short winded    SOB  (shortness of breath) 2019    Unspecified chronic bronchitis     not chronic episode this year-    Unspecified glaucoma       Past Surgical History:   Procedure Laterality Date    BILATERAL TUBAL LIGATION      BREAST BIOPSY Left     [facility]    BREAST SURGERY       SECTION      COLONOSCOPY N/A 2017    Procedure: COLONOSCOPY Miralax;  Surgeon: Buddy Butcher Jr., MD;  Location: [facility] ENDO;  Service: Endoscopy;  Laterality: N/A;    COLONOSCOPY N/A 2022    Procedure: COLONOSCOPY;  Surgeon: TREV Kay MD;  Location: [facility] ENDO;  Service: Endoscopy;  Laterality: N/A;    COLONOSCOPY N/A 2022    Procedure: COLONOSCOPY;  Surgeon: TREV Kay MD;  Location: [facility] ENDO;  Service: Endoscopy;  Laterality: N/A;    ESOPHAGOGASTRODUODENOSCOPY N/A 7/3/2023    Procedure: EGD (ESOPHAGOGASTRODUODENOSCOPY);  Surgeon: Mary Cotton MD;  Location: [facility];  Service: Endoscopy;  Laterality: N/A;    EYE SURGERY Bilateral     cataract removal    FLEXIBLE SIGMOIDOSCOPY N/A 2022    Procedure: SIGMOIDOSCOPY, FLEXIBLE;  Surgeon: TREV aKy MD;  [facility];  Service: Colon and Rectal;  Laterality: N/A;    HYSTERECTOMY      KIDNEY SURGERY Right     done at [facility], reported as mass by patient    LAPAROSCOPIC SIGMOIDECTOMY Left 2022    Procedure: COLECTOMY, SIGMOID, LAPAROSCOPIC, ERAS low;  Surgeon: TREV Kay MD;  Location: [facility] OR [facility] FLR;  Service: Colon and Rectal;  Laterality: Left;    LEFT HEART CATHETERIZATION Left 2019    Procedure: Left heart cath;  Surgeon: Gerhard Krishnan MD;  Location: [facility] CATH LAB;  Service: Cardiology;  Laterality: Left;    MOBILIZATION OF SPLENIC FLEXURE N/A 2022    Procedure: MOBILIZATION, SPLENIC FLEXURE;  Surgeon: TREV Kay MD;  Location: [facility] OR 2ND FLR;  Service: Colon and Rectal;  Laterality: N/A;    OOPHORECTOMY      s-section        No family history on file.   Social History  "    Tobacco Use    Smoking status: Never    Smokeless tobacco: Never   Substance Use Topics    Alcohol use: Not Currently     Comment: 12-31-22      Review of Systems   Constitutional:  Negative for chills and fever.   Respiratory:  Negative for shortness of breath.    Cardiovascular:  Negative for chest pain.     Objective:     Vitals:    06/27/24 1337   Weight: 75 kg (165 lb 5.5 oz)   Height: 5' 5" (1.651 m)     Physical Exam  Constitutional:       General: She is not in acute distress.     Appearance: She is not toxic-appearing.   HENT:      Head: Normocephalic.      Right Ear: External ear normal.      Left Ear: External ear normal.   Eyes:      Comments: Bruising improving   Cardiovascular:      Pulses: Normal pulses.      Heart sounds: Normal heart sounds.   Pulmonary:      Effort: No respiratory distress.      Breath sounds: Normal breath sounds. No wheezing or rales.   Abdominal:      Palpations: Abdomen is soft.      Tenderness: There is no abdominal tenderness. There is no guarding.   Musculoskeletal:         General: Normal range of motion.   Skin:     General: Skin is warm and dry.   Neurological:      Mental Status: She is alert.       Assessment/Plan:     Ashli Kumar is a 63 y.o. year old female who presents to clinic for     1. Pelvic pain  Unclear etiology, possibly secondary to Nephrolithiasis or UTI  Will get urinalysis and urine culture for initial evaluation  If symptoms persist, can escalate with imaging and/or further testing.    - Urinalysis; Future  - Urine culture; Future       ________________________  Chadd Cuevas Jr, MD  [facility] PGY-2            "

## 2024-07-03 ENCOUNTER — OFFICE VISIT (OUTPATIENT)
Dept: FAMILY MEDICINE | Facility: HOSPITAL | Age: 64
End: 2024-07-03
Payer: MEDICARE

## 2024-07-03 VITALS
BODY MASS INDEX: 27.51 KG/M2 | HEIGHT: 65 IN | SYSTOLIC BLOOD PRESSURE: 126 MMHG | DIASTOLIC BLOOD PRESSURE: 69 MMHG | WEIGHT: 165.13 LBS | HEART RATE: 72 BPM

## 2024-07-03 DIAGNOSIS — F43.0 ACUTE STRESS REACTION: ICD-10-CM

## 2024-07-03 DIAGNOSIS — S06.0XAS CONCUSSION WITH UNKNOWN LOSS OF CONSCIOUSNESS STATUS, SEQUELA: Primary | ICD-10-CM

## 2024-07-03 DIAGNOSIS — F41.9 ANXIETY: ICD-10-CM

## 2024-07-03 DIAGNOSIS — R25.1 OCCASIONAL TREMORS: ICD-10-CM

## 2024-07-03 DIAGNOSIS — H53.9 VISION CHANGES: ICD-10-CM

## 2024-07-03 DIAGNOSIS — Z59.82 TRANSPORTATION INSECURITY: ICD-10-CM

## 2024-07-03 DIAGNOSIS — Z59.819 HOUSING INSECURITY: ICD-10-CM

## 2024-07-03 PROCEDURE — 99215 OFFICE O/P EST HI 40 MIN: CPT

## 2024-07-03 SDOH — SOCIAL DETERMINANTS OF HEALTH (SDOH): HOUSING INSTABILITY UNSPECIFIED: Z59.819

## 2024-07-03 SDOH — SOCIAL DETERMINANTS OF HEALTH (SDOH): TRANSPORTATION INSECURITY: Z59.82

## 2024-07-03 NOTE — PROGRESS NOTES
Clinic Note  [facility]    Subjective:     Ashli Kumar is a 64 y.o. year old who presents to clinic today for followup.    Fall: Patient had fall . Since then, she is reporting headaches localized to the left side, vision loss in left eye, memory loss, increased dizziness with sudden sitting to standing position. She has also noticed a tremor with certain activities but gives inconsistent history about whether this began before or after the TBI.    Is also reporting increased stress due to 's upcoming surgery, housing and transportation insecurities.    Review of Systems negative except for symptoms mentioned in HPI      Health Maintenance         Date Due Completion Date    Pneumococcal Vaccines (Age 65+) (1 of 2 - PCV) Never done ---    TETANUS VACCINE Never done ---    High Dose Statin Never done ---    RSV Vaccine (Age 60+ and Pregnant patients) (1 - 1-dose 60+ series) Never done ---    COVID-19 Vaccine ( - -24 season) Never done ---    Influenza Vaccine (1) 2024 ---    Mammogram 2024    DEXA Scan 2026    Hemoglobin A1c (Diabetic Prevention Screening) 2027 3/21/2024    Lipid Panel 2029 3/21/2024    Colorectal Cancer Screening 2032            Past Medical History:   Diagnosis Date    Allergies     Anxiety     Arthritis     CHF (congestive heart failure)     Depression     Diverticular disease of large intestine without perforation or abscess     GERD (gastroesophageal reflux disease)     High cholesterol     History of fainting     Hypertension     Shortness of breath     states sometimes gets short winded    SOB (shortness of breath) 2019    Unspecified chronic bronchitis     not chronic episode this year-    Unspecified glaucoma        Past Surgical History:   Procedure Laterality Date    BILATERAL TUBAL LIGATION      BREAST BIOPSY Left     [facility]    BREAST SURGERY       SECTION       COLONOSCOPY N/A 05/12/2017    Procedure: COLONOSCOPY Miralax;  Surgeon: Buddy Butcher Jr., MD;  Location: [facility] ENDO;  Service: Endoscopy;  Laterality: N/A;    COLONOSCOPY N/A 06/30/2022    Procedure: COLONOSCOPY;  Surgeon: TREV Kay MD;  Location: [facility] ENDO;  Service: Endoscopy;  Laterality: N/A;    COLONOSCOPY N/A 12/29/2022    Procedure: COLONOSCOPY;  Surgeon: TREV Kay MD;  Location: [facility] ENDO;  Service: Endoscopy;  Laterality: N/A;    ESOPHAGOGASTRODUODENOSCOPY N/A 7/3/2023    Procedure: EGD (ESOPHAGOGASTRODUODENOSCOPY);  Surgeon: Mary Cotton MD;  Location: [facility];  Service: Endoscopy;  Laterality: N/A;    EYE SURGERY Bilateral     cataract removal    FLEXIBLE SIGMOIDOSCOPY N/A 07/20/2022    Procedure: SIGMOIDOSCOPY, FLEXIBLE;  Surgeon: TREV Kay MD;  [facility];  Service: Colon and Rectal;  Laterality: N/A;    HYSTERECTOMY      KIDNEY SURGERY Right     done at [facility], reported as mass by patient    LAPAROSCOPIC SIGMOIDECTOMY Left 07/20/2022    Procedure: COLECTOMY, SIGMOID, LAPAROSCOPIC, ERAS low;  Surgeon: TREV Kay MD;  [facility];  Service: Colon and Rectal;  Laterality: Left;    LEFT HEART CATHETERIZATION Left 05/13/2019    Procedure: Left heart cath;  Surgeon: Gerhard Krishnan MD;  Location: [facility] CATH LAB;  Service: Cardiology;  Laterality: Left;    MOBILIZATION OF SPLENIC FLEXURE N/A 07/20/2022    Procedure: MOBILIZATION, SPLENIC FLEXURE;  Surgeon: TREV Kay MD;  [facility];  Service: Colon and Rectal;  Laterality: N/A;    OOPHORECTOMY      s-section         No family history on file.    Social History     Socioeconomic History    Marital status:    Tobacco Use    Smoking status: Never    Smokeless tobacco: Never   Substance and Sexual Activity    Alcohol use: Not Currently     Comment: 12-31-22    Drug use: No    Sexual activity: Yes     Partners: Male     Social Determinants of  Health     Financial Resource Strain: Low Risk  (1/4/2023)    Overall Financial Resource Strain (CARDIA)     Difficulty of Paying Living Expenses: Not hard at all   Food Insecurity: No Food Insecurity (1/4/2023)    Hunger Vital Sign     Worried About Running Out of Food in the Last Year: Never true     Ran Out of Food in the Last Year: Never true   Transportation Needs: Unmet Transportation Needs (1/4/2023)    PRAPARE - Transportation     Lack of Transportation (Medical): Yes     Lack of Transportation (Non-Medical): Yes   Physical Activity: Inactive (1/4/2023)    Exercise Vital Sign     Days of Exercise per Week: 0 days     Minutes of Exercise per Session: 0 min   Stress: Stress Concern Present (1/4/2023)    [employer] - Occupational Stress Questionnaire     Feeling of Stress : Very much   Housing Stability: High Risk (1/4/2023)    Housing Stability Vital Sign     Unable to Pay for Housing in the Last Year: No     Number of Places Lived in the Last Year: 3     Unstable Housing in the Last Year: No       Current Outpatient Medications   Medication Sig Dispense Refill    divalproex (DEPAKOTE) 500 MG TbEC Take 1 tablet (500 mg total) by mouth 2 (two) times a day. 60 tablet 2    estradioL (ESTRACE) 0.01 % (0.1 mg/gram) vaginal cream Place 1 g vaginally once daily. 42.5 g 3    famotidine (PEPCID) 20 MG tablet Take 1 tablet (20 mg total) by mouth 2 (two) times daily as needed for Heartburn. 60 tablet 11    fexofenadine (ALLEGRA) 180 MG tablet Take 1 tablet (180 mg total) by mouth once daily. 30 tablet 11    fexofenadine (ALLEGRA) 180 MG tablet Take 180 mg by mouth daily as needed.      FLUoxetine 20 MG capsule Take 1 capsule (20 mg total) by mouth once daily. 30 capsule 2    fluticasone propionate (FLONASE) 50 mcg/actuation nasal spray 2 sprays by Each Nostril route daily as needed.      Lactobac no.41/Bifidobact no.7 (PROBIOTIC-10 ORAL) Take 1 capsule by mouth once daily.      latanoprost  0.005 % ophthalmic solution Place 1 drop into both eyes every evening.      pravastatin (PRAVACHOL) 20 MG tablet Take 1 tablet (20 mg total) by mouth once daily. 30 tablet 11    cyclobenzaprine (FLEXERIL) 10 MG tablet Take 10 mg by mouth once daily. (Patient not taking: Reported on 7/3/2024)      metoprolol tartrate (LOPRESSOR) 25 MG tablet Take 25 mg by mouth 2 (two) times daily.      metoprolol tartrate (LOPRESSOR) 50 MG tablet  (Patient not taking: Reported on 4/11/2024)      pneumoc 20-yash conj-dip cr,PF, (PREVNAR-20, PF,) 0.5 mL Syrg injection Inject into the muscle. (Patient not taking: Reported on 6/11/2024) 0.5 mL 0    psyllium 0.52 gram capsule Take 0.52 g by mouth once daily. (Patient not taking: Reported on 4/11/2024)       No current facility-administered medications for this visit.       Review of patient's allergies indicates:   Allergen Reactions    Codeine Hives    Sulfa (sulfonamide antibiotics) Hives    Benzoate analogues Itching         Objective:     Vitals:    07/03/24 0910   BP: 126/69   Pulse: 72       Wt Readings from Last 1 Encounters:   07/03/24 74.9 kg (165 lb 2 oz)       Physical Exam  Constitutional:       General: She is not in acute distress.  HENT:      Head: Normocephalic and atraumatic.   Eyes:      General:         Right eye: No discharge.         Left eye: No discharge.      Extraocular Movements: Extraocular movements intact.      Conjunctiva/sclera: Conjunctivae normal.      Comments: Decreased field of vision on the top left quadrant   Cardiovascular:      Rate and Rhythm: Normal rate.      Pulses: Normal pulses.   Pulmonary:      Effort: Pulmonary effort is normal. No respiratory distress.   Musculoskeletal:      Right lower leg: No edema.      Left lower leg: No edema.   Skin:     General: Skin is warm.      Coloration: Skin is not jaundiced.      Comments: Facial injuries have healed - no obvious signs of trauma   Neurological:      Mental Status: She is alert and oriented  to person, place, and time.      Gait: Gait normal.      Comments: Motor 5/5 across all extremities  No disdiadochokinesia  Slow gait   Psychiatric:         Mood and Affect: Mood normal.         Behavior: Behavior normal.         Assessment/Plan:     Ashli was seen today for follow-up and urinary tract infection.    Diagnoses and all orders for this visit:    Concussion with unknown loss of consciousness status, sequela  Patient with sequela following concussion a month ago. Explained to patient that symptoms could persist for an extended period of time but reviewed ER precautions.    Vision changes  -     Ambulatory referral/consult to Ophthalmology; Future    Anxiety  -     Ambulatory referral/consult to Psychology; Future    Housing insecurity  Transportation insecurity  Acute Stress Reaction  Patient exhibiting physical signs of stress from triggers listed above. Would benefit from Social Work evaluation to target these areas.  -     Ambulatory referral/consult to Social Work; Future    Occasional tremors  Could be a component of stress, could be more pathological. Referred to neurology. In the meantime, will attempt to address the social aspect.  -     Ambulatory referral/consult to Neurology; Future      Follow up in about 2 months with PCP.    Case discussed with staff: Dr. Rito Lal MD PGY-1  \[facility]\"" Family Medicine   07/06/2024 8:32 AM

## 2024-07-10 ENCOUNTER — LAB VISIT (OUTPATIENT)
Dept: LAB | Facility: HOSPITAL | Age: 64
End: 2024-07-10
Payer: MEDICARE

## 2024-07-10 DIAGNOSIS — Z79.899 OTHER LONG TERM (CURRENT) DRUG THERAPY: Primary | ICD-10-CM

## 2024-07-10 LAB
ALBUMIN SERPL BCP-MCNC: 4.2 G/DL (ref 3.5–5.2)
ALP SERPL-CCNC: 46 U/L (ref 38–126)
ALT SERPL W/O P-5'-P-CCNC: 12 U/L (ref 10–44)
ANION GAP SERPL CALC-SCNC: 12 MMOL/L (ref 8–16)
AST SERPL-CCNC: 21 U/L (ref 15–46)
BASOPHILS # BLD AUTO: 0.03 K/UL (ref 0–0.2)
BASOPHILS NFR BLD: 0.5 % (ref 0–1.9)
BILIRUB SERPL-MCNC: 0.7 MG/DL (ref 0.1–1)
CALCIUM SERPL-MCNC: 9.5 MG/DL (ref 8.7–10.5)
CHLORIDE SERPL-SCNC: 98 MMOL/L (ref 95–110)
CHOLEST SERPL-MCNC: 192 MG/DL (ref 120–199)
CHOLEST/HDLC SERPL: 3 {RATIO} (ref 2–5)
CO2 SERPL-SCNC: 30 MMOL/L (ref 23–29)
CREAT SERPL-MCNC: 0.74 MG/DL (ref 0.5–1.4)
DIFFERENTIAL METHOD BLD: ABNORMAL
EOSINOPHIL # BLD AUTO: 0 K/UL (ref 0–0.5)
EOSINOPHIL NFR BLD: 0.5 % (ref 0–8)
ERYTHROCYTE [DISTWIDTH] IN BLOOD BY AUTOMATED COUNT: 13.1 % (ref 11.5–14.5)
EST. GFR  (NO RACE VARIABLE): >60 ML/MIN/1.73 M^2
ESTIMATED AVG GLUCOSE: 108 MG/DL (ref 68–131)
GLUCOSE SERPL-MCNC: 98 MG/DL (ref 70–110)
HBA1C MFR BLD: 5.4 % (ref 4–5.6)
HCT VFR BLD AUTO: 39 % (ref 37–48.5)
HDLC SERPL-MCNC: 65 MG/DL (ref 40–75)
HDLC SERPL: 33.9 % (ref 20–50)
HGB BLD-MCNC: 13 G/DL (ref 12–16)
IMM GRANULOCYTES # BLD AUTO: 0.02 K/UL (ref 0–0.04)
IMM GRANULOCYTES NFR BLD AUTO: 0.4 % (ref 0–0.5)
LDLC SERPL CALC-MCNC: 104.4 MG/DL (ref 63–159)
LYMPHOCYTES # BLD AUTO: 2.3 K/UL (ref 1–4.8)
LYMPHOCYTES NFR BLD: 40.5 % (ref 18–48)
MCH RBC QN AUTO: 30.4 PG (ref 27–31)
MCHC RBC AUTO-ENTMCNC: 33.3 G/DL (ref 32–36)
MCV RBC AUTO: 91 FL (ref 82–98)
MONOCYTES # BLD AUTO: 0.5 K/UL (ref 0.3–1)
MONOCYTES NFR BLD: 8 % (ref 4–15)
NEUTROPHILS # BLD AUTO: 2.8 K/UL (ref 1.8–7.7)
NEUTROPHILS NFR BLD: 50.1 % (ref 38–73)
NONHDLC SERPL-MCNC: 127 MG/DL
NRBC BLD-RTO: 0 /100 WBC
PLATELET # BLD AUTO: 128 K/UL (ref 150–450)
PMV BLD AUTO: 12.9 FL (ref 9.2–12.9)
POTASSIUM SERPL-SCNC: 4.2 MMOL/L (ref 3.5–5.1)
PROT SERPL-MCNC: 7 G/DL (ref 6–8.4)
RBC # BLD AUTO: 4.28 M/UL (ref 4–5.4)
SODIUM SERPL-SCNC: 140 MMOL/L (ref 136–145)
T4 FREE SERPL-MCNC: 0.84 NG/DL (ref 0.71–1.51)
TRIGL SERPL-MCNC: 113 MG/DL (ref 30–150)
TSH SERPL DL<=0.005 MIU/L-ACNC: 2.74 UIU/ML (ref 0.4–4)
UUN UR-MCNC: 12 MG/DL (ref 7–17)
VALPROATE SERPL-MCNC: 43.2 UG/ML (ref 50–100)
WBC # BLD AUTO: 5.63 K/UL (ref 3.9–12.7)

## 2024-07-10 PROCEDURE — 84439 ASSAY OF FREE THYROXINE: CPT

## 2024-07-10 PROCEDURE — 80053 COMPREHEN METABOLIC PANEL: CPT | Mod: PN

## 2024-07-10 PROCEDURE — 83036 HEMOGLOBIN GLYCOSYLATED A1C: CPT

## 2024-07-10 PROCEDURE — 84443 ASSAY THYROID STIM HORMONE: CPT | Mod: PN

## 2024-07-10 PROCEDURE — 36415 COLL VENOUS BLD VENIPUNCTURE: CPT | Mod: PN

## 2024-07-10 PROCEDURE — 80164 ASSAY DIPROPYLACETIC ACD TOT: CPT | Mod: PN

## 2024-07-10 PROCEDURE — 85025 COMPLETE CBC W/AUTO DIFF WBC: CPT | Mod: PN

## 2024-07-10 PROCEDURE — 80061 LIPID PANEL: CPT

## 2024-07-11 NOTE — PROGRESS NOTES
I assume primary medical responsibility for this patient. I have reviewed the history, physical, and assessement & treatment plan with the resident and agree that the care is reasonable and necessary. This service has been performed by a resident without the presence of a teaching physician under the primary care exception. If necessary, an addendum of additional findings or evaluation beyond the resident documentation will be noted below.    Agree with initial plan for work up of pelvic etiology. Unfortunately complete vital signs were never loaded into the chart. Needs close follow up for more date. Previously controlled BP.

## 2024-08-08 ENCOUNTER — OFFICE VISIT (OUTPATIENT)
Dept: FAMILY MEDICINE | Facility: HOSPITAL | Age: 64
End: 2024-08-08
Payer: MEDICARE

## 2024-08-08 VITALS
HEART RATE: 92 BPM | WEIGHT: 156.5 LBS | OXYGEN SATURATION: 98 % | SYSTOLIC BLOOD PRESSURE: 131 MMHG | HEIGHT: 65 IN | DIASTOLIC BLOOD PRESSURE: 84 MMHG | BODY MASS INDEX: 26.08 KG/M2

## 2024-08-08 DIAGNOSIS — U07.1 COVID-19: ICD-10-CM

## 2024-08-08 DIAGNOSIS — R05.9 COUGH, UNSPECIFIED TYPE: Primary | ICD-10-CM

## 2024-08-08 PROCEDURE — 99214 OFFICE O/P EST MOD 30 MIN: CPT

## 2024-08-12 ENCOUNTER — TELEPHONE (OUTPATIENT)
Dept: FAMILY MEDICINE | Facility: HOSPITAL | Age: 64
End: 2024-08-12
Payer: MEDICARE

## 2024-08-12 NOTE — PROGRESS NOTES
[facility]  History & Physical    SUBJECTIVE:     Chief Complaint:   Chief Complaint   Patient presents with    Cough    Headache    Sinus Problem       History of Present Illness:  64 y.o. female who  has a past medical history of Allergies, Anxiety, Arthritis, CHF (congestive heart failure), Depression, Diverticular disease of large intestine without perforation or abscess, GERD (gastroesophageal reflux disease), High cholesterol, History of fainting, Hypertension, Shortness of breath, SOB (shortness of breath) (05/13/2019), Unspecified chronic bronchitis, and Unspecified glaucoma. presents to clinic today for approx 1 week of subjective fever, cough, general malaise.  She denies chest pain, SOB, d/n/v or urinary complaints. She is up to date on PAP, Mammogram.  Recent valproate level was marginally low. She was encouraged to follow up with Neurology concerning that value.       Allergies:  Review of patient's allergies indicates:   Allergen Reactions    Codeine Hives    Sulfa (sulfonamide antibiotics) Hives    Benzoate analogues Itching       Home Medications:  Current Outpatient Medications on File Prior to Visit   Medication Sig    divalproex (DEPAKOTE) 500 MG TbEC Take 1 tablet (500 mg total) by mouth 2 (two) times a day.    estradioL (ESTRACE) 0.01 % (0.1 mg/gram) vaginal cream Place 1 g vaginally once daily.    famotidine (PEPCID) 20 MG tablet Take 1 tablet (20 mg total) by mouth 2 (two) times daily as needed for Heartburn.    fexofenadine (ALLEGRA) 180 MG tablet Take 1 tablet (180 mg total) by mouth once daily.    fexofenadine (ALLEGRA) 180 MG tablet Take 180 mg by mouth daily as needed.    FLUoxetine 20 MG capsule Take 1 capsule (20 mg total) by mouth once daily.    fluticasone propionate (FLONASE) 50 mcg/actuation nasal spray 2 sprays by Each Nostril route daily as needed.    Lactobac no.41/Bifidobact no.7 (PROBIOTIC-10 ORAL) Take 1 capsule by mouth once daily.    latanoprost 0.005 % ophthalmic  solution Place 1 drop into both eyes every evening.    metoprolol tartrate (LOPRESSOR) 25 MG tablet Take 25 mg by mouth 2 (two) times daily.    pravastatin (PRAVACHOL) 20 MG tablet Take 1 tablet (20 mg total) by mouth once daily.    cyclobenzaprine (FLEXERIL) 10 MG tablet Take 10 mg by mouth once daily. (Patient not taking: Reported on 7/3/2024)    metoprolol tartrate (LOPRESSOR) 50 MG tablet  (Patient not taking: Reported on 2024)    pneumoc 20-yash conj-dip cr,PF, (PREVNAR-20, PF,) 0.5 mL Syrg injection Inject into the muscle. (Patient not taking: Reported on 2024)    psyllium 0.52 gram capsule Take 0.52 g by mouth once daily. (Patient not taking: Reported on 2024)     No current facility-administered medications on file prior to visit.       Past Medical History:   Diagnosis Date    Allergies     Anxiety     Arthritis     CHF (congestive heart failure)     Depression     Diverticular disease of large intestine without perforation or abscess     GERD (gastroesophageal reflux disease)     High cholesterol     History of fainting     Hypertension     Shortness of breath     states sometimes gets short winded    SOB (shortness of breath) 2019    Unspecified chronic bronchitis     not chronic episode this year-    Unspecified glaucoma      Past Surgical History:   Procedure Laterality Date    BILATERAL TUBAL LIGATION      BREAST BIOPSY Left     h    BREAST SURGERY       SECTION      COLONOSCOPY N/A 2017    Procedure: COLONOSCOPY Miralax;  Surgeon: Buddy Butcher Jr., MD;  Location: [facility] ENDO;  Service: Endoscopy;  Laterality: N/A;    COLONOSCOPY N/A 2022    Procedure: COLONOSCOPY;  Surgeon: TREV Kay MD;  Location: [facility] ENDO;  Service: Endoscopy;  Laterality: N/A;    COLONOSCOPY N/A 2022    Procedure: COLONOSCOPY;  Surgeon: TREV Kay MD;  Location: [facility] ENDO;  Service: Endoscopy;  Laterality: N/A;    ESOPHAGOGASTRODUODENOSCOPY N/A 7/3/2023     Procedure: EGD (ESOPHAGOGASTRODUODENOSCOPY);  Surgeon: Mary Cotton MD;  Location: [facility] ENDO;  Service: Endoscopy;  Laterality: N/A;    EYE SURGERY Bilateral     cataract removal    FLEXIBLE SIGMOIDOSCOPY N/A 07/20/2022    Procedure: SIGMOIDOSCOPY, FLEXIBLE;  Surgeon: TREV Kay MD;  [facility];  Service: Colon and Rectal;  Laterality: N/A;    HYSTERECTOMY      KIDNEY SURGERY Right     done at [facility], reported as mass by patient    LAPAROSCOPIC SIGMOIDECTOMY Left 07/20/2022    Procedure: COLECTOMY, SIGMOID, LAPAROSCOPIC, ERAS low;  Surgeon: TREV Kay MD;  Location: [facility] OR [facility] FLR;  Service: Colon and Rectal;  Laterality: Left;    LEFT HEART CATHETERIZATION Left 05/13/2019    Procedure: Left heart cath;  Surgeon: Gerhard Krishnan MD;  Location: [facility] CATH LAB;  Service: Cardiology;  Laterality: Left;    MOBILIZATION OF SPLENIC FLEXURE N/A 07/20/2022    Procedure: MOBILIZATION, SPLENIC FLEXURE;  Surgeon: TREV Kay MD;  [facility];  Service: Colon and Rectal;  Laterality: N/A;    OOPHORECTOMY      s-section       No family history on file.  Social History     Tobacco Use    Smoking status: Never    Smokeless tobacco: Never   Substance Use Topics    Alcohol use: Not Currently     Comment: 12-31-22    Drug use: No        Review of Systems   Constitutional:  Positive for malaise/fatigue. Negative for fever.   HENT:  Negative for sore throat.    Respiratory:  Positive for cough and sputum production.    Cardiovascular:  Negative for chest pain.   Gastrointestinal:  Negative for abdominal pain, constipation, diarrhea, heartburn, nausea and vomiting.   Musculoskeletal:  Positive for myalgias.   Neurological:  Negative for dizziness and headaches.   All other systems reviewed and are negative.       OBJECTIVE:     Vital Signs:  Pulse: 92 (08/08/24 1114)  BP: 131/84 (08/08/24 1114)  SpO2: 98 % (08/08/24 1114)    Physical Exam  Constitutional:        Appearance: Normal appearance.   HENT:      Head: Normocephalic and atraumatic.   Eyes:      Extraocular Movements: Extraocular movements intact.   Cardiovascular:      Rate and Rhythm: Normal rate and regular rhythm.      Pulses: Normal pulses.      Heart sounds: Normal heart sounds. No murmur heard.  Pulmonary:      Effort: Pulmonary effort is normal. No respiratory distress.      Breath sounds: Normal breath sounds. No stridor. No wheezing, rhonchi or rales.   Abdominal:      General: Abdomen is flat.      Palpations: Abdomen is soft.      Tenderness: There is no abdominal tenderness.   Musculoskeletal:      Right lower leg: No edema.      Left lower leg: No edema.   Neurological:      General: No focal deficit present.      Mental Status: She is alert and oriented to person, place, and time.   Psychiatric:         Mood and Affect: Mood normal.         Behavior: Behavior normal.         Thought Content: Thought content normal.         Judgment: Judgment normal.         A/P:  Ashli was seen today for cough, headache and sinus problem.    Diagnoses and all orders for this visit:    COVID 19  Cough, unspecified type  Symptomatic for over 1 week, not candidate for Paxlovid   No alarm signs, vitals stable, no SOB, non-toxic appearing  -     POCT COVID-19 Rapid Screening... POS  - Counseled on home remedies, Tylenol, soup, tea  - Counseled on isolation measures        DUE AT NEXT/FUTURE VISIT:  Health mx appointment when recovered from COVID    Gonzalo Chirinos  [facility], PGY-3  08/12/2024    This note was partially created using [employer] Voice Recognition software. Typographical and content errors may occur with this process. While efforts are made to detect and correct such errors, in some cases errors will persist. For this reason, wording in this document should be considered in the proper context and not strictly verbatim     The following information is provided to all patients.  This  information is to help you find resources for any of the problems found today that may be affecting your health:                Living healthy guide: www.[facility].[facility]       Understanding Diabetes: www.diabetes.org       Eating healthy: www.cdc.gov/healthyweight      CDC home safety checklist: www.cdc.gov/steadi/patient.html      Agency on Aging: www.goea.[place].[facility]       Alcoholics anonymous (AA): www.aa.org      Physical Activity: www.mike.nih.gov/pr0lmzt       Tobacco use: www.quitwithusla.org

## 2024-08-12 NOTE — TELEPHONE ENCOUNTER
----- Message from Stacia Manriquez sent at 8/12/2024  3:00 PM CDT -----  Regarding: call  Type:  Needs Medical Advice    Who Called: pt  Would the patient rather a call back or a response via [facility]? Call  Best Call Back Number: [phone]  Additional Information: pt would like to know how long it takes for covid to go away

## 2024-08-15 NOTE — PROGRESS NOTES
I assume primary medical responsibility for this patient. I have reviewed the history, physical, and assessement & treatment plan with the resident and agree that the care is reasonable and necessary. This service has been performed by a resident without the presence of a teaching physician under the primary care exception. If necessary, an addendum of additional findings or evaluation beyond the resident documentation will be noted below.    Agree with plan as detailed with Covid infection not a candidate for paxlovid and anticipatory guidance given if symptoms worsen.

## 2024-08-23 ENCOUNTER — LAB VISIT (OUTPATIENT)
Dept: LAB | Facility: HOSPITAL | Age: 64
End: 2024-08-23
Attending: NURSE PRACTITIONER
Payer: MEDICARE

## 2024-08-23 DIAGNOSIS — I10 HTN (HYPERTENSION): ICD-10-CM

## 2024-08-23 DIAGNOSIS — L85.9 HLP (HYPERKERATOSIS LENTICULARIS PERSTANS): ICD-10-CM

## 2024-08-23 DIAGNOSIS — I50.9 CHF (CONGESTIVE HEART FAILURE): Primary | ICD-10-CM

## 2024-08-23 LAB
ALBUMIN SERPL BCP-MCNC: 4.2 G/DL (ref 3.5–5.2)
ALP SERPL-CCNC: 45 U/L (ref 38–126)
ALT SERPL W/O P-5'-P-CCNC: 11 U/L (ref 10–44)
ANION GAP SERPL CALC-SCNC: 11 MMOL/L (ref 8–16)
AST SERPL-CCNC: 21 U/L (ref 15–46)
BASOPHILS # BLD AUTO: 0.03 K/UL (ref 0–0.2)
BASOPHILS NFR BLD: 0.5 % (ref 0–1.9)
BILIRUB SERPL-MCNC: 0.8 MG/DL (ref 0.1–1)
CALCIUM SERPL-MCNC: 9.9 MG/DL (ref 8.7–10.5)
CHLORIDE SERPL-SCNC: 95 MMOL/L (ref 95–110)
CHOLEST SERPL-MCNC: 205 MG/DL (ref 120–199)
CHOLEST/HDLC SERPL: 3.9 {RATIO} (ref 2–5)
CO2 SERPL-SCNC: 34 MMOL/L (ref 23–29)
CREAT SERPL-MCNC: 0.77 MG/DL (ref 0.5–1.4)
DIFFERENTIAL METHOD BLD: ABNORMAL
EOSINOPHIL # BLD AUTO: 0 K/UL (ref 0–0.5)
EOSINOPHIL NFR BLD: 0.5 % (ref 0–8)
ERYTHROCYTE [DISTWIDTH] IN BLOOD BY AUTOMATED COUNT: 13.3 % (ref 11.5–14.5)
EST. GFR  (NO RACE VARIABLE): >60 ML/MIN/1.73 M^2
GLUCOSE SERPL-MCNC: 93 MG/DL (ref 70–110)
HCT VFR BLD AUTO: 40.5 % (ref 37–48.5)
HDLC SERPL-MCNC: 53 MG/DL (ref 40–75)
HDLC SERPL: 25.9 % (ref 20–50)
HGB BLD-MCNC: 13.5 G/DL (ref 12–16)
IMM GRANULOCYTES # BLD AUTO: 0.03 K/UL (ref 0–0.04)
IMM GRANULOCYTES NFR BLD AUTO: 0.5 % (ref 0–0.5)
LDLC SERPL CALC-MCNC: 127.2 MG/DL (ref 63–159)
LYMPHOCYTES # BLD AUTO: 2.7 K/UL (ref 1–4.8)
LYMPHOCYTES NFR BLD: 42.3 % (ref 18–48)
MCH RBC QN AUTO: 30.5 PG (ref 27–31)
MCHC RBC AUTO-ENTMCNC: 33.3 G/DL (ref 32–36)
MCV RBC AUTO: 92 FL (ref 82–98)
MONOCYTES # BLD AUTO: 0.5 K/UL (ref 0.3–1)
MONOCYTES NFR BLD: 7.4 % (ref 4–15)
NEUTROPHILS # BLD AUTO: 3.1 K/UL (ref 1.8–7.7)
NEUTROPHILS NFR BLD: 48.8 % (ref 38–73)
NONHDLC SERPL-MCNC: 152 MG/DL
NRBC BLD-RTO: 0 /100 WBC
PLATELET # BLD AUTO: 180 K/UL (ref 150–450)
PMV BLD AUTO: 13.5 FL (ref 9.2–12.9)
POTASSIUM SERPL-SCNC: 4 MMOL/L (ref 3.5–5.1)
PROT SERPL-MCNC: 7.3 G/DL (ref 6–8.4)
RBC # BLD AUTO: 4.42 M/UL (ref 4–5.4)
SODIUM SERPL-SCNC: 140 MMOL/L (ref 136–145)
TRIGL SERPL-MCNC: 124 MG/DL (ref 30–150)
UUN UR-MCNC: 10 MG/DL (ref 7–17)
WBC # BLD AUTO: 6.34 K/UL (ref 3.9–12.7)

## 2024-08-23 PROCEDURE — 36415 COLL VENOUS BLD VENIPUNCTURE: CPT | Mod: PN | Performed by: NURSE PRACTITIONER

## 2024-08-23 PROCEDURE — 80053 COMPREHEN METABOLIC PANEL: CPT | Mod: PN | Performed by: NURSE PRACTITIONER

## 2024-08-23 PROCEDURE — 80061 LIPID PANEL: CPT | Performed by: NURSE PRACTITIONER

## 2024-08-23 PROCEDURE — 85025 COMPLETE CBC W/AUTO DIFF WBC: CPT | Mod: PN | Performed by: NURSE PRACTITIONER

## 2024-08-26 RX ORDER — FLUTICASONE PROPIONATE 50 MCG
2 SPRAY, SUSPENSION (ML) NASAL DAILY PRN
Qty: 11.1 ML | Refills: 0 | Status: SHIPPED | OUTPATIENT
Start: 2024-08-26 | End: 2024-09-25

## 2024-09-16 ENCOUNTER — LAB VISIT (OUTPATIENT)
Dept: LAB | Facility: HOSPITAL | Age: 64
End: 2024-09-16
Attending: NURSE PRACTITIONER
Payer: MEDICARE

## 2024-09-16 DIAGNOSIS — I50.9 CHF (CONGESTIVE HEART FAILURE): Primary | ICD-10-CM

## 2024-09-16 DIAGNOSIS — I10 HTN (HYPERTENSION): ICD-10-CM

## 2024-09-16 DIAGNOSIS — L85.9 HLP (HYPERKERATOSIS LENTICULARIS PERSTANS): ICD-10-CM

## 2024-09-16 LAB
CHOLEST SERPL-MCNC: 240 MG/DL (ref 120–199)
CHOLEST/HDLC SERPL: 3.6 {RATIO} (ref 2–5)
HDLC SERPL-MCNC: 67 MG/DL (ref 40–75)
HDLC SERPL: 27.9 % (ref 20–50)
LDLC SERPL CALC-MCNC: 151.6 MG/DL (ref 63–159)
NONHDLC SERPL-MCNC: 173 MG/DL
TRIGL SERPL-MCNC: 107 MG/DL (ref 30–150)

## 2024-09-16 PROCEDURE — 36415 COLL VENOUS BLD VENIPUNCTURE: CPT | Mod: PN | Performed by: NURSE PRACTITIONER

## 2024-09-16 PROCEDURE — 80061 LIPID PANEL: CPT | Performed by: NURSE PRACTITIONER

## 2024-09-25 ENCOUNTER — OFFICE VISIT (OUTPATIENT)
Dept: FAMILY MEDICINE | Facility: HOSPITAL | Age: 64
End: 2024-09-25
Payer: MEDICARE

## 2024-09-25 VITALS
DIASTOLIC BLOOD PRESSURE: 76 MMHG | HEART RATE: 71 BPM | WEIGHT: 147.06 LBS | SYSTOLIC BLOOD PRESSURE: 122 MMHG | BODY MASS INDEX: 24.5 KG/M2 | HEIGHT: 65 IN

## 2024-09-25 DIAGNOSIS — Z12.31 ENCOUNTER FOR SCREENING MAMMOGRAM FOR MALIGNANT NEOPLASM OF BREAST: ICD-10-CM

## 2024-09-25 DIAGNOSIS — J30.9 ALLERGIC RHINITIS, UNSPECIFIED SEASONALITY, UNSPECIFIED TRIGGER: Primary | ICD-10-CM

## 2024-09-25 PROCEDURE — 99213 OFFICE O/P EST LOW 20 MIN: CPT

## 2024-09-25 RX ORDER — CETIRIZINE HYDROCHLORIDE 10 MG/1
10 TABLET ORAL 2 TIMES DAILY PRN
Qty: 60 TABLET | Refills: 0 | Status: SHIPPED | OUTPATIENT
Start: 2024-09-25 | End: 2024-11-24

## 2024-09-25 NOTE — PROGRESS NOTES
History & Physical  [facility]      SUBJECTIVE:     History of Present Illness:  Patient is a 64 y.o. female. Presents to clinic for seasonal allergies.    #Allergic rhinitis  - pressure behind ears, post nasal drip worse over last 2 weeks.  - uses Flonase every day, does not take OTC allergy medication.    #Preventative  - due for mammogram    ROS  A 10+ review of systems was performed with pertinent positives and negatives noted above in the history of present illness.  Other systems were negative unless otherwise specified.    Review of patient's allergies indicates:   Allergen Reactions    Codeine Hives    Sulfa (sulfonamide antibiotics) Hives    Benzoate analogues Itching       Past Medical History:   Diagnosis Date    Allergies     Anxiety     Arthritis     CHF (congestive heart failure)     Depression     Diverticular disease of large intestine without perforation or abscess     GERD (gastroesophageal reflux disease)     High cholesterol     History of fainting     Hypertension     Shortness of breath     states sometimes gets short winded    SOB (shortness of breath) 05/13/2019    Unspecified chronic bronchitis     not chronic episode this year-2023    Unspecified glaucoma        Current Outpatient Medications   Medication Instructions    cetirizine (ZYRTEC) 10 mg, Oral, 2 times daily PRN    divalproex (DEPAKOTE) 500 mg, Oral, 2 times daily    estradioL (ESTRACE) 1 g, Vaginal, Daily    famotidine (PEPCID) 20 mg, Oral, 2 times daily PRN    FLUoxetine 20 mg, Oral, Daily    fluticasone propionate (FLONASE) 100 mcg, Each Nostril, Daily PRN    Lactobac no.41/Bifidobact no.7 (PROBIOTIC-10 ORAL) 1 capsule, Oral, Daily    latanoprost 0.005 % ophthalmic solution 1 drop, Both Eyes, Nightly    metoprolol tartrate (LOPRESSOR) 25 mg, Oral, 2 times daily    pneumoc 20-yash conj-dip cr,PF, (PREVNAR-20, PF,) 0.5 mL Syrg injection Intramuscular    pravastatin (PRAVACHOL) 20 mg, Oral, Daily       Past Surgical  History:   Procedure Laterality Date    BILATERAL TUBAL LIGATION      BREAST BIOPSY Left     [facility]    BREAST SURGERY       SECTION      COLONOSCOPY N/A 2017    Procedure: COLONOSCOPY Miralax;  Surgeon: Buddy Butcher Jr., MD;  Location: [facility] ENDO;  Service: Endoscopy;  Laterality: N/A;    COLONOSCOPY N/A 2022    Procedure: COLONOSCOPY;  Surgeon: TREV Kay MD;  Location: [facility] ENDO;  Service: Endoscopy;  Laterality: N/A;    COLONOSCOPY N/A 2022    Procedure: COLONOSCOPY;  Surgeon: TREV Kay MD;  Location: [facility];  Service: Endoscopy;  Laterality: N/A;    ESOPHAGOGASTRODUODENOSCOPY N/A 7/3/2023    Procedure: EGD (ESOPHAGOGASTRODUODENOSCOPY);  Surgeon: Mary Cotton MD;  Location: [facility];  Service: Endoscopy;  Laterality: N/A;    EYE SURGERY Bilateral     cataract removal    FLEXIBLE SIGMOIDOSCOPY N/A 2022    Procedure: SIGMOIDOSCOPY, FLEXIBLE;  Surgeon: TREV Kay MD;  Location: [facility] OR [address] [place] [address];  Service: Colon and Rectal;  Laterality: N/A;    HYSTERECTOMY      KIDNEY SURGERY Right     done at [facility], reported as mass by patient    LAPAROSCOPIC SIGMOIDECTOMY Left 2022    Procedure: COLECTOMY, SIGMOID, LAPAROSCOPIC, ERAS low;  Surgeon: TREV Kay MD;  [facility];  Service: Colon and Rectal;  Laterality: Left;    LEFT HEART CATHETERIZATION Left 2019    Procedure: Left heart cath;  Surgeon: Gerhard Krishnan MD;  Location: [facility] CATH LAB;  Service: Cardiology;  Laterality: Left;    MOBILIZATION OF SPLENIC FLEXURE N/A 2022    Procedure: MOBILIZATION, SPLENIC FLEXURE;  Surgeon: TREV Kay MD;  [facility];  Service: Colon and Rectal;  Laterality: N/A;    OOPHORECTOMY      s-section         No family history on file.    Social History     Tobacco Use    Smoking status: Never    Smokeless tobacco: Never   Substance Use Topics    Alcohol use: Not Currently     Comment: 22     "Drug use: No        OBJECTIVE:     Vital Signs (Most Recent)  Vitals:    09/25/24 0933   BP: 122/76   Pulse: 71   Weight: 66.7 kg (147 lb 0.8 oz)   Height: 5' 5" (1.651 m)     BMI: Body mass index is 24.47 kg/m².    Physical Exam:  Gen: No apparent distress, cooperative & interactive  CV: RRR, S1 and S2 present  Resp: CTAB, normal respiratory effort     Labs  Hemoglobin A1C   Date Value Ref Range Status   07/10/2024 5.4 4.0 - 5.6 % Final     Comment:     ADA Screening Guidelines:  5.7-6.4%  Consistent with prediabetes  >or=6.5%  Consistent with diabetes    High levels of fetal hemoglobin interfere with the HbA1C  assay. Heterozygous hemoglobin variants (HbS, HgC, etc)do  not significantly interfere with this assay.   However, presence of multiple variants may affect accuracy.     03/21/2024 5.6 4.0 - 5.6 % Final     Comment:     ADA Screening Guidelines:  5.7-6.4%  Consistent with prediabetes  >or=6.5%  Consistent with diabetes    High levels of fetal hemoglobin interfere with the HbA1C  assay. Heterozygous hemoglobin variants (HbS, HgC, etc)do  not significantly interfere with this assay.   However, presence of multiple variants may affect accuracy.     01/03/2023 5.4 4.0 - 5.6 % Final     Comment:     ADA Screening Guidelines:  5.7-6.4%  Consistent with prediabetes  >or=6.5%  Consistent with diabetes    High levels of fetal hemoglobin interfere with the HbA1C  assay. Heterozygous hemoglobin variants (HbS, HgC, etc)do  not significantly interfere with this assay.   However, presence of multiple variants may affect accuracy.       TSH   Date Value Ref Range Status   07/10/2024 2.740 0.400 - 4.000 uIU/mL Final     Comment:     Warning:  Heterophilic antibodies in serum or plasma of   certain individuals are known to cause interference with   immunoassays. These antibodies may be present in blood samples   from individuals regularly exposed to animal or who have been   treated with animal products.     Patients taking " high doses of supplemental biotin may have  negatively biased results.           ASSESSMENT/PLAN:   64 y.o.female presents to clinic for    Allergic rhinitis, unspecified seasonality, unspecified trigger  -     cetirizine (ZYRTEC) 10 MG tablet; Take 1 tablet (10 mg total) by mouth 2 (two) times daily as needed for Allergies.  Dispense: 60 tablet; Refill: 0  - Patient with flonase from prior prescription, states she does not need refills. Continue zyrtec and flonase. Instructed to use Neilmed as well prior to flonase. Also instructed to use only distilled water or cooled boiled water.    Encounter for screening mammogram for malignant neoplasm of breast  -     Mammo Digital Screening Bilat w/ Jonathan; Future; Expected date: 09/25/2024        Provided patient with anticipatory guidance and return precautions. Treatment plan discussed with patient, all questions answered, and patient acknowledged understanding and verbal agreement.      Follow-up in: 2 weeks if no improvement. Or sooner PRN as acute concerns arise.     Medical decision making straight forward and not complex during this visit.       Case discussed with Dr. LIZA Veras.  ________________________  Tamela Chery MD  [facility] PGY-2          *This note was partially created using [employer]ac[employer] Voice Recognition software. Typographical and content errors may occur with this process. While efforts are made to detect and correct such errors, in some cases errors will persist. For this reason, wording in this document should be considered in the proper context and not strictly verbatim.

## 2024-10-21 ENCOUNTER — HOSPITAL ENCOUNTER (EMERGENCY)
Facility: HOSPITAL | Age: 64
Discharge: HOME | End: 2024-10-21
Attending: EMERGENCY MEDICINE
Payer: MEDICARE

## 2024-10-21 VITALS
HEIGHT: 65 IN | WEIGHT: 139.31 LBS | TEMPERATURE: 99 F | BODY MASS INDEX: 23.21 KG/M2 | RESPIRATION RATE: 20 BRPM | DIASTOLIC BLOOD PRESSURE: 81 MMHG | SYSTOLIC BLOOD PRESSURE: 144 MMHG | OXYGEN SATURATION: 100 % | HEART RATE: 98 BPM

## 2024-10-21 DIAGNOSIS — E83.42 HYPOMAGNESEMIA: ICD-10-CM

## 2024-10-21 DIAGNOSIS — M25.561 KNEE PAIN, BILATERAL: ICD-10-CM

## 2024-10-21 DIAGNOSIS — M25.562 KNEE PAIN, BILATERAL: ICD-10-CM

## 2024-10-21 DIAGNOSIS — R19.7 DIARRHEA, UNSPECIFIED TYPE: Primary | ICD-10-CM

## 2024-10-21 LAB
ALBUMIN SERPL BCP-MCNC: 4.2 G/DL (ref 3.5–5.2)
ALP SERPL-CCNC: 33 U/L (ref 38–126)
ALT SERPL W/O P-5'-P-CCNC: 11 U/L (ref 10–44)
ANION GAP SERPL CALC-SCNC: 8 MMOL/L (ref 8–16)
AST SERPL-CCNC: 21 U/L (ref 15–46)
BACTERIA #/AREA URNS AUTO: ABNORMAL /HPF
BASOPHILS # BLD AUTO: 0.01 K/UL (ref 0–0.2)
BASOPHILS NFR BLD: 0.1 % (ref 0–1.9)
BILIRUB SERPL-MCNC: 0.6 MG/DL (ref 0.1–1)
BILIRUB UR QL STRIP: ABNORMAL
CALCIUM SERPL-MCNC: 9.3 MG/DL (ref 8.7–10.5)
CHLORIDE SERPL-SCNC: 96 MMOL/L (ref 95–110)
CLARITY UR REFRACT.AUTO: ABNORMAL
CO2 SERPL-SCNC: 30 MMOL/L (ref 23–29)
COLOR UR AUTO: YELLOW
CREAT SERPL-MCNC: 0.54 MG/DL (ref 0.5–1.4)
DIFFERENTIAL METHOD BLD: ABNORMAL
EOSINOPHIL # BLD AUTO: 0 K/UL (ref 0–0.5)
EOSINOPHIL NFR BLD: 0.3 % (ref 0–8)
ERYTHROCYTE [DISTWIDTH] IN BLOOD BY AUTOMATED COUNT: 12.9 % (ref 11.5–14.5)
EST. GFR  (NO RACE VARIABLE): >60 ML/MIN/1.73 M^2
GLUCOSE SERPL-MCNC: 90 MG/DL (ref 70–110)
GLUCOSE UR QL STRIP: NEGATIVE
HCT VFR BLD AUTO: 38.9 % (ref 37–48.5)
HGB BLD-MCNC: 13.3 G/DL (ref 12–16)
HGB UR QL STRIP: ABNORMAL
HYALINE CASTS UR QL AUTO: 1 /LPF
IMM GRANULOCYTES # BLD AUTO: 0.02 K/UL (ref 0–0.04)
IMM GRANULOCYTES NFR BLD AUTO: 0.3 % (ref 0–0.5)
KETONES UR QL STRIP: ABNORMAL
LEUKOCYTE ESTERASE UR QL STRIP: ABNORMAL
LIPASE SERPL-CCNC: 87 U/L (ref 23–300)
LYMPHOCYTES # BLD AUTO: 2.7 K/UL (ref 1–4.8)
LYMPHOCYTES NFR BLD: 36.9 % (ref 18–48)
MAGNESIUM SERPL-MCNC: 1.3 MG/DL (ref 1.6–2.6)
MCH RBC QN AUTO: 31.7 PG (ref 27–31)
MCHC RBC AUTO-ENTMCNC: 34.2 G/DL (ref 32–36)
MCV RBC AUTO: 93 FL (ref 82–98)
MICROSCOPIC COMMENT: ABNORMAL
MONOCYTES # BLD AUTO: 0.8 K/UL (ref 0.3–1)
MONOCYTES NFR BLD: 10.6 % (ref 4–15)
NEUTROPHILS # BLD AUTO: 3.8 K/UL (ref 1.8–7.7)
NEUTROPHILS NFR BLD: 51.8 % (ref 38–73)
NITRITE UR QL STRIP: NEGATIVE
NRBC BLD-RTO: 0 /100 WBC
PH UR STRIP: 7 [PH] (ref 5–8)
PLATELET # BLD AUTO: 134 K/UL (ref 150–450)
PMV BLD AUTO: 12.9 FL (ref 9.2–12.9)
POTASSIUM SERPL-SCNC: 3.5 MMOL/L (ref 3.5–5.1)
PROT SERPL-MCNC: 7.1 G/DL (ref 6–8.4)
PROT UR QL STRIP: NEGATIVE
RBC # BLD AUTO: 4.19 M/UL (ref 4–5.4)
RBC #/AREA URNS AUTO: 4 /HPF (ref 0–4)
SODIUM SERPL-SCNC: 134 MMOL/L (ref 136–145)
SP GR UR STRIP: 1.02 (ref 1–1.03)
URN SPEC COLLECT METH UR: ABNORMAL
UROBILINOGEN UR STRIP-ACNC: 1 EU/DL
UUN UR-MCNC: 9 MG/DL (ref 7–17)
WBC # BLD AUTO: 7.35 K/UL (ref 3.9–12.7)
WBC #/AREA URNS AUTO: 10 /HPF (ref 0–5)

## 2024-10-21 PROCEDURE — 99285 EMERGENCY DEPT VISIT HI MDM: CPT | Mod: 25,ER

## 2024-10-21 PROCEDURE — 25000003 PHARM REV CODE 250: Mod: ER

## 2024-10-21 PROCEDURE — 83735 ASSAY OF MAGNESIUM: CPT | Mod: ER

## 2024-10-21 PROCEDURE — 85025 COMPLETE CBC W/AUTO DIFF WBC: CPT | Mod: ER

## 2024-10-21 PROCEDURE — 80053 COMPREHEN METABOLIC PANEL: CPT | Mod: ER

## 2024-10-21 PROCEDURE — 83690 ASSAY OF LIPASE: CPT | Mod: ER

## 2024-10-21 PROCEDURE — 81000 URINALYSIS NONAUTO W/SCOPE: CPT | Mod: ER

## 2024-10-21 RX ORDER — DICYCLOMINE HYDROCHLORIDE 20 MG/1
20 TABLET ORAL 2 TIMES DAILY
Qty: 20 TABLET | Refills: 0 | Status: SHIPPED | OUTPATIENT
Start: 2024-10-21 | End: 2024-11-20

## 2024-10-21 RX ORDER — MAGNESIUM SULFATE HEPTAHYDRATE 40 MG/ML
2 INJECTION, SOLUTION INTRAVENOUS
Status: DISCONTINUED | OUTPATIENT
Start: 2024-10-21 | End: 2024-10-21

## 2024-10-21 RX ORDER — LANOLIN ALCOHOL/MO/W.PET/CERES
400 CREAM (GRAM) TOPICAL DAILY
Qty: 5 TABLET | Refills: 0 | Status: SHIPPED | OUTPATIENT
Start: 2024-10-21 | End: 2024-10-26

## 2024-10-21 RX ORDER — DICYCLOMINE HYDROCHLORIDE 10 MG/1
20 CAPSULE ORAL
Status: COMPLETED | OUTPATIENT
Start: 2024-10-21 | End: 2024-10-21

## 2024-10-21 RX ORDER — LOPERAMIDE HYDROCHLORIDE AND SIMETHICONE 2; 125 MG/1; MG/1
1 TABLET ORAL EVERY 6 HOURS
Qty: 40 EACH | Refills: 0 | Status: SHIPPED | OUTPATIENT
Start: 2024-10-21 | End: 2024-10-31

## 2024-10-21 RX ADMIN — DICYCLOMINE HYDROCHLORIDE 20 MG: 10 CAPSULE ORAL at 05:10

## 2024-10-21 NOTE — ED PROVIDER NOTES
"Encounter Date: 10/21/2024       History     Chief Complaint   Patient presents with    Diarrhea     Diarrhea for 1 month. PT report she fell in tub- denies loc. PT reports bilateral knee pain     Patient is a 64-year-old female with a past medical history of allergies, anxiety, arthritis, CHF, depression, diverticulosis, GERD, high cholesterol, history of fainting, hypertension, shortness of breath, and chronic bronchitis who presents to emergency room for diarrhea over the past month.  Patient states it is watery in nature.  She was about 3-4 episodes per day.  No rectal bleeding or blood in stool.  No recent antibiotic use.  No recent travel.  Patient states that yesterday she was in the shower when she "fell to her knees." "it just happened so fast," toes she does not remember the nature of the incident.  States that she did not have head injury or loss consciousness.  No chest pain prior to incident.  No chest pain now.  No shortness of breath.  No nausea or vomiting.  She does get some generalized abdominal cramping prior to having a bowel movements that resolves afterwards.  No medications taken prior to arrival.    The history is provided by the patient. No  was used.     Review of patient's allergies indicates:   Allergen Reactions    Codeine Hives    Sulfa (sulfonamide antibiotics) Hives    Benzoate analogues Itching     Past Medical History:   Diagnosis Date    Allergies     Anxiety     Arthritis     CHF (congestive heart failure)     Depression     Diverticular disease of large intestine without perforation or abscess     GERD (gastroesophageal reflux disease)     High cholesterol     History of fainting     Hypertension     Shortness of breath     states sometimes gets short winded    SOB (shortness of breath) 05/13/2019    Unspecified chronic bronchitis     not chronic episode this year-2023    Unspecified glaucoma      Past Surgical History:   Procedure Laterality Date    BILATERAL " TUBAL LIGATION      BREAST BIOPSY Left     [facility]    BREAST SURGERY       SECTION      COLONOSCOPY N/A 2017    Procedure: COLONOSCOPY Miralax;  Surgeon: Buddy Butcher Jr., MD;  Location: [facility] ENDO;  Service: Endoscopy;  Laterality: N/A;    COLONOSCOPY N/A 2022    Procedure: COLONOSCOPY;  Surgeon: TREV Kay MD;  Location: [facility] ENDO;  Service: Endoscopy;  Laterality: N/A;    COLONOSCOPY N/A 2022    Procedure: COLONOSCOPY;  Surgeon: TREV Kay MD;  Location: [facility];  Service: Endoscopy;  Laterality: N/A;    ESOPHAGOGASTRODUODENOSCOPY N/A 7/3/2023    Procedure: EGD (ESOPHAGOGASTRODUODENOSCOPY);  Surgeon: Mary Cotton MD;  Location: [facility];  Service: Endoscopy;  Laterality: N/A;    EYE SURGERY Bilateral     cataract removal    FLEXIBLE SIGMOIDOSCOPY N/A 2022    Procedure: SIGMOIDOSCOPY, FLEXIBLE;  Surgeon: TREV Kay MD;  [facility];  Service: Colon and Rectal;  Laterality: N/A;    HYSTERECTOMY      KIDNEY SURGERY Right     done at [facility], reported as mass by patient    LAPAROSCOPIC SIGMOIDECTOMY Left 2022    Procedure: COLECTOMY, SIGMOID, LAPAROSCOPIC, ERAS low;  Surgeon: TREV Kay MD;  [facility];  Service: Colon and Rectal;  Laterality: Left;    LEFT HEART CATHETERIZATION Left 2019    Procedure: Left heart cath;  Surgeon: Gerhard Krishnan MD;  Location: [facility] CATH LAB;  Service: Cardiology;  Laterality: Left;    MOBILIZATION OF SPLENIC FLEXURE N/A 2022    Procedure: MOBILIZATION, SPLENIC FLEXURE;  Surgeon: TREV Kay MD;  Location: [facility] OR [facility] FLR;  Service: Colon and Rectal;  Laterality: N/A;    OOPHORECTOMY      s-section       No family history on file.  Social History     Tobacco Use    Smoking status: Never    Smokeless tobacco: Never   Substance Use Topics    Alcohol use: Not Currently     Comment: 22    Drug use: No     Review of Systems   Constitutional:   Negative for chills, diaphoresis, fatigue and fever.   HENT:  Negative for congestion, sore throat and trouble swallowing.    Respiratory:  Negative for cough and shortness of breath.    Cardiovascular:  Negative for chest pain and palpitations.   Gastrointestinal:  Positive for abdominal pain (cramping) and diarrhea. Negative for blood in stool, constipation, nausea and vomiting.   Genitourinary:  Negative for difficulty urinating, dysuria, frequency and urgency.   Musculoskeletal:  Positive for arthralgias (bilateral knee pain). Negative for back pain and myalgias.   Skin:  Negative for rash and wound.   Neurological:  Negative for weakness, light-headedness, numbness and headaches.       Physical Exam     Initial Vitals [10/21/24 1441]   BP Pulse Resp Temp SpO2   (!) 144/81 98 20 98.7 °F (37.1 °C) 100 %      MAP       --         Physical Exam    Nursing note and vitals reviewed.  Constitutional: She appears well-developed and well-nourished. She is not diaphoretic. No distress.   Patient well-appearing.  Awake and alert.  No acute distress.  Maintaining airway appropriately.  Speaking in complete sentences.   HENT:   Head: Normocephalic and atraumatic.   Right Ear: External ear normal.   Left Ear: External ear normal.   Eyes: Conjunctivae and EOM are normal. Pupils are equal, round, and reactive to light.   Neck: Neck supple.   Normal range of motion.  Cardiovascular:  Normal rate, regular rhythm and normal heart sounds.     Exam reveals no friction rub.       No murmur heard.  Pulmonary/Chest: Breath sounds normal. No respiratory distress. She has no wheezes. She has no rhonchi. She has no rales.   Abdominal: Abdomen is soft. Bowel sounds are normal. She exhibits no distension. There is abdominal tenderness (diffuse, primarily located to right lower and left lower quadrant). There is no rebound and no guarding.   Musculoskeletal:         General: No edema. Normal range of motion.      Cervical back: Normal range  of motion and neck supple.      Right knee: No swelling. Normal range of motion. Tenderness present.      Left knee: No swelling. Normal range of motion. Tenderness present.      Comments: Diffuse tenderness to palpation of bilateral knees.  Bruising noted.  No swelling.  Range of motion within normal limits.     Neurological: She is alert and oriented to person, place, and time. She has normal strength.   Skin: Skin is warm and dry. Bruising noted.        Psychiatric: She has a normal mood and affect. Her behavior is normal. Thought content normal.         ED Course   Procedures  Labs Reviewed   COMPREHENSIVE METABOLIC PANEL - Abnormal       Result Value    Sodium 134 (*)     Potassium 3.5      Chloride 96      CO2 30 (*)     Glucose 90      BUN 9      Creatinine 0.54      Calcium 9.3      Total Protein 7.1      Albumin 4.2      Total Bilirubin 0.6      Alkaline Phosphatase 33 (*)     AST 21      ALT 11      Anion Gap 8      eGFR >60.0     URINALYSIS, REFLEX TO URINE CULTURE - Abnormal    Specimen UA Urine, Clean Catch      Color, UA Yellow      Appearance, UA Hazy (*)     pH, UA 7.0      Specific Gravity, UA 1.020      Protein, UA Negative      Glucose, UA Negative      Ketones, UA 1+ (*)     Bilirubin (UA) 1+ (*)     Occult Blood UA Trace (*)     Nitrite, UA Negative      Urobilinogen, UA 1.0      Leukocytes, UA Trace (*)     Narrative:     Preferred Collection Type->Urine, Clean Catch  Specimen Source->Urine   CBC W/ AUTO DIFFERENTIAL - Abnormal    WBC 7.35      RBC 4.19      Hemoglobin 13.3      Hematocrit 38.9      MCV 93      MCH 31.7 (*)     MCHC 34.2      RDW 12.9      Platelets 134 (*)     MPV 12.9      Immature Granulocytes 0.3      Gran # (ANC) 3.8      Immature Grans (Abs) 0.02      Lymph # 2.7      Mono # 0.8      Eos # 0.0      Baso # 0.01      nRBC 0      Gran % 51.8      Lymph % 36.9      Mono % 10.6      Eosinophil % 0.3      Basophil % 0.1      Differential Method Automated     MAGNESIUM -  Abnormal    Magnesium 1.3 (*)    URINALYSIS MICROSCOPIC - Abnormal    RBC, UA 4      WBC, UA 10 (*)     Bacteria Occasional      Hyaline Casts, UA 1      Microscopic Comment SEE COMMENT      Narrative:     Preferred Collection Type->Urine, Clean Catch  Specimen Source->Urine   LIPASE    Lipase Result 87            Imaging Results              X-Ray Knee 3 View Bilateral (Final result)  Result time 10/21/24 16:44:40      Final result by Puneet Stoll MD (10/21/24 16:44:40)                   Impression:      As above      Electronically signed by: Puneet Stoll MD  Date:    10/21/2024  Time:    16:44               Narrative:    EXAMINATION:  XR KNEE 3 VIEW BILATERAL    CLINICAL HISTORY:  Pain in right knee    TECHNIQUE:  AP, lateral, and Merchant views of both knees were performed.    COMPARISON:  None    FINDINGS:  Negative for right or left knee joint effusion.  Negative for fracture or dislocation.  Tricompartment osteophyte formation noted bilaterally with intact joint surfaces bilaterally.    Soft tissue calcification along the right medial femoral condyle medially, non articular, could represent an old MCL injury.  Bilateral fabella.                                       CT Abdomen Pelvis  Without Contrast (Final result)  Result time 10/21/24 16:38:42      Final result by Lissy Cheung MD (10/21/24 16:38:42)                   Impression:      no acute finding identified      Electronically signed by: Lissy Cheung  Date:    10/21/2024  Time:    16:38               Narrative:    EXAMINATION:  CT ABDOMEN PELVIS WITHOUT CONTRAST    CLINICAL HISTORY:  Abdominal pain, acute, nonlocalized;    TECHNIQUE:  Low dose axial images, sagittal and coronal reformations were obtained from the lung bases to the pubic symphysis.  Contrast was not administered.    COMPARISON:  February 2024    FINDINGS:  Unenhanced liver pancreas and spleen stable    No hydronephrosis or adverse renal finding unenhanced    No aortic  aneurysm    No obstructive or overt inflammatory bowel findings.    Urinary bladder decompressed                                       Medications   dicyclomine capsule 20 mg (has no administration in time range)     Medical Decision Making  Patient presents to emergency room for 1 month of diarrhea.  Vital signs stable.  Physical exam as stated above.    Differential diagnosis includes but is not limited to gastroenteritis, C. difficile, colitis, Crohn's, ulcerative colitis, irritable bowel syndrome, infectious diarrhea (viral or bacterial) drug reaction, stress, among others.  Patient without any recent antibiotic use.  I do not suspect C diff at this time.  Patient without any urgency or blood in stool.  Low suspicion for Crohn's or ulcerative colitis. Patient afebrile and clinically well-appearing.  Unlikely bacterial etiology.  Lab work relatively unremarkable.  Some hypo magnesium noted.  CT abdomen without evidence of obstruction.  Clinical presentation and physical exam most suggestive of diarrhea.  Will prescribe loperamide-simethicone, Bentyl, and oral magnesium.  Discussed conservative management such as bland diet and adequate hydration.    I see no indication of an emergent process beyond that addressed during our encounter. Patient stable for discharge at this time. I have counseled the patient regarding follow up with GI and gave strict return precautions. I have discussed the final diagnosis and gave instructions regarding prescribed medications. Patient verbalized understanding and is agreeable.     Problems Addressed:  Diarrhea, unspecified type: acute illness or injury  Hypomagnesemia: acute illness or injury  Knee pain, bilateral: acute illness or injury    Amount and/or Complexity of Data Reviewed  External Data Reviewed: notes.     Details: Patient with history of possible small-bowel obstruction in 02/2024.  General surgery were not convinced that it was a small bowel obstruction due to CT and  history.  Patient did not undergo any interventions.  Symptoms resolved spontaneously.  Patient does has history of colon and SB resection for diverticulitis.     Patient has appointment with gastroenterology on 11/04/2024.  Labs: ordered. Decision-making details documented in ED Course.  Radiology: ordered. Decision-making details documented in ED Course.  ECG/medicine tests:      Details: Considered ordering EKG, though patient without any chest pain, palpitations, leg swelling, or SOB at this time.     Risk  OTC drugs.  Prescription drug management.  Risk Details: Comorbidities taken into consideration during the patient's evaluation and treatment include allergies, anxiety, arthritis, CHF, depression, diverticulosis, GERD, high cholesterol, history of fainting, hypertension, shortness of breath, and chronic bronchitis.  Social determinants of health taken into consideration during development of our treatment plan include difficulty in obtaining follow-up, obtaining medications, health literacy, access to healthy options for preventative/conservative management, and/or support systems due to, but not limited to, transportation limitations, socioeconomic status, and environmental factors.                ED Course as of 10/21/24 1707   Mon Oct 21, 2024   1529 CBC auto differential(!)  CBC relatively unremarkable.  No leukocytosis.  H/H stable and within normal limits.  Platelet count within normal limits. [BJ]   1535 Comp. Metabolic Panel(!)  CMP with sodium slightly decreased to 134.  BUN and creatinine within normal limits.  LFTs unremarkable. [BJ]   1535 Lipase Result: 87  Within normal limits. [BJ]   1615 Unable to obtain IV x8 tries. Will order CT abdomen without contrast and plan to orally replete mag. [BJ]   1643 Urinalysis, Reflex to Urine Culture Urine, Clean Catch(!)  Urinalysis with trace leukocytes.  Trace occult blood.  1+ ketones. [BJ]   1643 Urinalysis Microscopic(!)  Microscopic urinalysis with  occasional bacteria. [BJ]   1643 CT Abdomen Pelvis  Without Contrast  Impression:     no acute finding identified. [BJ]   1648 X-Ray Knee 3 View Bilateral  Agree with radiology reading. [BJ]      ED Course User Index  [BJ] Lynn Callahan PA-C                           Clinical Impression:  Final diagnoses:  [M25.561, M25.562] Knee pain, bilateral  [R19.7] Diarrhea, unspecified type (Primary)  [E83.42] Hypomagnesemia          ED Disposition Condition    Discharge Stable          ED Prescriptions       Medication Sig Dispense Start Date End Date Auth. Provider    loperamide-simethicone 2-125 mg Tab Take 1 tablet by mouth every 6 (six) hours. Take 1 tablet after first loose stool and 1 tablet after each subsequent loose stool. No more than 4 tablets in 24 hours. for 10 days 40 each 10/21/2024 10/31/2024 Lynn Callahan PA-C    dicyclomine (BENTYL) 20 mg tablet Take 1 tablet (20 mg total) by mouth 2 (two) times daily. 20 tablet 10/21/2024 11/20/2024 Lynn Callahan PA-C    magnesium oxide (MAG-OX) 400 mg (241.3 mg magnesium) tablet Take 1 tablet (400 mg total) by mouth once daily. for 5 days 5 tablet 10/21/2024 10/26/2024 Lynn Callahan PA-C          Follow-up Information       Follow up With Specialties Details Why Contact Info    Chadd Cuevas MD Family Medicine   [address] [address]  [place] [address]  [phone]              This note was partially created using [employer] Voice Recognition software. Typographical and content errors may occur with this process. While efforts are made to detect and correct such errors, in some cases errors will persist. For this reason, wording in this document should be considered in the proper context and not strictly verbatim.        Lynn Callahan PA-C  10/21/24 [id number]

## 2024-10-21 NOTE — DISCHARGE INSTRUCTIONS
Today you were seen in the emergency room for diarrhea.  Your lab work was reassuring today in addition to the imaging was at done however follow up with gastroenterology to determine the reason you are having persistent diarrhea.    Use Gatorade/Pedialyte/coconut water or rehydration packets to help stay hydrated. Vitamin water and plain water do not contain rehydrating electrolytes. Increase clear liquids (water, gatorade, pedialyte, broths, jello, etc) Hold off on solids for 12-18 hours. Then advance to BRAT diet (banana, rice, applesauce, tea, toast/crackers), then advance further as tolerated. Use Peptobismol to help alleviate your diarrhea symptoms.     I have prescribed do Bentyl to help cramping and per my with simethicone for diarrhea.  You also shows that you had slightly decreased magnesium of the side prescribed you a tablet to take over the next few days.

## 2024-11-04 ENCOUNTER — OFFICE VISIT (OUTPATIENT)
Dept: GASTROENTEROLOGY | Facility: CLINIC | Age: 64
End: 2024-11-04
Payer: MEDICARE

## 2024-11-04 VITALS
DIASTOLIC BLOOD PRESSURE: 87 MMHG | HEIGHT: 65 IN | HEART RATE: 81 BPM | SYSTOLIC BLOOD PRESSURE: 138 MMHG | WEIGHT: 138 LBS | BODY MASS INDEX: 22.99 KG/M2

## 2024-11-04 DIAGNOSIS — G89.29 CHRONIC ABDOMINAL PAIN: ICD-10-CM

## 2024-11-04 DIAGNOSIS — Z09 HOSPITAL DISCHARGE FOLLOW-UP: ICD-10-CM

## 2024-11-04 DIAGNOSIS — R19.7 DIARRHEA, UNSPECIFIED TYPE: Primary | ICD-10-CM

## 2024-11-04 DIAGNOSIS — R10.9 CHRONIC ABDOMINAL PAIN: ICD-10-CM

## 2024-11-04 PROCEDURE — 99999 PR PBB SHADOW E&M-EST. PATIENT-LVL III: CPT | Mod: PBBFAC,,,

## 2024-11-04 PROCEDURE — 99214 OFFICE O/P EST MOD 30 MIN: CPT | Mod: S$GLB,,,

## 2024-11-04 NOTE — PROGRESS NOTES
"   Gastroenterology Clinic Consultation Note    Reason for Visit:  The primary encounter diagnosis was Diarrhea, unspecified type. Diagnoses of Chronic abdominal pain and Hospital discharge follow-up were also pertinent to this visit.    PCP:   Chadd Cuevas         Initial HPI   This is a 64 y.o. female with a past medical history of allergies, anxiety, arthritis, CHF, depression, diverticulosis, GERD, high cholesterol, history of fainting, hypertension, shortness of breath, and chronic bronchitis presenting for chronic abdominal pain, diarrhea.  She was recently in the emergency room for diarrhea for the past month. Patient states it is watery in nature. She was about 3-4 episodes per day. No rectal bleeding or blood in stool. No recent antibiotic use. No recent travel. Patient states that what brought her to the ER was she was in the shower when she "fell to her knees." "it just happened so fast," she does not remember the nature of the incident. States that she did not have head injury or loss consciousness. She does get some generalized abdominal cramping prior to having a bowel movements that resolves afterwards. She has been seen by other GI providers in the past for same complaints, but she reports the diarrhea has worsened. CT done 2 weeks ago unremarkable. Last colonoscopy 2022 unremarkable. EGD 2023 showed benign fundic gland polyp. Denies unintentional weight loss, fever, chills, nausea, vomiting, constipation, esophageal reflux, regurgitation, hematemesis, difficulties swallowing, blood in stool, and melena. No recent stool studies.     ROS:  Review of Systems   Constitutional:  Negative for chills, fever, malaise/fatigue and weight loss.   Respiratory:  Negative for shortness of breath.    Cardiovascular:  Negative for chest pain.   Gastrointestinal:  Positive for abdominal pain and diarrhea. Negative for blood in stool, constipation, heartburn, melena, nausea and vomiting.   Genitourinary:  " Negative for dysuria, flank pain and hematuria.   Neurological:  Negative for dizziness and weakness.        Medical History:  has a past medical history of Allergies, Anxiety, Arthritis, CHF (congestive heart failure), Depression, Diverticular disease of large intestine without perforation or abscess, GERD (gastroesophageal reflux disease), High cholesterol, History of fainting, Hypertension, Shortness of breath, SOB (shortness of breath) (2019), Unspecified chronic bronchitis, and Unspecified glaucoma.    Surgical History:  has a past surgical history that includes Hysterectomy; s-section;  section; Breast surgery; Kidney surgery (Right); Colonoscopy (N/A, 2017); Left heart catheterization (Left, 2019); Oophorectomy; Breast biopsy (Left); Colonoscopy (N/A, 2022); Laparoscopic sigmoidectomy (Left, 2022); Flexible sigmoidoscopy (N/A, 2022); Mobilization of splenic flexure (N/A, 2022); Colonoscopy (N/A, 2022); Bilateral tubal ligation; Eye surgery (Bilateral); and Esophagogastroduodenoscopy (N/A, 7/3/2023).    Family History: family history is not on file..       Review of patient's allergies indicates:   Allergen Reactions    Codeine Hives    Sulfa (sulfonamide antibiotics) Hives    Benzoate analogues Itching       Current Outpatient Medications on File Prior to Visit   Medication Sig Dispense Refill    cetirizine (ZYRTEC) 10 MG tablet Take 1 tablet (10 mg total) by mouth 2 (two) times daily as needed for Allergies. 60 tablet 0    dicyclomine (BENTYL) 20 mg tablet Take 1 tablet (20 mg total) by mouth 2 (two) times daily. 20 tablet 0    divalproex (DEPAKOTE) 500 MG TbEC Take 1 tablet (500 mg total) by mouth 2 (two) times a day. 60 tablet 2    estradioL (ESTRACE) 0.01 % (0.1 mg/gram) vaginal cream Place 1 g vaginally once daily. 42.5 g 3    famotidine (PEPCID) 20 MG tablet Take 1 tablet (20 mg total) by mouth 2 (two) times daily as needed for Heartburn. 60 tablet  "11    FLUoxetine 20 MG capsule Take 1 capsule (20 mg total) by mouth once daily. 30 capsule 2    Lactobac no.41/Bifidobact no.7 (PROBIOTIC-10 ORAL) Take 1 capsule by mouth once daily.      latanoprost 0.005 % ophthalmic solution Place 1 drop into both eyes every evening.      metoprolol tartrate (LOPRESSOR) 25 MG tablet Take 25 mg by mouth 2 (two) times daily.      pravastatin (PRAVACHOL) 20 MG tablet Take 1 tablet (20 mg total) by mouth once daily. 30 tablet 11     No current facility-administered medications on file prior to visit.         Objective Findings:    Vital Signs:  /87   Pulse 81   Ht 5' 5" (1.651 m)   Wt 62.6 kg (138 lb 0.1 oz)   BMI 22.97 kg/m²   Body mass index is 22.97 kg/m².    Physical Exam:  Physical Exam  Vitals and nursing note reviewed.   Constitutional:       General: She is not in acute distress.     Appearance: Normal appearance. She is not ill-appearing.   HENT:      Head: Normocephalic and atraumatic.      Right Ear: External ear normal.      Left Ear: External ear normal.      Nose: Nose normal.   Eyes:      General: No scleral icterus.     Extraocular Movements: Extraocular movements intact.   Cardiovascular:      Rate and Rhythm: Normal rate.   Pulmonary:      Effort: Pulmonary effort is normal. No respiratory distress.   Abdominal:      General: Bowel sounds are normal. There is no distension.      Palpations: Abdomen is soft.      Tenderness: There is no guarding.   Musculoskeletal:         General: Normal range of motion.      Cervical back: Normal range of motion.   Skin:     General: Skin is warm.   Neurological:      Mental Status: She is alert and oriented to person, place, and time.   Psychiatric:         Mood and Affect: Mood normal.         Behavior: Behavior is cooperative.         Thought Content: Thought content normal.             Labs:  Lab Results   Component Value Date    WBC 7.35 10/21/2024    HGB 13.3 10/21/2024    HCT 38.9 10/21/2024     (L) " 10/21/2024    CRP 94.9 (H) 07/23/2022    CHOL 240 (H) 09/16/2024    TRIG 107 09/16/2024    HDL 67 09/16/2024    ALKPHOS 33 (L) 10/21/2024    LIPASE 59 05/27/2022    ALT 11 10/21/2024    AST 21 10/21/2024     (L) 10/21/2024    K 3.5 10/21/2024    CL 96 10/21/2024    CREATININE 0.54 10/21/2024    BUN 9 10/21/2024    CO2 30 (H) 10/21/2024    TSH 2.740 07/10/2024    INR 0.9 05/10/2023    HGBA1C 5.4 07/10/2024       Imaging reviewed:   CT ABDOMEN PELVIS WITHOUT CONTRAST     CLINICAL HISTORY:  Abdominal pain, acute, nonlocalized;     TECHNIQUE:  Low dose axial images, sagittal and coronal reformations were obtained from the lung bases to the pubic symphysis.  Contrast was not administered.     COMPARISON:  February 2024     FINDINGS:  Unenhanced liver pancreas and spleen stable     No hydronephrosis or adverse renal finding unenhanced     No aortic aneurysm     No obstructive or overt inflammatory bowel findings.     Urinary bladder decompressed     Impression:     no acute finding identified        Electronically signed by:Lissy Cheung  Date:                                            10/21/2024      Endoscopy reviewed:   Findings:       The examined esophagus was normal.        Mildly erythematous mucosa was found in the gastric antrum. Biopsies        were taken with a cold forceps for histology. Verification of        patient identification for the specimen was done by the physician,        nurse and technician. Estimated blood loss was minimal.        A few small semi-pedunculated polyps were found in the gastric body.        The polyp was removed with a cold snare. Resection and retrieval        were complete. Verification of patient identification for the        specimen was done by the physician, nurse and technician. Estimated        blood loss was minimal.        The examined duodenum was normal.   Impression:            - Normal esophagus.                          - Erythematous mucosa in the  antrum. Biopsied.                          - A few gastric polyps. Resected and retrieved.                          Endoscopically these are bland benign fundic gland                          polyps which are not of concern.                          - Normal examined duodenum.   Recommendation:        - Discharge patient to home.                          - Patient has a contact number available for                          emergencies. The signs and symptoms of potential                          delayed complications were discussed with the                          patient. Return to normal activities tomorrow.                          Written discharge instructions were provided to                          the patient.                          - Resume previous diet.                          - Continue present medications.                          - Await pathology results.                          - Return to GI clinic PRN.   MD Mary Monteiro MD   7/3/2023 11:56:42 AM     Findings:       Hemorrhoids were found on perianal exam.        There was evidence of a prior end-to-end colo-colonic anastomosis in        the rectum. This was patent and was characterized by healthy        appearing mucosa. The anastomosis was traversed.        Multiple small-mouthed diverticula were found in the entire colon.        The terminal ileum appeared normal.        The exam was otherwise without abnormality on direct and        retroflexion views.   Impression:            - Hemorrhoids found on perianal exam.                          - Patent end-to-end colo-colonic anastomosis,                          characterized by healthy appearing mucosa.                          - Diverticulosis in the entire examined colon.                          - The examined portion of the ileum was normal.                          - The examination was otherwise normal on direct                          and retroflexion views.                           - No specimens collected.   Recommendation:        - Discharge patient to home (ambulatory).                          - Resume previous diet.                          - Continue present medications.                          - Repeat colonoscopy in 10 years for screening                          purposes.   Silvestre Kay MD   12/29/2022 8:55:15 AM       1. Stomach, antrum, biopsy:  - Mild chronic inactive gastritis  - Negative for Helicobacter pylori on H&E stain and immunostain  - Negative for intestinal metaplasia, dysplasia or malignancy    NOTE: Positive control and internal negative control for Helicobacter pylori immunostain were examined and were appropriate.    2. Stomach, body, polypectomy:  - Fundic gland polyp  - Negative for Helicobacter pylori on H&E stain  - Negative for intestinal metaplasia, dysplasia or malignancy           Assessment:  1. Diarrhea, unspecified type    2. Chronic abdominal pain    3. Hospital discharge follow-up      Orders Placed This Encounter    Stool culture    H. pylori antigen, stool    Pancreatic elastase, fecal    Fecal fat, qualitative    WBC, Stool    Calprotectin, Stool    Giardia / Cryptosporidum, EIA    Stool Exam-Ova,Cysts,Parasites     Plan:   Stool studies to assess for possible acute/infectious/inflammatory processes for diarrhea     One teaspoonful of psyllium twice daily. It is used for diarrhea due to its water-holding effect in the intestines that may aid in bulking up the watery stool. It can also bind some toxins that may cause acute diarrhea. Psyllium products combined with laxatives should be avoided.   Documentation of stool output provides a baseline and helps direct replacement fluid therapy. Keep a diary of possible food triggers for diarrhea.   Its necessary to increase fluid intake, especially when experiencing diarrhea. Increased fluid intake and liquid meal replacements can replenish fluid loss.  You may use oral  rehydration solutions or diluted juices, diluted sports drinks, clear broth, or decaffeinated tea to stay hydrated. Sugary, carbonated, caffeinated, or alcoholic drinks can worsen diarrhea.   BRAT diet of bananas, rice, applesauce, and toast is fine. However, return to normal diet as soon as you feel up to it.   Utilize Imodium AD as needed        Thank you for allowing me to participate in this patient's care.    Sincerely,     KARTIK DRIVER, DAVIDE-C  Gastroenterology Department  [facility] Office [phone]

## 2024-11-04 NOTE — PROGRESS NOTES
"GENERAL GI PATIENT INTAKE:    COVID symptoms in the last 7 days (runny nose, sore throat, congestion, cough, fever): No  PCP: Chadd Cuevas  If not PCP-  number given to establish [phone]: No    ALLERGIES REVIEWED:  Yes    CHIEF COMPLAINT:    Chief Complaint   Patient presents with    Abdominal Pain       VITAL SIGNS:  Ht 5' 5" (1.651 m)   Wt 62.6 kg (138 lb 0.1 oz)   BMI 22.97 kg/m²      Change in medical, surgical, family or social history:  reviewed by NP      REVIEWED MEDICATION LIST RECONCILED INCLUDING ABOVE MEDS:  Yes     "

## 2024-11-06 ENCOUNTER — LAB VISIT (OUTPATIENT)
Dept: LAB | Facility: HOSPITAL | Age: 64
End: 2024-11-06
Payer: MEDICARE

## 2024-11-06 DIAGNOSIS — R10.84 GENERALIZED ABDOMINAL PAIN: Chronic | ICD-10-CM

## 2024-11-06 LAB — WBC #/AREA STL HPF: NORMAL /[HPF]

## 2024-11-06 PROCEDURE — 87328 CRYPTOSPORIDIUM AG IA: CPT

## 2024-11-06 PROCEDURE — 89055 LEUKOCYTE ASSESSMENT FECAL: CPT

## 2024-11-06 PROCEDURE — 87045 FECES CULTURE AEROBIC BACT: CPT

## 2024-11-06 PROCEDURE — 82705 FATS/LIPIDS FECES QUAL: CPT

## 2024-11-06 PROCEDURE — 87449 NOS EACH ORGANISM AG IA: CPT

## 2024-11-06 PROCEDURE — 83993 ASSAY FOR CALPROTECTIN FECAL: CPT

## 2024-11-06 PROCEDURE — 87046 STOOL CULTR AEROBIC BACT EA: CPT

## 2024-11-06 PROCEDURE — 82653 EL-1 FECAL QUANTITATIVE: CPT

## 2024-11-06 PROCEDURE — 87427 SHIGA-LIKE TOXIN AG IA: CPT | Mod: 59

## 2024-11-06 PROCEDURE — 87177 OVA AND PARASITES SMEARS: CPT

## 2024-11-06 PROCEDURE — 87338 HPYLORI STOOL AG IA: CPT

## 2024-11-07 LAB
CRYPTOSP AG STL QL IA: NEGATIVE
G LAMBLIA AG STL QL IA: NEGATIVE

## 2024-11-08 PROBLEM — K50.10 SEGMENTAL COLITIS ASSOCIATED WITH DIVERTICULOSIS: Status: RESOLVED | Noted: 2022-06-14 | Resolved: 2024-11-08

## 2024-11-08 PROBLEM — K57.30 SEGMENTAL COLITIS ASSOCIATED WITH DIVERTICULOSIS: Status: RESOLVED | Noted: 2022-06-14 | Resolved: 2024-11-08

## 2024-11-08 LAB
E COLI SXT1 STL QL IA: NEGATIVE
E COLI SXT2 STL QL IA: NEGATIVE
FAT STL QL: NORMAL
NEUTRAL FAT STL QL: NORMAL

## 2024-11-09 LAB
BACTERIA STL CULT: NORMAL
ELASTASE 1, FECAL: 437 MCG/G

## 2024-11-11 DIAGNOSIS — J30.9 ALLERGIC RHINITIS, UNSPECIFIED SEASONALITY, UNSPECIFIED TRIGGER: ICD-10-CM

## 2024-11-11 LAB — CALPROTECTIN STL-MCNT: 10.7 MCG/G

## 2024-11-11 RX ORDER — CETIRIZINE HYDROCHLORIDE 10 MG/1
10 TABLET ORAL 2 TIMES DAILY PRN
Qty: 60 TABLET | Refills: 0 | Status: SHIPPED | OUTPATIENT
Start: 2024-11-11 | End: 2024-11-14 | Stop reason: SDUPTHER

## 2024-11-12 LAB — O+P STL MICRO: NORMAL

## 2024-11-14 ENCOUNTER — OFFICE VISIT (OUTPATIENT)
Dept: FAMILY MEDICINE | Facility: HOSPITAL | Age: 64
End: 2024-11-14
Payer: MEDICARE

## 2024-11-14 VITALS
WEIGHT: 135.38 LBS | DIASTOLIC BLOOD PRESSURE: 67 MMHG | OXYGEN SATURATION: 98 % | HEIGHT: 64 IN | HEART RATE: 85 BPM | SYSTOLIC BLOOD PRESSURE: 102 MMHG | BODY MASS INDEX: 23.11 KG/M2

## 2024-11-14 DIAGNOSIS — J30.9 ALLERGIC RHINITIS, UNSPECIFIED SEASONALITY, UNSPECIFIED TRIGGER: ICD-10-CM

## 2024-11-14 DIAGNOSIS — R09.89 UPPER RESPIRATORY SYMPTOM: ICD-10-CM

## 2024-11-14 PROCEDURE — 99214 OFFICE O/P EST MOD 30 MIN: CPT

## 2024-11-14 RX ORDER — CETIRIZINE HYDROCHLORIDE 10 MG/1
10 TABLET ORAL DAILY PRN
Qty: 60 TABLET | Refills: 0 | Status: SHIPPED | OUTPATIENT
Start: 2024-11-14 | End: 2025-01-13

## 2024-11-14 RX ORDER — MIRTAZAPINE 30 MG/1
30 TABLET, FILM COATED ORAL NIGHTLY
COMMUNITY

## 2024-11-14 NOTE — PROGRESS NOTES
[facility]    Subjective:     Ashli Kumar is a 64 y.o. year old female with PMHx of bipolar disorder, HFpEF, GERD who presents to clinic for     Cough  Ongoing for 1 week, no fevers or sick contacts  Patient endorsing scratchy throat, post nasal drip, decreased taste, watery eyes  Previously taking zyrtec, ran out    Weight loss:  Patient with about 20lb weight loss over past 3 months  Endorses decreased taste during this time frame  Did have Covid in August    Patient Active Problem List    Diagnosis Date Noted    Hammer toe 06/11/2024    Heel pain 06/11/2024    Allergic rhinitis 04/12/2024    Hyperlipidemia 04/12/2024    Decreased range of motion of both knees 06/22/2023    Impaired strength of lower extremity 06/22/2023    Osteoarthritis 06/20/2023    Gastroesophageal reflux disease 05/12/2023    Irregular bowel habits 05/12/2023    Posture imbalance 03/14/2023    Decreased range of motion of left shoulder 03/14/2023    Weakness of shoulder 03/14/2023    Mental health problem 01/02/2023    Diverticular stricture 07/20/2022    Lower abdominal pain 05/29/2022    Rectal bleeding 05/27/2022    Bipolar affective disorder, currently depressed, moderate 05/27/2022    Chronic heart failure with preserved ejection fraction 05/13/2019    Obesity due to excess calories 05/13/2019    Angina, class III 05/01/2019    Screening for colorectal cancer 05/12/2017        Review of patient's allergies indicates:   Allergen Reactions    Codeine Hives    Sulfa (sulfonamide antibiotics) Hives    Benzoate analogues Itching        Past Medical History:   Diagnosis Date    Allergies     Anxiety     Arthritis     CHF (congestive heart failure)     Depression     Diverticular disease of large intestine without perforation or abscess     GERD (gastroesophageal reflux disease)     High cholesterol     History of fainting     Hypertension     Shortness of breath     states sometimes gets short winded    SOB (shortness of  breath) 2019    Unspecified chronic bronchitis     not chronic episode this year-    Unspecified glaucoma       Past Surgical History:   Procedure Laterality Date    BILATERAL TUBAL LIGATION      BREAST BIOPSY Left     h    BREAST SURGERY       SECTION      COLONOSCOPY N/A 2017    Procedure: COLONOSCOPY Miralax;  Surgeon: Buddy Butcher Jr., MD;  Location: [facility] ENDO;  Service: Endoscopy;  Laterality: N/A;    COLONOSCOPY N/A 2022    Procedure: COLONOSCOPY;  Surgeon: TREV Kay MD;  Location: [facility] ENDO;  Service: Endoscopy;  Laterality: N/A;    COLONOSCOPY N/A 2022    Procedure: COLONOSCOPY;  Surgeon: TREV Kay MD;  Location: [facility] ENDO;  Service: Endoscopy;  Laterality: N/A;    ESOPHAGOGASTRODUODENOSCOPY N/A 7/3/2023    Procedure: EGD (ESOPHAGOGASTRODUODENOSCOPY);  Surgeon: Mary Cotton MD;  Location: [facility] ENDO;  Service: Endoscopy;  Laterality: N/A;    EYE SURGERY Bilateral     cataract removal    FLEXIBLE SIGMOIDOSCOPY N/A 2022    Procedure: SIGMOIDOSCOPY, FLEXIBLE;  Surgeon: TREV Kay MD;  [facility];  Service: Colon and Rectal;  Laterality: N/A;    HYSTERECTOMY      KIDNEY SURGERY Right     done at [facility], reported as mass by patient    LAPAROSCOPIC SIGMOIDECTOMY Left 2022    Procedure: COLECTOMY, SIGMOID, LAPAROSCOPIC, ERAS low;  Surgeon: TREV Kay MD;  Location: [facility] OR [facility] FLR;  Service: Colon and Rectal;  Laterality: Left;    LEFT HEART CATHETERIZATION Left 2019    Procedure: Left heart cath;  Surgeon: Gerhard Krishnan MD;  Location: [facility] CATH LAB;  Service: Cardiology;  Laterality: Left;    MOBILIZATION OF SPLENIC FLEXURE N/A 2022    Procedure: MOBILIZATION, SPLENIC FLEXURE;  Surgeon: TREV Kay MD;  Location: [facility] OR 2ND FLR;  Service: Colon and Rectal;  Laterality: N/A;    OOPHORECTOMY      s-section        Family History   Problem Relation Name Age of Onset     "Colon cancer Neg Hx      Esophageal cancer Neg Hx        Social History     Tobacco Use    Smoking status: Never    Smokeless tobacco: Never   Substance Use Topics    Alcohol use: Not Currently     Comment: 12-31-22      Review of Systems   Constitutional:  Positive for weight loss. Negative for chills and fever.   Respiratory:  Positive for cough. Negative for shortness of breath.    Cardiovascular:  Negative for chest pain.   Gastrointestinal:  Negative for nausea and vomiting.     Objective:     Vitals:    11/14/24 1124   BP: 102/67   BP Location: Left arm   Patient Position: Sitting   Pulse: 85   SpO2: 98%   Weight: 61.4 kg (135 lb 5.8 oz)   Height: 5' 4" (1.626 m)     Physical Exam  Constitutional:       General: She is not in acute distress.     Appearance: She is not toxic-appearing.   HENT:      Head: Normocephalic and atraumatic.      Right Ear: Ear canal and external ear normal.      Left Ear: Ear canal and external ear normal.      Nose: Nose normal. No congestion or rhinorrhea.      Mouth/Throat:      Mouth: Mucous membranes are moist.      Pharynx: No oropharyngeal exudate or posterior oropharyngeal erythema.   Eyes:      General:         Right eye: No discharge.         Left eye: No discharge.      Extraocular Movements: Extraocular movements intact.   Cardiovascular:      Pulses: Normal pulses.      Heart sounds: Normal heart sounds.   Pulmonary:      Effort: Pulmonary effort is normal. No respiratory distress.      Breath sounds: Normal breath sounds. No wheezing or rales.   Musculoskeletal:         General: Normal range of motion.      Cervical back: Normal range of motion.   Neurological:      Mental Status: She is alert.       Assessment/Plan:     Ashli Kumar is a 64 y.o. year old female who presents to clinic for     1. Upper respiratory symptom  Given weight loss and current symptoms will get CXR  - X-Ray Chest PA And Lateral; Future    2. Allergic rhinitis, unspecified seasonality, " unspecified trigger  Patient with postnasal drip, watery eyes, itching ears, symptoms most likely allergic rhinitis  Will represcribe zyrtec and encouraged continued flonase usage  - cetirizine (ZYRTEC) 10 MG tablet; Take 1 tablet (10 mg total) by mouth daily as needed for Allergies.  Dispense: 60 tablet; Refill: 0    Weight loss:  Colonoscopy and mammogram up to date  Patient does not smoke, has family history of uterine cancer, but is s/p hysterectomy  Symptoms ongoing for few months, possibly 2/2 previous covid infection decreasing sensation of taste  Encouraged PO intake and trial of protein shakes  Will get CXR for continued evaluation  Will have patient back in 2 weeks and follow up more on weight then.    Follow-up:2 weeks    ________________________  Chadd Cuevas Jr, MD  [facility] PGY-2

## 2024-11-22 ENCOUNTER — HOSPITAL ENCOUNTER (EMERGENCY)
Facility: HOSPITAL | Age: 64
Discharge: HOME | End: 2024-11-22
Attending: EMERGENCY MEDICINE
Payer: MEDICARE

## 2024-11-22 VITALS
WEIGHT: 135 LBS | HEIGHT: 65 IN | DIASTOLIC BLOOD PRESSURE: 58 MMHG | TEMPERATURE: 98 F | BODY MASS INDEX: 22.49 KG/M2 | HEART RATE: 60 BPM | OXYGEN SATURATION: 100 % | SYSTOLIC BLOOD PRESSURE: 127 MMHG | RESPIRATION RATE: 16 BRPM

## 2024-11-22 DIAGNOSIS — R53.83 FATIGUE, UNSPECIFIED TYPE: Primary | ICD-10-CM

## 2024-11-22 DIAGNOSIS — R05.9 COUGH: ICD-10-CM

## 2024-11-22 LAB
ALBUMIN SERPL BCP-MCNC: 3.7 G/DL (ref 3.5–5.2)
ALP SERPL-CCNC: 38 U/L (ref 38–126)
ALT SERPL W/O P-5'-P-CCNC: 14 U/L (ref 10–44)
ANION GAP SERPL CALC-SCNC: 8 MMOL/L (ref 8–16)
AST SERPL-CCNC: 28 U/L (ref 15–46)
BASOPHILS # BLD AUTO: 0.02 K/UL (ref 0–0.2)
BASOPHILS NFR BLD: 0.3 % (ref 0–1.9)
BILIRUB SERPL-MCNC: 0.5 MG/DL (ref 0.1–1)
BILIRUB UR QL STRIP: ABNORMAL
CALCIUM SERPL-MCNC: 9.2 MG/DL (ref 8.7–10.5)
CHLORIDE SERPL-SCNC: 97 MMOL/L (ref 95–110)
CLARITY UR REFRACT.AUTO: ABNORMAL
CO2 SERPL-SCNC: 30 MMOL/L (ref 23–29)
COLOR UR AUTO: YELLOW
CREAT SERPL-MCNC: 0.52 MG/DL (ref 0.5–1.4)
DIFFERENTIAL METHOD BLD: ABNORMAL
EOSINOPHIL # BLD AUTO: 0 K/UL (ref 0–0.5)
EOSINOPHIL NFR BLD: 0.3 % (ref 0–8)
ERYTHROCYTE [DISTWIDTH] IN BLOOD BY AUTOMATED COUNT: 12.5 % (ref 11.5–14.5)
EST. GFR  (NO RACE VARIABLE): >60 ML/MIN/1.73 M^2
GLUCOSE SERPL-MCNC: 93 MG/DL (ref 70–110)
GLUCOSE UR QL STRIP: ABNORMAL
HCT VFR BLD AUTO: 37.8 % (ref 37–48.5)
HGB BLD-MCNC: 12.5 G/DL (ref 12–16)
HGB UR QL STRIP: ABNORMAL
IMM GRANULOCYTES # BLD AUTO: 0.04 K/UL (ref 0–0.04)
IMM GRANULOCYTES NFR BLD AUTO: 0.6 % (ref 0–0.5)
INFLUENZA A, MOLECULAR: NEGATIVE
INFLUENZA B, MOLECULAR: NEGATIVE
KETONES UR QL STRIP: ABNORMAL
LEUKOCYTE ESTERASE UR QL STRIP: NEGATIVE
LYMPHOCYTES # BLD AUTO: 2.1 K/UL (ref 1–4.8)
LYMPHOCYTES NFR BLD: 33.5 % (ref 18–48)
MAGNESIUM SERPL-MCNC: 1.7 MG/DL (ref 1.6–2.6)
MCH RBC QN AUTO: 31.5 PG (ref 27–31)
MCHC RBC AUTO-ENTMCNC: 33.1 G/DL (ref 32–36)
MCV RBC AUTO: 95 FL (ref 82–98)
MONOCYTES # BLD AUTO: 0.7 K/UL (ref 0.3–1)
MONOCYTES NFR BLD: 10.5 % (ref 4–15)
NEUTROPHILS # BLD AUTO: 3.5 K/UL (ref 1.8–7.7)
NEUTROPHILS NFR BLD: 54.8 % (ref 38–73)
NITRITE UR QL STRIP: NEGATIVE
NRBC BLD-RTO: 0 /100 WBC
PH UR STRIP: 8 [PH] (ref 5–8)
PLATELET # BLD AUTO: 183 K/UL (ref 150–450)
PMV BLD AUTO: 11.6 FL (ref 9.2–12.9)
POCT GLUCOSE: 98 MG/DL (ref 70–110)
POTASSIUM SERPL-SCNC: 4 MMOL/L (ref 3.5–5.1)
PROT SERPL-MCNC: 6.8 G/DL (ref 6–8.4)
PROT UR QL STRIP: ABNORMAL
RBC # BLD AUTO: 3.97 M/UL (ref 4–5.4)
SARS-COV-2 RDRP RESP QL NAA+PROBE: NEGATIVE
SODIUM SERPL-SCNC: 135 MMOL/L (ref 136–145)
SP GR UR STRIP: 1.02 (ref 1–1.03)
SPECIMEN SOURCE: NORMAL
TSH SERPL DL<=0.005 MIU/L-ACNC: 3.11 UIU/ML (ref 0.4–4)
URN SPEC COLLECT METH UR: ABNORMAL
UROBILINOGEN UR STRIP-ACNC: ABNORMAL EU/DL
UUN UR-MCNC: 11 MG/DL (ref 7–17)
WBC # BLD AUTO: 6.3 K/UL (ref 3.9–12.7)

## 2024-11-22 PROCEDURE — 81003 URINALYSIS AUTO W/O SCOPE: CPT | Mod: ER

## 2024-11-22 PROCEDURE — 99284 EMERGENCY DEPT VISIT MOD MDM: CPT | Mod: 25,ER

## 2024-11-22 PROCEDURE — 83735 ASSAY OF MAGNESIUM: CPT | Mod: ER

## 2024-11-22 PROCEDURE — 80053 COMPREHEN METABOLIC PANEL: CPT | Mod: ER

## 2024-11-22 PROCEDURE — 84443 ASSAY THYROID STIM HORMONE: CPT | Mod: ER

## 2024-11-22 PROCEDURE — 85025 COMPLETE CBC W/AUTO DIFF WBC: CPT | Mod: ER

## 2024-11-22 PROCEDURE — 87502 INFLUENZA DNA AMP PROBE: CPT | Mod: ER

## 2024-11-22 PROCEDURE — 82962 GLUCOSE BLOOD TEST: CPT | Mod: ER

## 2024-11-22 PROCEDURE — 87635 SARS-COV-2 COVID-19 AMP PRB: CPT | Mod: ER

## 2024-11-22 RX ORDER — CETIRIZINE HYDROCHLORIDE 10 MG/1
10 TABLET ORAL DAILY
Qty: 30 TABLET | Refills: 0 | Status: SHIPPED | OUTPATIENT
Start: 2024-11-22 | End: 2025-11-22

## 2024-11-22 NOTE — DISCHARGE INSTRUCTIONS
Ms. Kumar,     Thank you for letting me care for you today! It was nice meeting you, and I hope you feel better soon.   If you would like access to your chart and what was done today please utilize the [facility].   Please don't hesitate to return if your symptoms worsen or you develop any other worrisome symptoms.    Our goal in the emergency department is to always give you outstanding care and exceptional service. You may receive a survey by mail or e-mail in the next week regarding your experience in our ED. We would greatly appreciate you completing and returning the survey. Your feedback provides us with a way to recognize our staff who give very good care and it helps us learn how to improve when your experience was below our aspiration of excellence.     Sincerely,    Sheryl Sheridan PA-C  Emergency Department Physician Assistant  [facility] [place]

## 2024-11-22 NOTE — ED PROVIDER NOTES
"Encounter Date: 2024       History     Chief Complaint   Patient presents with    Fatigue     Generalized weakness and loss of appetite x a couple of weeks     64-year-old female with past medical history of CHF, depression, GERD, anxiety, arthritis, hyperlipidemia presents to the ED for further evaluation of feeling fatigued x few weeks. Patient was seen by her PCP on  for an URI. X-ray of the chest ordered but not yet performed for further evaluation of cough and reported weight loss. States feeling tired. Has tried OTC Pediasure however states " drinking it, makes me want to throw up." She is able to tolerate p.o but does not have an appetite.  Reports weight loss, unsure for how long and how much. Pt denies chest pain, shortness of breath, abdominal pain, nausea, vomiting.  Patient denies blood in her stool. Notes a cough x few weeks. Denies hemoptysis. States she suffers from allergies. Denies hx of cancer. No fever, chills. No other acute complaints today.    The history is provided by the patient.     Review of patient's allergies indicates:   Allergen Reactions    Codeine Hives    Sulfa (sulfonamide antibiotics) Hives    Benzoate analogues Itching     Past Medical History:   Diagnosis Date    Allergies     Anxiety     Arthritis     CHF (congestive heart failure)     Depression     Diverticular disease of large intestine without perforation or abscess     GERD (gastroesophageal reflux disease)     High cholesterol     History of fainting     Hypertension     Shortness of breath     states sometimes gets short winded    SOB (shortness of breath) 2019    Unspecified chronic bronchitis     not chronic episode this year-    Unspecified glaucoma      Past Surgical History:   Procedure Laterality Date    BILATERAL TUBAL LIGATION      BREAST BIOPSY Left     rph    BREAST SURGERY       SECTION      COLONOSCOPY N/A 2017    Procedure: COLONOSCOPY Miralax;  Surgeon: Buddy Butcher " MD Heavenly;  Location: [facility] ENDO;  Service: Endoscopy;  Laterality: N/A;    COLONOSCOPY N/A 06/30/2022    Procedure: COLONOSCOPY;  Surgeon: TREV Kay MD;  Location: [facility] ENDO;  Service: Endoscopy;  Laterality: N/A;    COLONOSCOPY N/A 12/29/2022    Procedure: COLONOSCOPY;  Surgeon: TREV Kay MD;  Location: [facility] ENDO;  Service: Endoscopy;  Laterality: N/A;    ESOPHAGOGASTRODUODENOSCOPY N/A 7/3/2023    Procedure: EGD (ESOPHAGOGASTRODUODENOSCOPY);  Surgeon: Mary Cotton MD;  Location: [facility] ENDO;  Service: Endoscopy;  Laterality: N/A;    EYE SURGERY Bilateral     cataract removal    FLEXIBLE SIGMOIDOSCOPY N/A 07/20/2022    Procedure: SIGMOIDOSCOPY, FLEXIBLE;  Surgeon: TREV Kay MD;  [facility];  Service: Colon and Rectal;  Laterality: N/A;    HYSTERECTOMY      KIDNEY SURGERY Right     done at [facility], reported as mass by patient    LAPAROSCOPIC SIGMOIDECTOMY Left 07/20/2022    Procedure: COLECTOMY, SIGMOID, LAPAROSCOPIC, ERAS low;  Surgeon: TREV Kay MD;  Location: [facility] OR [facility] FLR;  Service: Colon and Rectal;  Laterality: Left;    LEFT HEART CATHETERIZATION Left 05/13/2019    Procedure: Left heart cath;  Surgeon: Gerhard Krishnan MD;  Location: [facility] CATH LAB;  Service: Cardiology;  Laterality: Left;    MOBILIZATION OF SPLENIC FLEXURE N/A 07/20/2022    Procedure: MOBILIZATION, SPLENIC FLEXURE;  Surgeon: TREV Kay MD;  Location: [facility] OR 2ND FLR;  Service: Colon and Rectal;  Laterality: N/A;    OOPHORECTOMY      s-section       Family History   Problem Relation Name Age of Onset    Colon cancer Neg Hx      Esophageal cancer Neg Hx       Social History     Tobacco Use    Smoking status: Never    Smokeless tobacco: Never   Substance Use Topics    Alcohol use: Not Currently     Comment: 12-31-22    Drug use: No     Review of Systems   Constitutional:  Positive for appetite change and fatigue. Negative for chills and fever.   Respiratory:  Positive  for cough.    Cardiovascular:  Negative for chest pain.   Gastrointestinal:  Negative for abdominal distention, abdominal pain, nausea and vomiting.   Genitourinary:  Negative for dysuria and frequency.   Musculoskeletal:  Negative for neck pain and neck stiffness.       Physical Exam     Initial Vitals [11/22/24 1038]   BP Pulse Resp Temp SpO2   134/80 102 18 98.1 °F (36.7 °C) 98 %      MAP       --         Physical Exam    Vitals reviewed.  Constitutional: She appears well-developed. She is not diaphoretic. No distress.   Frail appearing.  Alert and oriented x3.  No acute distress.  Speaking in full sentences.   HENT:   Head: Normocephalic and atraumatic.   Right Ear: External ear normal.   No oral mucosa and conjunctival paleness   Neck: Neck supple.   Cardiovascular:  Normal rate and normal heart sounds.           Pulmonary/Chest: Breath sounds normal. No respiratory distress. She has no wheezes.   Abdominal: Abdomen is soft. Bowel sounds are normal. She exhibits no distension. There is no abdominal tenderness.   Musculoskeletal:      Cervical back: Neck supple.     Neurological: She is alert and oriented to person, place, and time. GCS score is 15. GCS eye subscore is 4. GCS verbal subscore is 5. GCS motor subscore is 6.   Skin: Skin is warm. Capillary refill takes less than 2 seconds.   Psychiatric: She has a normal mood and affect. Her behavior is normal. Judgment and thought content normal.         ED Course   Procedures  Labs Reviewed   CBC W/ AUTO DIFFERENTIAL - Abnormal       Result Value    WBC 6.30      RBC 3.97 (*)     Hemoglobin 12.5      Hematocrit 37.8      MCV 95      MCH 31.5 (*)     MCHC 33.1      RDW 12.5      Platelets 183      MPV 11.6      Immature Granulocytes 0.6 (*)     Gran # (ANC) 3.5      Immature Grans (Abs) 0.04      Lymph # 2.1      Mono # 0.7      Eos # 0.0      Baso # 0.02      nRBC 0      Gran % 54.8      Lymph % 33.5      Mono % 10.5      Eosinophil % 0.3      Basophil % 0.3       Differential Method Automated     COMPREHENSIVE METABOLIC PANEL - Abnormal    Sodium 135 (*)     Potassium 4.0      Chloride 97      CO2 30 (*)     Glucose 93      BUN 11      Creatinine 0.52      Calcium 9.2      Total Protein 6.8      Albumin 3.7      Total Bilirubin 0.5      Alkaline Phosphatase 38      AST 28      ALT 14      Anion Gap 8      eGFR >60.0     URINALYSIS, REFLEX TO URINE CULTURE - Abnormal    Specimen UA Urine, Clean Catch      Color, UA Yellow      Appearance, UA Hazy (*)     pH, UA 8.0      Specific Gravity, UA 1.020      Protein, UA Trace (*)     Glucose, UA Trace (*)     Ketones, UA 1+ (*)     Bilirubin (UA) 1+ (*)     Occult Blood UA Trace (*)     Nitrite, UA Negative      Urobilinogen, UA 2.0-3.0 (*)     Leukocytes, UA Negative      Narrative:     Preferred Collection Type->Urine, Clean Catch  Specimen Source->Urine   INFLUENZA A & B BY MOLECULAR    Influenza A, Molecular Negative      Influenza B, Molecular Negative      Flu A & B Source Nasal swab     MAGNESIUM    Magnesium 1.7     TSH    TSH 3.110     SARS-COV-2 RNA AMPLIFICATION, QUAL    SARS-CoV-2 RNA, Amplification, Qual Negative     POCT GLUCOSE    POCT Glucose 98     POCT GLUCOSE MONITORING CONTINUOUS          Imaging Results              X-Ray Chest 1 View (Final result)  Result time 11/22/24 12:56:22      Final result by Arnoldo Oconnor MD (11/22/24 12:56:22)                   Impression:      No acute process seen.      Electronically signed by: Arnoldo Oconnor MD  Date:    11/22/2024  Time:    12:56               Narrative:    EXAMINATION:  XR CHEST 1 VIEW    CLINICAL HISTORY:  Cough, unspecified    FINDINGS:  Single view of the chest.  Comparison 04/23/2023    Cardiac silhouette is normal.  The lungs demonstrate no evidence of active disease.  No evidence of pleural effusion or pneumothorax.  Bones appear intact.                                       Medications - No data to display  Medical Decision Making  Differential  Diagnosis includes, but is not limited to:  Sepsis, meningitis, cavernous sinus thrombosis, nasal foreign body, otitis media/external, nasal polyp, bacterial sinusitis, allergic rhinitis, influenza, bacterial/viral pharyngitis, peritonsillar abscess, retropharyngeal abscess, bacterial/viral pneumonia.    ED management     64-year-old female with past medical history of CHF, depression, GERD, anxiety, arthritis, hyperlipidemia presents to the ED for further evaluation of feeling fatigued x few weeks. Patient is not toxic appearing, hemodynamically stable and resting comfortably on bed. Patient is well-appearing.  Awake and alert.  Afebrile with vitals WNL. No distress on exam. Today's labs unremarkable. CMP without evidence of electrolyte abnormalities. UA without evidence of UTI.  Chest x-ray without evidence of acute cardiopulmonary abnormalities. Pt denies pain. No systemic symptoms, non-septic, otherwise labs are reassuring. Based on history and physical exam, symptoms have been an ongoing medical problem. I will send refill as per patient's request and I advised to continue proper oral hydration and follow-up with PCP within 1 week for further evaluation and care. Otherwise, I do not suspect any urgent or emergent medical condition that needs to be addressed today in the ED. Pt stable throughout ED visit.     I have discussed the specifics of the workup with the patient and the patient has verbalized understanding of the details of the workup, the diagnosis, the treatment plan, and the need for outpatient follow-up with PCP. ED precautions given. Discussed with pt about returning to the ED, if symptoms fail to improve or worsen.     RESULTS:  Documented in ED course.   Labs/ekg interpreted by myself     Voice recognition software utilized in this note. Typographical and content errors may occur with this process. While efforts are made to detect and correct such errors, in some cases errors will persist. For  this reason, wording in this document should be considered in the proper context and not strictly verbatim.       Amount and/or Complexity of Data Reviewed  Labs: ordered. Decision-making details documented in ED Course.  Radiology: ordered. Decision-making details documented in ED Course.    Risk  OTC drugs.               ED Course as of 11/22/24 1401   Fri Nov 22, 2024   1121 BP: 134/80 [NW]   1121 Temp: 98.1 °F (36.7 °C) [NW]   1121 Pulse: 102 [NW]   1121 Resp: 18 [NW]   1121 SpO2: 98 % [NW]   1122 POCT Glucose: 98 [NW]   1142 Magnesium : 1.7  WNL [NW]   1142 Comprehensive metabolic panel(!)  Grossly unremarkable. [NW]   1238 TSH: 3.110  WNL [NW]   1246 Per PCP note on 11/ 14:  1. Upper respiratory symptom  · Given weight loss and current symptoms will get CXR  - X-Ray Chest PA And Lateral; Future     2. Allergic rhinitis, unspecified seasonality, unspecified trigger  · Patient with postnasal drip, watery eyes, itching ears, symptoms most likely allergic rhinitis  · Will represcribe zyrtec and encouraged continued flonase usage  - cetirizine (ZYRTEC) 10 MG tablet; Take 1 tablet (10 mg total) by mouth daily as needed for Allergies.  Dispense: 60 tablet; Refill: 0     Weight loss:  · Colonoscopy and mammogram up to date  · Patient does not smoke, has family history of uterine cancer, but is s/p hysterectomy  · Symptoms ongoing for few months, possibly 2/2 previous covid infection decreasing sensation of taste  · Encouraged PO intake and trial of protein shakes  · Will get CXR for continued evaluation  · Will have patient back in 2 weeks and follow up more on weight then.      [NW]   1303 X-Ray Chest 1 View  FINDINGS:  Single view of the chest.  Comparison 04/23/2023     Cardiac silhouette is normal.  The lungs demonstrate no evidence of active disease.  No evidence of pleural effusion or pneumothorax.  Bones appear intact.     Impression:     No acute process seen.      [NW]   1316 SARS-CoV-2 RNA, Amplification,  Qual: Negative [NW]      ED Course User Index  [NW] Sheryl Sheridan PA-C                           Clinical Impression:  Final diagnoses:  [R05.9] Cough  [R53.83] Fatigue, unspecified type (Primary)          ED Disposition Condition    Discharge Stable          ED Prescriptions       Medication Sig Dispense Start Date End Date Auth. Provider    cetirizine (ZYRTEC) 10 MG tablet Take 1 tablet (10 mg total) by mouth once daily. 30 tablet 11/22/2024 11/22/2025 Sheryl Sheridan PA-C          Follow-up Information       Follow up With Specialties Details Why Contact Info    Chadd Cuevas MD Family Medicine Schedule an appointment as soon as possible for a visit in 3 days As needed, If symptoms worsen [address]  [place] [address] [place] [id number]  [phone]               Sheryl Sheridan PA-C  11/22/24 8225

## 2024-12-06 ENCOUNTER — HOSPITAL ENCOUNTER (OUTPATIENT)
Dept: RADIOLOGY | Facility: HOSPITAL | Age: 64
Discharge: HOME | End: 2024-12-06
Payer: MEDICARE

## 2024-12-06 DIAGNOSIS — Z12.31 ENCOUNTER FOR SCREENING MAMMOGRAM FOR MALIGNANT NEOPLASM OF BREAST: ICD-10-CM

## 2024-12-06 PROCEDURE — 77063 BREAST TOMOSYNTHESIS BI: CPT | Mod: TC

## 2024-12-06 PROCEDURE — 77063 BREAST TOMOSYNTHESIS BI: CPT | Mod: 26,,, | Performed by: RADIOLOGY

## 2024-12-06 PROCEDURE — 77067 SCR MAMMO BI INCL CAD: CPT | Mod: 26,,, | Performed by: RADIOLOGY

## 2025-01-09 ENCOUNTER — OFFICE VISIT (OUTPATIENT)
Dept: FAMILY MEDICINE | Facility: HOSPITAL | Age: 65
End: 2025-01-09
Payer: MEDICARE

## 2025-01-09 VITALS
SYSTOLIC BLOOD PRESSURE: 138 MMHG | HEIGHT: 65 IN | BODY MASS INDEX: 21.16 KG/M2 | HEART RATE: 88 BPM | OXYGEN SATURATION: 92 % | DIASTOLIC BLOOD PRESSURE: 76 MMHG | WEIGHT: 127 LBS

## 2025-01-09 DIAGNOSIS — R63.4 WEIGHT LOSS, UNINTENTIONAL: Primary | ICD-10-CM

## 2025-01-09 DIAGNOSIS — R19.7 DIARRHEA, UNSPECIFIED TYPE: ICD-10-CM

## 2025-01-09 DIAGNOSIS — K08.9 POOR DENTITION: ICD-10-CM

## 2025-01-09 PROCEDURE — 99214 OFFICE O/P EST MOD 30 MIN: CPT

## 2025-01-09 NOTE — PATIENT INSTRUCTIONS
One teaspoonful of psyllium twice daily. It is used for diarrhea due to its water-holding effect in the intestines that may aid in bulking up the watery stool. It can also bind some toxins that may cause acute diarrhea. Psyllium products combined with laxatives should be avoided.   Documentation of stool output provides a baseline and helps direct replacement fluid therapy. Keep a diary of possible food triggers for diarrhea.   Its necessary to increase fluid intake, especially when experiencing diarrhea. Increased fluid intake and liquid meal replacements can replenish fluid loss.  You may use oral rehydration solutions or diluted juices, diluted sports drinks, clear broth, or decaffeinated tea to stay hydrated. Sugary, carbonated, caffeinated, or alcoholic drinks can worsen diarrhea.   BRAT diet of bananas, rice, applesauce, and toast is fine. However, return to normal diet as soon as you feel up to it.   Utilize Imodium AD as needed

## 2025-01-09 NOTE — PROGRESS NOTES
[facility]    Subjective:     Ashli Kumar is a 64 y.o. year old female with PMHx of Bipolar, Heart failure, GERD who presents to clinic for     Weight loss:  - Per chart review, patient with steady weight decline, at least 40lbs since 07/2024  - Feels likes certain foods can cause her to have increased BM  - Patient states she can have some tenderness in maxilla when chewing meats  - Protein shakes make stomach upset   - Eating about 1 meal per day  - Doesn't feel that anything helps with her symptoms, has not found any foods that she tolerates  - Can get hungry  - No vomiting    Patient Active Problem List    Diagnosis Date Noted    Hammer toe 06/11/2024    Heel pain 06/11/2024    Allergic rhinitis 04/12/2024    Hyperlipidemia 04/12/2024    Decreased range of motion of both knees 06/22/2023    Impaired strength of lower extremity 06/22/2023    Osteoarthritis 06/20/2023    Gastroesophageal reflux disease 05/12/2023    Irregular bowel habits 05/12/2023    Posture imbalance 03/14/2023    Decreased range of motion of left shoulder 03/14/2023    Weakness of shoulder 03/14/2023    Mental health problem 01/02/2023    Diverticular stricture 07/20/2022    Lower abdominal pain 05/29/2022    Rectal bleeding 05/27/2022    Bipolar affective disorder, currently depressed, moderate 05/27/2022    Chronic heart failure with preserved ejection fraction 05/13/2019    Obesity due to excess calories 05/13/2019    Angina, class III 05/01/2019    Screening for colorectal cancer 05/12/2017        Review of patient's allergies indicates:   Allergen Reactions    Codeine Hives    Sulfa (sulfonamide antibiotics) Hives    Benzoate analogues Itching        Past Medical History:   Diagnosis Date    Allergies     Anxiety     Arthritis     CHF (congestive heart failure)     Depression     Diverticular disease of large intestine without perforation or abscess     GERD (gastroesophageal reflux disease)     High cholesterol      History of fainting     Hypertension     Shortness of breath     states sometimes gets short winded    SOB (shortness of breath) 2019    Unspecified chronic bronchitis     not chronic episode this year-    Unspecified glaucoma       Past Surgical History:   Procedure Laterality Date    BILATERAL TUBAL LIGATION      BREAST BIOPSY Left     [facility]    BREAST SURGERY       SECTION      COLONOSCOPY N/A 2017    Procedure: COLONOSCOPY Miralax;  Surgeon: Buddy Butcher Jr., MD;  Location: [facility] ENDO;  Service: Endoscopy;  Laterality: N/A;    COLONOSCOPY N/A 2022    Procedure: COLONOSCOPY;  Surgeon: TREV Kay MD;  Location: [facility] ENDO;  Service: Endoscopy;  Laterality: N/A;    COLONOSCOPY N/A 2022    Procedure: COLONOSCOPY;  Surgeon: TREV Kay MD;  Location: [facility];  Service: Endoscopy;  Laterality: N/A;    ESOPHAGOGASTRODUODENOSCOPY N/A 7/3/2023    Procedure: EGD (ESOPHAGOGASTRODUODENOSCOPY);  Surgeon: Mary Cotton MD;  Location: [facility];  Service: Endoscopy;  Laterality: N/A;    EYE SURGERY Bilateral     cataract removal    FLEXIBLE SIGMOIDOSCOPY N/A 2022    Procedure: SIGMOIDOSCOPY, FLEXIBLE;  Surgeon: TREV Kay MD;  Location: [facility] OR [address] [place] [address];  Service: Colon and Rectal;  Laterality: N/A;    HYSTERECTOMY      KIDNEY SURGERY Right     done at [facility], reported as mass by patient    LAPAROSCOPIC SIGMOIDECTOMY Left 2022    Procedure: COLECTOMY, SIGMOID, LAPAROSCOPIC, ERAS low;  Surgeon: TREV Kay MD;  [facility];  Service: Colon and Rectal;  Laterality: Left;    LEFT HEART CATHETERIZATION Left 2019    Procedure: Left heart cath;  Surgeon: Gerhard Krishnan MD;  Location: [facility] CATH LAB;  Service: Cardiology;  Laterality: Left;    MOBILIZATION OF SPLENIC FLEXURE N/A 2022    Procedure: MOBILIZATION, SPLENIC FLEXURE;  Surgeon: TREV Kay MD;  [facility];  Service: Colon and  "Rectal;  Laterality: N/A;    OOPHORECTOMY      s-section        Family History   Problem Relation Name Age of Onset    Colon cancer Neg Hx      Esophageal cancer Neg Hx        Social History     Tobacco Use    Smoking status: Never    Smokeless tobacco: Never   Substance Use Topics    Alcohol use: Not Currently     Comment: 12-31-22      Review of Systems   Constitutional:  Positive for weight loss.   Respiratory:  Negative for shortness of breath.    Cardiovascular:  Negative for chest pain.   Gastrointestinal:  Negative for blood in stool, nausea and vomiting.     Objective:     Vitals:    01/09/25 1445   BP: 138/76   Pulse: 88   SpO2: (!) 92%   Weight: 57.6 kg (126 lb 15.8 oz)   Height: 5' 5" (1.651 m)     Physical Exam  Constitutional:       General: She is not in acute distress.     Appearance: She is not toxic-appearing.   HENT:      Head: Normocephalic and atraumatic.      Right Ear: External ear normal.      Left Ear: External ear normal.      Nose: Nose normal.   Eyes:      Extraocular Movements: Extraocular movements intact.   Cardiovascular:      Pulses: Normal pulses.      Heart sounds: Normal heart sounds. No murmur heard.     No gallop.   Pulmonary:      Effort: Pulmonary effort is normal.      Breath sounds: Normal breath sounds.   Musculoskeletal:         General: Normal range of motion.   Skin:     General: Skin is warm and dry.   Neurological:      Mental Status: She is alert.   Psychiatric:         Mood and Affect: Mood normal.       Assessment/Plan:     Ashli Kumar is a 64 y.o. year old female who presents to clinic for     1. Weight loss, unintentional (Primary)  - Had a lengthy discussion with patient about the etiology of her weight loss  - Per chart review, cancer screenings are uptodate  - Patient with previous workup from GI without acutely concerning evidence of an inflammatory bowel disease/acute bowel disease  - Suspect that the etiology is multifactorial. Patient and  " endorsed that some evidence of financial difficulties, also suspect that lingering pain from prior fall could be playing a role as well as overall fear of eating given patient's history of diarrhea. Patient endorsed desire to avoid continued diarrhea/loose stools.  Plan:  - Will prescribe imodium prn  - Will refer to Dentistry given patient's pain and baseline poor dentition   - Patient endorses she follows with Psychiatry, may benefit from psychotherapy if if organic cause not found      ________________________  Chadd Cuevas Jr, MD  [facility] PGY-2

## 2025-01-11 RX ORDER — LOPERAMIDE HYDROCHLORIDE 2 MG/1
2 CAPSULE ORAL 3 TIMES DAILY PRN
Qty: 30 CAPSULE | Refills: 0 | Status: SHIPPED | OUTPATIENT
Start: 2025-01-11 | End: 2025-01-21

## 2025-01-22 NOTE — PROGRESS NOTES
I assume primary medical responsibility for this patient. I have reviewed the history, physical, and assessement & treatment plan with the resident and agree that the care is reasonable and necessary. This service has been performed by a resident with the presence of a teaching physician under the primary care exception. If necessary, an addendum of additional findings or evaluation beyond the resident documentation will be noted below.       Beata Suarez MD    [facility]

## 2025-01-27 ENCOUNTER — OFFICE VISIT (OUTPATIENT)
Facility: CLINIC | Age: 65
End: 2025-01-27
Payer: MEDICARE

## 2025-01-27 VITALS
HEART RATE: 111 BPM | BODY MASS INDEX: 20.75 KG/M2 | DIASTOLIC BLOOD PRESSURE: 78 MMHG | WEIGHT: 124.56 LBS | HEIGHT: 65 IN | SYSTOLIC BLOOD PRESSURE: 114 MMHG

## 2025-01-27 DIAGNOSIS — R19.7 DIARRHEA, UNSPECIFIED TYPE: ICD-10-CM

## 2025-01-27 DIAGNOSIS — R25.1 OCCASIONAL TREMORS: ICD-10-CM

## 2025-01-27 DIAGNOSIS — G25.0 ESSENTIAL TREMOR: Primary | ICD-10-CM

## 2025-01-27 PROCEDURE — 1159F MED LIST DOCD IN RCRD: CPT | Mod: CPTII,S$GLB,, | Performed by: STUDENT IN AN ORGANIZED HEALTH CARE EDUCATION/TRAINING PROGRAM

## 2025-01-27 PROCEDURE — 99205 OFFICE O/P NEW HI 60 MIN: CPT | Mod: S$GLB,,, | Performed by: STUDENT IN AN ORGANIZED HEALTH CARE EDUCATION/TRAINING PROGRAM

## 2025-01-27 PROCEDURE — 99999 PR PBB SHADOW E&M-EST. PATIENT-LVL III: CPT | Mod: PBBFAC,,, | Performed by: STUDENT IN AN ORGANIZED HEALTH CARE EDUCATION/TRAINING PROGRAM

## 2025-01-27 PROCEDURE — 3078F DIAST BP <80 MM HG: CPT | Mod: CPTII,S$GLB,, | Performed by: STUDENT IN AN ORGANIZED HEALTH CARE EDUCATION/TRAINING PROGRAM

## 2025-01-27 PROCEDURE — 1100F PTFALLS ASSESS-DOCD GE2>/YR: CPT | Mod: CPTII,S$GLB,, | Performed by: STUDENT IN AN ORGANIZED HEALTH CARE EDUCATION/TRAINING PROGRAM

## 2025-01-27 PROCEDURE — 3288F FALL RISK ASSESSMENT DOCD: CPT | Mod: CPTII,S$GLB,, | Performed by: STUDENT IN AN ORGANIZED HEALTH CARE EDUCATION/TRAINING PROGRAM

## 2025-01-27 PROCEDURE — 3008F BODY MASS INDEX DOCD: CPT | Mod: CPTII,S$GLB,, | Performed by: STUDENT IN AN ORGANIZED HEALTH CARE EDUCATION/TRAINING PROGRAM

## 2025-01-27 PROCEDURE — 3074F SYST BP LT 130 MM HG: CPT | Mod: CPTII,S$GLB,, | Performed by: STUDENT IN AN ORGANIZED HEALTH CARE EDUCATION/TRAINING PROGRAM

## 2025-01-27 PROCEDURE — 1126F AMNT PAIN NOTED NONE PRSNT: CPT | Mod: CPTII,S$GLB,, | Performed by: STUDENT IN AN ORGANIZED HEALTH CARE EDUCATION/TRAINING PROGRAM

## 2025-01-27 RX ORDER — LOPERAMIDE HYDROCHLORIDE 2 MG/1
2 CAPSULE ORAL ONCE AS NEEDED
COMMUNITY

## 2025-01-27 RX ORDER — PRIMIDONE 50 MG/1
50 TABLET ORAL 2 TIMES DAILY
Qty: 60 TABLET | Refills: 11 | Status: SHIPPED | OUTPATIENT
Start: 2025-01-27

## 2025-01-27 RX ORDER — LANOLIN ALCOHOL/MO/W.PET/CERES
400 CREAM (GRAM) TOPICAL DAILY
COMMUNITY

## 2025-01-27 NOTE — PROGRESS NOTES
[facility] - NEUROLOGICAL MOVEMENT DISORDERS 8TH FLOOR  [facility], [place]    Date: 1/27/25  Patient Name: Ashli Kumar   MRN: [id number]   PCP: Chadd Cuevas  Referring Provider: Leticia Lal MD    Assessment:   Ashli Kumar is a 64 y.o. female presenting for tremors. Case most consistent with ET. Patient is convinced that the tremors started 6 months ago after a fall. There is significant tremor of both hands at rest and action. Significant paratonia/difficulty relaxing. No other concerns for parkinsonism. She is on Depakote but has been on the same dose for 30 years. We spoke about the diagnosis. We will try primidone for treatment. I will also send a referral to GI given ongoing significant diarrhea of no clear cause. We will plan a follow up in 3 months. She verbalized understanding and agreed with the plan.     Plan:     - Primidone, slow titration   - GI referral   - Follow up with psychiatry   - 3 month follow up     Problem List Items Addressed This Visit          Neuro    Essential tremor - Primary    Relevant Medications    primidone (MYSOLINE) 50 MG Tab       GI    Diarrhea    Relevant Orders    Ambulatory referral/consult to Gastroenterology     Other Visit Diagnoses       Occasional tremors              Mario Adams MD    Subjective:   Patient seen in consultation at the request of Leticia Lal MD for the evaluation of tremors. A copy of this note will be sent to the referring physician.      HPI 1/27/25:   Ms. Ashli Kumar is a 64 y.o. female presenting for tremors. She has a history of bipolars disorder, HLD, HF. For her bipolars, she follows a psychiatrist outside of [facility]. She is not sure if she has been on antipsychotics in the past. A follow up at [facility] a couple of years ago showed that she was taking olanzapine. She stated that she started having tremors 6 months ago. Before that she was not having any tremors at all.  Both hands affected at onset. Some mild head tremors also noticed. She denied any parkinsonian symptoms. Grandmother had tremors. She does not drink alcohol.     Current Mvmt Medications:  None      Prior Mvmt Medication Trials:  None     Nonmotor ROS:  Smell/Taste: good.   Voice/Swallowing: softer voice. No chocking spells.   Gait/Falls: 1 fall yesterday in the bathroom, did not trip   Exercise: no   Dizziness: no   Urinary Issues: yes but she feels them coming on, if she doesn't get to the bathroom in time   Constipation: no, she is having diarrhea frequently.   Sleep/RBD: sleeps well but snores.   Hallucinations/Peripheral Illusions: no   Memory/Cognition/Language: no    PAST MEDICAL HISTORY:  Past Medical History:   Diagnosis Date    Allergies     Anxiety     Arthritis     CHF (congestive heart failure)     Depression     Diverticular disease of large intestine without perforation or abscess     GERD (gastroesophageal reflux disease)     High cholesterol     History of fainting     Hypertension     Shortness of breath     states sometimes gets short winded    SOB (shortness of breath) 2019    Unspecified chronic bronchitis     not chronic episode this year-    Unspecified glaucoma      PAST SURGICAL HISTORY:  Past Surgical History:   Procedure Laterality Date    BILATERAL TUBAL LIGATION      BREAST BIOPSY Left     [facility]    BREAST SURGERY       SECTION      COLONOSCOPY N/A 2017    Procedure: COLONOSCOPY Miralax;  Surgeon: Buddy Butcher Jr., MD;  Location: [facility];  Service: Endoscopy;  Laterality: N/A;    COLONOSCOPY N/A 2022    Procedure: COLONOSCOPY;  Surgeon: TREV Kay MD;  Location: [facility];  Service: Endoscopy;  Laterality: N/A;    COLONOSCOPY N/A 2022    Procedure: COLONOSCOPY;  Surgeon: TREV Kay MD;  Location: [facility];  Service: Endoscopy;  Laterality: N/A;    ESOPHAGOGASTRODUODENOSCOPY N/A 7/3/2023    Procedure: EGD  (ESOPHAGOGASTRODUODENOSCOPY);  Surgeon: Mary Cotton MD;  Location: [facility] ENDO;  Service: Endoscopy;  Laterality: N/A;    EYE SURGERY Bilateral     cataract removal    FLEXIBLE SIGMOIDOSCOPY N/A 07/20/2022    Procedure: SIGMOIDOSCOPY, FLEXIBLE;  Surgeon: TREV Kay MD;  [facility];  Service: Colon and Rectal;  Laterality: N/A;    HYSTERECTOMY      KIDNEY SURGERY Right     done at [facility], reported as mass by patient    LAPAROSCOPIC SIGMOIDECTOMY Left 07/20/2022    Procedure: COLECTOMY, SIGMOID, LAPAROSCOPIC, ERAS low;  Surgeon: TREV Kay MD;  Location: [facility];  Service: Colon and Rectal;  Laterality: Left;    LEFT HEART CATHETERIZATION Left 05/13/2019    Procedure: Left heart cath;  Surgeon: Gerhard Krisnhan MD;  Location: [facility] CATH LAB;  Service: Cardiology;  Laterality: Left;    MOBILIZATION OF SPLENIC FLEXURE N/A 07/20/2022    Procedure: MOBILIZATION, SPLENIC FLEXURE;  Surgeon: TREV Kay MD;  [facility];  Service: Colon and Rectal;  Laterality: N/A;    OOPHORECTOMY      s-section       CURRENT MEDS:  Current Outpatient Medications   Medication Sig Dispense Refill    divalproex (DEPAKOTE) 500 MG TbEC Take 1 tablet (500 mg total) by mouth 2 (two) times a day. 60 tablet 2    estradioL (ESTRACE) 0.01 % (0.1 mg/gram) vaginal cream Place 1 g vaginally once daily. 42.5 g 3    famotidine (PEPCID) 20 MG tablet Take 1 tablet (20 mg total) by mouth 2 (two) times daily as needed for Heartburn. 60 tablet 11    FLUoxetine 20 MG capsule Take 1 capsule (20 mg total) by mouth once daily. 30 capsule 2    latanoprost 0.005 % ophthalmic solution Place 1 drop into both eyes every evening.      loperamide (IMODIUM) 2 mg capsule Take 2 mg by mouth once as needed for Diarrhea.      magnesium oxide (MAG-OX) 400 mg (241.3 mg magnesium) tablet Take 400 mg by mouth once daily.      pravastatin (PRAVACHOL) 20 MG tablet Take 1 tablet (20 mg total) by mouth once  "daily. 30 tablet 11    cetirizine (ZYRTEC) 10 MG tablet Take 1 tablet (10 mg total) by mouth once daily. 30 tablet 0    Lactobac no.41/Bifidobact no.7 (PROBIOTIC-10 ORAL) Take 1 capsule by mouth once daily.      metoprolol tartrate (LOPRESSOR) 25 MG tablet Take 25 mg by mouth 2 (two) times daily. (Patient not taking: Reported on 1/27/2025)      mirtazapine (REMERON) 30 MG tablet Take 30 mg by mouth every evening.      primidone (MYSOLINE) 50 MG Tab Take 1 tablet (50 mg total) by mouth 2 (two) times a day. Take half a tablet (25 mg) twice a day for 2 weeks. Take 50 mg twice a day for 2 weeks Take 100 in the AM and 50 mg in the PM for 2 weeks Take 100 mg in the AM and 100 mg in the PM until I see you again 60 tablet 11     No current facility-administered medications for this visit.     ALLERGIES:  Review of patient's allergies indicates:   Allergen Reactions    Codeine Hives    Sulfa (sulfonamide antibiotics) Hives    Benzoate analogues Itching     FAMILY HISTORY:  Family History   Problem Relation Name Age of Onset    Colon cancer Neg Hx      Esophageal cancer Neg Hx       SOCIAL HISTORY:  Social History     Tobacco Use    Smoking status: Never    Smokeless tobacco: Never   Substance Use Topics    Alcohol use: Not Currently     Comment: 12-31-22    Drug use: No       Review of Systems:  12 system review of systems is negative except for the symptoms mentioned in HPI.      Objective:     Vitals:    01/27/25 1014   BP: 114/78   BP Location: Right arm   Patient Position: Sitting   Pulse: (!) 111   Weight: 56.5 kg (124 lb 9 oz)   Height: 5' 5" (1.651 m)     General: NAD, well nourished   Eyes: no tearing, discharge, no erythema   ENT: moist mucous membranes of the oral cavity, nares patent    Neck: Supple, full range of motion  Cardiovascular: Warm and well perfused, pulses equal and symmetrical  Lungs: Normal work of breathing, normal chest wall excursions  Skin: No rash, lesions, or breakdown on exposed " skin  Psychiatry: Mood and affect are appropriate   Abdomen: soft, non tender, non distended  Extremeties: No cyanosis, clubbing or edema.    Neurological   MENTAL STATUS: Alert and oriented to person, place, and time. Attention and concentration within normal limits. Speech without dysarthria, able to name and repeat without difficulty. Recent and remote memory within normal limits   CRANIAL NERVES: Visual fields intact. PERRL. EOMI. Facial sensation intact. Face symmetrical. Hearing grossly intact. Full shoulder shrug bilaterally. Tongue protrudes midline   SENSORY: Sensation is intact to pin throughout.  Joint position perception intact. Negative Romberg.   MOTOR: Normal bulk and tone. No pronator drift.  5/5 deltoid, biceps, triceps, interosseous, hand  bilaterally. 5/5 iliopsoas, knee extension/flexion, foot dorsi/plantarflexion bilaterally.    REFLEXES: Symmetric and 2+ throughout. Toes down going bilaterally.   CEREBELLAR/COORDINATION/GAIT: Gait steady with normal arm swing and stride length.  Heel to shin intact. Finger to nose intact. Normal rapid alternating movements.     Movement disorder exam:  Mild head tremor. Bilateral hand tremor present at rest and during action. Some hyperkinetic movements of the mouth. Significant paratonia of limbs. No myoclonus, chorea. Normal walk     I spent a total of 60 minutes on the day of the visit.   This includes face to face time and non-face to face time preparing to see the patient (eg, review of tests), obtaining and/or reviewing separately obtained history, documenting clinical information in the electronic or other health record, independently interpreting results and communicating results to the patient/family/caregiver, or care coordinator.      Mario Adams MD  Neurology

## 2025-03-05 ENCOUNTER — OFFICE VISIT (OUTPATIENT)
Dept: GASTROENTEROLOGY | Facility: CLINIC | Age: 65
End: 2025-03-05
Payer: MEDICARE

## 2025-03-05 VITALS
BODY MASS INDEX: 22.66 KG/M2 | WEIGHT: 136 LBS | HEART RATE: 89 BPM | HEIGHT: 65 IN | DIASTOLIC BLOOD PRESSURE: 82 MMHG | SYSTOLIC BLOOD PRESSURE: 143 MMHG

## 2025-03-05 DIAGNOSIS — R19.7 DIARRHEA, UNSPECIFIED TYPE: Primary | ICD-10-CM

## 2025-03-05 PROCEDURE — 99214 OFFICE O/P EST MOD 30 MIN: CPT | Mod: S$GLB,,,

## 2025-03-05 PROCEDURE — 99999 PR PBB SHADOW E&M-EST. PATIENT-LVL IV: CPT | Mod: PBBFAC,,,

## 2025-03-05 PROCEDURE — 3288F FALL RISK ASSESSMENT DOCD: CPT | Mod: CPTII,S$GLB,,

## 2025-03-05 PROCEDURE — 3008F BODY MASS INDEX DOCD: CPT | Mod: CPTII,S$GLB,,

## 2025-03-05 PROCEDURE — 3077F SYST BP >= 140 MM HG: CPT | Mod: CPTII,S$GLB,,

## 2025-03-05 PROCEDURE — 1126F AMNT PAIN NOTED NONE PRSNT: CPT | Mod: CPTII,S$GLB,,

## 2025-03-05 PROCEDURE — 1159F MED LIST DOCD IN RCRD: CPT | Mod: CPTII,S$GLB,,

## 2025-03-05 PROCEDURE — 1101F PT FALLS ASSESS-DOCD LE1/YR: CPT | Mod: CPTII,S$GLB,,

## 2025-03-05 PROCEDURE — 3079F DIAST BP 80-89 MM HG: CPT | Mod: CPTII,S$GLB,,

## 2025-03-05 RX ORDER — DICYCLOMINE HYDROCHLORIDE 10 MG/1
10 CAPSULE ORAL 3 TIMES DAILY PRN
Qty: 120 CAPSULE | Refills: 0 | Status: SHIPPED | OUTPATIENT
Start: 2025-03-05

## 2025-03-05 NOTE — PROGRESS NOTES
Gastroenterology Clinic Consultation Note    Reason for Visit:  The encounter diagnosis was Diarrhea, unspecified type.    PCP:   Chadd Cuevas   [address] / [place] [id number]      Initial HPI   This is a 64 y.o. female presenting for diarrhea. Triggered by certain foods and spicy foods. She will have 1-2 loose stools and then she will feel better. Did have 2 loose stools after eating crawfish this AM. Will take Multi-Symptom Antidiarrhea/anti-gas when she gets symptoms. Has been trying to avoid other dietary triggers and he symptoms have improved. Denies blood in her stool, nausea, vomiting.     ROS:  Review of Systems   Constitutional:  Negative for chills, fever and weight loss.   Eyes:  Negative for redness.   Respiratory:  Negative for cough and wheezing.    Cardiovascular:  Negative for chest pain.   Gastrointestinal:  Negative for abdominal pain, blood in stool, constipation, diarrhea (improved with dietary adjustments), heartburn, melena, nausea and vomiting.   Skin:  Negative for rash.   Neurological:  Negative for seizures, loss of consciousness and weakness.        Medical History:  has a past medical history of Allergies, Anxiety, Arthritis, CHF (congestive heart failure), Depression, Diverticular disease of large intestine without perforation or abscess, GERD (gastroesophageal reflux disease), High cholesterol, History of fainting, Hypertension, Shortness of breath, SOB (shortness of breath) (2019), Unspecified chronic bronchitis, and Unspecified glaucoma.    Surgical History:  has a past surgical history that includes Hysterectomy; s-section;  section; Breast surgery; Kidney surgery (Right); Colonoscopy (N/A, 2017); Left heart catheterization (Left, 2019); Oophorectomy; Breast biopsy (Left); Colonoscopy (N/A, 2022); Laparoscopic sigmoidectomy (Left, 2022); Flexible  "sigmoidoscopy (N/A, 07/20/2022); Mobilization of splenic flexure (N/A, 07/20/2022); Colonoscopy (N/A, 12/29/2022); Bilateral tubal ligation; Eye surgery (Bilateral); and Esophagogastroduodenoscopy (N/A, 7/3/2023).    Family History: family history is not on file..       Review of patient's allergies indicates:   Allergen Reactions    Codeine Hives    Sulfa (sulfonamide antibiotics) Hives    Benzoate analogues Itching       Medications Ordered Prior to Encounter[1]      Objective Findings:    Vital Signs:  BP (!) 143/82   Pulse 89   Ht 5' 5" (1.651 m)   Wt 61.7 kg (136 lb 0.4 oz)   BMI 22.64 kg/m²   Body mass index is 22.64 kg/m².    Physical Exam:  Physical Exam  Vitals and nursing note reviewed.   Constitutional:       Appearance: She is normal weight. She is not ill-appearing.   HENT:      Mouth/Throat:      Mouth: Mucous membranes are moist.      Pharynx: Oropharynx is clear.   Eyes:      General: No scleral icterus.  Abdominal:      General: Abdomen is flat. Bowel sounds are normal. There is no distension.      Palpations: Abdomen is soft. There is no mass.      Tenderness: There is no abdominal tenderness.      Hernia: No hernia is present.   Skin:     General: Skin is warm and dry.      Capillary Refill: Capillary refill takes less than 2 seconds.      Coloration: Skin is not jaundiced or pale.   Neurological:      Mental Status: She is alert and oriented to person, place, and time. Mental status is at baseline.             Labs:  Lab Results   Component Value Date    WBC 6.30 11/22/2024    HGB 12.5 11/22/2024    HCT 37.8 11/22/2024     11/22/2024    CRP 94.9 (H) 07/23/2022    CHOL 240 (H) 09/16/2024    TRIG 107 09/16/2024    HDL 67 09/16/2024    ALKPHOS 38 11/22/2024    LIPASE 59 05/27/2022    ALT 14 11/22/2024    AST 28 11/22/2024     (L) 11/22/2024    K 4.0 11/22/2024    CL 97 11/22/2024    CREATININE 0.52 11/22/2024    BUN 11 11/22/2024    CO2 30 (H) 11/22/2024    TSH 3.110 11/22/2024    " INR 0.9 05/10/2023    HGBA1C 5.4 07/10/2024       Imaging reviewed: No pertinent imaging reviewed       Endoscopy reviewed: EGD 7/3/2023  Impression:            - Normal esophagus.                          - Erythematous mucosa in the antrum. Biopsied.                          - A few gastric polyps. Resected and retrieved.                          Endoscopically these are bland benign fundic gland                          polyps which are not of concern.                          - Normal examined duodenum.   Recommendation:        - Discharge patient to home.                          - Patient has a contact number available for                          emergencies. The signs and symptoms of potential                          delayed complications were discussed with the                          patient. Return to normal activities tomorrow.                          Written discharge instructions were provided to                          the patient.                          - Resume previous diet.                          - Continue present medications.                          - Await pathology results.                          - Return to GI clinic PRN.   MD Mary Monteiro MD   7/3/2023 11:56:42 AM     Colonoscopy 12/29/2022  Impression:            - Hemorrhoids found on perianal exam.                          - Patent end-to-end colo-colonic anastomosis,                          characterized by healthy appearing mucosa.                          - Diverticulosis in the entire examined colon.                          - The examined portion of the ileum was normal.                          - The examination was otherwise normal on direct                          and retroflexion views.                          - No specimens collected.   Recommendation:        - Discharge patient to home (ambulatory).                          - Resume previous diet.                          - Continue  present medications.                          - Repeat colonoscopy in 10 years for screening                          purposes.   Silvestre Kay MD   12/29/2022 8:55:15 AM     Assessment:  1. Diarrhea, unspecified type      Orders Placed This Encounter    dicyclomine (BENTYL) 10 MG capsule         Plan:  Bentyl as needed. Can continue Simethicone for gas.   RTC PRN      Thank you for allowing me to participate in this patient's care.    Sincerely,     Brittaney Ann NP  Gastroenterology Department  [facility]               [1]   Current Outpatient Medications on File Prior to Visit   Medication Sig Dispense Refill    cetirizine (ZYRTEC) 10 MG tablet Take 1 tablet (10 mg total) by mouth once daily. 30 tablet 0    divalproex (DEPAKOTE) 500 MG TbEC Take 1 tablet (500 mg total) by mouth 2 (two) times a day. 60 tablet 2    estradioL (ESTRACE) 0.01 % (0.1 mg/gram) vaginal cream Place 1 g vaginally once daily. 42.5 g 3    famotidine (PEPCID) 20 MG tablet Take 1 tablet (20 mg total) by mouth 2 (two) times daily as needed for Heartburn. 60 tablet 11    FLUoxetine 20 MG capsule Take 1 capsule (20 mg total) by mouth once daily. 30 capsule 2    Lactobac no.41/Bifidobact no.7 (PROBIOTIC-10 ORAL) Take 1 capsule by mouth once daily.      latanoprost 0.005 % ophthalmic solution Place 1 drop into both eyes every evening.      loperamide (IMODIUM) 2 mg capsule Take 2 mg by mouth once as needed for Diarrhea.      magnesium oxide (MAG-OX) 400 mg (241.3 mg magnesium) tablet Take 400 mg by mouth once daily.      metoprolol tartrate (LOPRESSOR) 25 MG tablet Take 25 mg by mouth 2 (two) times daily.      mirtazapine (REMERON) 30 MG tablet Take 30 mg by mouth every evening.      pravastatin (PRAVACHOL) 20 MG tablet Take 1 tablet (20 mg total) by mouth once daily. 30 tablet 11    primidone (MYSOLINE) 50 MG Tab Take 1 tablet (50 mg total) by mouth 2 (two) times a day. Take half a tablet (25 mg) twice a day for 2  weeks. Take 50 mg twice a day for 2 weeks Take 100 in the AM and 50 mg in the PM for 2 weeks Take 100 mg in the AM and 100 mg in the PM until I see you again 60 tablet 11     No current facility-administered medications on file prior to visit.

## 2025-03-19 ENCOUNTER — OFFICE VISIT (OUTPATIENT)
Dept: FAMILY MEDICINE | Facility: HOSPITAL | Age: 65
End: 2025-03-19
Payer: MEDICARE

## 2025-03-19 VITALS
OXYGEN SATURATION: 99 % | HEART RATE: 74 BPM | DIASTOLIC BLOOD PRESSURE: 76 MMHG | HEIGHT: 65 IN | WEIGHT: 136.25 LBS | SYSTOLIC BLOOD PRESSURE: 146 MMHG | RESPIRATION RATE: 16 BRPM | BODY MASS INDEX: 22.7 KG/M2

## 2025-03-19 DIAGNOSIS — R63.4 WEIGHT LOSS, UNINTENTIONAL: ICD-10-CM

## 2025-03-19 DIAGNOSIS — R23.2 HOT FLASHES: Primary | ICD-10-CM

## 2025-03-19 DIAGNOSIS — R51.9 NONINTRACTABLE HEADACHE, UNSPECIFIED CHRONICITY PATTERN, UNSPECIFIED HEADACHE TYPE: ICD-10-CM

## 2025-03-19 PROCEDURE — 99214 OFFICE O/P EST MOD 30 MIN: CPT

## 2025-03-19 NOTE — PROGRESS NOTES
[facility]    Subjective:     Ashli Kumar is a 64 y.o. year old female with PMHx of Bipolar disorder, essential tremor, HLD who presents to clinic for     Weight:  - Increasing  - Taste improving and taking ensures  - Stomach can still get upsets, can get gassy   - No blood in stool   - Following with GI   - no vomiting     Headaches:  - Pt unsure of onset  - Located on forehead, has not taken any medications for this  - States does NOT wake with headaches, no sensitivity to light or sound    Hot flashes:  - Had elective hysterectomy in 40s, unsure of when rest of women in family [place] through menopause  - States can feel warm and sweats at night, has not taken any medication for this yet  - Mother with history of uterine cancer, no family history of ovarian cancer  - States had symptoms previously and tried medication before, she is unsure of the name of the medication    Patient Active Problem List    Diagnosis Date Noted    Essential tremor 01/27/2025    Diarrhea 01/27/2025    Hammer toe 06/11/2024    Heel pain 06/11/2024    Allergic rhinitis 04/12/2024    Hyperlipidemia 04/12/2024    Decreased range of motion of both knees 06/22/2023    Impaired strength of lower extremity 06/22/2023    Osteoarthritis 06/20/2023    Gastroesophageal reflux disease 05/12/2023    Irregular bowel habits 05/12/2023    Posture imbalance 03/14/2023    Decreased range of motion of left shoulder 03/14/2023    Weakness of shoulder 03/14/2023    Mental health problem 01/02/2023    Diverticular stricture 07/20/2022    Lower abdominal pain 05/29/2022    Rectal bleeding 05/27/2022    Bipolar affective disorder, currently depressed, moderate 05/27/2022    Chronic heart failure with preserved ejection fraction 05/13/2019    Obesity due to excess calories 05/13/2019    Angina, class III 05/01/2019    Screening for colorectal cancer 05/12/2017        Review of patient's allergies indicates:   Allergen Reactions    Codeine Hives     Sulfa (sulfonamide antibiotics) Hives    Benzoate analogues Itching        Past Medical History:   Diagnosis Date    Allergies     Anxiety     Arthritis     CHF (congestive heart failure)     Depression     Diverticular disease of large intestine without perforation or abscess     GERD (gastroesophageal reflux disease)     High cholesterol     History of fainting     Hypertension     Shortness of breath     states sometimes gets short winded    SOB (shortness of breath) 2019    Unspecified chronic bronchitis     not chronic episode this year-    Unspecified glaucoma       Past Surgical History:   Procedure Laterality Date    BILATERAL TUBAL LIGATION      BREAST BIOPSY Left     h    BREAST SURGERY       SECTION      COLONOSCOPY N/A 2017    Procedure: COLONOSCOPY Miralax;  Surgeon: Buddy Butcher Jr., MD;  Location: [facility];  Service: Endoscopy;  Laterality: N/A;    COLONOSCOPY N/A 2022    Procedure: COLONOSCOPY;  Surgeon: TREV Kay MD;  Location: [facility];  Service: Endoscopy;  Laterality: N/A;    COLONOSCOPY N/A 2022    Procedure: COLONOSCOPY;  Surgeon: TREV Kay MD;  Location: [facility];  Service: Endoscopy;  Laterality: N/A;    ESOPHAGOGASTRODUODENOSCOPY N/A 7/3/2023    Procedure: EGD (ESOPHAGOGASTRODUODENOSCOPY);  Surgeon: Mary Cotton MD;  Location: [facility];  Service: Endoscopy;  Laterality: N/A;    EYE SURGERY Bilateral     cataract removal    FLEXIBLE SIGMOIDOSCOPY N/A 2022    Procedure: SIGMOIDOSCOPY, FLEXIBLE;  Surgeon: TREV Kay MD;  Location: [facility] OR [address] [place] [address];  Service: Colon and Rectal;  Laterality: N/A;    HYSTERECTOMY      KIDNEY SURGERY Right     done at [facility], reported as mass by patient    LAPAROSCOPIC SIGMOIDECTOMY Left 2022    Procedure: COLECTOMY, SIGMOID, LAPAROSCOPIC, ERAS low;  Surgeon: TREV Kay MD;  Location: [facility] OR [address] [place] [address];  Service: Colon and Rectal;  Laterality: Left;    LEFT  HEART CATHETERIZATION Left 05/13/2019    Procedure: Left heart cath;  Surgeon: Gerhard Krishnan MD;  Location: [facility] CATH LAB;  Service: Cardiology;  Laterality: Left;    MOBILIZATION OF SPLENIC FLEXURE N/A 07/20/2022    Procedure: MOBILIZATION, SPLENIC FLEXURE;  Surgeon: TREV Kay MD;  Location: [facility] OR [address] [place] [address];  Service: Colon and Rectal;  Laterality: N/A;    OOPHORECTOMY      s-section        Family History   Problem Relation Name Age of Onset    Colon cancer Neg Hx      Esophageal cancer Neg Hx        Social History     Tobacco Use    Smoking status: Never    Smokeless tobacco: Never   Substance Use Topics    Alcohol use: Not Currently     Comment: 12-31-22      Review of Systems   Constitutional:  Negative for chills and fever.   Respiratory:  Negative for shortness of breath.    Cardiovascular:  Negative for chest pain.   Gastrointestinal:  Negative for nausea and vomiting.   Neurological:  Negative for dizziness.     Objective:     There were no vitals filed for this visit.  Physical Exam  Constitutional:       General: She is not in acute distress.     Appearance: She is not toxic-appearing.   HENT:      Head: Normocephalic and atraumatic.      Right Ear: External ear normal.      Left Ear: External ear normal.      Nose: Nose normal.   Eyes:      Extraocular Movements: Extraocular movements intact.   Cardiovascular:      Pulses: Normal pulses.      Heart sounds: Normal heart sounds. No murmur heard.     No gallop.   Pulmonary:      Effort: Pulmonary effort is normal.      Breath sounds: Normal breath sounds.   Musculoskeletal:         General: Normal range of motion.   Skin:     General: Skin is warm and dry.   Neurological:      Mental Status: She is alert.   Psychiatric:         Mood and Affect: Mood normal.       Assessment/Plan:     Ashli Kumar is a 64 y.o. year old female who presents to clinic for     1. Hot flashes (Primary)  - Chronic, uncontrolled  - Discussed options  with patient, will try primrose oil OTC for symptoms and CTM    2. Nonintractable headache, unspecified chronicity pattern, unspecified headache type  - Duration unclear  - Seems most consistent with tension type headaches, will have patient use OTC tylenol/ibuprofen PRN for symptoms; discussed risks of medication overuse, patient expressed understanding    3. Weight loss, unintentional  - Chronic, improving  - Appetite and tastebuds better; patient gaining weight from last visit  - Will continue to monitor, for now, will stop Ensure to see if this is the source of patient's increased gas  - Follow with GI    ________________________  Chadd Cuevas Jr, MD  \[facility]\"" Family Medicine PGY-2

## 2025-04-14 ENCOUNTER — OFFICE VISIT (OUTPATIENT)
Facility: CLINIC | Age: 65
End: 2025-04-14
Payer: MEDICARE

## 2025-04-14 VITALS
HEIGHT: 65 IN | SYSTOLIC BLOOD PRESSURE: 124 MMHG | WEIGHT: 142 LBS | HEART RATE: 78 BPM | BODY MASS INDEX: 23.66 KG/M2 | DIASTOLIC BLOOD PRESSURE: 79 MMHG

## 2025-04-14 DIAGNOSIS — G25.0 ESSENTIAL TREMOR: Primary | ICD-10-CM

## 2025-04-14 DIAGNOSIS — F31.32 BIPOLAR AFFECTIVE DISORDER, CURRENTLY DEPRESSED, MODERATE: ICD-10-CM

## 2025-04-14 DIAGNOSIS — Z71.89 COUNSELING REGARDING GOALS OF CARE: ICD-10-CM

## 2025-04-14 PROBLEM — E66.09 OBESITY DUE TO EXCESS CALORIES: Status: RESOLVED | Noted: 2019-05-13 | Resolved: 2025-04-14

## 2025-04-14 PROCEDURE — 3074F SYST BP LT 130 MM HG: CPT | Mod: CPTII,S$GLB,, | Performed by: PHYSICIAN ASSISTANT

## 2025-04-14 PROCEDURE — 3288F FALL RISK ASSESSMENT DOCD: CPT | Mod: CPTII,S$GLB,, | Performed by: PHYSICIAN ASSISTANT

## 2025-04-14 PROCEDURE — 1160F RVW MEDS BY RX/DR IN RCRD: CPT | Mod: CPTII,S$GLB,, | Performed by: PHYSICIAN ASSISTANT

## 2025-04-14 PROCEDURE — 1101F PT FALLS ASSESS-DOCD LE1/YR: CPT | Mod: CPTII,S$GLB,, | Performed by: PHYSICIAN ASSISTANT

## 2025-04-14 PROCEDURE — 99214 OFFICE O/P EST MOD 30 MIN: CPT | Mod: S$GLB,,, | Performed by: PHYSICIAN ASSISTANT

## 2025-04-14 PROCEDURE — 1159F MED LIST DOCD IN RCRD: CPT | Mod: CPTII,S$GLB,, | Performed by: PHYSICIAN ASSISTANT

## 2025-04-14 PROCEDURE — 99999 PR PBB SHADOW E&M-EST. PATIENT-LVL III: CPT | Mod: PBBFAC,,, | Performed by: PHYSICIAN ASSISTANT

## 2025-04-14 PROCEDURE — G2211 COMPLEX E/M VISIT ADD ON: HCPCS | Mod: S$GLB,,, | Performed by: PHYSICIAN ASSISTANT

## 2025-04-14 PROCEDURE — 1126F AMNT PAIN NOTED NONE PRSNT: CPT | Mod: CPTII,S$GLB,, | Performed by: PHYSICIAN ASSISTANT

## 2025-04-14 PROCEDURE — 3078F DIAST BP <80 MM HG: CPT | Mod: CPTII,S$GLB,, | Performed by: PHYSICIAN ASSISTANT

## 2025-04-14 PROCEDURE — 3008F BODY MASS INDEX DOCD: CPT | Mod: CPTII,S$GLB,, | Performed by: PHYSICIAN ASSISTANT

## 2025-04-14 RX ORDER — PRIMIDONE 50 MG/1
50 TABLET ORAL 2 TIMES DAILY
Qty: 60 TABLET | Refills: 11 | Status: SHIPPED | OUTPATIENT
Start: 2025-04-14

## 2025-04-14 NOTE — PROGRESS NOTES
Name: Ashli Kumar  MRN: [id number]   CSN: [id number]      Date: 04/14/2025    Referring physician:  No referring provider defined for this encounter.    Chief Complaint: tremor    Interval History:  - new to me, saw Dr. Adams in Jan   - trial of primidone at that time   - this has been helpful, able to write   - got a refill of primidone and started titration    - currently taking 1/2 tab BID   - thinks that when she finished the end of the first prescription, her hands were barely shaking   - did make her feel drowsy whenever she got up to 2 tabs BID   - no falls   - some mouth movements, describes as tremor   - takes depakote         Jan 2025 with Dr. Adams  Ashli Kumar is a 64 y.o. female presenting for tremors. Case most consistent with ET. Patient is convinced that the tremors started 6 months ago after a fall. There is significant tremor of both hands at rest and action. Significant paratonia/difficulty relaxing. No other concerns for parkinsonism. She is on Depakote but has been on the same dose for 30 years. We spoke about the diagnosis. We will try primidone for treatment. I will also send a referral to GI given ongoing significant diarrhea of no clear cause. We will plan a follow up in 3 months. She verbalized understanding and agreed with the plan.       Family History: grandmother (maternal) had tremors     Neuroleptic Exposure: none     Nonmotor/Premotor ROS:  Anosmia: normal   Dysarthria/Hypophonia: none   Dysphagia/Sialorrhea: sialorrhea in her sleep only   Depression: on prozac and depakote   Urinary changes: nocturia   Constipation: none   Falls: none   Freezing: none   Micrographia: shaky  Sleep issues:  -RBD: none       Review of Systems:   Review of Systems   Constitutional:  Negative for chills, fever and malaise/fatigue.   HENT:  Negative for hearing loss.    Eyes:  Negative for blurred vision and double vision.   Respiratory:  Negative for cough, shortness of breath  "and stridor.    Cardiovascular:  Negative for chest pain and leg swelling.   Gastrointestinal:  Negative for constipation, diarrhea and nausea.   Genitourinary:  Negative for frequency and urgency.   Musculoskeletal:  Negative for falls.   Skin:  Negative for itching and rash.   Neurological:  Positive for tremors. Negative for dizziness, loss of consciousness and weakness.   Psychiatric/Behavioral:  Negative for hallucinations and memory loss.            Past Medical History: The patient  has a past medical history of Allergies, Anxiety, Arthritis, CHF (congestive heart failure), Depression, Diverticular disease of large intestine without perforation or abscess, GERD (gastroesophageal reflux disease), High cholesterol, History of fainting, Hypertension, Shortness of breath, SOB (shortness of breath) (05/13/2019), Unspecified chronic bronchitis, and Unspecified glaucoma.    Social History: The patient  reports that she has never smoked. She has never used smokeless tobacco. She reports that she does not currently use alcohol. She reports that she does not use drugs.    Family History: Their family history is not on file.    Allergies: Codeine, Sulfa (sulfonamide antibiotics), and Benzoate analogues     Meds: Medications Ordered Prior to Encounter[1]    Exam:  /79   Pulse 78   Ht 5' 5" (1.651 m)   Wt 64.4 kg (142 lb)   BMI 23.63 kg/m²     Constitutional  Well-developed, well-nourished, appears stated age   Ophthalmoscopic  No papilledema with no hemorrhages or exudates bilaterally   Cardiovascular  Radial pulses 2+ and symmetric, no LE edema bilaterally   Neurological    * Mental status  MOCA =      - Orientation  Oriented to person, place, time, and situation     - Memory   Intact recent and remote     - Attention/concentration  Attentive, vigilant during exam     - Language  Naming & repetition intact, +2-step commands     - Fund of knowledge  Aware of current events     - Executive  Well-organized " thoughts     - Other     * Cranial nerves       - CN II  PERRL, visual fields full to confrontation     - CN III, IV, VI  Extraocular movements full, normal pursuits and saccades     - CN V  Sensation V1 - V3 intact     - CN VII  Face strong and symmetric bilaterally     - CN VIII  Hearing intact bilaterally     - CN IX, X  Palate raises midline and symmetric     - CN XI  SCM and trapezius 5/5 bilaterally     - CN XII  Tongue midline   * Motor  Muscle bulk normal, strength 5/5 throughout   * Sensory   Intact to light touch    * Coordination  No dysmetria with finger-to-nose or heel-to-shin   * Gait  See below.   * Deep tendon reflexes  3+ and symmetric throughout   L dasilva    Crossadductors    Babinski downgoing bilaterally   * Specialized movement exam  No hypophonic speech.    No facial masking, appropriately animated    No cogwheel rigidity.     No bradykinesia.   Minimal postural tremor bilaterally    Bilateral kinetic tremor    No other dystonia, chorea, athetosis, myoclonus, or tics.   No motor impersistence.   Normal-based gait.   No shortened stride length.   No abnormal arm swing.     No postural instability.    RLE tremor? Vs stereotypy -- able to suppress?    Mild disdiadochokinesia with heel taps bilaterally    Unable to appreciate chin tremor on exam today  - mild orobuccal dyskinesia? No neuroleptic exposure per patient    Mild re-emergence of tremor in R hand during gait eval      Laboratory/Radiological:  - Results:  No visits with results within 3 Month(s) from this visit.   Latest known visit with results is:   Admission on 11/22/2024, Discharged on 11/22/2024   Component Date Value Ref Range Status    WBC 11/22/2024 6.30  3.90 - 12.70 K/uL Final    RBC 11/22/2024 3.97 (L)  4.00 - 5.40 M/uL Final    Hemoglobin 11/22/2024 12.5  12.0 - 16.0 g/dL Final    Hematocrit 11/22/2024 37.8  37.0 - 48.5 % Final    MCV 11/22/2024 95  82 - 98 fL Final    MCH 11/22/2024 31.5 (H)  27.0 - 31.0 pg Final    MCHC  11/22/2024 33.1  32.0 - 36.0 g/dL Final    RDW 11/22/2024 12.5  11.5 - 14.5 % Final    Platelets 11/22/2024 183  150 - 450 K/uL Final    MPV 11/22/2024 11.6  9.2 - 12.9 fL Final    Immature Granulocytes 11/22/2024 0.6 (H)  0.0 - 0.5 % Final    Gran # (ANC) 11/22/2024 3.5  1.8 - 7.7 K/uL Final    Immature Grans (Abs) 11/22/2024 0.04  0.00 - 0.04 K/uL Final    Lymph # 11/22/2024 2.1  1.0 - 4.8 K/uL Final    Mono # 11/22/2024 0.7  0.3 - 1.0 K/uL Final    Eos # 11/22/2024 0.0  0.0 - 0.5 K/uL Final    Baso # 11/22/2024 0.02  0.00 - 0.20 K/uL Final    nRBC 11/22/2024 0  0 /100 WBC Final    Gran % 11/22/2024 54.8  38.0 - 73.0 % Final    Lymph % 11/22/2024 33.5  18.0 - 48.0 % Final    Mono % 11/22/2024 10.5  4.0 - 15.0 % Final    Eosinophil % 11/22/2024 0.3  0.0 - 8.0 % Final    Basophil % 11/22/2024 0.3  0.0 - 1.9 % Final    Differential Method 11/22/2024 Automated   Final    Sodium 11/22/2024 135 (L)  136 - 145 mmol/L Final    Potassium 11/22/2024 4.0  3.5 - 5.1 mmol/L Final    Chloride 11/22/2024 97  95 - 110 mmol/L Final    CO2 11/22/2024 30 (H)  23 - 29 mmol/L Final    Glucose 11/22/2024 93  70 - 110 mg/dL Final    BUN 11/22/2024 11  7 - 17 mg/dL Final    Creatinine 11/22/2024 0.52  0.50 - 1.40 mg/dL Final    Calcium 11/22/2024 9.2  8.7 - 10.5 mg/dL Final    Total Protein 11/22/2024 6.8  6.0 - 8.4 g/dL Final    Albumin 11/22/2024 3.7  3.5 - 5.2 g/dL Final    Total Bilirubin 11/22/2024 0.5  0.1 - 1.0 mg/dL Final    Alkaline Phosphatase 11/22/2024 38  38 - 126 U/L Final    AST 11/22/2024 28  15 - 46 U/L Final    ALT 11/22/2024 14  10 - 44 U/L Final    Anion Gap 11/22/2024 8  8 - 16 mmol/L Final    eGFR 11/22/2024 >60.0  >60 mL/min/1.73 m^2 Final    Magnesium 11/22/2024 1.7  1.6 - 2.6 mg/dL Final    TSH 11/22/2024 3.110  0.400 - 4.000 uIU/mL Final    Specimen UA 11/22/2024 Urine, Clean Catch   Final    Color, UA 11/22/2024 Yellow  Yellow, Straw, Elizabet Final    Appearance, UA 11/22/2024 Hazy (A)  Clear Final    pH, UA  11/22/2024 8.0  5.0 - 8.0 Final    Specific Gravity, UA 11/22/2024 1.020  1.005 - 1.030 Final    Protein, UA 11/22/2024 Trace (A)  Negative Final    Glucose, UA 11/22/2024 Trace (A)  Negative Final    Ketones, UA 11/22/2024 1+ (A)  Negative Final    Bilirubin (UA) 11/22/2024 1+ (A)  Negative Final    Occult Blood UA 11/22/2024 Trace (A)  Negative Final    Nitrite, UA 11/22/2024 Negative  Negative Final    Urobilinogen, UA 11/22/2024 2.0-3.0 (A)  <2.0 EU/dL Final    Leukocytes, UA 11/22/2024 Negative  Negative Final    POCT Glucose 11/22/2024 98  70 - 110 mg/dL Final    SARS-CoV-2 RNA, Amplification, Qual 11/22/2024 Negative  Negative Final    Influenza A, Molecular 11/22/2024 Negative  Negative Final    Influenza B, Molecular 11/22/2024 Negative  Negative Final    Flu A & B Source 11/22/2024 Nasal swab   Final       - Independent review of images:      - Independent review of consultant's notes: Bryan     ASSESSMENT/PLAN:  Tremor   - bilateral hand tremor with posture and action, re-mergence of R hand during gait eval   - no significant bradykinesia, difficult to assess for cogwheel rigidity -- difficulty relaxing   - currently on primidone 1/2 tab BID (followed instructions on rx, refilled and restarted titration)  - increase to 1 tab BID, hold off on increase to 2 tabs BID -- thinks maybe this caused her to feel drowsy   - on depakote, likely worsening tremor. History of bipolar disorder.     Orders Placed This Encounter    primidone (MYSOLINE) 50 MG Tab         Follow up: with Dr. Adams in 3 months     This is a patient with a neurologic diagnosis whose overall, ongoing care is being managed and monitored by me and our Neurology clinic.   As such, since 2024,  is the appropriate add-on code to accompany the other E/M billing for this visit.      Collaborating Physician, Dr. Dawn, was available during today's encounter. Any change to plan along with cosign to appear in the EMR.            Sammie  KALEB Astorga   [facility]  Department of Neurology  Movement Disorders             [1]   Current Outpatient Medications on File Prior to Visit   Medication Sig Dispense Refill    cetirizine (ZYRTEC) 10 MG tablet Take 1 tablet (10 mg total) by mouth once daily. 30 tablet 0    dicyclomine (BENTYL) 10 MG capsule Take 1 capsule (10 mg total) by mouth 3 (three) times daily as needed (abdominal cramping). 120 capsule 0    divalproex (DEPAKOTE) 500 MG TbEC Take 1 tablet (500 mg total) by mouth 2 (two) times a day. 60 tablet 2    estradioL (ESTRACE) 0.01 % (0.1 mg/gram) vaginal cream Place 1 g vaginally once daily. 42.5 g 3    FLUoxetine 20 MG capsule Take 1 capsule (20 mg total) by mouth once daily. 30 capsule 2    Lactobac no.41/Bifidobact no.7 (PROBIOTIC-10 ORAL) Take 1 capsule by mouth once daily.      latanoprost 0.005 % ophthalmic solution Place 1 drop into both eyes every evening.      loperamide (IMODIUM) 2 mg capsule Take 2 mg by mouth once as needed for Diarrhea.      magnesium oxide (MAG-OX) 400 mg (241.3 mg magnesium) tablet Take 400 mg by mouth once daily.      metoprolol tartrate (LOPRESSOR) 25 MG tablet Take 25 mg by mouth 2 (two) times daily.      mirtazapine (REMERON) 30 MG tablet Take 30 mg by mouth every evening.      pravastatin (PRAVACHOL) 20 MG tablet Take 1 tablet (20 mg total) by mouth once daily. 30 tablet 11    [DISCONTINUED] primidone (MYSOLINE) 50 MG Tab Take 1 tablet (50 mg total) by mouth 2 (two) times a day. Take half a tablet (25 mg) twice a day for 2 weeks. Take 50 mg twice a day for 2 weeks Take 100 in the AM and 50 mg in the PM for 2 weeks Take 100 mg in the AM and 100 mg in the PM until I see you again 60 tablet 11    famotidine (PEPCID) 20 MG tablet Take 1 tablet (20 mg total) by mouth 2 (two) times daily as needed for Heartburn. 60 tablet 11     No current facility-administered medications on file prior to visit.

## 2025-04-16 ENCOUNTER — TELEPHONE (OUTPATIENT)
Facility: CLINIC | Age: 65
End: 2025-04-16
Payer: MEDICARE

## 2025-04-25 ENCOUNTER — TELEPHONE (OUTPATIENT)
Dept: GASTROENTEROLOGY | Facility: CLINIC | Age: 65
End: 2025-04-25
Payer: MEDICARE

## 2025-04-25 DIAGNOSIS — E78.5 HYPERLIPIDEMIA, UNSPECIFIED HYPERLIPIDEMIA TYPE: ICD-10-CM

## 2025-04-25 RX ORDER — PRAVASTATIN SODIUM 20 MG/1
20 TABLET ORAL DAILY
Qty: 30 TABLET | Refills: 11 | Status: SHIPPED | OUTPATIENT
Start: 2025-04-25

## 2025-04-25 NOTE — TELEPHONE ENCOUNTER
----- Message from Vanda sent at 4/25/2025 10:42 AM CDT -----  Regarding: Consult/Advisory  Contact: HAO STANLEY [[id number]]  Consult/AdvisoryName Of Caller:HAO STANLEY [[id number]]Contact Preference:[phone] (home)  Nature of call:  Pt is calling dicyclomine (BENTYL) 10 MG capsule does not work if something over the counter that will work. Please call and advise.

## 2025-05-12 DIAGNOSIS — E78.5 HYPERLIPIDEMIA, UNSPECIFIED HYPERLIPIDEMIA TYPE: ICD-10-CM

## 2025-05-12 RX ORDER — PRAVASTATIN SODIUM 20 MG/1
20 TABLET ORAL DAILY
Qty: 30 TABLET | Refills: 11 | Status: SHIPPED | OUTPATIENT
Start: 2025-05-12

## 2025-05-14 DIAGNOSIS — F31.32 BIPOLAR AFFECTIVE DISORDER, CURRENTLY DEPRESSED, MODERATE: ICD-10-CM

## 2025-05-14 RX ORDER — FLUOXETINE 20 MG/1
20 CAPSULE ORAL DAILY
Qty: 30 CAPSULE | Refills: 2 | Status: SHIPPED | OUTPATIENT
Start: 2025-05-14

## 2025-06-12 ENCOUNTER — OFFICE VISIT (OUTPATIENT)
Dept: FAMILY MEDICINE | Facility: HOSPITAL | Age: 65
End: 2025-06-12
Payer: MEDICARE

## 2025-06-12 VITALS
SYSTOLIC BLOOD PRESSURE: 132 MMHG | BODY MASS INDEX: 23.69 KG/M2 | HEART RATE: 74 BPM | WEIGHT: 142.19 LBS | DIASTOLIC BLOOD PRESSURE: 77 MMHG | HEIGHT: 65 IN | RESPIRATION RATE: 18 BRPM | OXYGEN SATURATION: 100 %

## 2025-06-12 DIAGNOSIS — R51.9 NONINTRACTABLE HEADACHE, UNSPECIFIED CHRONICITY PATTERN, UNSPECIFIED HEADACHE TYPE: Primary | ICD-10-CM

## 2025-06-12 PROCEDURE — 99214 OFFICE O/P EST MOD 30 MIN: CPT

## 2025-06-12 NOTE — PROGRESS NOTES
[facility]    Subjective:     Ashli Kumar is a 64 y.o. year old female who presents to clinic for     Headaches:  - Getting daily, usually around noon  - Located in front of head   - Started last month  - Feels like a pressure  - Loud noises and bright lights do not affect it  - Tries to relax or lay down; states lasts about 5-10 minutes   - No aura, no recent life changes or stressors  - Can have some trouble with loud TV  - States went to eye doctor yesterday, new prescription glasses ordered       Patient Active Problem List    Diagnosis Date Noted    Essential tremor 01/27/2025    Diarrhea 01/27/2025    Hammer toe 06/11/2024    Heel pain 06/11/2024    Allergic rhinitis 04/12/2024    Hyperlipidemia 04/12/2024    Decreased range of motion of both knees 06/22/2023    Impaired strength of lower extremity 06/22/2023    Osteoarthritis 06/20/2023    Gastroesophageal reflux disease 05/12/2023    Irregular bowel habits 05/12/2023    Posture imbalance 03/14/2023    Decreased range of motion of left shoulder 03/14/2023    Weakness of shoulder 03/14/2023    Mental health problem 01/02/2023    Diverticular stricture 07/20/2022    Lower abdominal pain 05/29/2022    Rectal bleeding 05/27/2022    Bipolar affective disorder, currently depressed, moderate 05/27/2022    Chronic heart failure with preserved ejection fraction 05/13/2019    Angina, class III 05/01/2019    Screening for colorectal cancer 05/12/2017        Review of patient's allergies indicates:   Allergen Reactions    Codeine Hives    Sulfa (sulfonamide antibiotics) Hives    Benzoate analogues Itching        Past Medical History:   Diagnosis Date    Allergies     Anxiety     Arthritis     CHF (congestive heart failure)     Depression     Diverticular disease of large intestine without perforation or abscess     GERD (gastroesophageal reflux disease)     High cholesterol     History of fainting     Hypertension     Shortness of breath     states  sometimes gets short winded    SOB (shortness of breath) 2019    Unspecified chronic bronchitis     not chronic episode this year-    Unspecified glaucoma       Past Surgical History:   Procedure Laterality Date    BILATERAL TUBAL LIGATION      BREAST BIOPSY Left     [facility]    BREAST SURGERY       SECTION      COLONOSCOPY N/A 2017    Procedure: COLONOSCOPY Miralax;  Surgeon: Buddy Butcher Jr., MD;  Location: [facility] ENDO;  Service: Endoscopy;  Laterality: N/A;    COLONOSCOPY N/A 2022    Procedure: COLONOSCOPY;  Surgeon: TREV Kay MD;  Location: [facility] ENDO;  Service: Endoscopy;  Laterality: N/A;    COLONOSCOPY N/A 2022    Procedure: COLONOSCOPY;  Surgeon: TREV Kay MD;  Location: [facility];  Service: Endoscopy;  Laterality: N/A;    ESOPHAGOGASTRODUODENOSCOPY N/A 7/3/2023    Procedure: EGD (ESOPHAGOGASTRODUODENOSCOPY);  Surgeon: Mary Cotton MD;  Location: [facility];  Service: Endoscopy;  Laterality: N/A;    EYE SURGERY Bilateral     cataract removal    FLEXIBLE SIGMOIDOSCOPY N/A 2022    Procedure: SIGMOIDOSCOPY, FLEXIBLE;  Surgeon: TREV Kay MD;  [facility];  Service: Colon and Rectal;  Laterality: N/A;    HYSTERECTOMY      KIDNEY SURGERY Right     done at [facility], reported as mass by patient    LAPAROSCOPIC SIGMOIDECTOMY Left 2022    Procedure: COLECTOMY, SIGMOID, LAPAROSCOPIC, ERAS low;  Surgeon: TREV Kay MD;  [facility];  Service: Colon and Rectal;  Laterality: Left;    LEFT HEART CATHETERIZATION Left 2019    Procedure: Left heart cath;  Surgeon: Gerhard Krishnan MD;  Location: [facility] CATH LAB;  Service: Cardiology;  Laterality: Left;    MOBILIZATION OF SPLENIC FLEXURE N/A 2022    Procedure: MOBILIZATION, SPLENIC FLEXURE;  Surgeon: TREV Kay MD;  Location: [facility] OR 2ND FLR;  Service: Colon and Rectal;  Laterality: N/A;    OOPHORECTOMY      s-section        Family  "History   Problem Relation Name Age of Onset    Colon cancer Neg Hx      Esophageal cancer Neg Hx        Social History     Tobacco Use    Smoking status: Never    Smokeless tobacco: Never   Substance Use Topics    Alcohol use: Not Currently     Comment: 12-31-22      Review of Systems   Constitutional:  Negative for chills and fever.   Respiratory:  Negative for shortness of breath.    Cardiovascular:  Negative for chest pain.   Gastrointestinal:  Negative for nausea and vomiting.   Neurological:  Negative for dizziness.     Objective:     Vitals:    06/12/25 1314   BP: 132/77   BP Location: Left arm   Patient Position: Sitting   Pulse: 74   Resp: 18   SpO2: 100%   Weight: 64.5 kg (142 lb 3.2 oz)   Height: 5' 5" (1.651 m)     Physical Exam  Constitutional:       General: She is not in acute distress.     Appearance: She is not toxic-appearing.   HENT:      Head: Normocephalic and atraumatic.      Right Ear: External ear normal.      Left Ear: External ear normal.      Nose: Nose normal.   Eyes:      Extraocular Movements: Extraocular movements intact.   Cardiovascular:      Pulses: Normal pulses.      Heart sounds: Normal heart sounds. No murmur heard.     No gallop.   Pulmonary:      Effort: Pulmonary effort is normal.      Breath sounds: Normal breath sounds.   Musculoskeletal:         General: Normal range of motion.   Skin:     General: Skin is warm and dry.   Neurological:      Mental Status: She is alert.   Psychiatric:         Mood and Affect: Mood normal.       Assessment/Plan:     Ashli Kumar is a 64 y.o. year old female who presents to clinic for     1. Nonintractable headache, unspecified chronicity pattern, unspecified headache type (Primary)  - Chronic, uncontrolled  - No acutely concerning findings from history or exam to warrant emergent imaging  - Discussed possible etiologies with patient, seems possible tension vs sinus headache  - Patient has not tried any therapies yet, encouraged " her to try acetaminophen for pain and keep journal for possible triggers  - Encouraged her to discuss with Neurology at next appointment in 1 month if acetaminophen does not help with symptoms      ________________________  Chadd Cuevas Jr, MD  [facility] PGY-2

## 2025-06-27 ENCOUNTER — LAB VISIT (OUTPATIENT)
Dept: LAB | Facility: HOSPITAL | Age: 65
End: 2025-06-27
Payer: MEDICARE

## 2025-06-27 DIAGNOSIS — Z79.899 OTHER LONG TERM (CURRENT) DRUG THERAPY: Primary | ICD-10-CM

## 2025-06-27 LAB
ABSOLUTE EOSINOPHIL (OHS): 0.06 K/UL
ABSOLUTE MONOCYTE (OHS): 0.36 K/UL (ref 0.3–1)
ABSOLUTE NEUTROPHIL COUNT (OHS): 1.99 K/UL (ref 1.8–7.7)
ALBUMIN SERPL BCP-MCNC: 4 G/DL (ref 3.5–5.2)
ALP SERPL-CCNC: 48 UNIT/L (ref 38–126)
ALT SERPL W/O P-5'-P-CCNC: 24 UNIT/L (ref 10–44)
ANION GAP (OHS): 7 MMOL/L (ref 8–16)
AST SERPL-CCNC: 22 UNIT/L (ref 15–46)
BASOPHILS # BLD AUTO: 0.02 K/UL
BASOPHILS NFR BLD AUTO: 0.4 %
BILIRUB SERPL-MCNC: 0.5 MG/DL (ref 0.1–1)
BUN SERPL-MCNC: 14 MG/DL (ref 7–17)
CALCIUM SERPL-MCNC: 9.4 MG/DL (ref 8.7–10.5)
CHLORIDE SERPL-SCNC: 100 MMOL/L (ref 95–110)
CHOLEST SERPL-MCNC: 215 MG/DL (ref 120–199)
CHOLEST/HDLC SERPL: 2.8 {RATIO} (ref 2–5)
CO2 SERPL-SCNC: 32 MMOL/L (ref 23–29)
CREAT SERPL-MCNC: 0.7 MG/DL (ref 0.5–1.4)
EAG (OHS): 111 MG/DL (ref 68–131)
ERYTHROCYTE [DISTWIDTH] IN BLOOD BY AUTOMATED COUNT: 12.4 % (ref 11.5–14.5)
GFR SERPLBLD CREATININE-BSD FMLA CKD-EPI: >60 ML/MIN/1.73/M2
GLUCOSE SERPL-MCNC: 91 MG/DL (ref 70–110)
HBA1C MFR BLD: 5.5 % (ref 4–5.6)
HCT VFR BLD AUTO: 37.4 % (ref 37–48.5)
HDLC SERPL-MCNC: 78 MG/DL (ref 40–75)
HDLC SERPL: 36.3 % (ref 20–50)
HGB BLD-MCNC: 12.6 GM/DL (ref 12–16)
IMM GRANULOCYTES # BLD AUTO: 0.01 K/UL (ref 0–0.04)
IMM GRANULOCYTES NFR BLD AUTO: 0.2 % (ref 0–0.5)
LDLC SERPL CALC-MCNC: 125.4 MG/DL (ref 63–159)
LYMPHOCYTES # BLD AUTO: 2.98 K/UL (ref 1–4.8)
MCH RBC QN AUTO: 31.2 PG (ref 27–31)
MCHC RBC AUTO-ENTMCNC: 33.7 G/DL (ref 32–36)
MCV RBC AUTO: 93 FL (ref 82–98)
NONHDLC SERPL-MCNC: 137 MG/DL
NUCLEATED RBC (/100WBC) (OHS): 0 /100 WBC
PLATELET # BLD AUTO: 160 K/UL (ref 150–450)
PMV BLD AUTO: 12.5 FL (ref 9.2–12.9)
POTASSIUM SERPL-SCNC: 4.7 MMOL/L (ref 3.5–5.1)
PROT SERPL-MCNC: 6.9 GM/DL (ref 6–8.4)
RBC # BLD AUTO: 4.04 M/UL (ref 4–5.4)
RELATIVE EOSINOPHIL (OHS): 1.1 %
RELATIVE LYMPHOCYTE (OHS): 55 % (ref 18–48)
RELATIVE MONOCYTE (OHS): 6.6 % (ref 4–15)
RELATIVE NEUTROPHIL (OHS): 36.7 % (ref 38–73)
SODIUM SERPL-SCNC: 139 MMOL/L (ref 136–145)
T4 FREE SERPL-MCNC: 0.72 NG/DL (ref 0.71–1.51)
T4 FREE SERPL-MCNC: 0.76 NG/DL (ref 0.71–1.51)
TRIGL SERPL-MCNC: 58 MG/DL (ref 30–150)
TSH SERPL-ACNC: 5.02 UIU/ML (ref 0.4–4)
VALPROATE SERPL-MCNC: 26.3 UG/ML (ref 50–100)
WBC # BLD AUTO: 5.42 K/UL (ref 3.9–12.7)

## 2025-06-27 PROCEDURE — 82040 ASSAY OF SERUM ALBUMIN: CPT | Mod: PN

## 2025-06-27 PROCEDURE — 84443 ASSAY THYROID STIM HORMONE: CPT | Mod: PN

## 2025-06-27 PROCEDURE — 80164 ASSAY DIPROPYLACETIC ACD TOT: CPT | Mod: PN

## 2025-06-27 PROCEDURE — 83036 HEMOGLOBIN GLYCOSYLATED A1C: CPT | Mod: PN

## 2025-06-27 PROCEDURE — 85025 COMPLETE CBC W/AUTO DIFF WBC: CPT | Mod: PN

## 2025-06-27 PROCEDURE — 80061 LIPID PANEL: CPT | Mod: PN

## 2025-06-27 PROCEDURE — 84439 ASSAY OF FREE THYROXINE: CPT | Mod: PN

## 2025-06-27 PROCEDURE — 36415 COLL VENOUS BLD VENIPUNCTURE: CPT | Mod: PN

## 2025-07-15 ENCOUNTER — TELEPHONE (OUTPATIENT)
Dept: FAMILY MEDICINE | Facility: HOSPITAL | Age: 65
End: 2025-07-15
Payer: MEDICARE

## 2025-07-15 NOTE — TELEPHONE ENCOUNTER
Copied from CRM #[id number]. Topic: General Inquiry - Patient Advice  >> Jul 15, 2025  2:36 PM Janice wrote:  Type:  Patient Requesting Referral    Who Called: Pt   Does the patient already have the specialty appointment scheduled?: N/A  If yes, what is the date of that appointment?: No   Referral to What Specialty: endocrinologist  Reason for Referral: low thyroid   Does the patient want the referral with a specific physician?: Open   [facility] the [facility]   Patient Requesting a Response?: Yes   Would the patient rather a call back or a response via [facility]? Call   Best Call Back Number:[phone]   Additional Information:

## 2025-07-17 DIAGNOSIS — R79.89 ELEVATED TSH: Primary | ICD-10-CM

## 2025-08-11 ENCOUNTER — TELEPHONE (OUTPATIENT)
Dept: FAMILY MEDICINE | Facility: HOSPITAL | Age: 65
End: 2025-08-11
Payer: MEDICARE

## (undated) DEVICE — BANDAGE ADHESIVE PLAS STRL 1X3

## (undated) DEVICE — TROCAR ENDOPATH XCEL 5MM 7.5CM

## (undated) DEVICE — SEE MEDLINE ITEM 157144

## (undated) DEVICE — NDL BOX COUNTER

## (undated) DEVICE — DRAPE ABDOMINAL TIBURON 14X11

## (undated) DEVICE — HEMOSTAT SURGICEL PWD 3G

## (undated) DEVICE — DRAPE INCISE IOBAN 2 23X17IN

## (undated) DEVICE — MARKER SKIN STND TIP BLUE BARR

## (undated) DEVICE — CUTTER PROXIMATE BLUE 75MM

## (undated) DEVICE — LEGGINGS 48X31 INCH

## (undated) DEVICE — COVER LIGHT HANDLE 80/CA

## (undated) DEVICE — TROCAR ENDOPATH XCEL 5X75MM

## (undated) DEVICE — BOWL STERILE LARGE 32OZ

## (undated) DEVICE — SUT COATED VICRYL 4/0 27IN

## (undated) DEVICE — RELOAD PROXIMATE CUT BLUE 75MM

## (undated) DEVICE — ELECTRODE REM PLYHSV RETURN 9

## (undated) DEVICE — TRAY FOLEY 16FR INFECTION CONT

## (undated) DEVICE — CLOSURE SKIN STERI STRIP 1/2X4

## (undated) DEVICE — SUT 3/0 27IN PDS II VIO MO

## (undated) DEVICE — TIP YANKAUERS BULB NO VENT

## (undated) DEVICE — SUT CTD VICRYL VIL BR CR/SH

## (undated) DEVICE — LUBRICANT SURGILUBE 2 OZ

## (undated) DEVICE — KIT ANTIFOG W/SPONG & FLUID

## (undated) DEVICE — TRAY SKIN SCRUB WET PREMIUM

## (undated) DEVICE — TOWEL OR DISP STRL BLUE 4/PK

## (undated) DEVICE — STAPLER CONTOUR 40MM GREEN

## (undated) DEVICE — POUCH SENSURA MIO 3/8X2 1/8IN

## (undated) DEVICE — TUBING SUCTION STERILE

## (undated) DEVICE — ADHESIVE DERMABOND ADVANCED

## (undated) DEVICE — KIT GELPORT LAPAROSCOPIC ABD

## (undated) DEVICE — SEALER LIGASURE LAP 37CM 5MM

## (undated) DEVICE — TUBING HF INSUFFLATION W/ FLTR

## (undated) DEVICE — DRAPE CORETEMP FLD WRM 56X62IN

## (undated) DEVICE — SEE MEDLINE ITEM 156902

## (undated) DEVICE — KIT VUETIP TROCAR SWAB

## (undated) DEVICE — SOL NS 1000CC

## (undated) DEVICE — PAD PINK TRENDELENBURG POS XL